# Patient Record
Sex: FEMALE | Race: OTHER | NOT HISPANIC OR LATINO
[De-identification: names, ages, dates, MRNs, and addresses within clinical notes are randomized per-mention and may not be internally consistent; named-entity substitution may affect disease eponyms.]

---

## 2018-06-13 ENCOUNTER — FORM ENCOUNTER (OUTPATIENT)
Age: 83
End: 2018-06-13

## 2018-06-14 ENCOUNTER — OUTPATIENT (OUTPATIENT)
Dept: OUTPATIENT SERVICES | Facility: HOSPITAL | Age: 83
LOS: 1 days | End: 2018-06-14
Payer: MEDICARE

## 2018-06-14 ENCOUNTER — APPOINTMENT (OUTPATIENT)
Dept: ORTHOPEDIC SURGERY | Facility: CLINIC | Age: 83
End: 2018-06-14
Payer: MEDICARE

## 2018-06-14 VITALS — BODY MASS INDEX: 25.52 KG/M2 | HEIGHT: 60 IN | WEIGHT: 130 LBS

## 2018-06-14 PROCEDURE — 73564 X-RAY EXAM KNEE 4 OR MORE: CPT

## 2018-06-14 PROCEDURE — 73564 X-RAY EXAM KNEE 4 OR MORE: CPT | Mod: 26,50

## 2018-06-14 PROCEDURE — 20610 DRAIN/INJ JOINT/BURSA W/O US: CPT | Mod: LT

## 2018-06-14 PROCEDURE — 72170 X-RAY EXAM OF PELVIS: CPT | Mod: 26

## 2018-06-14 PROCEDURE — 72170 X-RAY EXAM OF PELVIS: CPT

## 2018-10-04 ENCOUNTER — APPOINTMENT (OUTPATIENT)
Dept: ORTHOPEDIC SURGERY | Facility: CLINIC | Age: 83
End: 2018-10-04
Payer: MEDICARE

## 2018-10-04 DIAGNOSIS — Z86.2 PERSONAL HISTORY OF DISEASES OF THE BLOOD AND BLOOD-FORMING ORGANS AND CERTAIN DISORDERS INVOLVING THE IMMUNE MECHANISM: ICD-10-CM

## 2018-10-04 DIAGNOSIS — Z87.440 PERSONAL HISTORY OF URINARY (TRACT) INFECTIONS: ICD-10-CM

## 2018-10-04 DIAGNOSIS — Z87.01 PERSONAL HISTORY OF PNEUMONIA (RECURRENT): ICD-10-CM

## 2018-10-04 DIAGNOSIS — Z86.39 PERSONAL HISTORY OF OTHER ENDOCRINE, NUTRITIONAL AND METABOLIC DISEASE: ICD-10-CM

## 2018-10-04 DIAGNOSIS — Z60.2 PROBLEMS RELATED TO LIVING ALONE: ICD-10-CM

## 2018-10-04 DIAGNOSIS — Z87.39 PERSONAL HISTORY OF OTHER DISEASES OF THE MUSCULOSKELETAL SYSTEM AND CONNECTIVE TISSUE: ICD-10-CM

## 2018-10-04 DIAGNOSIS — Z86.59 PERSONAL HISTORY OF OTHER MENTAL AND BEHAVIORAL DISORDERS: ICD-10-CM

## 2018-10-04 PROCEDURE — 20610 DRAIN/INJ JOINT/BURSA W/O US: CPT | Mod: 50

## 2018-10-04 RX ADMIN — DEXAMETHASONE SODIUM PHOSPHATE 1 MG/30ML: 4 INJECTION, SOLUTION INTRA-ARTICULAR; INTRALESIONAL; INTRAMUSCULAR; INTRAVENOUS; SOFT TISSUE at 00:00

## 2018-10-04 SDOH — SOCIAL STABILITY - SOCIAL INSECURITY: PROBLEMS RELATED TO LIVING ALONE: Z60.2

## 2018-10-11 ENCOUNTER — APPOINTMENT (OUTPATIENT)
Dept: ORTHOPEDIC SURGERY | Facility: CLINIC | Age: 83
End: 2018-10-11

## 2019-04-24 ENCOUNTER — FORM ENCOUNTER (OUTPATIENT)
Age: 84
End: 2019-04-24

## 2019-04-25 ENCOUNTER — APPOINTMENT (OUTPATIENT)
Dept: ORTHOPEDIC SURGERY | Facility: CLINIC | Age: 84
End: 2019-04-25
Payer: MEDICARE

## 2019-04-25 ENCOUNTER — OUTPATIENT (OUTPATIENT)
Dept: OUTPATIENT SERVICES | Facility: HOSPITAL | Age: 84
LOS: 1 days | End: 2019-04-25
Payer: MEDICARE

## 2019-04-25 PROCEDURE — 20610 DRAIN/INJ JOINT/BURSA W/O US: CPT | Mod: 50

## 2019-04-25 PROCEDURE — 99213 OFFICE O/P EST LOW 20 MIN: CPT | Mod: 25

## 2019-04-25 PROCEDURE — 73564 X-RAY EXAM KNEE 4 OR MORE: CPT | Mod: 26,50

## 2019-04-25 PROCEDURE — 73564 X-RAY EXAM KNEE 4 OR MORE: CPT

## 2019-04-25 RX ORDER — DEXAMETHASONE SODIUM PHOSPHATE 4 MG/ML
120 INJECTION, SOLUTION INTRA-ARTICULAR; INTRALESIONAL; INTRAMUSCULAR; INTRAVENOUS; SOFT TISSUE
Refills: 0 | Status: COMPLETED | OUTPATIENT
Start: 2019-04-25

## 2019-04-25 RX ADMIN — DEXAMETHASONE SODIUM PHOSPHATE 1 MG/30ML: 4 INJECTION, SOLUTION INTRA-ARTICULAR; INTRALESIONAL; INTRAMUSCULAR; INTRAVENOUS; SOFT TISSUE at 00:00

## 2019-04-25 NOTE — PROCEDURE
[Injection] : Injection [Knee Joint] : knee joint [Bilateral] : of bilateral [Patient] : patient [Risk] : risk [Benefits] : benefits [Alternatives] : alternatives [Bleeding] : bleeding [Infection] : infection [Allergic Reaction] : allergic reaction [Alcohol] : Alcohol [Verbal Consent Obtained] : verbal consent was obtained prior to the procedure [Betadine] : Betadine [Ethyl Chloride Spray] : ethyl chloride spray was used as a topical anesthetic [20] : a 20-gauge [Lateral] : lateral [1% Lidocaine___(mL)] : [unfilled] mL of 1% Lidocaine [1.5  inch] : 1.5 inch needle [Dexameth. 4mg/mL___(mL)] : [unfilled] ~U mL of 4 mg/mL dexamethasone [Bandage Applied] : a bandage [Tolerated Well] : The patient tolerated the procedure well [No Strenuous Activity___day(s)] : avoid strenuous activity for [unfilled] day(s) [None] : none [PRN] : as needed

## 2019-04-25 NOTE — DISCUSSION/SUMMARY
[de-identified] : Diagnosis, prognosis and treatment options discussed. Conservative management including activity modification, therapeutic exercise with PT, topical and oral antiinflammatories, steroid and HA injections discussed. Patient was given a steroid injection to bilateral knees today. She will follow up pending HA authorization

## 2019-04-25 NOTE — HISTORY OF PRESENT ILLNESS
[de-identified] : Patient presents for follow up of bilateral knee pain. She states l;eft knee has become worse in last couple months. She reports pain worse after walking long distances and when waking up in the morning. She takes NSAIDs prn for pain. She had steroid injection in the past with minimal relief.

## 2019-04-25 NOTE — PHYSICAL EXAM
[de-identified] : Constitutional: Well appearing. No acute distress.\par Mental Status: Alert & oriented to person, place and time. Normal affect.\par Pulmonary: No respiratory distress. Normal chest excursion.\par Abdomen: Soft and not distended.\par  \par Gait: Normal.\par Ambulatory assist devices: None.\par  \par Cervical spine: Skin intact. No visible deformity. Painless active ROM without evident restriction.\par Bilateral upper extremities: Skin intact. No deformity. Painless active ROM without evident restriction.\par Thoracolumbar spine: No deformity. No tenderness. No radicular pain on passive straight leg raise bilaterally.\par  \par Pelvis: No pelvic obliquity. No tenderness.\par Leg lengths: Equal.\par  \par Bilateral Hips: No swelling or deformity. No focal tenderness. Painless and unrestricted range of motion. No crepitation. Hip flexor and abductor power 5/5. \par  \par Right Knee:\par Skin intact.\par No surgical scars.\par No erythema or ecchymosis.\par No swelling or effusion.\par No deformity.\par Mild medial joint line tenderness.\par Painless ROM from full extension to 120 degrees of flexion.\par Central patellar tracking.\par No crepitation.\par No instability.\par Quadriceps power 5/5.\par  \par Left Knee:\par Skin intact.\par No surgical scars.\par No erythema or ecchymosis.\par No swelling or effusion.\par No deformity.\par Mild medial joint line tenderness.\par Painless ROM from full extension to 120 degrees of flexion.\par Central patellar tracking.\par No crepitation.\par No instability.\par Quadriceps power 5/5.\par  \par Neurological: Intact distal crude touch sensation. Normal distal motor power. Symmetric knee jerk and ankle jerk reflexes bilaterally.\par Cardiovascular: Palpable dorsalis pedis and posterior tibialis pulses. Brisk capillary refill. No peripheral edema.\par Lymphatics: No peripheral adenopathy appreciated.\par \par  [de-identified] : X-rays taken today demonstrate bilateral knees with moderate medial compartment joint space narrowing

## 2019-04-25 NOTE — ADDENDUM
[FreeTextEntry1] : Dr. Sanches was physically present during the key portions the encounter, provided assistance as needed, and formulated the assessment and plan as documented above.\par \par

## 2019-10-02 PROBLEM — Z60.2 PERSON LIVING ALONE: Status: ACTIVE | Noted: 2018-06-14

## 2020-07-27 ENCOUNTER — RESULT REVIEW (OUTPATIENT)
Age: 85
End: 2020-07-27

## 2020-07-27 ENCOUNTER — OUTPATIENT (OUTPATIENT)
Dept: OUTPATIENT SERVICES | Facility: HOSPITAL | Age: 85
LOS: 1 days | End: 2020-07-27
Payer: MEDICARE

## 2020-07-27 ENCOUNTER — APPOINTMENT (OUTPATIENT)
Dept: ORTHOPEDIC SURGERY | Facility: CLINIC | Age: 85
End: 2020-07-27
Payer: MEDICARE

## 2020-07-27 PROCEDURE — 73562 X-RAY EXAM OF KNEE 3: CPT

## 2020-07-27 PROCEDURE — 99213 OFFICE O/P EST LOW 20 MIN: CPT | Mod: 25

## 2020-07-27 PROCEDURE — 73562 X-RAY EXAM OF KNEE 3: CPT | Mod: 26,50

## 2020-07-27 PROCEDURE — 20610 DRAIN/INJ JOINT/BURSA W/O US: CPT | Mod: RT

## 2020-07-31 NOTE — HISTORY OF PRESENT ILLNESS
[de-identified] : 89 yo female previous patient of Dr. Bryant seen for bilateral knee DJD L worse than right with cortisone injections 2 years ago with moderate relief. Has been ambulating more recently since COVID and reports worsening pain along medial knees. Occasional tylenol but no NSAIDs.

## 2020-07-31 NOTE — DISCUSSION/SUMMARY
[de-identified] : A\par Bilateral knee OA\par \par P\par Mobic\par Exercises\par CSI\par Activity modification\par RTO 1 month\par \par \par All medical record entries made by the PA/Scribe/Fellow are at my, Dr. Jani Mackenzie's direction and personally dictated by me on 07/27/2020]. I have reviewed the chart and agree that the record accurately reflects my personal performance of the history, physical exam, assessment, and plan. I have also personally directed reviewed, and agreed with the chart.\par

## 2020-07-31 NOTE — PHYSICAL EXAM
[de-identified] : BLE: pain at medial and lateral joint line of knees, crepitus, ROM 0-130, neg log roll. trace effusion, skin intact. lig stable. DNVI. [de-identified] : Bilateral xrays knee show Moderate OA-tricompartmental

## 2020-07-31 NOTE — PROCEDURE
[de-identified] : After verbal consent under sterile conditions both knees were injected using 40mg kenalog and 4cc lidocaine plain for which the atient tolerated wel expressing interval improvement in symptoms.

## 2020-08-24 ENCOUNTER — EMERGENCY (EMERGENCY)
Facility: HOSPITAL | Age: 85
LOS: 1 days | Discharge: HOME | End: 2020-08-24
Attending: EMERGENCY MEDICINE | Admitting: EMERGENCY MEDICINE
Payer: MEDICARE

## 2020-08-24 VITALS
OXYGEN SATURATION: 97 % | HEART RATE: 71 BPM | DIASTOLIC BLOOD PRESSURE: 72 MMHG | SYSTOLIC BLOOD PRESSURE: 182 MMHG | RESPIRATION RATE: 18 BRPM | TEMPERATURE: 98 F

## 2020-08-24 DIAGNOSIS — R11.0 NAUSEA: ICD-10-CM

## 2020-08-24 DIAGNOSIS — R51 HEADACHE: ICD-10-CM

## 2020-08-24 PROCEDURE — 99285 EMERGENCY DEPT VISIT HI MDM: CPT

## 2020-08-24 PROCEDURE — 93010 ELECTROCARDIOGRAM REPORT: CPT

## 2020-08-24 RX ORDER — METOCLOPRAMIDE HCL 10 MG
10 TABLET ORAL ONCE
Refills: 0 | Status: COMPLETED | OUTPATIENT
Start: 2020-08-24 | End: 2020-08-24

## 2020-08-24 RX ORDER — ACETAMINOPHEN 500 MG
650 TABLET ORAL ONCE
Refills: 0 | Status: DISCONTINUED | OUTPATIENT
Start: 2020-08-24 | End: 2020-08-25

## 2020-08-24 NOTE — ED ADULT TRIAGE NOTE - ARRIVAL INFO ADDITIONAL COMMENTS
pt c/o head pressure, abd pain for "a long time".  pt states her PMD just can't figure out what is wrong with her.

## 2020-08-24 NOTE — ED ADULT NURSE NOTE - CHPI ED NUR SYMPTOMS NEG
no weakness/no chills/no decreased eating/drinking/no vomiting/no fever/no dizziness/no tingling/no nausea

## 2020-08-24 NOTE — ED ADULT NURSE NOTE - OBJECTIVE STATEMENT
Received ambulatory with chief complaints of headache rated as 9/10 and nausea. AOX4, speaking full sentences. +PERRLA.  Patient denies chest pain, shortness of breath, difficulty breathing and any form of distress not noted.  Resps even and nonlabored. No obvious trauma/injury/deformity noted. Patient oriented to ED area. All needs attended. POC reviewed. Hourly rounding in progress.

## 2020-08-25 VITALS
SYSTOLIC BLOOD PRESSURE: 161 MMHG | RESPIRATION RATE: 18 BRPM | TEMPERATURE: 98 F | HEART RATE: 65 BPM | OXYGEN SATURATION: 97 % | DIASTOLIC BLOOD PRESSURE: 78 MMHG

## 2020-08-25 LAB
ANION GAP SERPL CALC-SCNC: 12 MMOL/L — SIGNIFICANT CHANGE UP (ref 5–17)
BASOPHILS # BLD AUTO: 0.02 K/UL — SIGNIFICANT CHANGE UP (ref 0–0.2)
BASOPHILS NFR BLD AUTO: 0.2 % — SIGNIFICANT CHANGE UP (ref 0–2)
BUN SERPL-MCNC: 20 MG/DL — SIGNIFICANT CHANGE UP (ref 7–23)
CALCIUM SERPL-MCNC: 9.4 MG/DL — SIGNIFICANT CHANGE UP (ref 8.4–10.5)
CHLORIDE SERPL-SCNC: 96 MMOL/L — SIGNIFICANT CHANGE UP (ref 96–108)
CO2 SERPL-SCNC: 26 MMOL/L — SIGNIFICANT CHANGE UP (ref 22–31)
CREAT SERPL-MCNC: 0.74 MG/DL — SIGNIFICANT CHANGE UP (ref 0.5–1.3)
EOSINOPHIL # BLD AUTO: 0.08 K/UL — SIGNIFICANT CHANGE UP (ref 0–0.5)
EOSINOPHIL NFR BLD AUTO: 1 % — SIGNIFICANT CHANGE UP (ref 0–6)
GLUCOSE SERPL-MCNC: 117 MG/DL — HIGH (ref 70–99)
HCT VFR BLD CALC: 34.5 % — SIGNIFICANT CHANGE UP (ref 34.5–45)
HGB BLD-MCNC: 11.6 G/DL — SIGNIFICANT CHANGE UP (ref 11.5–15.5)
IMM GRANULOCYTES NFR BLD AUTO: 0.4 % — SIGNIFICANT CHANGE UP (ref 0–1.5)
LYMPHOCYTES # BLD AUTO: 1.36 K/UL — SIGNIFICANT CHANGE UP (ref 1–3.3)
LYMPHOCYTES # BLD AUTO: 16.5 % — SIGNIFICANT CHANGE UP (ref 13–44)
MCHC RBC-ENTMCNC: 32.9 PG — SIGNIFICANT CHANGE UP (ref 27–34)
MCHC RBC-ENTMCNC: 33.6 GM/DL — SIGNIFICANT CHANGE UP (ref 32–36)
MCV RBC AUTO: 97.7 FL — SIGNIFICANT CHANGE UP (ref 80–100)
MONOCYTES # BLD AUTO: 0.72 K/UL — SIGNIFICANT CHANGE UP (ref 0–0.9)
MONOCYTES NFR BLD AUTO: 8.8 % — SIGNIFICANT CHANGE UP (ref 2–14)
NEUTROPHILS # BLD AUTO: 6.01 K/UL — SIGNIFICANT CHANGE UP (ref 1.8–7.4)
NEUTROPHILS NFR BLD AUTO: 73.1 % — SIGNIFICANT CHANGE UP (ref 43–77)
NRBC # BLD: 0 /100 WBCS — SIGNIFICANT CHANGE UP (ref 0–0)
PLATELET # BLD AUTO: 235 K/UL — SIGNIFICANT CHANGE UP (ref 150–400)
POTASSIUM SERPL-MCNC: 4.5 MMOL/L — SIGNIFICANT CHANGE UP (ref 3.5–5.3)
POTASSIUM SERPL-SCNC: 4.5 MMOL/L — SIGNIFICANT CHANGE UP (ref 3.5–5.3)
RBC # BLD: 3.53 M/UL — LOW (ref 3.8–5.2)
RBC # FLD: 12.2 % — SIGNIFICANT CHANGE UP (ref 10.3–14.5)
SODIUM SERPL-SCNC: 134 MMOL/L — LOW (ref 135–145)
TROPONIN T SERPL-MCNC: <0.01 NG/ML — SIGNIFICANT CHANGE UP (ref 0–0.01)
WBC # BLD: 8.22 K/UL — SIGNIFICANT CHANGE UP (ref 3.8–10.5)
WBC # FLD AUTO: 8.22 K/UL — SIGNIFICANT CHANGE UP (ref 3.8–10.5)

## 2020-08-25 PROCEDURE — 84484 ASSAY OF TROPONIN QUANT: CPT

## 2020-08-25 PROCEDURE — 99284 EMERGENCY DEPT VISIT MOD MDM: CPT | Mod: 25

## 2020-08-25 PROCEDURE — 70450 CT HEAD/BRAIN W/O DYE: CPT

## 2020-08-25 PROCEDURE — 93005 ELECTROCARDIOGRAM TRACING: CPT

## 2020-08-25 PROCEDURE — 80048 BASIC METABOLIC PNL TOTAL CA: CPT

## 2020-08-25 PROCEDURE — 36415 COLL VENOUS BLD VENIPUNCTURE: CPT

## 2020-08-25 PROCEDURE — 96374 THER/PROPH/DIAG INJ IV PUSH: CPT

## 2020-08-25 PROCEDURE — 70450 CT HEAD/BRAIN W/O DYE: CPT | Mod: 26

## 2020-08-25 PROCEDURE — 85025 COMPLETE CBC W/AUTO DIFF WBC: CPT

## 2020-08-25 RX ORDER — ACETAMINOPHEN 500 MG
650 TABLET ORAL ONCE
Refills: 0 | Status: COMPLETED | OUTPATIENT
Start: 2020-08-25 | End: 2020-08-25

## 2020-08-25 RX ADMIN — Medication 650 MILLIGRAM(S): at 01:00

## 2020-08-25 RX ADMIN — Medication 10 MILLIGRAM(S): at 00:26

## 2020-08-25 RX ADMIN — Medication 650 MILLIGRAM(S): at 00:38

## 2020-08-25 NOTE — ED PROVIDER NOTE - PHYSICAL EXAMINATION
CONST: nontoxic NAD speaking in full sentences  HEAD: atraumatic  EYES: conjunctivae clear  ENT: mmm  NECK: supple/FROM, no jvd, nttp  CARD: rrr no murmurs  CHEST: ctab no r/r/w, no stridor/retractions/tripoding  ABD: soft, nd, nttp, no rebound/guarding  EXT: FROM, symmetric distal pulses intact  SKIN: warm, dry, no rash, no pedal edema, cap refill <2sec  NEURO: a+ox3, no facial asymmetry, no aphasia, PERRL, EOMI, no neglect, CN II-XII intact, ftn wnl, neg pronator, 5/5 strength x4, gross sensation intact x4, normal gait

## 2020-08-25 NOTE — ED PROVIDER NOTE - OBJECTIVE STATEMENT
88F nonsmoker, hld, hypothyroid, c/o <48h constant nonpositional nonexertional nonradiating frontal/posterior head pressure unrelieved w/ assoc nausesa. pt reports similar chronic ha that usually resolves w/ nsaids so became worried when lasted >24h and was unrelieved s/p nsaids. no prior head imaging. no fever/chills, no dizziness, no neck pain/stiffness, no photophobia/vision changes, no uri/cough, no cp/sob, no abd pain/n/v, no diarrhea, no hematochezia/melena, no dysuria, no rash, no prior covid, no trauma, no etoh/ivdu.    at baseline, highly functional, a+ox3, +ADLs, ambulates unassisted, lives at home 88F nonsmoker, hld, hypothyroid, c/o <48h constant nonpositional nonexertional nonradiating frontal/posterior head pressure unrelieved w/ assoc nausea. pt is able to sleep. pt reports similar chronic ha that usually resolves w/ nsaids so became worried when lasted >24h and was unrelieved s/p nsaids. no prior head imaging. no fever/chills, no dizziness, no neck pain/stiffness, no photophobia/vision changes, no uri/cough, no cp/sob, no abd pain/n/v, no diarrhea, no hematochezia/melena, no dysuria, no rash, no prior covid, no trauma, no etoh/ivdu.    at baseline, highly functional, a+ox3, +ADLs, ambulates unassisted, lives at home.

## 2020-08-25 NOTE — ED PROVIDER NOTE - NSFOLLOWUPINSTRUCTIONS_ED_ALL_ED_FT
PLEASE REST AND REMAIN HYDRATED. CONTINUE IBUPROFEN/TYLENOL AS NEEDED FOR HEADACHE. PLEASE FOLLOW-UP WITH YOUR PCP IN 1-2 DAYS.    PLEASE RETURN TO THE EMERGENCY DEPARTMENT IF FEVER, WORSENING HEADACHE, NECK PAIN, CHEST PAIN, SHORTNESS OF BREATH, VOMITING, OTHER CONCERNING SYMPTOMS.

## 2020-08-25 NOTE — ED PROVIDER NOTE - PATIENT PORTAL LINK FT
You can access the FollowMyHealth Patient Portal offered by [facility] by registering at the following website: [email or web address]. By joining [facility]’s FollowMyHealth portal, you will also be able to view your health information using other applications (apps) compatible with our system.

## 2020-08-25 NOTE — ED PROVIDER NOTE - CLINICAL SUMMARY MEDICAL DECISION MAKING FREE TEXT BOX
avss. nontoxic. NAD. no systemic sx. no acute focal neuro deficits. no red flags. no risk factors. no leukocytosis vs significant anemia vs electrolyte abnl. trop neg. ekg w/o acute abnl. ct head w/o acute abnl. sx resolved. tolerating po. no indication for inpatient hospitalization at this time. will dc w/ outpatient pcp fu, strict return precautions. pt agrees w/ plan. questions answered.

## 2020-09-01 ENCOUNTER — APPOINTMENT (OUTPATIENT)
Dept: ORTHOPEDIC SURGERY | Facility: CLINIC | Age: 85
End: 2020-09-01
Payer: MEDICARE

## 2020-09-01 PROCEDURE — 99213 OFFICE O/P EST LOW 20 MIN: CPT

## 2020-09-03 NOTE — HISTORY OF PRESENT ILLNESS
[Home] : at home, [unfilled] , at the time of the visit. [Medical Office: ([facility])___] : at the medical office located in  [Verbal consent obtained from patient] : the patient, [unfilled] [de-identified] : 87 y/o F, here for follow up of bilateral knee pain. Has intermittent knee pain, 7/10, described as dull/achy for several years.

## 2020-09-03 NOTE — ASSESSMENT
[FreeTextEntry1] : Assessment\par Bilateral knee OA\par \par Plan\par Send script for physical therapy\par \par F/U in 1 month

## 2020-09-03 NOTE — PHYSICAL EXAM
[de-identified] : General: Not in acute distress, dressed appropriately, sitting on examination table\par Skin: Warm and dry, normal turgor, no rashes\par Neurological: AOx3, Cranial nerves grossly in tact\par Psych: Mood and affect appropriate\par \par Bilateral Knees: Alignment: Neutral Tender: Medial ROM: 0-120 Stable 5/5 Strength. DNVI. Ambulates with a cane. \par \par

## 2021-03-31 ENCOUNTER — APPOINTMENT (OUTPATIENT)
Dept: NEUROLOGY | Facility: CLINIC | Age: 86
End: 2021-03-31
Payer: MEDICARE

## 2021-03-31 VITALS
HEART RATE: 71 BPM | HEIGHT: 60 IN | SYSTOLIC BLOOD PRESSURE: 140 MMHG | BODY MASS INDEX: 25.52 KG/M2 | TEMPERATURE: 97.7 F | WEIGHT: 130 LBS | DIASTOLIC BLOOD PRESSURE: 74 MMHG | OXYGEN SATURATION: 95 %

## 2021-03-31 PROCEDURE — 99204 OFFICE O/P NEW MOD 45 MIN: CPT

## 2021-05-03 ENCOUNTER — APPOINTMENT (OUTPATIENT)
Dept: ORTHOPEDIC SURGERY | Facility: CLINIC | Age: 86
End: 2021-05-03
Payer: MEDICARE

## 2021-05-03 VITALS
SYSTOLIC BLOOD PRESSURE: 128 MMHG | BODY MASS INDEX: 25.52 KG/M2 | HEART RATE: 69 BPM | WEIGHT: 130 LBS | DIASTOLIC BLOOD PRESSURE: 69 MMHG | HEIGHT: 60 IN

## 2021-05-03 DIAGNOSIS — Z80.9 FAMILY HISTORY OF MALIGNANT NEOPLASM, UNSPECIFIED: ICD-10-CM

## 2021-05-03 DIAGNOSIS — Z78.9 OTHER SPECIFIED HEALTH STATUS: ICD-10-CM

## 2021-05-03 DIAGNOSIS — Z87.39 PERSONAL HISTORY OF OTHER DISEASES OF THE MUSCULOSKELETAL SYSTEM AND CONNECTIVE TISSUE: ICD-10-CM

## 2021-05-03 DIAGNOSIS — Z87.891 PERSONAL HISTORY OF NICOTINE DEPENDENCE: ICD-10-CM

## 2021-05-03 DIAGNOSIS — Z86.39 PERSONAL HISTORY OF OTHER ENDOCRINE, NUTRITIONAL AND METABOLIC DISEASE: ICD-10-CM

## 2021-05-03 DIAGNOSIS — Z82.49 FAMILY HISTORY OF ISCHEMIC HEART DISEASE AND OTHER DISEASES OF THE CIRCULATORY SYSTEM: ICD-10-CM

## 2021-05-03 PROCEDURE — 73564 X-RAY EXAM KNEE 4 OR MORE: CPT | Mod: 50

## 2021-05-03 PROCEDURE — 20610 DRAIN/INJ JOINT/BURSA W/O US: CPT | Mod: 50

## 2021-05-03 PROCEDURE — 99215 OFFICE O/P EST HI 40 MIN: CPT | Mod: 25

## 2021-05-03 NOTE — DISCUSSION/SUMMARY
[de-identified] : Discussed at length the nature of the patient's condition. Their bilateral knee symptoms appear secondary to degenerative arthritis. We reviewed operative and non operative treatment. I do not think that surgical intervention is warranted at this time. Therefore, we will continue nonoperatively. We will seek authorization for bilateral knee Euflexxa injections. Once we receive authorization, we will proceed accordingly. In the interim, patient was given a bilateral knee cortisone injection today as detailed above for symptomatic relief. I also suggest PT,  Advil or Aleve prn. They can continue activities as tolerated.

## 2021-05-03 NOTE — HISTORY OF PRESENT ILLNESS
[de-identified] : 89 year year old female presents for initial evaluation of bilateral knee pain L>R x20 years. She denies any recent trauma or injury. The pain is diffuse and is increased with stairs and walking. The pain is sharp/dull depending on the day, denying buckling, locking, or clicking.  She is able to walk 5-6 blocks and navigates stairs one step at a time using the railing. The patient takes Tylenol, Aleve, and Ibuprofen with no improvement. The patient did have treatment by Dr. Medrano 8 years ago with gel shots and Dr. Sanches who gave her left knee cortisone injection in 2018. In April 2019  Dr. Brownlee gave her bilateral knee cortisone and gel shots. Most recently she saw Dr. Mackenzie last year in July where she received  bilateral cortisone injections and PT. The patient would like to discuss a further treatment plan with Dr. Dowd today.

## 2021-05-03 NOTE — PROCEDURE
[de-identified] : BILATERAL KNEE CORTISONE INJECTION\par Discussed at length with the patient the planned steroid and lidocaine injection. The risks, benefits, convalescence and alternatives were reviewed. The possible side effects discussed included but were not limited to: pain, swelling, heat and redness. These symptoms are generally mild but if they are extensive then contact the office. Giving pain relievers by mouth such as NSAID’s or Tylenol can generally treat the reactions to steroid and lidocaine. Rare cases of infection have been noted. Rash, hives and itching may occur post injection. If you have muscle pain or cramps, flushing and or swelling of the face, rapid heart beat, nausea, dizziness, fever, chills, headache, difficulty breathing, swelling in the arms or legs, or have a prickly feeling of your skin, contact a health care provider immediately.\par \par Following this discussion, the knee was prepped with betadine and under sterile conditions 9 cc of 1% lidocaine and 1 cc depo-medrol (40mg) were injected with a 21 gauge needle. The needle was introduced into the joint, aspiration was performed to ensure intra-articular placement and the medication was injected. Upon withdrawal of the needle the site was cleaned with alcohol and a bandaid applied. The patient tolerated the injection well and there were no adverse effects. Post injection instructions included no strenuous activity for 24 hours, cryotherapy and if there are any adverse effects to contact the office.

## 2021-05-03 NOTE — ADDENDUM
[FreeTextEntry1] : This note was written by Marcel Yepez on 05/03/2021 acting scribe for Dr. Sebastian Dowd M.D.\par \par I Dr. Sebastian Dowd have read and attest that all the information, medical decision making, and discharge instructions within are true and accurate.

## 2021-05-09 VITALS — BODY MASS INDEX: 25.52 KG/M2 | WEIGHT: 130 LBS | HEIGHT: 60 IN

## 2021-05-10 ENCOUNTER — APPOINTMENT (OUTPATIENT)
Dept: ORTHOPEDIC SURGERY | Facility: CLINIC | Age: 86
End: 2021-05-10
Payer: MEDICARE

## 2021-05-10 PROCEDURE — 20610 DRAIN/INJ JOINT/BURSA W/O US: CPT | Mod: 50

## 2021-05-10 NOTE — PROCEDURE

## 2021-05-16 VITALS — WEIGHT: 130 LBS | BODY MASS INDEX: 25.52 KG/M2 | HEIGHT: 60 IN

## 2021-05-17 ENCOUNTER — APPOINTMENT (OUTPATIENT)
Dept: ORTHOPEDIC SURGERY | Facility: CLINIC | Age: 86
End: 2021-05-17
Payer: MEDICARE

## 2021-05-17 PROCEDURE — 20610 DRAIN/INJ JOINT/BURSA W/O US: CPT | Mod: 50

## 2021-05-17 NOTE — PROCEDURE

## 2021-05-23 VITALS — HEIGHT: 60 IN | WEIGHT: 130 LBS | BODY MASS INDEX: 25.52 KG/M2

## 2021-05-24 ENCOUNTER — APPOINTMENT (OUTPATIENT)
Dept: ORTHOPEDIC SURGERY | Facility: CLINIC | Age: 86
End: 2021-05-24
Payer: MEDICARE

## 2021-05-24 PROCEDURE — 20610 DRAIN/INJ JOINT/BURSA W/O US: CPT | Mod: 50

## 2021-05-24 NOTE — PROCEDURE
[de-identified] : Euflexxa # 3 Bilateral knee\par Discussed at length with the patient the planned Euflexxa injection. The risks, benefits, convalescence and alternatives were reviewed. The possible side effects discussed included but were not limited to: pain, swelling, heat and redness. There symptoms are generally mild but if they are extensive then contact the office. Giving pain relievers by mouth such as NSAID’s or Tylenol can generally treat the reactions to Euflexxa. Rare cases of infection have been noted. Rash, hives and itching may occur post injection. If you have muscle pain or cramps, flushing and or swelling of the face, rapid heart beat, nausea, dizziness, fever, chills, headache, difficulty breathing, swelling in the arms or legs, or have a prickly feeling of your skin, contact a health care provider immediately.\par \par Following this discussion, the knee was prepped with betadine and under sterile condition the Euflexxa injection was performed with a 22 gauge needle. The needle was introduced into the joint, aspiration was performed to ensure intra-articular placement and the medication was injected. Upon withdrawal of the needle the site was cleaned with alcohol and a bandaid applied. The patient tolerated the injection well and there were no adverse effects. Post injection instructions included no strenuous activity for 24 hours, cryotherapy and if there are any adverse effects to contact the office.\par

## 2021-07-12 ENCOUNTER — APPOINTMENT (OUTPATIENT)
Dept: NEUROLOGY | Facility: CLINIC | Age: 86
End: 2021-07-12
Payer: MEDICARE

## 2021-07-12 VITALS
HEIGHT: 60 IN | RESPIRATION RATE: 14 BRPM | TEMPERATURE: 98 F | HEART RATE: 64 BPM | OXYGEN SATURATION: 96 % | WEIGHT: 130 LBS | BODY MASS INDEX: 25.52 KG/M2 | SYSTOLIC BLOOD PRESSURE: 130 MMHG | DIASTOLIC BLOOD PRESSURE: 79 MMHG

## 2021-07-12 PROCEDURE — 99214 OFFICE O/P EST MOD 30 MIN: CPT

## 2021-07-12 NOTE — HISTORY OF PRESENT ILLNESS
[FreeTextEntry1] : Interim Hx:7/12/2021\par \par Patient reports headaches have returned again. \par Occurring daily, no specific time\par Diffuse, variable in location throbbing pain.\par No double vision, blurry vision reported.\par No response to NSAIDs\par Does report discomfort in stomach at times. No history of ulcers\par \par Went to urgent care last week and was given Fioricet which did not really  help. Made dizziness worse\par \par Now reports have been having these headaches on and off for 1.5 years.\par Has periods where she is feeling better and forgets about headaches.\par \par Doing PT for vertigo/neck pain \par _________________\par Initial Hx: 3/31/2021\par 88 yo F w/ vertigo presents today for headaches \par \par She reports she had persistent headaches for the past few months. Almost daily.\par Started with or after bout of vertigo in Nov/Dec.\par Past 1-2 weeks she has become headache free after starting acupuncture \par \par History of intermittent vertigo over the past 5 years.\par Has occurred about 5 x, does have severe symptoms \par \par [facility] CT head 1/27/2021\par Mild microvascular matter change in the centrum semiovale\par \par \par [facility] MRI brain 3/3/2021\par 1. Stable mild/moderate age-related cortical atrophy and slightly increased, mild microvascular ischemic white matter demyelination.\par 2. No acute intracranial pathology or intracranial hemorrhage\par 3. Interval development of mild mild left sphenoid disease and very small lateral right maxillary sinus retention cyst.

## 2021-07-12 NOTE — PHYSICAL EXAM
[General Appearance - Alert] : alert [General Appearance - In No Acute Distress] : in no acute distress [Oriented To Time, Place, And Person] : oriented to person, place, and time [Impaired Insight] : insight and judgment were intact [Person] : oriented to person [Place] : oriented to place [Time] : oriented to time [Fluency] : fluency intact [Cranial Nerves Optic (II)] : visual acuity intact bilaterally,  visual fields full to confrontation, pupils equal round and reactive to light [Cranial Nerves Oculomotor (III)] : extraocular motion intact [Cranial Nerves Trigeminal (V)] : facial sensation intact symmetrically [Cranial Nerves Facial (VII)] : face symmetrical [Cranial Nerves Vestibulocochlear (VIII)] : hearing was intact bilaterally [Cranial Nerves Glossopharyngeal (IX)] : tongue and palate midline [Cranial Nerves Accessory (XI - Cranial And Spinal)] : head turning and shoulder shrug symmetric [Cranial Nerves Hypoglossal (XII)] : there was no tongue deviation with protrusion [Motor Tone] : muscle tone was normal in all four extremities [Motor Strength] : muscle strength was normal in all four extremities [Sensation Tactile Decrease] : light touch was intact [Abnormal Walk] : normal gait

## 2021-07-12 NOTE — DISCUSSION/SUMMARY
[FreeTextEntry1] : 88 yo F presents for evaluation of daily  headaches\par \par plan:\par Discussed going to ER - patient does not want to at this time\par Provided Ubrelvy sample to patient  \par Start Nortriptyline 10 mg nightly as headache preventative. Informed of side effects\par Duexis PRN- (also has OA)\par Counseled on habitual risk with Fioricet- advised to d/c or using sparingly \par follow up in 1 month

## 2021-08-16 ENCOUNTER — APPOINTMENT (OUTPATIENT)
Dept: ORTHOPEDIC SURGERY | Facility: CLINIC | Age: 86
End: 2021-08-16
Payer: MEDICARE

## 2021-08-16 PROCEDURE — 73564 X-RAY EXAM KNEE 4 OR MORE: CPT | Mod: 50

## 2021-08-16 PROCEDURE — 20610 DRAIN/INJ JOINT/BURSA W/O US: CPT | Mod: 50

## 2021-08-16 PROCEDURE — 99213 OFFICE O/P EST LOW 20 MIN: CPT | Mod: 25

## 2021-08-16 NOTE — HISTORY OF PRESENT ILLNESS
[de-identified] : 80 year old female presents for follow-up evaluation of bilateral knee pain related to chronic pain and significant arthritis. She has been undergoing nonoperative treatments. Pt had a corticosteroid injections done in May 2021 and followed with Euflexxa injections. She would like to repeat the same treatment. She has been taking OTC Tylenol and does exercises on her own. Pt is willing to go for physical therapy and work with a therapist if recommended by Dr. Dowd.

## 2021-08-16 NOTE — END OF VISIT
[FreeTextEntry3] : This note was written by Mary Coyne on 08/16/2021 acting as scribe for Dr. Sebastian Dowd M.D.\par \par I, Dr. Sebastian Dowd, have read and attest that all the information, medical decision making and discharge instructions within are true and accurate.

## 2021-08-16 NOTE — DISCUSSION/SUMMARY
[de-identified] : Discussed at length the nature of the patient’s condition. Their BILATERAL knee symptoms appear secondary to degenerative arthritis. While I believe she would eventually benefit from a staged BILATERAL TKR, she is hesitant to have surgery at this time. Therefore, we will continue nonoperatively. In the interim, patient was given a BILATERAL knee cortisone injection as detailed above for symptomatic relief. I have recommended a course of physiotherapy, weight management and Tylenol prn. They can continue activities as tolerated. Patient may follow up in 3 months, at which we will consider Hyalgan injections

## 2021-08-16 NOTE — PHYSICAL EXAM
[de-identified] : Left knee\par Inspection: trace effusion and popiteal cyst noted \par Wounds: none.\par Alignment: normal.\par Palpation: no specific tenderness on palpation.\par ROM active (in degrees): 0-120 with crepitus no instability and good strength \par Ligamentous laxity: all ligaments appear stable,, negative ant. drawer test, negative post. drawer test, stable to varus stress test, stable to valgus stress test. negative Lachman's test, negative pivot shift test\par Meniscal Test: negative McMurrays, negative Rodolfo.\par Patellofemoral Alignment Test: Q angle-, normal.\par Muscle Test: good quad strength.\par Leg examination: calf is soft and non-tender. [de-identified] : RIGHT knee xrays, 4 views standing AP/Lateral and Merchant films, and 45 degree PA standing view, taken at the office today shows diffuse degenerative arthritis, medial joint space narrowing, sclerosis, bone on bone, narrowing of the patellofemoral joint, Kellgren and Deric grade 4\par \par LEFT knee xrays, 4 views standing AP/Lateral and Merchant films, and 45 degree PA standing view, taken at the office today shows diffuse degenerative arthritis, medial joint space narrowing, sclerosis, bone on bone, narrowing of the patellofemoral joint, Kellgren and Deric grade 4. \par

## 2021-08-16 NOTE — PROCEDURE
[de-identified] : BILATERAL KNEE CORTISONE INJECTION\par Discussed at length with the patient the planned steroid and lidocaine injection. The risks, benefits, convalescence and alternatives were reviewed. The possible side effects discussed included but were not limited to: pain, swelling, heat and redness. These symptoms are generally mild but if they are extensive then contact the office. Giving pain relievers by mouth such as NSAID’s or Tylenol can generally treat the reactions to steroid and lidocaine. Rare cases of infection have been noted. Rash, hives and itching may occur post injection. If you have muscle pain or cramps, flushing and or swelling of the face, rapid heart beat, nausea, dizziness, fever, chills, headache, difficulty breathing, swelling in the arms or legs, or have a prickly feeling of your skin, contact a health care provider immediately.\par \par Following this discussion, the knee was prepped with betadine and under sterile conditions 5 cc of 2% lidocaine and 1 cc depo-medrol (40mg) were injected with a 21 gauge needle. The needle was introduced into the joint, aspiration was performed to ensure intra-articular placement and the medication was injected. Upon withdrawal of the needle the site was cleaned with alcohol and a bandaid applied. The patient tolerated the injection well and there were no adverse effects. Post injection instructions included no strenuous activity for 24 hours, cryotherapy and if there are any adverse effects to contact the office.

## 2021-08-23 ENCOUNTER — APPOINTMENT (OUTPATIENT)
Dept: NEUROLOGY | Facility: CLINIC | Age: 86
End: 2021-08-23

## 2021-11-15 ENCOUNTER — APPOINTMENT (OUTPATIENT)
Dept: ORTHOPEDIC SURGERY | Facility: CLINIC | Age: 86
End: 2021-11-15
Payer: MEDICARE

## 2021-11-15 PROCEDURE — 99213 OFFICE O/P EST LOW 20 MIN: CPT | Mod: 25

## 2021-11-15 PROCEDURE — 73564 X-RAY EXAM KNEE 4 OR MORE: CPT | Mod: 50

## 2021-11-15 PROCEDURE — 20610 DRAIN/INJ JOINT/BURSA W/O US: CPT | Mod: 50

## 2021-11-15 NOTE — HISTORY OF PRESENT ILLNESS
[de-identified] : 89 year old female presents for follow-up evaluation of bilateral knee arthritis. She had cortisone injections done on 5/3/2021 and a series of Euflexxa injections in May of 2021. She had a second cortisone injection 8/15/2021. All of the aforementioned injections did not help according to the patient. She takes Tylenol and Ibuprofen prn. The patient is scheduled for Hyalgan injections.

## 2021-11-15 NOTE — END OF VISIT
[FreeTextEntry3] : This note was written by Nichelle Bartlett on 11/15/2021 acting as scribe for Dr. Sebastian Dowd M.D.\par \par I, Dr. Sebastian Dowd, have read and attest that all the information, medical decision making and discharge instructions within are true and accurate.

## 2021-11-15 NOTE — PHYSICAL EXAM
[de-identified] : Left knee\par Inspection: trace effusion and popiteal cyst noted \par Wounds: none.\par Alignment: normal.\par Palpation: no specific tenderness on palpation.\par ROM active (in degrees): 0-120 with crepitus no instability and good strength \par Ligamentous laxity: all ligaments appear stable,, negative ant. drawer test, negative post. drawer test, stable to varus stress test, stable to valgus stress test. negative Lachman's test, negative pivot shift test\par Meniscal Test: negative McMurrays, negative Rodolfo.\par Patellofemoral Alignment Test: Q angle-, normal.\par Muscle Test: good quad strength.\par Leg examination: calf is soft and non-tender.  [de-identified] : RIGHT knee xrays, 4 views standing AP/Lateral and Merchant films, and 45 degree PA standing view, taken at the office today shows diffuse degenerative arthritis, medial joint space narrowing, sclerosis, bone on bone, narrowing of the patellofemoral joint, Kellgren and Deric grade 4\par \par LEFT knee xrays, 4 views standing AP/Lateral and Merchant films, and 45 degree PA standing view, taken at the office today shows diffuse degenerative arthritis, medial joint space narrowing, sclerosis, bone on bone, narrowing of the patellofemoral joint, Kellgren and Deric grade 4. \par

## 2021-11-15 NOTE — DISCUSSION/SUMMARY
[de-identified] : Discussed at length the nature of the patient's condition. Their bilateral knee symptoms appear secondary to degenerative arthritis. The patient is authorized and scheduled for Hyalgan injections. In the interim, I suggested that she continue home exercise and Tylenol/Ibuprofen prn.

## 2021-11-22 ENCOUNTER — APPOINTMENT (OUTPATIENT)
Dept: ORTHOPEDIC SURGERY | Facility: CLINIC | Age: 86
End: 2021-11-22
Payer: MEDICARE

## 2021-11-22 VITALS — HEIGHT: 60 IN | WEIGHT: 130 LBS | BODY MASS INDEX: 25.52 KG/M2

## 2021-11-22 PROCEDURE — 20610 DRAIN/INJ JOINT/BURSA W/O US: CPT | Mod: 50

## 2021-11-22 NOTE — PROCEDURE
[de-identified] : Hyalgan #1 Bilateral knees\par Discussed at length with the patient the planned Hyalgan injection. The risks, benefits, convalescence and alternatives were reviewed. The possible side effects discussed included but were not limited to: pain, swelling, heat and redness. There symptoms are generally mild but if they are extensive then contact the office. Giving pain relievers by mouth such as NSAID’s or Tylenol can generally treat the reactions to Hyalgan. Rare cases of infection have been noted. Rash, hives and itching may occur post injection. If you have muscle pain or cramps, flushing and or swelling of the face, rapid heart beat, nausea, dizziness, fever, chills, headache, difficulty breathing, swelling in the arms or legs, or have a prickly feeling of your skin, contact a health care provider immediately.\par \par Following this discussion, the knee was prepped with betadine and under sterile conditions the Hyalgan injection was performed with a 22 gauge needle. The needle was introduced into the joint, aspiration was performed to ensure intra-articular placement and the medication was injected. Upon withdrawal of the needle the site was cleaned with alcohol and a bandaid applied. The patient tolerated the injection well and there were no adverse effects. Post injection instructions included no strenuous activity for 24 hours, cryotherapy and if there are any adverse effects to contact the office.\par

## 2021-11-24 ENCOUNTER — TRANSCRIPTION ENCOUNTER (OUTPATIENT)
Age: 86
End: 2021-11-24

## 2021-11-24 VITALS — BODY MASS INDEX: 25.52 KG/M2 | HEIGHT: 60 IN | WEIGHT: 130 LBS

## 2021-11-29 ENCOUNTER — APPOINTMENT (OUTPATIENT)
Dept: ORTHOPEDIC SURGERY | Facility: CLINIC | Age: 86
End: 2021-11-29
Payer: MEDICARE

## 2021-11-29 PROCEDURE — 20610 DRAIN/INJ JOINT/BURSA W/O US: CPT | Mod: 50

## 2021-11-29 NOTE — PROCEDURE
[de-identified] : Hyalgan # 2 Bilateral Knee\par Discussed at length with the patient the planned Hyalgan injection. The risks, benefits, convalescence and alternatives were reviewed. The possible side effects discussed included but were not limited to: pain, swelling, heat and redness. There symptoms are generally mild but if they are extensive then contact the office. Giving pain relievers by mouth such as NSAID’s or Tylenol can generally treat the reactions to Hyalgan. Rare cases of infection have been noted. Rash, hives and itching may occur post injection. If you have muscle pain or cramps, flushing and or swelling of the face, rapid heart beat, nausea, dizziness, fever, chills, headache, difficulty breathing, swelling in the arms or legs, or have a prickly feeling of your skin, contact a health care provider immediately.\par \par Following this discussion, the knee was prepped with betadine and under sterile conditions the Hyalgan injection was performed with a 22 gauge needle. The needle was introduced into the joint, aspiration was performed to ensure intra-articular placement and the medication was injected. Upon withdrawal of the needle the site was cleaned with alcohol and a bandaid applied. The patient tolerated the injection well and there were no adverse effects. Post injection instructions included no strenuous activity for 24 hours, cryotherapy and if there are any adverse effects to contact the office.\par

## 2021-12-03 VITALS — BODY MASS INDEX: 25.52 KG/M2 | WEIGHT: 130 LBS | HEIGHT: 60 IN

## 2021-12-06 ENCOUNTER — APPOINTMENT (OUTPATIENT)
Dept: ORTHOPEDIC SURGERY | Facility: CLINIC | Age: 86
End: 2021-12-06
Payer: MEDICARE

## 2021-12-06 PROCEDURE — 20610 DRAIN/INJ JOINT/BURSA W/O US: CPT | Mod: 50

## 2021-12-06 NOTE — PROCEDURE
[de-identified] : Hyalgan # 3 Bilateral Knee\par Discussed at length with the patient the planned Hyalgan injection. The risks, benefits, convalescence and alternatives were reviewed. The possible side effects discussed included but were not limited to: pain, swelling, heat and redness. There symptoms are generally mild but if they are extensive then contact the office. Giving pain relievers by mouth such as NSAID’s or Tylenol can generally treat the reactions to Hyalgan. Rare cases of infection have been noted. Rash, hives and itching may occur post injection. If you have muscle pain or cramps, flushing and or swelling of the face, rapid heart beat, nausea, dizziness, fever, chills, headache, difficulty breathing, swelling in the arms or legs, or have a prickly feeling of your skin, contact a health care provider immediately.\par \par Following this discussion, the knee was prepped with betadine and under sterile conditions the Hyalgan injection was performed with a 22 gauge needle. The needle was introduced into the joint, aspiration was performed to ensure intra-articular placement and the medication was injected. Upon withdrawal of the needle the site was cleaned with alcohol and a bandaid applied. The patient tolerated the injection well and there were no adverse effects. Post injection instructions included no strenuous activity for 24 hours, cryotherapy and if there are any adverse effects to contact the office.\par

## 2021-12-10 VITALS — WEIGHT: 130 LBS | HEIGHT: 60 IN | BODY MASS INDEX: 25.52 KG/M2

## 2021-12-12 ENCOUNTER — EMERGENCY (EMERGENCY)
Facility: HOSPITAL | Age: 86
LOS: 1 days | Discharge: HOME | End: 2021-12-12
Attending: EMERGENCY MEDICINE | Admitting: EMERGENCY MEDICINE
Payer: MEDICARE

## 2021-12-12 VITALS
OXYGEN SATURATION: 97 % | SYSTOLIC BLOOD PRESSURE: 167 MMHG | WEIGHT: 130.07 LBS | RESPIRATION RATE: 18 BRPM | DIASTOLIC BLOOD PRESSURE: 71 MMHG | HEART RATE: 71 BPM | TEMPERATURE: 98 F

## 2021-12-12 VITALS
SYSTOLIC BLOOD PRESSURE: 153 MMHG | DIASTOLIC BLOOD PRESSURE: 73 MMHG | OXYGEN SATURATION: 100 % | RESPIRATION RATE: 18 BRPM | TEMPERATURE: 98 F | HEART RATE: 60 BPM

## 2021-12-12 DIAGNOSIS — S09.90XA UNSPECIFIED INJURY OF HEAD, INITIAL ENCOUNTER: ICD-10-CM

## 2021-12-12 DIAGNOSIS — E03.9 HYPOTHYROIDISM, UNSPECIFIED: ICD-10-CM
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
## 2021-12-12 DIAGNOSIS — E78.5 HYPERLIPIDEMIA, UNSPECIFIED: ICD-10-CM
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
## 2021-12-12 DIAGNOSIS — R51.9 HEADACHE, UNSPECIFIED: ICD-10-CM

## 2021-12-12 PROCEDURE — 70450 CT HEAD/BRAIN W/O DYE: CPT | Mod: 26,MA

## 2021-12-12 PROCEDURE — 70450 CT HEAD/BRAIN W/O DYE: CPT | Mod: MA

## 2021-12-12 PROCEDURE — 99284 EMERGENCY DEPT VISIT MOD MDM: CPT | Mod: 25

## 2021-12-12 PROCEDURE — 99284 EMERGENCY DEPT VISIT MOD MDM: CPT

## 2021-12-12 RX ORDER — ACETAMINOPHEN 500 MG
650 TABLET ORAL ONCE
Refills: 0 | Status: COMPLETED | OUTPATIENT
Start: 2021-12-12 | End: 2021-12-12

## 2021-12-12 NOTE — ED PROVIDER NOTE - NSFOLLOWUPINSTRUCTIONS_ED_ALL_ED_FT
Closed Head Injury    A closed head injury is an injury to your head that may or may not involve a traumatic brain injury (TBI). Symptoms of TBI can be short or long lasting and include headache, dizziness, interference with memory or speech, fatigue, confusion, changes in sleep, mood changes, nausea, depression/anxiety, and dulling of senses. Make sure to obtain proper rest which includes getting plenty of sleep, avoiding excessive visual stimulation, and avoiding activities that may cause physical or mental stress. Avoid any situation where there is potential for another head injury, including sports.    SEEK IMMEDIATE MEDICAL CARE IF YOU HAVE ANY OF THE FOLLOWING SYMPTOMS: unusual drowsiness, vomiting, severe dizziness, seizures, lightheadedness, muscular weakness, different pupil sizes, visual changes, or clear or bloody discharge from your ears or nose. Take tylenol 650mg or motrin 400-800mg as needed every 4-6 hours for pain.     Closed Head Injury    A closed head injury is an injury to your head that may or may not involve a traumatic brain injury (TBI). Symptoms of TBI can be short or long lasting and include headache, dizziness, interference with memory or speech, fatigue, confusion, changes in sleep, mood changes, nausea, depression/anxiety, and dulling of senses. Make sure to obtain proper rest which includes getting plenty of sleep, avoiding excessive visual stimulation, and avoiding activities that may cause physical or mental stress. Avoid any situation where there is potential for another head injury, including sports.    SEEK IMMEDIATE MEDICAL CARE IF YOU HAVE ANY OF THE FOLLOWING SYMPTOMS: unusual drowsiness, vomiting, severe dizziness, seizures, lightheadedness, muscular weakness, different pupil sizes, visual changes, or clear or bloody discharge from your ears or nose.

## 2021-12-12 NOTE — ED PROVIDER NOTE - PATIENT PORTAL LINK FT
You can access the FollowMyHealth Patient Portal offered by [facility] by registering at the following website: [email or web address]. By joining [facility]’s FollowMyHealth portal, you will also be able to view your health information using other applications (apps) compatible with our system.
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
## 2021-12-12 NOTE — ED PROVIDER NOTE - NSICDXPASTMEDICALHX_GEN_ALL_CORE_FT
PAST MEDICAL HISTORY:  Dizziness Vertigo    Hyperlipidemia Hyperlipemia    Hypothyroidism Hypothyroidism
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
## 2021-12-12 NOTE — ED PROVIDER NOTE - CCCP TRG CHIEF CMPLNT
Progress Note


Progress Note


.pt seen and  examined full note will be dictated  [id number]











JOSE MOROCHO Mar 25, 2017 13:55 head injury

## 2021-12-12 NOTE — ED PROVIDER NOTE - PHYSICAL EXAMINATION
Vitals reviewed  Gen: well appearing elderly F, nad, speaking in full sentences  Skin: wwp, no rash/lesions  HEENT: ncat, eomi, perrla, no nystagmus, mmm  Neck/Back: no midline ttp/step off   CV: rrr, no audible m/r/g  Resp: symmetrical expansion, ctab, no w/r/r  Abd: nondistended, soft/nt  Ext: FROM throughout, no peripheral edema, distal pulses 2+, 5/5 strength all ext, no joint ttp   Neuro: alert/oriented, no focal deficits, steady gait no ataxia

## 2021-12-13 ENCOUNTER — APPOINTMENT (OUTPATIENT)
Dept: ORTHOPEDIC SURGERY | Facility: CLINIC | Age: 86
End: 2021-12-13
Payer: MEDICARE

## 2021-12-13 ENCOUNTER — APPOINTMENT (OUTPATIENT)
Dept: ORTHOPEDIC SURGERY | Facility: CLINIC | Age: 86
End: 2021-12-13

## 2021-12-13 PROCEDURE — 20610 DRAIN/INJ JOINT/BURSA W/O US: CPT | Mod: 50

## 2021-12-13 NOTE — PROCEDURE
[de-identified] : Hyalgan # 4 Bilateral Knee\par Discussed at length with the patient the planned Hyalgan injection. The risks, benefits, convalescence and alternatives were reviewed. The possible side effects discussed included but were not limited to: pain, swelling, heat and redness. There symptoms are generally mild but if they are extensive then contact the office. Giving pain relievers by mouth such as NSAID’s or Tylenol can generally treat the reactions to Hyalgan. Rare cases of infection have been noted. Rash, hives and itching may occur post injection. If you have muscle pain or cramps, flushing and or swelling of the face, rapid heart beat, nausea, dizziness, fever, chills, headache, difficulty breathing, swelling in the arms or legs, or have a prickly feeling of your skin, contact a health care provider immediately.\par \par Following this discussion, the knee was prepped with betadine and under sterile conditions the Hyalgan injection was performed with a 22 gauge needle. The needle was introduced into the joint, aspiration was performed to ensure intra-articular placement and the medication was injected. Upon withdrawal of the needle the site was cleaned with alcohol and a bandaid applied. The patient tolerated the injection well and there were no adverse effects. Post injection instructions included no strenuous activity for 24 hours, cryotherapy and if there are any adverse effects to contact the office.\par

## 2021-12-19 VITALS — BODY MASS INDEX: 25.52 KG/M2 | HEIGHT: 60 IN | WEIGHT: 130 LBS

## 2021-12-20 ENCOUNTER — APPOINTMENT (OUTPATIENT)
Dept: ORTHOPEDIC SURGERY | Facility: CLINIC | Age: 86
End: 2021-12-20

## 2021-12-20 ENCOUNTER — APPOINTMENT (OUTPATIENT)
Dept: ORTHOPEDIC SURGERY | Facility: CLINIC | Age: 86
End: 2021-12-20
Payer: MEDICARE

## 2021-12-20 PROCEDURE — 20610 DRAIN/INJ JOINT/BURSA W/O US: CPT | Mod: 50

## 2021-12-20 NOTE — PROCEDURE
[de-identified] : Hyalgan # 5 Bilateral Knee\par Discussed at length with the patient the planned Hyalgan injection. The risks, benefits, convalescence and alternatives were reviewed. The possible side effects discussed included but were not limited to: pain, swelling, heat and redness. There symptoms are generally mild but if they are extensive then contact the office. Giving pain relievers by mouth such as NSAID’s or Tylenol can generally treat the reactions to Hyalgan. Rare cases of infection have been noted. Rash, hives and itching may occur post injection. If you have muscle pain or cramps, flushing and or swelling of the face, rapid heart beat, nausea, dizziness, fever, chills, headache, difficulty breathing, swelling in the arms or legs, or have a prickly feeling of your skin, contact a health care provider immediately.\par \par Following this discussion, the knee was prepped with betadine and under sterile conditions the Hyalgan injection was performed with a 22 gauge needle. The needle was introduced into the joint, aspiration was performed to ensure intra-articular placement and the medication was injected. Upon withdrawal of the needle the site was cleaned with alcohol and a bandaid applied. The patient tolerated the injection well and there were no adverse effects. Post injection instructions included no strenuous activity for 24 hours, cryotherapy and if there are any adverse effects to contact the office.\par

## 2021-12-22 ENCOUNTER — TRANSCRIPTION ENCOUNTER (OUTPATIENT)
Age: 86
End: 2021-12-22

## 2021-12-23 ENCOUNTER — INPATIENT (INPATIENT)
Facility: HOSPITAL | Age: 86
LOS: 3 days | Discharge: HOME HEALTH | DRG: 641 | End: 2021-12-27
Attending: PSYCHIATRY & NEUROLOGY | Admitting: PSYCHIATRY & NEUROLOGY
Payer: MEDICARE

## 2021-12-23 VITALS
OXYGEN SATURATION: 97 % | TEMPERATURE: 98 F | WEIGHT: 130.07 LBS | DIASTOLIC BLOOD PRESSURE: 41 MMHG | HEART RATE: 59 BPM | RESPIRATION RATE: 18 BRPM | SYSTOLIC BLOOD PRESSURE: 194 MMHG

## 2021-12-23 LAB
ANION GAP SERPL CALC-SCNC: 9 MMOL/L — SIGNIFICANT CHANGE UP (ref 5–17)
APPEARANCE UR: CLEAR — SIGNIFICANT CHANGE UP
BACTERIA # UR AUTO: PRESENT /HPF
BASOPHILS # BLD AUTO: 0.02 K/UL — SIGNIFICANT CHANGE UP (ref 0–0.2)
BASOPHILS NFR BLD AUTO: 0.2 % — SIGNIFICANT CHANGE UP (ref 0–2)
BILIRUB UR-MCNC: NEGATIVE — SIGNIFICANT CHANGE UP
BUN SERPL-MCNC: 12 MG/DL — SIGNIFICANT CHANGE UP (ref 7–23)
CALCIUM SERPL-MCNC: 9 MG/DL — SIGNIFICANT CHANGE UP (ref 8.4–10.5)
CHLORIDE SERPL-SCNC: 86 MMOL/L — LOW (ref 96–108)
CHLORIDE UR-SCNC: 56 MMOL/L — SIGNIFICANT CHANGE UP
CO2 SERPL-SCNC: 25 MMOL/L — SIGNIFICANT CHANGE UP (ref 22–31)
COLOR SPEC: YELLOW — SIGNIFICANT CHANGE UP
CREAT ?TM UR-MCNC: 10 MG/DL — SIGNIFICANT CHANGE UP
CREAT SERPL-MCNC: 0.62 MG/DL — SIGNIFICANT CHANGE UP (ref 0.5–1.3)
DIFF PNL FLD: ABNORMAL
EOSINOPHIL # BLD AUTO: 0.04 K/UL — SIGNIFICANT CHANGE UP (ref 0–0.5)
EOSINOPHIL NFR BLD AUTO: 0.5 % — SIGNIFICANT CHANGE UP (ref 0–6)
EPI CELLS # UR: SIGNIFICANT CHANGE UP /HPF (ref 0–5)
GLUCOSE SERPL-MCNC: 106 MG/DL — HIGH (ref 70–99)
GLUCOSE UR QL: NEGATIVE — SIGNIFICANT CHANGE UP
HCT VFR BLD CALC: 32.5 % — LOW (ref 34.5–45)
HGB BLD-MCNC: 11.7 G/DL — SIGNIFICANT CHANGE UP (ref 11.5–15.5)
IMM GRANULOCYTES NFR BLD AUTO: 0.5 % — SIGNIFICANT CHANGE UP (ref 0–1.5)
KETONES UR-MCNC: NEGATIVE — SIGNIFICANT CHANGE UP
LEUKOCYTE ESTERASE UR-ACNC: NEGATIVE — SIGNIFICANT CHANGE UP
LYMPHOCYTES # BLD AUTO: 1.05 K/UL — SIGNIFICANT CHANGE UP (ref 1–3.3)
LYMPHOCYTES # BLD AUTO: 12.3 % — LOW (ref 13–44)
MCHC RBC-ENTMCNC: 32.7 PG — SIGNIFICANT CHANGE UP (ref 27–34)
MCHC RBC-ENTMCNC: 36 GM/DL — SIGNIFICANT CHANGE UP (ref 32–36)
MCV RBC AUTO: 90.8 FL — SIGNIFICANT CHANGE UP (ref 80–100)
MONOCYTES # BLD AUTO: 0.73 K/UL — SIGNIFICANT CHANGE UP (ref 0–0.9)
MONOCYTES NFR BLD AUTO: 8.6 % — SIGNIFICANT CHANGE UP (ref 2–14)
NEUTROPHILS # BLD AUTO: 6.64 K/UL — SIGNIFICANT CHANGE UP (ref 1.8–7.4)
NEUTROPHILS NFR BLD AUTO: 77.9 % — HIGH (ref 43–77)
NITRITE UR-MCNC: NEGATIVE — SIGNIFICANT CHANGE UP
NRBC # BLD: 0 /100 WBCS — SIGNIFICANT CHANGE UP (ref 0–0)
OSMOLALITY UR: 227 MOSM/KG — LOW (ref 300–900)
PH UR: 7 — SIGNIFICANT CHANGE UP (ref 5–8)
PLATELET # BLD AUTO: 303 K/UL — SIGNIFICANT CHANGE UP (ref 150–400)
POTASSIUM SERPL-MCNC: 4.5 MMOL/L — SIGNIFICANT CHANGE UP (ref 3.5–5.3)
POTASSIUM SERPL-SCNC: 4.5 MMOL/L — SIGNIFICANT CHANGE UP (ref 3.5–5.3)
POTASSIUM UR-SCNC: 14 MMOL/L — SIGNIFICANT CHANGE UP
PROT UR-MCNC: NEGATIVE MG/DL — SIGNIFICANT CHANGE UP
RBC # BLD: 3.58 M/UL — LOW (ref 3.8–5.2)
RBC # FLD: 12 % — SIGNIFICANT CHANGE UP (ref 10.3–14.5)
RBC CASTS # UR COMP ASSIST: < 5 /HPF — SIGNIFICANT CHANGE UP
SARS-COV-2 RNA SPEC QL NAA+PROBE: NEGATIVE — SIGNIFICANT CHANGE UP
SODIUM SERPL-SCNC: 120 MMOL/L — CRITICAL LOW (ref 135–145)
SODIUM UR-SCNC: 63 MMOL/L — SIGNIFICANT CHANGE UP
SP GR SPEC: 1.01 — SIGNIFICANT CHANGE UP (ref 1–1.03)
UROBILINOGEN FLD QL: 0.2 E.U./DL — SIGNIFICANT CHANGE UP
WBC # BLD: 8.52 K/UL — SIGNIFICANT CHANGE UP (ref 3.8–10.5)
WBC # FLD AUTO: 8.52 K/UL — SIGNIFICANT CHANGE UP (ref 3.8–10.5)
WBC UR QL: < 5 /HPF — SIGNIFICANT CHANGE UP

## 2021-12-23 PROCEDURE — 99285 EMERGENCY DEPT VISIT HI MDM: CPT

## 2021-12-23 PROCEDURE — 70498 CT ANGIOGRAPHY NECK: CPT | Mod: 26,MA

## 2021-12-23 PROCEDURE — 93010 ELECTROCARDIOGRAM REPORT: CPT

## 2021-12-23 PROCEDURE — 70496 CT ANGIOGRAPHY HEAD: CPT | Mod: 26,MA

## 2021-12-23 PROCEDURE — 0042T: CPT

## 2021-12-23 RX ORDER — CLOPIDOGREL BISULFATE 75 MG/1
75 TABLET, FILM COATED ORAL DAILY
Refills: 0 | Status: DISCONTINUED | OUTPATIENT
Start: 2021-12-23 | End: 2021-12-26

## 2021-12-23 RX ORDER — ENOXAPARIN SODIUM 100 MG/ML
40 INJECTION SUBCUTANEOUS AT BEDTIME
Refills: 0 | Status: DISCONTINUED | OUTPATIENT
Start: 2021-12-23 | End: 2021-12-27

## 2021-12-23 RX ORDER — METOCLOPRAMIDE HCL 10 MG
10 TABLET ORAL ONCE
Refills: 0 | Status: COMPLETED | OUTPATIENT
Start: 2021-12-23 | End: 2021-12-23

## 2021-12-23 RX ORDER — ACETAMINOPHEN 500 MG
1000 TABLET ORAL ONCE
Refills: 0 | Status: COMPLETED | OUTPATIENT
Start: 2021-12-23 | End: 2021-12-23

## 2021-12-23 RX ORDER — ATORVASTATIN CALCIUM 80 MG/1
80 TABLET, FILM COATED ORAL AT BEDTIME
Refills: 0 | Status: DISCONTINUED | OUTPATIENT
Start: 2021-12-23 | End: 2021-12-27

## 2021-12-23 RX ORDER — SODIUM CHLORIDE 9 MG/ML
1000 INJECTION INTRAMUSCULAR; INTRAVENOUS; SUBCUTANEOUS
Refills: 0 | Status: DISCONTINUED | OUTPATIENT
Start: 2021-12-23 | End: 2021-12-24

## 2021-12-23 RX ORDER — ASPIRIN/CALCIUM CARB/MAGNESIUM 324 MG
81 TABLET ORAL DAILY
Refills: 0 | Status: DISCONTINUED | OUTPATIENT
Start: 2021-12-23 | End: 2021-12-26

## 2021-12-23 RX ADMIN — Medication 1000 MILLIGRAM(S): at 21:26

## 2021-12-23 RX ADMIN — ENOXAPARIN SODIUM 40 MILLIGRAM(S): 100 INJECTION SUBCUTANEOUS at 23:34

## 2021-12-23 RX ADMIN — SODIUM CHLORIDE 100 MILLILITER(S): 9 INJECTION INTRAMUSCULAR; INTRAVENOUS; SUBCUTANEOUS at 21:32

## 2021-12-23 RX ADMIN — ATORVASTATIN CALCIUM 80 MILLIGRAM(S): 80 TABLET, FILM COATED ORAL at 23:34

## 2021-12-23 RX ADMIN — Medication 104 MILLIGRAM(S): at 20:53

## 2021-12-23 RX ADMIN — CLOPIDOGREL BISULFATE 75 MILLIGRAM(S): 75 TABLET, FILM COATED ORAL at 23:34

## 2021-12-23 RX ADMIN — Medication 400 MILLIGRAM(S): at 20:33

## 2021-12-23 RX ADMIN — Medication 10 MILLIGRAM(S): at 21:26

## 2021-12-23 NOTE — PATIENT PROFILE ADULT - FALL HARM RISK - HARM RISK INTERVENTIONS
Assistance with ambulation/Assistance OOB with selected safe patient handling equipment/Communicate Risk of Fall with Harm to all staff/Discuss with provider need for PT consult/Monitor gait and stability/Reinforce activity limits and safety measures with patient and family/Tailored Fall Risk Interventions/Visual Cue: Yellow wristband and red socks/Bed in lowest position, wheels locked, appropriate side rails in place/Call bell, personal items and telephone in reach/Instruct patient to call for assistance before getting out of bed or chair/Non-slip footwear when patient is out of bed/[place] to call system/Physically safe environment - no spills, clutter or unnecessary equipment/Purposeful Proactive Rounding/Room/bathroom lighting operational, light cord in reach

## 2021-12-23 NOTE — ED PROVIDER NOTE - CLINICAL SUMMARY MEDICAL DECISION MAKING FREE TEXT BOX
Dizziness, persistent, not positional, described as feeling off balance x 22 hours. HA, not worse than prior, not worst of life, c/w prior HA's. No hx HTN. + hx vertigo, headaches in the past, states nothing helps. Outside TPA window, but stroke code called in accordance w/ [facility] protocols Dizziness, persistent, not positional, described as feeling off balance x 22 hours. HA, not worse than prior, not worst of life, c/w prior HA's. No hx HTN. + hx vertigo, headaches in the past, states nothing helps. Outside TPA window, but stroke code called in accordance w/ [facility] protocols.  Supportive care for sx. F/u imaging, stroke recommendations. Other considerations include migraine, peripheral vertigo, electrolyte / metabolic disturbances, COVID19 infection, other pathology

## 2021-12-23 NOTE — H&P ADULT - NSHPREVIEWOFSYSTEMS_GEN_ALL_CORE
ROS:   Constitutional: No fever, weight loss or fatigue  Eyes: No eye pain, visual disturbances, or discharge  ENMT:  No difficulty hearing, tinnitus, vertigo; No sinus or throat pain  Neck: No pain or stiffness  Respiratory: No cough, wheezing, chills or hemoptysis  Cardiovascular: No chest pain, palpitations, shortness of breath, dizziness or leg swelling  Gastrointestinal: No abdominal pain. No nausea, vomiting or hematemesis; No diarrhea or constipation. Nohematochezia.  Genitourinary: No dysuria, frequency, hematuria or incontinence  Neurological: As per HPI  Skin: No itching, burning, rashes or lesions   Endocrine: No heat or cold intolerance; No hair loss  Musculoskeletal: No joint pain or swelling; No muscle, back or extremity pain  Psychiatric: No depression, anxiety, mood swings or difficulty sleeping  Heme/Lymph: No easy bruising or bleeding gums

## 2021-12-23 NOTE — ED PROVIDER NOTE - OBJECTIVE STATEMENT
Pt w/ PMHx HA's, HLD, hypothyroidism, vertigo, p/w HA and dizziness, sx onset 10pm last night while at rest. The HA is generalized, throbbing. She took Motrin 11 am w/o relief. The pain is 9.5/10. She states she has had similar HA's in the past, similar in quality and severity, and states it is no different. She states nothing ever works for her headaches. The dizziness is described as feeling off balance, having diff walking, and states it is not affected by positional change, and seems persistent. She states she has had vertigo in the past and does not have meds for it, because nothing has worked for her. She notes HTN, and states she does not normally have HTN. She denies unilateral neck pain, neck stiffness, vision changes, numbness/tingling, focal weakness, confusion and slurred speech. No head trauma.

## 2021-12-23 NOTE — ED PROVIDER NOTE - CONSULTANT FREE TEXT FOR MDM DISCUSSED CASE WITH QUESTION
Stroke PAST SURGICAL HISTORY:  H/O arthroscopy of knee left  knee    History of appendectomy     S/P partial hysterectomy

## 2021-12-23 NOTE — ED PROVIDER NOTE - PROGRESS NOTE DETAILS
Pt seen by stroke, admit to Dr Pradhan, tele, for further stroke w/u. Permissive HTN to . Hydration.

## 2021-12-23 NOTE — ED ADULT NURSE NOTE - OBJECTIVE STATEMENT
pt is 89y female, reports to ED for elevated BP, headache and dizziness since yes pt is 89y female, reports to ED for elevated BP, headache and dizziness since yesterday, reports prior episodes of similar sx, denies any hx of stroke, also denies any CP or SOB or vision changes, a&ox3, speaking in full sentences, no facial droop or arm drift, no neuro deficits, normal heart sounds

## 2021-12-23 NOTE — H&P ADULT - NSHPLABSRESULTS_GEN_ALL_CORE
Fingerstick Blood Glucose: CAPILLARY BLOOD GLUCOSE      POCT Blood Glucose.: 104 mg/dL (23 Dec 2021 19:49)    LABS:                        11.7   8.52  )-----------( 303      ( 23 Dec 2021 20:07 )             32.5     12-    120<LL>  |  86<L>  |  12  ----------------------------<  106<H>  4.5   |  25  |  0.62    Ca    9.0      23 Dec 2021 20:07        Urinalysis Basic - ( 23 Dec 2021 21:33 )    Color: Yellow / Appearance: Clear / S.010 / pH: x  Gluc: x / Ketone: NEGATIVE  / Bili: Negative / Urobili: 0.2 E.U./dL   Blood: x / Protein: NEGATIVE mg/dL / Nitrite: NEGATIVE   Leuk Esterase: NEGATIVE / RBC: < 5 /HPF / WBC < 5 /HPF   Sq Epi: x / Non Sq Epi: 0-5 /HPF / Bacteria: Present /HPF        RADIOLOGY & ADDITIONAL STUDIES:    HCT: No acute intracranial hemorrhage or acute demarcated territorial   infarction. 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     CTA: No high-grade stenosis or occlusion.    A 2 mm inferomedially oriented protuberance emanating off the proximal   right cervical internal carotid artery, (series 7 image [phone]). Findings   suspicious for a small aneurysm.

## 2021-12-23 NOTE — H&P ADULT - HISTORY OF PRESENT ILLNESS
**STROKE HPI***    HPI: 89y Female with PMHx of HLD, hypothyroidism, vertigo who presented to the ED for dizziness and headache x1 day. Patient reports she has chronic headaches and vertigo which she experiences around once per week but yesterday the dizziness and headache was worse. She reports the headache was slightly better in the morning but she still felt dizzy and unsteady on her feet. Dizziness had resolved upon arrival to ED. Stroke code called in ED. Patient denies hx of stroke, although CTH shows chronic left frontal infarct.     PAST MEDICAL & SURGICAL HISTORY:  Hyperlipidemia  Hyperlipemia    Hypothyroidism  Hypothyroidism    Dizziness  Vertigo    Cytomegalovirus infection not present  No significant past surgical history        FAMILY HISTORY:            T(C): 36.8 (12-23-21 @ 19:34), Max: 36.8 (12-23-21 @ 19:34)  HR: 61 (12-23-21 @ 21:00) (59 - 61)  BP: 192/81 (12-23-21 @ 21:00) (192/81 - 215/93)  RR: 18 (12-23-21 @ 21:00) (18 - 18)  SpO2: 99% (12-23-21 @ 21:00) (97% - 99%)    MEDICATION RECONCILIATION   MEDICATIONS  (STANDING):  aspirin enteric coated 81 milliGRAM(s) Oral daily  atorvastatin 80 milliGRAM(s) Oral at bedtime  clopidogrel Tablet 75 milliGRAM(s) Oral daily  enoxaparin Injectable 40 milliGRAM(s) SubCutaneous at bedtime  sodium chloride 0.9%. 1000 milliLiter(s) (100 mL/Hr) IV Continuous <Continuous>    MEDICATIONS  (PRN):    Allergies    No Known Allergies    Intolerances      Vital Signs Last 24 Hrs  T(C): 36.8 (23 Dec 2021 19:34), Max: 36.8 (23 Dec 2021 19:34)  T(F): 98.3 (23 Dec 2021 19:34), Max: 98.3 (23 Dec 2021 19:34)  HR: 61 (23 Dec 2021 21:00) (59 - 61)  BP: 192/81 (23 Dec 2021 21:00) (192/81 - 215/93)  BP(mean): --  RR: 18 (23 Dec 2021 21:00) (18 - 18)  SpO2: 99% (23 Dec 2021 21:00) (97% - 99%)

## 2021-12-23 NOTE — H&P ADULT - NSHPSOCIALHISTORY_GEN_ALL_CORE
SOCIAL HISTORY:   Patient lives at home alone   Smoking status: non smoker   Drinking: denies   Drug Use: denies

## 2021-12-23 NOTE — ED PROVIDER NOTE - PHYSICAL EXAMINATION
Constitutional: Well appearing, awake, alert, oriented to person, place, time/situation and in no apparent distress.  ENMT: Airway patent. Normal MM  Eyes: Clear bilaterally, PERRL. EOMI. No nystagmus  Cardiac: Normal rate, regular rhythm.    Respiratory: No increased WOB, tachypnea, hypoxia, or accessory mm use. Pt speaks in full sentences.   Gastrointestinal: Abd soft, NT, ND, NABS. No guarding, rebound, or rigidity. No pulsatile abdominal masses. No organomegaly appreciated.   Musculoskeletal: Range of motion is not limited  Neuro: Alert & Oriented x 3. CN II-XII intact. No facial droop. Clear speech. OCONNELL w/ 5/5 strength x 4 ext. Normal sensation. No pronator drift. No dysdidokinesia nor dysmetria. See NIHSS  Skin: Skin normal color for race, warm, dry and intact. No evidence of rash.  Psych: Alert and oriented to person, place, time/situation. normal mood and affect. no apparent risk to self or others.

## 2021-12-23 NOTE — H&P ADULT - ASSESSMENT
-----------------------------------------------------------------------------------------    ASSESSMENT/PLAN:    89y Female with PMHx of HLD, hypothyroidism, vertigo who presented to the ED for dizziness and headache x1 day. . HCT showed chronic left frontal infarct. CTA showed A 2 mm inferomedially oriented protuberance emanating off the proximal   right cervical internal carotid artery. Findings suspicious for a small aneurysm. Patient out of window for tPA, not a thrombectomy candidate as there is no large vessel occlusion. Patient was admitted to stroke telemetry for further stroke work up and blood pressure management.     Neuro  #CVA workup  - continue aspirin 81mg and plavix 75mg daily  - continue atorvastatin 80mg daily  - q4hr stroke neuro checks and vitals  - obtain MRI Brain without contrast  - Stroke Code HCT Results: left frontal chronic infarct   - Stroke Code CTA Results: 2 mm inferomedially oriented protuberance emanating off the proximal   right cervical internal carotid artery  - Stroke education    #Hyponatremia   -  on arrival   - NS @ 100 ml/hr   - serial NA blood draws     Cards  #HTN  - permissive hypertension, Goal -180  - hold home blood pressure medication for now  - obtain TTE with bubble    #HLD  - high dose statin as above in CVA  - LDL results: pending     Pulm  - call provider if SPO2 < 94%    GI  #Nutrition/Fluids/Electrolytes   - replete K<4 and Mg <2  - Diet: DASH/ TLC   - IVF: NA @ 100cc/hr     Endocrine  - A1C results: pending   - TSH results: pending    DVT Prophylaxis  - lovenox sq for DVT prophylaxis   - SCDs for DVT prophylaxis        Discussed daily hospital plans and goals with patient     Discussed with Neurology Attending

## 2021-12-23 NOTE — H&P ADULT - NSHPPHYSICALEXAM_GEN_ALL_CORE
Physical exam:  General: No acute distress, awake and alert  Cardiovascular: Regular rate and rhythm, no murmurs, rubs, or gallops. No bruits  Pulmonary: Anterior breath sounds clear bilaterally, no crackles or wheezing. No use of accessory muscles  GI: Abdomen soft, non-tender, distended    Neurologic:  -Mental status: Awake, alert, oriented to person, place, and time. Speech is fluent with intact naming, repetition, and comprehension, no dysarthria. Recent and remote memory intact. Follows commands. Attention/concentration intact. Fund of knowledge appropriate.  -Cranial nerves:   II: Visual fields are full to confrontation.  III, IV, VI: Extraocular movements are intact without nystagmus. Pupils equally round and reactive to light  V:  Facial sensation V1-V3 equal and intact   VII: Face is symmetric with normal eye closure and smile  VIII: Hearing is bilaterally intact to finger rub  IX, X: Uvula is midline and soft palate rises symmetrically  XI: Head turning and shoulder shrug are intact.  XII: Tongue protrudes midline  Motor: Normal bulk and tone. No pronator drift. Strength bilateral upper extremity 5/5, bilateral lower extremities 5/5.  Rapid alternating movements intact and symmetric  Sensation: Intact to light touch bilaterally. No neglect or extinction on double simultaneous testing.  Coordination: No dysmetria on finger-to-nose and heel-to-shin bilaterally  Reflexes: Downgoing toes bilaterally   Gait: Narrow gait and steady    NIHSS: 0

## 2021-12-24 ENCOUNTER — TRANSCRIPTION ENCOUNTER (OUTPATIENT)
Age: 86
End: 2021-12-24

## 2021-12-24 LAB
ANION GAP SERPL CALC-SCNC: 12 MMOL/L — SIGNIFICANT CHANGE UP (ref 5–17)
ANION GAP SERPL CALC-SCNC: 12 MMOL/L — SIGNIFICANT CHANGE UP (ref 5–17)
ANION GAP SERPL CALC-SCNC: 8 MMOL/L — SIGNIFICANT CHANGE UP (ref 5–17)
BUN SERPL-MCNC: 12 MG/DL — SIGNIFICANT CHANGE UP (ref 7–23)
BUN SERPL-MCNC: 14 MG/DL — SIGNIFICANT CHANGE UP (ref 7–23)
BUN SERPL-MCNC: 18 MG/DL — SIGNIFICANT CHANGE UP (ref 7–23)
CALCIUM SERPL-MCNC: 9 MG/DL — SIGNIFICANT CHANGE UP (ref 8.4–10.5)
CALCIUM SERPL-MCNC: 9.1 MG/DL — SIGNIFICANT CHANGE UP (ref 8.4–10.5)
CALCIUM SERPL-MCNC: 9.2 MG/DL — SIGNIFICANT CHANGE UP (ref 8.4–10.5)
CALCIUM UR-MCNC: 4.6 MG/DL — SIGNIFICANT CHANGE UP
CHLORIDE SERPL-SCNC: 103 MMOL/L — SIGNIFICANT CHANGE UP (ref 96–108)
CHLORIDE SERPL-SCNC: 103 MMOL/L — SIGNIFICANT CHANGE UP (ref 96–108)
CHLORIDE SERPL-SCNC: 98 MMOL/L — SIGNIFICANT CHANGE UP (ref 96–108)
CHOLEST SERPL-MCNC: 191 MG/DL — SIGNIFICANT CHANGE UP
CO2 SERPL-SCNC: 22 MMOL/L — SIGNIFICANT CHANGE UP (ref 22–31)
CO2 SERPL-SCNC: 23 MMOL/L — SIGNIFICANT CHANGE UP (ref 22–31)
CO2 SERPL-SCNC: 24 MMOL/L — SIGNIFICANT CHANGE UP (ref 22–31)
CREAT SERPL-MCNC: 0.61 MG/DL — SIGNIFICANT CHANGE UP (ref 0.5–1.3)
CREAT SERPL-MCNC: 0.67 MG/DL — SIGNIFICANT CHANGE UP (ref 0.5–1.3)
CREAT SERPL-MCNC: 0.76 MG/DL — SIGNIFICANT CHANGE UP (ref 0.5–1.3)
GLUCOSE SERPL-MCNC: 120 MG/DL — HIGH (ref 70–99)
GLUCOSE SERPL-MCNC: 147 MG/DL — HIGH (ref 70–99)
GLUCOSE SERPL-MCNC: 85 MG/DL — SIGNIFICANT CHANGE UP (ref 70–99)
HDLC SERPL-MCNC: 67 MG/DL — SIGNIFICANT CHANGE UP
LIPID PNL WITH DIRECT LDL SERPL: 110 MG/DL — HIGH
NON HDL CHOLESTEROL: 124 MG/DL — SIGNIFICANT CHANGE UP
POTASSIUM SERPL-MCNC: 3.5 MMOL/L — SIGNIFICANT CHANGE UP (ref 3.5–5.3)
POTASSIUM SERPL-MCNC: 3.9 MMOL/L — SIGNIFICANT CHANGE UP (ref 3.5–5.3)
POTASSIUM SERPL-MCNC: 4.1 MMOL/L — SIGNIFICANT CHANGE UP (ref 3.5–5.3)
POTASSIUM SERPL-SCNC: 3.5 MMOL/L — SIGNIFICANT CHANGE UP (ref 3.5–5.3)
POTASSIUM SERPL-SCNC: 3.9 MMOL/L — SIGNIFICANT CHANGE UP (ref 3.5–5.3)
POTASSIUM SERPL-SCNC: 4.1 MMOL/L — SIGNIFICANT CHANGE UP (ref 3.5–5.3)
SODIUM SERPL-SCNC: 125 MMOL/L — LOW (ref 135–145)
SODIUM SERPL-SCNC: 130 MMOL/L — LOW (ref 135–145)
SODIUM SERPL-SCNC: 132 MMOL/L — LOW (ref 135–145)
SODIUM SERPL-SCNC: 135 MMOL/L — SIGNIFICANT CHANGE UP (ref 135–145)
SODIUM SERPL-SCNC: 138 MMOL/L — SIGNIFICANT CHANGE UP (ref 135–145)
TRIGL SERPL-MCNC: 71 MG/DL — SIGNIFICANT CHANGE UP

## 2021-12-24 PROCEDURE — 99223 1ST HOSP IP/OBS HIGH 75: CPT

## 2021-12-24 RX ORDER — LANOLIN ALCOHOL/MO/W.PET/CERES
10 CREAM (GRAM) TOPICAL ONCE
Refills: 0 | Status: DISCONTINUED | OUTPATIENT
Start: 2021-12-24 | End: 2021-12-24

## 2021-12-24 RX ORDER — SODIUM CHLORIDE 9 MG/ML
500 INJECTION, SOLUTION INTRAVENOUS
Refills: 0 | Status: COMPLETED | OUTPATIENT
Start: 2021-12-24 | End: 2021-12-24

## 2021-12-24 RX ORDER — LANOLIN ALCOHOL/MO/W.PET/CERES
5 CREAM (GRAM) TOPICAL AT BEDTIME
Refills: 0 | Status: DISCONTINUED | OUTPATIENT
Start: 2021-12-24 | End: 2021-12-27

## 2021-12-24 RX ADMIN — ATORVASTATIN CALCIUM 80 MILLIGRAM(S): 80 TABLET, FILM COATED ORAL at 22:01

## 2021-12-24 RX ADMIN — ENOXAPARIN SODIUM 40 MILLIGRAM(S): 100 INJECTION SUBCUTANEOUS at 22:01

## 2021-12-24 RX ADMIN — SODIUM CHLORIDE 100 MILLILITER(S): 9 INJECTION, SOLUTION INTRAVENOUS at 19:23

## 2021-12-24 RX ADMIN — Medication 5 MILLIGRAM(S): at 22:01

## 2021-12-24 RX ADMIN — CLOPIDOGREL BISULFATE 75 MILLIGRAM(S): 75 TABLET, FILM COATED ORAL at 12:37

## 2021-12-24 RX ADMIN — Medication 81 MILLIGRAM(S): at 12:36

## 2021-12-24 NOTE — DISCHARGE NOTE PROVIDER - NSFTFHOMEHTHYNRD_GEN_ALL_CORE
Spoke to pharmacy. 0.15g of hydroxocobalamin powder reconstituted to 10mg/ml at 0.2ml(2mg) is 45 days of medication.    Yes

## 2021-12-24 NOTE — DISCHARGE NOTE PROVIDER - CARE PROVIDERS DIRECT ADDRESSES
[email or web address]

## 2021-12-24 NOTE — OCCUPATIONAL THERAPY INITIAL EVALUATION ADULT - SENSORY TESTS
Visual fields are full to confrontation, H and Quad test intact, Eye movements are intact without nystagmus, Pupils equally round and reactive to light, facial sensation V1-V3 equal and intact, face is symmetric with normal eye closure and smile, tongue protrudes midlines, shoulder shrugs intact, puffing cheeks intact, b/l eyebrow shrugs intact, hearing bilaterally with rubs intact

## 2021-12-24 NOTE — PHYSICAL THERAPY INITIAL EVALUATION ADULT - PERTINENT HX OF CURRENT PROBLEM, REHAB EVAL
89y Female with PMHx of HLD, hypothyroidism, vertigo who presented to the ED for dizziness and headache x1 day. Patient reports she has chronic headaches and vertigo which she experiences around once per week but yesterday the dizziness and headache was worse. CTH neg for acute infarct or  bleed

## 2021-12-24 NOTE — OCCUPATIONAL THERAPY INITIAL EVALUATION ADULT - DIAGNOSIS, OT EVAL
Patient to [facility] for dizziness and headache, presents with new b/l hand tremors, discomfort to b/l knees, decreased overall balance, activity tolerance impacting independence with functional activities and mobility.

## 2021-12-24 NOTE — DISCHARGE NOTE PROVIDER - HOSPITAL COURSE
88 yo F w/ PMH HLD, hypothyroidism, vertigo, presented to ED for dizziness and headache x1 day.   MRI ______  Patient found to be hyponatremic to 120, likely contributing to above symptoms.   Patient's home desmopressin was held, and sodium improved to _____ on day of discharge.    Patient had the following workup done in house:  < from: CT Brain Stroke Protocol (12.23.21 @ 20:15) >  Impression:  No acute intracranial hemorrhage or acute demarcated territorial   infarction..    < from: CT Angio Head w/ IV Cont (12.23.21 @ 20:16) >  IMPRESSION:  No high-grade stenosis or occlusion.    A 2 mm inferomedially oriented protuberance emanating off the proximal   right cervical internal carotid artery, (series 7 image 511). Findings   suspicious for a small aneurysm.    < from: CT Angio Head w/ IV Cont (12.23.21 @ 20:16) >  IMPRESSION:  No large vessel occlusion.    A 2 mm inferiorly oriented protuberance emanating off the left distal   cavernous/clinoid internal carotid artery, (series 7 image 340). Findings   may represent a small aneurysm versus infundibulum.    Asymmetric enhancement extending along the left nasopharynx into the left   oropharynx and along the left base of the tongue of unknown significance.   Correlate with direct visualization.    Physical exam at discharge:  General: No acute distress, awake and alert  Eyes: Anicteric sclerae, moist conjunctivae, see below for CNs  Neck: trachea midline  Cardiovascular: Regular rate and rhythm   Pulmonary: No use of accessory muscles  GI: Abdomen soft, non-distended, non-tender  Extremities:no edema    Neurologic:  -Mental status: Awake, alert, oriented to person, place, and time. Speech is fluent with intact naming, repetition, and comprehension, no dysarthria. Recent and remote memory intact. Follows commands. Attention/concentration intact. Fund of knowledge appropriate.  -Cranial nerves:   II: Visual fields are full to confrontation.  III, IV, VI: Extraocular movements are intact without nystagmus. Pupils equally round and reactive to light  V:  Facial sensation V1-V3 equal and intact   VII: Face is symmetric  Motor: Normal bulk and tone. No pronator drift. Strength bilateral upper extremity 5/5, bilateral lower extremities 5/5.  Rapid alternating movements intact and symmetric  Sensation: Intact to light touch bilaterally. No neglect or extinction on double simultaneous testing.  Coordination: No dysmetria on finger-to-nose and heel-to-shin bilaterally  Reflexes: Downgoing toes bilaterally   Gait: Narrow gait    New medications on discharge:  Labs to be followed up: repeat sodium level as outpatient  Imaging to be done as outpatient:  Further outpatient workup:         88 yo F w/ PMH HLD, hypothyroidism, vertigo, presented to ED for dizziness and headache x1 day.   CTH x2 showing no e/o strokes.   Patient found to be hyponatremic to 120, likely contributing to above symptoms.   Patient's home desmopressin was held, and sodium improved to 140 on day of discharge.    Patient had the following workup done in house:  < from: CT Brain Stroke Protocol (12.23.21 @ 20:15) >  Impression:  No acute intracranial hemorrhage or acute demarcated territorial   infarction..    < from: CT Angio Head w/ IV Cont (12.23.21 @ 20:16) >  IMPRESSION:  No high-grade stenosis or occlusion.    A 2 mm inferomedially oriented protuberance emanating off the proximal   right cervical internal carotid artery, (series 7 image 511). Findings   suspicious for a small aneurysm.    < from: CT Angio Head w/ IV Cont (12.23.21 @ 20:16) >  IMPRESSION:  No large vessel occlusion.    A 2 mm inferiorly oriented protuberance emanating off the left distal   cavernous/clinoid internal carotid artery, (series 7 image 340). Findings   may represent a small aneurysm versus infundibulum.    Asymmetric enhancement extending along the left nasopharynx into the left   oropharynx and along the left base of the tongue of unknown significance.   Correlate with direct visualization.    Physical exam at discharge:  General: No acute distress, awake and alert  Eyes: Anicteric sclerae, moist conjunctivae, see below for CNs  Neck: trachea midline  Cardiovascular: Regular rate and rhythm   Pulmonary: No use of accessory muscles  GI: Abdomen soft, non-distended, non-tender  Extremities:no edema    Neurologic:  -Mental status: Awake, alert, oriented to person, place, and time. Speech is fluent with intact naming, repetition, and comprehension, no dysarthria. Recent and remote memory intact. Follows commands. Attention/concentration intact. Fund of knowledge appropriate.  -Cranial nerves:   II: Visual fields are full to confrontation.  III, IV, VI: Extraocular movements are intact without nystagmus. Pupils equally round and reactive to light  V:  Facial sensation V1-V3 equal and intact   VII: Face is symmetric  Motor: Normal bulk and tone. No pronator drift. Strength bilateral upper extremity 5/5, bilateral lower extremities 5/5.  Rapid alternating movements intact and symmetric  Sensation: Intact to light touch bilaterally. No neglect or extinction on double simultaneous testing.  Coordination: No dysmetria on finger-to-nose and heel-to-shin bilaterally  Reflexes: Downgoing toes bilaterally   Gait: Narrow gait    New medications on discharge: Depakote 250mg QHS   Labs to be followed up: repeat sodium level as outpatient  Imaging to be done as outpatient:  Further outpatient workup:         89 yo F w/ PMH HLD, hypothyroidism, vertigo, presented to ED for dizziness and headache x1 day.   CTH x2 showing no e/o strokes.   Patient found to be hyponatremic to 120, likely contributing to above symptoms.   Patient's home desmopressin was held, and sodium improved to 140 on day of discharge.    Patient had the following workup done in house:  < from: CT Brain Stroke Protocol (12.23.21 @ 20:15) >  Impression:  No acute intracranial hemorrhage or acute demarcated territorial   infarction..    < from: CT Angio Head w/ IV Cont (12.23.21 @ 20:16) >  IMPRESSION:  No high-grade stenosis or occlusion.    A 2 mm inferomedially oriented protuberance emanating off the proximal   right cervical internal carotid artery, (series 7 image 511). Findings   suspicious for a small aneurysm.    < from: CT Angio Head w/ IV Cont (12.23.21 @ 20:16) >  IMPRESSION:  No large vessel occlusion.    A 2 mm inferiorly oriented protuberance emanating off the left distal   cavernous/clinoid internal carotid artery, (series 7 image 340). Findings   may represent a small aneurysm versus infundibulum.    Asymmetric enhancement extending along the left nasopharynx into the left   oropharynx and along the left base of the tongue of unknown significance.   Correlate with direct visualization.    Physical exam at discharge:  General: No acute distress, awake and alert  Eyes: Anicteric sclerae, moist conjunctivae, see below for CNs  Neck: trachea midline  Cardiovascular: Regular rate and rhythm   Pulmonary: No use of accessory muscles  GI: Abdomen soft, non-distended, non-tender  Extremities:no edema    Neurologic:  -Mental status: Awake, alert, oriented to person, place, and time. Speech is fluent with intact naming, repetition, and comprehension, no dysarthria. Recent and remote memory intact. Follows commands. Attention/concentration intact. Fund of knowledge appropriate.  -Cranial nerves:   II: Visual fields are full to confrontation.  III, IV, VI: Extraocular movements are intact without nystagmus. Pupils equally round and reactive to light  V:  Facial sensation V1-V3 equal and intact   VII: Face is symmetric  Motor: Normal bulk and tone. No pronator drift. Strength bilateral upper extremity 5/5, bilateral lower extremities 5/5.  Rapid alternating movements intact and symmetric  Sensation: Intact to light touch bilaterally. No neglect or extinction on double simultaneous testing.  Coordination: No dysmetria on finger-to-nose and heel-to-shin bilaterally  Reflexes: Downgoing toes bilaterally   Gait: Narrow gait    New medications on discharge: Depakote 250mg QHS, amlodipine 5mg  Labs to be followed up: repeat sodium level as outpatient  Imaging to be done as outpatient:  Further outpatient workup:

## 2021-12-24 NOTE — OCCUPATIONAL THERAPY INITIAL EVALUATION ADULT - MD ORDER
89y Female with PMHx of HLD, hypothyroidism, vertigo who presented to the ED for dizziness and headache x1 day. HCT showed chronic left frontal infarct. CTA showed A 2 mm inferomedially oriented protuberance emanating off the proximal right cervical internal carotid artery. Findings suspicious for a small aneurysm. Patient was admitted to stroke telemetry for further stroke work up and blood pressure management. Hyponatremic on admission to 120.

## 2021-12-24 NOTE — PHYSICAL THERAPY INITIAL EVALUATION ADULT - MODALITIES TREATMENT COMMENTS
H test: able to track smoothly to all fields. Quadrant test: WNL. All other cranial nerves intact. SILT face

## 2021-12-24 NOTE — DISCHARGE NOTE PROVIDER - NSDCMRMEDTOKEN_GEN_ALL_CORE_FT
desmopressin 0.1 mg oral tablet: 1 tab(s) orally 2 times a day  LORazepam 1 mg oral tablet: 1 tab(s) orally 2 times a day  lovastatin 20 mg oral tablet: 1 tab(s) orally once a day  Synthroid 75 mcg (0.075 mg) oral tablet: 1 tab(s) orally once a day   divalproex sodium 250 mg oral delayed release tablet: 1 tab(s) orally once a day (at bedtime)  LORazepam 1 mg oral tablet: 1 tab(s) orally 2 times a day  lovastatin 20 mg oral tablet: 1 tab(s) orally once a day  oxybutynin 5 mg oral tablet: 0.5 tab(s) orally 2 times a day  Synthroid 75 mcg (0.075 mg) oral tablet: 1 tab(s) orally once a day   amLODIPine 5 mg oral tablet: 1 tab(s) orally every 24 hours  divalproex sodium 250 mg oral delayed release tablet: 1 tab(s) orally once a day (at bedtime)  LORazepam 1 mg oral tablet: 1 tab(s) orally 2 times a day  lovastatin 20 mg oral tablet: 1 tab(s) orally once a day  oxybutynin 5 mg oral tablet: 0.5 tab(s) orally 2 times a day  Synthroid 75 mcg (0.075 mg) oral tablet: 1 tab(s) orally once a day

## 2021-12-24 NOTE — CONSULT NOTE ADULT - ASSESSMENT
per Neurology    89 yo Female with PMHx of HLD, hypothyroidism, vertigo who presented to the ED for dizziness and headache x1 day. . HCT showed chronic left frontal infarct. CTA showed A 2 mm inferomedially oriented protuberance emanating off the proximal   right cervical internal carotid artery. Findings suspicious for a small aneurysm. Patient out of window for tPA, not a thrombectomy candidate as there is no large vessel occlusion. Patient was admitted to stroke telemetry for further stroke work up and blood pressure management.     Neuro  #CVA workup  - continue aspirin 81mg and plavix 75mg daily  - continue atorvastatin 80mg daily  - q4hr stroke neuro checks and vitals  - obtain MRI Brain without contrast  - Stroke Code HCT Results: left frontal chronic infarct   - Stroke Code CTA Results: 2 mm inferomedially oriented protuberance emanating off the proximal   right cervical internal carotid artery  - Stroke education    #Hyponatremia   -  on arrival   - NS @ 100 ml/hr   - serial NA blood draws     Cards  #HTN  - permissive hypertension, Goal -180  - hold home blood pressure medication for now  - obtain TTE with bubble    #HLD  - high dose statin as above in CVA  - LDL results: pending     Pulm  - call provider if SPO2 < 94%    GI  #Nutrition/Fluids/Electrolytes   - replete K<4 and Mg <2  - Diet: DASH/ TLC   - IVF: NA @ 100cc/hr     Endocrine  - A1C results: pending   - TSH results: pending    DVT Prophylaxis  - lovenox sq for DVT prophylaxis   - SCDs for DVT prophylaxis

## 2021-12-24 NOTE — OCCUPATIONAL THERAPY INITIAL EVALUATION ADULT - ADDITIONAL COMMENTS
Patient reports living alone on the first floor of an apartment building with 4 EDWIN[place]. Patient states she was independent with all ADL's, functional mobility with no AD prior to admission however owns a RW. Patient is L hand dominant and owns a bathtub shower with shower chair however difficulty with getting into and out of the tub.

## 2021-12-24 NOTE — DISCHARGE NOTE PROVIDER - NSDCCPCAREPLAN_GEN_ALL_CORE_FT
PRINCIPAL DISCHARGE DIAGNOSIS  Diagnosis: Hyponatremia  Assessment and Plan of Treatment:        PRINCIPAL DISCHARGE DIAGNOSIS  Diagnosis: Hyponatremia  Assessment and Plan of Treatment: You were diagnosed with hyponatremia, which means your blood level of sodium (salt) is too low. Salt is needed for the body and brain to work. Very low blood levels of sodium can be fatal. Symptoms can include headache, confusion, fatigue, muscle cramps, hallucinations, seizures, and coma. You have been treated to raise your blood levels of sodium.   Keep all follow-up appointments. Your provider needs to watch your condition closely.       PRINCIPAL DISCHARGE DIAGNOSIS  Diagnosis: Hyponatremia  Assessment and Plan of Treatment: Hyponatremia occurs when the amount of sodium (salt) in your blood is lower than normal. Sodium is an electrolyte (mineral) that helps your muscles, heart, and digestive system work properly. It helps control blood pressure and fluid balance.   DISCHARGE INSTRUCTIONS:  Follow up with your healthcare provider as directed: You may need to return for more tests. Write down your questions so you remember to ask them during your visits.  Nutrition: You may need to increase your intake of sodium. Foods that are high in sodium include milk, packaged snacks such as pretzels, or processed meats (shabazz, sausage, and ham). Ask your dietitian to help you create a meal plan that is right for you.  Liquids: Follow your healthcare provider's advice if you need to limit the amount of liquid you drink. Ask how much liquid to drink each day and which liquids are best for you. You may be asked to drink liquids that have water, sugar, and salt, such as juices, milk, or sports drinks. These liquids help your body hold in fluid and prevent dehydration.   Contact your healthcare provider if:   •You have muscle cramps or twitching.  •You feel very weak or tired.  •You have nausea or are vomiting.  •You have questions or concerns about your condition or care.  Return to the emergency department if:   •You have a seizure.  •You have an irregular heartbeat.  •You have trouble breathing.  •You cannot move your arms and legs.  •You are confused or cannot think clearly.         PRINCIPAL DISCHARGE DIAGNOSIS  Diagnosis: Hyponatremia  Assessment and Plan of Treatment: Hyponatremia occurs when the amount of sodium (salt) in your blood is lower than normal. Sodium is an electrolyte (mineral) that helps your muscles, heart, and digestive system work properly. It helps control blood pressure and fluid balance. Please stop taking desmopressin.   DISCHARGE INSTRUCTIONS:  Follow up with your healthcare provider as directed: You may need to return for more tests. Write down your questions so you remember to ask them during your visits.  Nutrition: You may need to increase your intake of sodium. Foods that are high in sodium include milk, packaged snacks such as pretzels, or processed meats (shabazz, sausage, and ham). Ask your dietitian to help you create a meal plan that is right for you.  Liquids: Follow your healthcare provider's advice if you need to limit the amount of liquid you drink. Ask how much liquid to drink each day and which liquids are best for you. You may be asked to drink liquids that have water, sugar, and salt, such as juices, milk, or sports drinks. These liquids help your body hold in fluid and prevent dehydration.   Contact your healthcare provider if:   •You have muscle cramps or twitching.  •You feel very weak or tired.  •You have nausea or are vomiting.  •You have questions or concerns about your condition or care.  Return to the emergency department if:   •You have a seizure.  •You have an irregular heartbeat.  •You have trouble breathing.  •You cannot move your arms and legs.  •You are confused or cannot think clearly.        SECONDARY DISCHARGE DIAGNOSES  Diagnosis: HTN (hypertension)  Assessment and Plan of Treatment: Your blood pressure was very high when you came in, over 200. This is likely contributiting to your headaches and feeling dizzy. We started you on a new medication called amlodipine, you will take 5mg once a day. Please follow up with Dr. Hebert in 2-3 weeks and they can continue to manage your blood pressure and adjust your medications accordingly. We sent a prescription for the amlodipine to your pharmacy.

## 2021-12-24 NOTE — OCCUPATIONAL THERAPY INITIAL EVALUATION ADULT - MODIFIED CLINICAL TEST OF SENSORY INTEGRATION IN BALANCE TEST
Patient functionally ambulated 20 x 1 feet without AD and Min A x 1 and 60 x 1 feet with RW and CGA.

## 2021-12-24 NOTE — PHYSICAL THERAPY INITIAL EVALUATION ADULT - ADDITIONAL COMMENTS
Patient lives alone on the first floor of an apartment building with 4 [address] building. Independent PTA. Denies use of an AD but owns a RW

## 2021-12-24 NOTE — DISCHARGE NOTE PROVIDER - CARE PROVIDER_API CALL
Christine Pradhan)  Neurology; Vascular Neurology  [address], [address] [place] [address]  Phone: [phone]  Fax: [phone]  Follow Up Time: 2 weeks   Christine Pradhan)  Neurology; Vascular Neurology  [address], [address] [place] [address]  Phone: [phone]  Fax: [phone]  Follow Up Time: 2 weeks    Orlando Hebert)  Cardiovascular Disease; Internal Medicine  [facility], [address]  [place] [address]  Phone: [phone]  Fax: [phone]  Established Patient  Follow Up Time: 2 weeks   Christine Pradhan)  Neurology; Vascular Neurology  [address], [address] [place] [address]  Phone: [phone]  Fax: [phone]  Scheduled Appointment: 01/18/2022 10:20 AM    Orlando Hebert)  Cardiovascular Disease; Internal Medicine  [address] [facility], [address]  [place] [address]  Phone: [phone]  Fax: [phone]  Established Patient  Follow Up Time: 2 weeks

## 2021-12-24 NOTE — PROGRESS NOTE ADULT - SUBJECTIVE AND OBJECTIVE BOX
Neurology Stroke Progress Note    INTERVAL HPI/OVERNIGHT EVENTS:  No acute overnight events. Patient seen and examined with attending at bedside. Pt states she is doing well, in no acute distress, has no complaints at this time.     MEDICATIONS  (STANDING):  aspirin enteric coated 81 milliGRAM(s) Oral daily  atorvastatin 80 milliGRAM(s) Oral at bedtime  clopidogrel Tablet 75 milliGRAM(s) Oral daily  enoxaparin Injectable 40 milliGRAM(s) SubCutaneous at bedtime    MEDICATIONS  (PRN):      Allergies    No Known Allergies    Intolerances    Vital Signs Last 24 Hrs  T(C): 36.2 (24 Dec 2021 10:48), Max: 36.8 (23 Dec 2021 19:34)  T(F): 97.1 (24 Dec 2021 10:48), Max: 98.3 (23 Dec 2021 19:34)  HR: 64 (24 Dec 2021 12:09) (57 - 64)  BP: 143/63 (24 Dec 2021 12:09) (143/63 - 215/93)  BP(mean): 90 (24 Dec 2021 12:09) (90 - 103)  RR: 20 (24 Dec 2021 12:09) (14 - 20)  SpO2: 98% (24 Dec 2021 12:09) (94% - 100%)    Physical exam:  General: No acute distress, awake and alert  Eyes: Anicteric sclerae, moist conjunctivae, see below for CNs  Neck: trachea midline, FROM, supple, no thyromegaly or lymphadenopathy  Cardiovascular: Regular rate and rhythm, no murmurs, rubs, or gallops. No carotid bruits.   Pulmonary: Anterior breath sounds clear bilaterally, no crackles or wheezing. No use of accessory muscles  GI: Abdomen soft, non-distended, non-tender  Extremities: Radial and DP pulses +2, no edema    Neurologic:  -Mental status: Awake, alert, oriented to person, place, and time. Speech is fluent with intact naming, repetition, and comprehension, no dysarthria. Recent and remote memory intact. Follows commands. Attention/concentration intact. Fund of knowledge appropriate.  -Cranial nerves:   II: Visual fields are full to confrontation.  III, IV, VI: Extraocular movements are intact without nystagmus. Pupils equally round and reactive to light  V:  Facial sensation V1-V3 equal and intact   VII: Face is symmetric with normal eye closure and smile  VIII: Hearing is bilaterally intact to finger rub  IX, X: Uvula is midline and soft palate rises symmetrically  XI: Head turning and shoulder shrug are intact.  XII: Tongue protrudes midline  Motor: Normal bulk and tone. No pronator drift. Strength bilateral upper extremity 5/5, bilateral lower extremities 5/5.  Rapid alternating movements intact and symmetric  Sensation: Intact to light touch bilaterally. No neglect or extinction on double simultaneous testing.  Coordination: No dysmetria on finger-to-nose and heel-to-shin bilaterally  Reflexes: Downgoing toes bilaterally   Gait: Narrow gait, unsteady initially but improved with continuing gait.     LABS:                        11.7   8.52  )-----------( 303      ( 23 Dec 2021 20:07 )             32.5     12-24    130<L>  |  x   |  x   ----------------------------<  x   x    |  x   |  x     Ca    9.0      24 Dec 2021 05:56    Urinalysis Basic - ( 23 Dec 2021 21:33 )    Color: Yellow / Appearance: Clear / S.010 / pH: x  Gluc: x / Ketone: NEGATIVE  / Bili: Negative / Urobili: 0.2 E.U./dL   Blood: x / Protein: NEGATIVE mg/dL / Nitrite: NEGATIVE   Leuk Esterase: NEGATIVE / RBC: < 5 /HPF / WBC < 5 /HPF   Sq Epi: x / Non Sq Epi: 0-5 /HPF / Bacteria: Present /HPF        RADIOLOGY & ADDITIONAL TESTS:

## 2021-12-24 NOTE — OCCUPATIONAL THERAPY INITIAL EVALUATION ADULT - GENERAL OBSERVATIONS, REHAB EVAL
Patient received semisupine in bed +tele, +heplock IV, +primafit, room air, NAD. Patient A&Ox4, agreeable and tolerated session fairly.

## 2021-12-24 NOTE — DISCHARGE NOTE PROVIDER - PROVIDER TOKENS
PROVIDER:[TOKEN:[[id number]],FOLLOWUP:[2 weeks]] PROVIDER:[TOKEN:[[id number]],FOLLOWUP:[2 weeks]],PROVIDER:[TOKEN:[[id number]],FOLLOWUP:[2 weeks],ESTABLISHEDPATIENT:[T]] PROVIDER:[TOKEN:[[id number]],SCHEDULEDAPPT:[01/18/2022],SCHEDULEDAPPTTIME:[10:20 AM]],PROVIDER:[TOKEN:[[id number]],FOLLOWUP:[2 weeks],ESTABLISHEDPATIENT:[T]]

## 2021-12-24 NOTE — CONSULT NOTE ADULT - ASSESSMENT
89YOF with history of hyperlipidemia, hypothyroidism, vertigo admitted to stroke neurology service for dizziness found to have hyponatremia with sodium 120.    Dizziness  Hyponatremia  Head imaging negative, low concern for stroke. 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   -agree with stopping IVF given rapid correction of Na  -hold desmopressin - allow her to equilibrate on her own  -monitor BMP q6h  -I expect her sodium to normalize on its own after removal of desmopressin  -rest of workup per neurology    HLD - continue home lovastatin  Hypothyroidism - continue home levothyroxine    Dispo: possibly medically clear for d/c tomorrow if Na stable 89YOF with history of hyperlipidemia, hypothyroidism, vertigo admitted to stroke neurology service for dizziness found to have hyponatremia with sodium 120.    Dizziness  Hyponatremia  Head imaging negative, low concern for stroke. Most likely dizziness related to her hyponatremia; notably she takes desmopressin at home for overactive bladder which I think is likely the culprit; possibly component of hypovolemia as well given rapid correction with fluids. She overall appears euvolemic on my exam today though.  -agree with stopping IVF given rapid correction of Na  -hold desmopressin - allow her to equilibrate on her own  -monitor BMP q6h  -I expect her sodium to normalize on its own after removal of desmopressin  -rest of workup per neurology    HLD - continue home lovastatin  Hypothyroidism - continue home levothyroxine    Dispo: possibly medically clear for d/c tomorrow if Na stable

## 2021-12-24 NOTE — PROGRESS NOTE ADULT - ASSESSMENT
89y Female with PMHx of HLD, hypothyroidism, vertigo who presented to the ED for dizziness and headache x1 day. HCT showed chronic left frontal infarct. CTA showed A 2 mm inferomedially oriented protuberance emanating off the proximal right cervical internal carotid artery. Findings suspicious for a small aneurysm. Patient was admitted to stroke telemetry for further stroke work up and blood pressure management. Hyponatremic on admission to 120.     Neuro  #CVA workup  - continue aspirin 81mg and plavix 75mg daily  - continue atorvastatin 80mg daily  - q4hr stroke neuro checks and vitals  - obtain MRI Brain without contrast  - Stroke Code HCT Results: left frontal chronic infarct   - Stroke Code CTA Results: 2 mm inferomedially oriented protuberance emanating off the proximal   right cervical internal carotid artery  - Stroke education    #Hyponatremia   -  on arrival, increased to 132 in 10 hours, dced NS, rpt Na 130 @1000  - pending repeat sodium ~1600  - pending renal consult - paged but waiting for response.     Cards  #HTN  - permissive hypertension, Goal -180  - hold home blood pressure medication for now  - obtain TTE with bubble    #HLD  - high dose statin as above in CVA  - LDL results: pending     Pulm  - call provider if SPO2 < 94%    GI  #Nutrition/Fluids/Electrolytes   - replete K<4 and Mg <2  - Diet: DASH/ TLC   - IVF: none    Endocrine  - A1C results: pending   - TSH results: pending    DVT Prophylaxis  - lovenox sq for DVT prophylaxis   - SCDs for DVT prophylaxis      Discussed daily hospital plans and goals with patient and cousin Gordon    Discussed with Neurology Attending Dr. Pradhan

## 2021-12-24 NOTE — OCCUPATIONAL THERAPY INITIAL EVALUATION ADULT - LEVEL OF INDEPENDENCE: DRESS LOWER BODY, OT EVAL
Patient able to don/doff socks at EOB with additional time, difficulty with bending knees 2/2 discomfort/supervision

## 2021-12-25 LAB
ANION GAP SERPL CALC-SCNC: 11 MMOL/L — SIGNIFICANT CHANGE UP (ref 5–17)
BUN SERPL-MCNC: 11 MG/DL — SIGNIFICANT CHANGE UP (ref 7–23)
CALCIUM SERPL-MCNC: 9 MG/DL — SIGNIFICANT CHANGE UP (ref 8.4–10.5)
CHLORIDE SERPL-SCNC: 106 MMOL/L — SIGNIFICANT CHANGE UP (ref 96–108)
CO2 SERPL-SCNC: 24 MMOL/L — SIGNIFICANT CHANGE UP (ref 22–31)
CREAT SERPL-MCNC: 0.6 MG/DL — SIGNIFICANT CHANGE UP (ref 0.5–1.3)
GLUCOSE SERPL-MCNC: 101 MG/DL — HIGH (ref 70–99)
HCT VFR BLD CALC: 34.6 % — SIGNIFICANT CHANGE UP (ref 34.5–45)
HGB BLD-MCNC: 11.5 G/DL — SIGNIFICANT CHANGE UP (ref 11.5–15.5)
MAGNESIUM SERPL-MCNC: 2.4 MG/DL — SIGNIFICANT CHANGE UP (ref 1.6–2.6)
MCHC RBC-ENTMCNC: 31.5 PG — SIGNIFICANT CHANGE UP (ref 27–34)
MCHC RBC-ENTMCNC: 33.2 GM/DL — SIGNIFICANT CHANGE UP (ref 32–36)
MCV RBC AUTO: 94.8 FL — SIGNIFICANT CHANGE UP (ref 80–100)
NRBC # BLD: 0 /100 WBCS — SIGNIFICANT CHANGE UP (ref 0–0)
PHOSPHATE SERPL-MCNC: 3.2 MG/DL — SIGNIFICANT CHANGE UP (ref 2.5–4.5)
PLATELET # BLD AUTO: 251 K/UL — SIGNIFICANT CHANGE UP (ref 150–400)
POTASSIUM SERPL-MCNC: 3.9 MMOL/L — SIGNIFICANT CHANGE UP (ref 3.5–5.3)
POTASSIUM SERPL-SCNC: 3.9 MMOL/L — SIGNIFICANT CHANGE UP (ref 3.5–5.3)
RBC # BLD: 3.65 M/UL — LOW (ref 3.8–5.2)
RBC # FLD: 12.7 % — SIGNIFICANT CHANGE UP (ref 10.3–14.5)
SODIUM SERPL-SCNC: 141 MMOL/L — SIGNIFICANT CHANGE UP (ref 135–145)
WBC # BLD: 6.73 K/UL — SIGNIFICANT CHANGE UP (ref 3.8–10.5)
WBC # FLD AUTO: 6.73 K/UL — SIGNIFICANT CHANGE UP (ref 3.8–10.5)

## 2021-12-25 PROCEDURE — 99233 SBSQ HOSP IP/OBS HIGH 50: CPT

## 2021-12-25 PROCEDURE — 93306 TTE W/DOPPLER COMPLETE: CPT | Mod: 26

## 2021-12-25 RX ORDER — OXYBUTYNIN CHLORIDE 5 MG
2.5 TABLET ORAL
Refills: 0 | Status: DISCONTINUED | OUTPATIENT
Start: 2021-12-25 | End: 2021-12-27

## 2021-12-25 RX ORDER — SODIUM CHLORIDE 9 MG/ML
1000 INJECTION, SOLUTION INTRAVENOUS
Refills: 0 | Status: COMPLETED | OUTPATIENT
Start: 2021-12-25 | End: 2021-12-25

## 2021-12-25 RX ADMIN — ATORVASTATIN CALCIUM 80 MILLIGRAM(S): 80 TABLET, FILM COATED ORAL at 22:42

## 2021-12-25 RX ADMIN — CLOPIDOGREL BISULFATE 75 MILLIGRAM(S): 75 TABLET, FILM COATED ORAL at 12:44

## 2021-12-25 RX ADMIN — Medication 81 MILLIGRAM(S): at 12:44

## 2021-12-25 RX ADMIN — SODIUM CHLORIDE 80 MILLILITER(S): 9 INJECTION, SOLUTION INTRAVENOUS at 16:49

## 2021-12-25 RX ADMIN — Medication 2.5 MILLIGRAM(S): at 17:57

## 2021-12-25 RX ADMIN — ENOXAPARIN SODIUM 40 MILLIGRAM(S): 100 INJECTION SUBCUTANEOUS at 22:42

## 2021-12-25 NOTE — PROGRESS NOTE ADULT - SUBJECTIVE AND OBJECTIVE BOX
Neurology Stroke Progress Note    INTERVAL HPI/OVERNIGHT EVENTS:  No acute overnight events. Patient seen and examined with attending at bedside. Pt refuses MRI due to claustrophobia, states she doesn't want with Ativan, would like to do outpatient. Patient agreeable to repeat CT head to r/o stroke. Gait improved, wobbly with initial sit to stand per PT. Pt does feel better since admission.     MEDICATIONS  (STANDING):  aspirin enteric coated 81 milliGRAM(s) Oral daily  atorvastatin 80 milliGRAM(s) Oral at bedtime  clopidogrel Tablet 75 milliGRAM(s) Oral daily  enoxaparin Injectable 40 milliGRAM(s) SubCutaneous at bedtime  melatonin 5 milliGRAM(s) Oral at bedtime    MEDICATIONS  (PRN):    Allergies    No Known Allergies    Intolerances    Vital Signs Last 24 Hrs  T(C): 36.7 (25 Dec 2021 14:43), Max: 36.7 (24 Dec 2021 17:54)  T(F): 98 (25 Dec 2021 14:43), Max: 98.1 (24 Dec 2021 17:54)  HR: 80 (25 Dec 2021 15:40) (63 - 87)  BP: 121/58 (25 Dec 2021 15:40) (121/58 - 144/64)  BP(mean): 83 (25 Dec 2021 15:40) (82 - 92)  RR: 20 (25 Dec 2021 15:40) (19 - 31)  SpO2: 95% (25 Dec 2021 15:40) (94% - 98%)    Physical exam:  General: No acute distress, awake and alert  Eyes: Anicteric sclerae, moist conjunctivae, see below for CNs  Neck: trachea midline  Cardiovascular: Regular rate and rhythm, no murmurs, rubs, or gallops  Pulmonary: Anterior breath sounds clear bilaterally, no crackles or wheezing. No use of accessory muscles  GI: Abdomen soft, non-distended, non-tender  Extremities: Radial and DP pulses +2, no edema    Neurologic:  -Mental status: Awake, alert, oriented to person, place, and time. Speech is fluent with intact naming, repetition, and comprehension, no dysarthria. Recent and remote memory intact. Follows commands. Attention/concentration intact. Fund of knowledge appropriate.  -Cranial nerves:   II: Visual fields are full to confrontation.  III, IV, VI: Extraocular movements are intact without nystagmus. Pupils equally round and reactive to light  V:  Facial sensation V1-V3 equal and intact   VII: Face is symmetric with normal eye closure and smile  VIII: Hearing is grossly intact bilaterally  IX, X: Uvula is midline and soft palate rises symmetrically  XI: Head turning and shoulder shrug are intact.  XII: Tongue protrudes midline  Motor: Normal bulk and tone. No pronator drift. Strength bilateral upper extremity 5/5, bilateral lower extremities 5/5.  Sensation: Intact to light touch bilaterally. No neglect or extinction on double simultaneous testing.  Coordination: No dysmetria on finger-to-nose and heel-to-shin bilaterally  Reflexes: Downgoing toes bilaterally   Gait: Narrow gait, unsteady initially but improved with continuing gait. steady    LABS:                        11.5   6.73  )-----------( 251      ( 25 Dec 2021 06:04 )             34.6         141  |  106  |  11  ----------------------------<  101<H>  3.9   |  24  |  0.60    Ca    9.0      25 Dec 2021 06:04  Phos  3.2       Mg     2.4             Urinalysis Basic - ( 23 Dec 2021 21:33 )    Color: Yellow / Appearance: Clear / S.010 / pH: x  Gluc: x / Ketone: NEGATIVE  / Bili: Negative / Urobili: 0.2 E.U./dL   Blood: x / Protein: NEGATIVE mg/dL / Nitrite: NEGATIVE   Leuk Esterase: NEGATIVE / RBC: < 5 /HPF / WBC < 5 /HPF   Sq Epi: x / Non Sq Epi: 0-5 /HPF / Bacteria: Present /HPF    RADIOLOGY & ADDITIONAL TESTS:    < from: TTE Echo Complete w/o Contrast w/ Doppler (21 @ 09:45) >  FINDINGS:    Left Ventricle:  There is mild-to-moderate concentric left ventricular hypertrophy. There   are no regional wall motion abnormalities seen. Left ventricular systolic   function is hyperdynamic with a calculated ejection fraction of >75%.   Analysis of mitral annular tissue Doppler, left atrial volume index, and   tricuspid regurgitant velocity reveals: grade I diastolic dysfunction   without elevated filling pressure.    < end of copied text >  < from: TTE Echo Complete w/o Contrast w/ Doppler (. @ 09:45) >  CONCLUSIONS:     1. Mild to moderate symmetric left ventricular hypertrophy.   2. Hyperdynamic left ventricular systolic function.   3. Grade I left ventricular diastolic dysfunction.   4.Normal right ventricular size and systolic function.   5. Severely dilated left atrium.   6. No significant valvular disease.   7. No evidence of pulmonary hypertension.   8. No pericardial effusion.    < end of copied text >     Neurology Stroke Progress Note    89y Female with PMHx of HLD, hypothyroidism, vertigo who presented to the ED for dizziness and headache x1 day. . HCT showed chronic left frontal infarct. CTA showed A 2 mm inferomedially oriented protuberance emanating off the proximal right cervical internal carotid artery. Findings suspicious for a small aneurysm. Patient out of window for tPA, not a thrombectomy candidate as there is no large vessel occlusion. Patient was admitted to stroke telemetry for further stroke work up and blood pressure management. Course c/b hyponatremia, admission sodium 120, started on NS and AM sodium was 132, IVF stopped. Patient's sodium normalized to 141 this AM. Hyponatremia most likely 2/2 home medication desmopressin for urinary incontinence. Pt with improved ataxia and dizziness today. Most likely ataxia and dizziness 2/2 hyponatremia. Pt is stable for stepdown. Pending repeat HCT and re-eval by PT as dispo possible rehab due to unsteady initial sit to stand.     INTERVAL HPI/OVERNIGHT EVENTS:  No acute overnight events. Patient seen and examined with attending at bedside. Pt refuses MRI due to claustrophobia, states she doesn't want with Ativan, would like to do outpatient. Patient agreeable to repeat CT head to r/o stroke. Gait improved, wobbly with initial sit to stand per PT. Pt does feel better since admission.     MEDICATIONS  (STANDING):  aspirin enteric coated 81 milliGRAM(s) Oral daily  atorvastatin 80 milliGRAM(s) Oral at bedtime  clopidogrel Tablet 75 milliGRAM(s) Oral daily  enoxaparin Injectable 40 milliGRAM(s) SubCutaneous at bedtime  melatonin 5 milliGRAM(s) Oral at bedtime    MEDICATIONS  (PRN):    Allergies    No Known Allergies    Intolerances    Vital Signs Last 24 Hrs  T(C): 36.7 (25 Dec 2021 14:43), Max: 36.7 (24 Dec 2021 17:54)  T(F): 98 (25 Dec 2021 14:43), Max: 98.1 (24 Dec 2021 17:54)  HR: 80 (25 Dec 2021 15:40) (63 - 87)  BP: 121/58 (25 Dec 2021 15:40) (121/58 - 144/64)  BP(mean): 83 (25 Dec 2021 15:40) (82 - 92)  RR: 20 (25 Dec 2021 15:40) (19 - 31)  SpO2: 95% (25 Dec 2021 15:40) (94% - 98%)    Physical exam:  General: No acute distress, awake and alert  Eyes: Anicteric sclerae, moist conjunctivae, see below for CNs  Neck: trachea midline  Cardiovascular: Regular rate and rhythm, no murmurs, rubs, or gallops  Pulmonary: Anterior breath sounds clear bilaterally, no crackles or wheezing. No use of accessory muscles  GI: Abdomen soft, non-distended, non-tender  Extremities: Radial and DP pulses +2, no edema    Neurologic:  -Mental status: Awake, alert, oriented to person, place, and time. Speech is fluent with intact naming, repetition, and comprehension, no dysarthria. Recent and remote memory intact. Follows commands. Attention/concentration intact. Fund of knowledge appropriate.  -Cranial nerves:   II: Visual fields are full to confrontation.  III, IV, VI: Extraocular movements are intact without nystagmus. Pupils equally round and reactive to light  V:  Facial sensation V1-V3 equal and intact   VII: Face is symmetric with normal eye closure and smile  VIII: Hearing is grossly intact bilaterally  IX, X: Uvula is midline and soft palate rises symmetrically  XI: Head turning and shoulder shrug are intact.  XII: Tongue protrudes midline  Motor: Normal bulk and tone. No pronator drift. Strength bilateral upper extremity 5/5, bilateral lower extremities 5/5.  Sensation: Intact to light touch bilaterally. No neglect or extinction on double simultaneous testing.  Coordination: No dysmetria on finger-to-nose and heel-to-shin bilaterally  Reflexes: Downgoing toes bilaterally   Gait: Narrow gait, unsteady initially but improved with continuing gait. steady    LABS:                        11.5   6.73  )-----------( 251      ( 25 Dec 2021 06:04 )             34.6     12-25    141  |  106  |  11  ----------------------------<  101<H>  3.9   |  24  |  0.60    Ca    9.0      25 Dec 2021 06:04  Phos  3.2     12-25  Mg     2.4     12-25        Urinalysis Basic - ( 23 Dec 2021 21:33 )    Color: Yellow / Appearance: Clear / S.010 / pH: x  Gluc: x / Ketone: NEGATIVE  / Bili: Negative / Urobili: 0.2 E.U./dL   Blood: x / Protein: NEGATIVE mg/dL / Nitrite: NEGATIVE   Leuk Esterase: NEGATIVE / RBC: < 5 /HPF / WBC < 5 /HPF   Sq Epi: x / Non Sq Epi: 0-5 /HPF / Bacteria: Present /HPF    RADIOLOGY & ADDITIONAL TESTS:    < from: TTE Echo Complete w/o Contrast w/ Doppler (21 @ 09:45) >  FINDINGS:    Left Ventricle:  There is mild-to-moderate concentric left ventricular hypertrophy. There   are no regional wall motion abnormalities seen. Left ventricular systolic   function is hyperdynamic with a calculated ejection fraction of >75%.   Analysis of mitral annular tissue Doppler, left atrial volume index, and   tricuspid regurgitant velocity reveals: grade I diastolic dysfunction   without elevated filling pressure.    < end of copied text >  < from: TTE Echo Complete w/o Contrast w/ Doppler (21 @ 09:45) >  CONCLUSIONS:     1. Mild to moderate symmetric left ventricular hypertrophy.   2. Hyperdynamic left ventricular systolic function.   3. Grade I left ventricular diastolic dysfunction.   4.Normal right ventricular size and systolic function.   5. Severely dilated left atrium.   6. No significant valvular disease.   7. No evidence of pulmonary hypertension.   8. No pericardial effusion.    < end of copied text >     ** TRANSFER NOTE TO REGIONAL FOR STEPDOWN**    89y Female with PMHx of HLD, hypothyroidism, vertigo who presented to the ED for dizziness and headache x1 day. . HCT showed chronic left frontal infarct. CTA showed A 2 mm inferomedially oriented protuberance emanating off the proximal right cervical internal carotid artery. Findings suspicious for a small aneurysm. Patient out of window for tPA, not a thrombectomy candidate as there is no large vessel occlusion. Patient was admitted to stroke telemetry for further stroke work up and blood pressure management. Course c/b hyponatremia, admission sodium 120, started on NS and AM sodium was 132, IVF stopped. Patient's sodium normalized to 141 this AM. Hyponatremia most likely 2/2 home medication desmopressin for urinary incontinence. Pt with improved ataxia and dizziness today. Most likely ataxia and dizziness 2/2 hyponatremia. Pt is stable for stepdown. Pending repeat HCT and re-eval by PT as dispo possible rehab due to unsteady initial sit to stand.     Neurology Stroke Progress Note    INTERVAL HPI/OVERNIGHT EVENTS:  No acute overnight events. Patient seen and examined with attending at bedside. Pt refuses MRI due to claustrophobia, states she doesn't want with Ativan, would like to do outpatient. Patient agreeable to repeat CT head to r/o stroke. Gait improved, wobbly with initial sit to stand per PT. Pt does feel better since admission.     MEDICATIONS  (STANDING):  aspirin enteric coated 81 milliGRAM(s) Oral daily  atorvastatin 80 milliGRAM(s) Oral at bedtime  clopidogrel Tablet 75 milliGRAM(s) Oral daily  enoxaparin Injectable 40 milliGRAM(s) SubCutaneous at bedtime  melatonin 5 milliGRAM(s) Oral at bedtime    MEDICATIONS  (PRN):    Allergies    No Known Allergies    Intolerances    Vital Signs Last 24 Hrs  T(C): 36.7 (25 Dec 2021 14:43), Max: 36.7 (24 Dec 2021 17:54)  T(F): 98 (25 Dec 2021 14:43), Max: 98.1 (24 Dec 2021 17:54)  HR: 80 (25 Dec 2021 15:40) (63 - 87)  BP: 121/58 (25 Dec 2021 15:40) (121/58 - 144/64)  BP(mean): 83 (25 Dec 2021 15:40) (82 - 92)  RR: 20 (25 Dec 2021 15:40) (19 - 31)  SpO2: 95% (25 Dec 2021 15:40) (94% - 98%)    Physical exam:  General: No acute distress, awake and alert  Eyes: Anicteric sclerae, moist conjunctivae, see below for CNs  Neck: trachea midline  Cardiovascular: Regular rate and rhythm, no murmurs, rubs, or gallops  Pulmonary: Anterior breath sounds clear bilaterally, no crackles or wheezing. No use of accessory muscles  GI: Abdomen soft, non-distended, non-tender  Extremities: Radial and DP pulses +2, no edema    Neurologic:  -Mental status: Awake, alert, oriented to person, place, and time. Speech is fluent with intact naming, repetition, and comprehension, no dysarthria. Recent and remote memory intact. Follows commands. Attention/concentration intact. Fund of knowledge appropriate.  -Cranial nerves:   II: Visual fields are full to confrontation.  III, IV, VI: Extraocular movements are intact without nystagmus. Pupils equally round and reactive to light  V:  Facial sensation V1-V3 equal and intact   VII: Face is symmetric with normal eye closure and smile  VIII: Hearing is grossly intact bilaterally  IX, X: Uvula is midline and soft palate rises symmetrically  XI: Head turning and shoulder shrug are intact.  XII: Tongue protrudes midline  Motor: Normal bulk and tone. No pronator drift. Strength bilateral upper extremity 5/5, bilateral lower extremities 5/5.  Sensation: Intact to light touch bilaterally. No neglect or extinction on double simultaneous testing.  Coordination: No dysmetria on finger-to-nose and heel-to-shin bilaterally  Reflexes: Downgoing toes bilaterally   Gait: Narrow gait, unsteady initially but improved with continuing gait. steady    LABS:                        11.5   6.73  )-----------( 251      ( 25 Dec 2021 06:04 )             34.6     12-25    141  |  106  |  11  ----------------------------<  101<H>  3.9   |  24  |  0.60    Ca    9.0      25 Dec 2021 06:04  Phos  3.2     12-25  Mg     2.4     12-25        Urinalysis Basic - ( 23 Dec 2021 21:33 )    Color: Yellow / Appearance: Clear / S.010 / pH: x  Gluc: x / Ketone: NEGATIVE  / Bili: Negative / Urobili: 0.2 E.U./dL   Blood: x / Protein: NEGATIVE mg/dL / Nitrite: NEGATIVE   Leuk Esterase: NEGATIVE / RBC: < 5 /HPF / WBC < 5 /HPF   Sq Epi: x / Non Sq Epi: 0-5 /HPF / Bacteria: Present /HPF    RADIOLOGY & ADDITIONAL TESTS:    < from: TTE Echo Complete w/o Contrast w/ Doppler (21 @ 09:45) >  FINDINGS:    Left Ventricle:  There is mild-to-moderate concentric left ventricular hypertrophy. There   are no regional wall motion abnormalities seen. Left ventricular systolic   function is hyperdynamic with a calculated ejection fraction of >75%.   Analysis of mitral annular tissue Doppler, left atrial volume index, and   tricuspid regurgitant velocity reveals: grade I diastolic dysfunction   without elevated filling pressure.    < end of copied text >  < from: TTE Echo Complete w/o Contrast w/ Doppler (21 @ 09:45) >  CONCLUSIONS:     1. Mild to moderate symmetric left ventricular hypertrophy.   2. Hyperdynamic left ventricular systolic function.   3. Grade I left ventricular diastolic dysfunction.   4.Normal right ventricular size and systolic function.   5. Severely dilated left atrium.   6. No significant valvular disease.   7. No evidence of pulmonary hypertension.   8. No pericardial effusion.    < end of copied text >

## 2021-12-25 NOTE — PROGRESS NOTE ADULT - ASSESSMENT
89y Female with PMHx of HLD, hypothyroidism, vertigo who presented to the ED for dizziness and headache x1 day. HCT showed chronic left frontal infarct. CTA showed A 2 mm inferomedially oriented protuberance emanating off the proximal right cervical internal carotid artery. Findings suspicious for a small aneurysm. Patient was admitted to stroke telemetry for further stroke work up and blood pressure management. Hyponatremic on admission to 120, now normalized at 141. PT refuses MR, pending repeat HCT to r/o stroke.     Neuro  #CVA workup  - continue aspirin 81mg and plavix 75mg daily  - continue atorvastatin 80mg daily  - q4hr stroke neuro checks and vitals  - obtain MRI Brain without contrast: pt refuses  - pending repeat HCT to r/o stroke.   - Stroke Code HCT Results: left frontal chronic infarct   - Stroke Code CTA Results: 2 mm inferomedially oriented protuberance emanating off the proximal   right cervical internal carotid artery  - Stroke education    #Hyponatremia   -  on arrival, increased to 132 in 10 hours, dced NS, rpt Na 130 @1000  - pt s/p 500cc D5  - AM Na 141  - most likely 2/2 home medication desmopressin prescribed for urinary incontinence.     Cards  #HTN  - permissive hypertension, Goal -180  - hold home blood pressure medication for now  - TTE with severely dilated LA and hyperdynamic LV    #echo findings  - cards consult placed. appreciate recs    #HLD  - high dose statin as above in CVA  - LDL results: 110    Pulm  - call provider if SPO2 < 94%    GI  #Nutrition/Fluids/Electrolytes   - replete K<4 and Mg <2  - Diet: DASH/ TLC   - IVF: none    Endocrine  - A1C results: pending   - TSH results: pending    DVT Prophylaxis  - lovenox sq for DVT prophylaxis   - SCDs for DVT prophylaxis      Discussed daily hospital plans and goals with patient    Discussed with Neurology Attending Dr. Pradhan 89y Female with PMHx of HLD, hypothyroidism, vertigo who presented to the ED for dizziness and headache x1 day. HCT showed chronic left frontal infarct. CTA showed A 2 mm inferomedially oriented protuberance emanating off the proximal right cervical internal carotid artery. Findings suspicious for a small aneurysm. Patient was admitted to stroke telemetry for further stroke work up and blood pressure management. Hyponatremic on admission to 120, now normalized at 141. PT refuses MR, pending repeat HCT to r/o stroke. PT stable for stepdown. Possible rehab dependant on PT re-eval on 12/26.     Neuro  #CVA workup  - continue aspirin 81mg and plavix 75mg daily  - continue atorvastatin 80mg daily  - q4hr stroke neuro checks and vitals  - obtain MRI Brain without contrast: pt refuses  - pending repeat HCT to r/o stroke.   - Stroke Code HCT Results: left frontal chronic infarct   - Stroke Code CTA Results: 2 mm inferomedially oriented protuberance emanating off the proximal   right cervical internal carotid artery  - Stroke education    #Hyponatremia   -  on arrival, increased to 132 in 10 hours, dced NS, rpt Na 130 @1000  - pt s/p 500cc D5  - AM Na 141  - most likely 2/2 home medication desmopressin prescribed for urinary incontinence.     Cards  #HTN  - permissive hypertension, Goal -180  - hold home blood pressure medication for now  - TTE with severely dilated LA and hyperdynamic LV    #echo findings  - cards consult placed. appreciate recs    #HLD  - high dose statin as above in CVA  - LDL results: 110    Pulm  - call provider if SPO2 < 94%    GI  #Nutrition/Fluids/Electrolytes   - replete K<4 and Mg <2  - Diet: DASH/ TLC   - IVF: LR 1L @ 80cc x 1 dose    Endocrine  - A1C results: pending   - TSH results: pending    DVT Prophylaxis  - lovenox sq for DVT prophylaxis   - SCDs for DVT prophylaxis      Discussed daily hospital plans and goals with patient    Discussed with Neurology Attending Dr. Pradhan

## 2021-12-25 NOTE — CONSULT NOTE ADULT - TIME BILLING
-Pt. seen and examined  -VSS, NAD, euvolemic on exam  -Palpitations: currently in NSR, though pt. has noted occasional palpitations as an outpatient unrelated to current presentation w/ dizziness however; TTE: Normal LV/RV function, Dilated LA, no significant valvular disease, PASP wnl. Pt. currently w/ no evidence of AF but would consider outpatient monitoring w/ either ILR or Zio patch; Currently griffin not recommend A/C w/o documented AF  -HTN - BP now well controlled since admission, some component of anxiety; Currently not on any anti-hypertensives; could consider Norvasc 10mg PO daily; c/w Lipitor    Anibal Ricardo MD  Cardiology Attending

## 2021-12-25 NOTE — PROGRESS NOTE ADULT - SUBJECTIVE AND OBJECTIVE BOX
Subjective/Interval events:  Na corrected above goal yesterday - up to 138 yesterday 5pm (was 120 at 8pm the night before), was given D5, down to 135 11pm, back up to 141 this morning. This morning she is mentating normally, dizziness improved, feels less fatigued and felt stronger with PT. No numbness/tingling, motor deficit, confusion, other neurologic symptoms.    MEDICATIONS  (STANDING):  aspirin enteric coated 81 milliGRAM(s) Oral daily  atorvastatin 80 milliGRAM(s) Oral at bedtime  clopidogrel Tablet 75 milliGRAM(s) Oral daily  enoxaparin Injectable 40 milliGRAM(s) SubCutaneous at bedtime  melatonin 5 milliGRAM(s) Oral at bedtime    MEDICATIONS  (PRN):      Vital Signs Last 24 Hrs  T(C): 36.7 (25 Dec 2021 14:43), Max: 36.7 (24 Dec 2021 17:54)  T(F): 98 (25 Dec 2021 14:43), Max: 98.1 (24 Dec 2021 17:54)  HR: 71 (25 Dec 2021 12:49) (63 - 87)  BP: 126/57 (25 Dec 2021 12:49) (125/58 - 144/64)  BP(mean): 82 (25 Dec 2021 12:49) (82 - 92)  RR: 20 (25 Dec 2021 12:49) (19 - 31)  SpO2: 94% (25 Dec 2021 12:49) (94% - 98%)    PHYSICAL EXAM:  GENERAL: pleasant, appropriate, no acute distress. Participating appropriately in interview  HEENT - NC/AT, EOMI, PERLLA, oropharynx clear without exudate or erythema, moist mucus membranes  NECK: Soft, supple, No JVD, no lymphadenopathy  CHEST/LUNG: Clear to auscultation bilaterally; No rales, rhonchi, wheezing. Normal work of breathing, not tachypneic  HEART: Regular rate and rhythm; No murmurs, rubs, or gallops, normal s1/s2. Warm and well-perfused  ABDOMEN: Soft, Nontender, Nondistended. Normoactive bowel sounds.  EXTREMITIES:  2+ Peripheral Pulses, No clubbing, cyanosis, or edema  NEURO:  awake, alert, oriented to person, place, time, and situation. Cranial nerves intact, no motor or sensory deficits. Moves all extremities.  SKIN: No rashes or lesions    LABS:  12-25    141  |  106  |  11  ----------------------------<  101  3.9   |  24  |  0.60    Ca    9.0      25 Dec 2021 06:04  Phos  3.2     12-25  Mg     2.4     12-25                            11.5   6.73  )-----------( 251      ( 25 Dec 2021 06:04 )             34.6       COVID-19 PCR: Negative (12-23-21 @ 20:07)      RADIOLOGY & ADDITIONAL TESTS:  reviewed, none new

## 2021-12-25 NOTE — PROGRESS NOTE ADULT - ASSESSMENT
89YOF with history of hyperlipidemia, hypothyroidism, vertigo admitted to stroke neurology service for dizziness found to have hyponatremia with sodium 120.    Dizziness  Hypotonic euvolemic hyponatremia  Head imaging negative, low concern for stroke. Most likely dizziness related to her hyponatremia; notably she takes desmopressin at home for overactive bladder which I think is likely the culprit; possibly component of hypovolemia as well given rapid correction with fluids. Notably had very rapid correction of hyponatremia after stopping desmopressin - no signs of ODS at this time.  -continue to hold desmopressin   -Na has normalized  -monitor closely for any neurologic symptoms for ODS    HLD - continue home lovastatin  Hypothyroidism - continue home levothyroxine    Dispo: [facility] vs home with HHPT 89YOF with history of hyperlipidemia, hypothyroidism, vertigo admitted to stroke neurology service for dizziness found to have hyponatremia with sodium 120.    Dizziness  Hypotonic euvolemic hyponatremia  Head imaging negative, low concern for stroke. Most likely dizziness related to her hyponatremia; notably she takes desmopressin at home for overactive bladder which I think is likely the culprit; possibly component of hypovolemia as well given rapid correction with fluids. Notably had very rapid correction of hyponatremia after stopping desmopressin - no signs of ODS at this time.  -continue to hold desmopressin   -Na has normalized  -monitor closely for any neurologic symptoms for ODS  -can use alternative for overactive bladder such as oxybutynin, would avoid desmopressin    HLD - continue home lovastatin  Hypothyroidism - continue home levothyroxine    Dispo: [facility] vs home with HHPT

## 2021-12-25 NOTE — CONSULT NOTE ADULT - ASSESSMENT
89y Female with PMHx of HLD, hypothyroidism, vertigo who presented to the ED for dizziness and headache, admitted to stroke service for workup of stroke. CT imaging consistent with chronic infarct and dizziness attributed to hyponatremia. Cardiology consulted for incidental Echo findings.     # Severely dilated LA  Pt with reported palpitations in the past without any workup. Has been told about irregular heart beat in the past. On imaging, found to have chronic stroke (small chronic infarction in the left frontal   lobe). Pt declined MRI inpatient but per primary team suspicion for acute stroke is low and dizziness attributed to hyponatremia which resolved.   - Given severe LA dilation, old stroke and palpitations, patient should be evaluated for paroxysmal Afib  - Recommend Event monitor which can be done as outpatient if suspicion for acute stroke is low. May need ILR as outpatient given frequency of palpitations. Pt already sees Dr. Hebert as her OP primary care/ Cardiologist  - Pt currently not on tele. Per team, no reported events on tele prior to stepdown  - If pt to have pAF, would need AC given stroke risk. [id number] atleast 3    # HTN   - Denies diagnosis of HTN but given LVH and diastolic dysfunction, suspect she has had HTN for a while  - BP goals per Stroke team    Recs final upon attending attestation

## 2021-12-25 NOTE — CONSULT NOTE ADULT - SUBJECTIVE AND OBJECTIVE BOX
HPI: 89y Female with PMHx of HLD, hypothyroidism, vertigo who presented to the ED for dizziness and headache x1 day. Patient reports she has chronic headaches and vertigo which she experiences around once per week but the dizziness and headache was worse. Dizziness had resolved upon arrival to ED. Stroke code called in ED. HCT showed chronic left frontal infarct. CTA showed A 2 mm inferomedially oriented protuberance emanating off the proximal right cervical internal carotid artery. Findings suspicious for a small aneurysm. Patient was admitted to stroke telemetry for further stroke work up and blood pressure management. Hyponatremic on admission to 120, now normalized at 141. Cardiology consulted for Echo finding showing severe LA dilation      Pertinent HPI:   Pt sees Dr. Orlando Hebert. Reportedly, she has never had a prior Echo done. Has had EKG's in his office. Has a hx of palpitations, on average once/ every 2 weeks but has never discussed this with her Cardiologist and has never had an event monitor. Denies any chest pain/ SOB on exertion. Occasionally has SOB when bending down. Can walk a couple of blocks on flat surfaces without [place]. Does not use the stairs.     PAST MEDICAL & SURGICAL HISTORY:  Hyperlipidemia  Hyperlipemia    Hypothyroidism  Hypothyroidism    Dizziness  Vertigo    Cytomegalovirus infection not present  No significant past surgical history        FAMILY HISTORY:            T(C): 36.8 (12-23-21 @ 19:34), Max: 36.8 (12-23-21 @ 19:34)  HR: 61 (12-23-21 @ 21:00) (59 - 61)  BP: 192/81 (12-23-21 @ 21:00) (192/81 - 215/93)  RR: 18 (12-23-21 @ 21:00) (18 - 18)  SpO2: 99% (12-23-21 @ 21:00) (97% - 99%)    MEDICATION RECONCILIATION   MEDICATIONS  (STANDING):  aspirin enteric coated 81 milliGRAM(s) Oral daily  atorvastatin 80 milliGRAM(s) Oral at bedtime  clopidogrel Tablet 75 milliGRAM(s) Oral daily  enoxaparin Injectable 40 milliGRAM(s) SubCutaneous at bedtime  sodium chloride 0.9%. 1000 milliLiter(s) (100 mL/Hr) IV Continuous <Continuous>    MEDICATIONS  (PRN):    Allergies    No Known Allergies    Intolerances      Vital Signs Last 24 Hrs  T(C): 36.8 (23 Dec 2021 19:34), Max: 36.8 (23 Dec 2021 19:34)  T(F): 98.3 (23 Dec 2021 19:34), Max: 98.3 (23 Dec 2021 19:34)  HR: 61 (23 Dec 2021 21:00) (59 - 61)  BP: 192/81 (23 Dec 2021 21:00) (192/81 - 215/93)  BP(mean): --  RR: 18 (23 Dec 2021 21:00) (18 - 18)  SpO2: 99% (23 Dec 2021 21:00) (97% - 99%)     (23 Dec 2021 22:19)      ROS: A 10-point review of systems was otherwise negative.    PAST MEDICAL & SURGICAL HISTORY:  Hyperlipidemia  Hyperlipemia    Hypothyroidism  Hypothyroidism    Dizziness  Vertigo    Cytomegalovirus infection not present  No significant past surgical history        SOCIAL HISTORY:  FAMILY HISTORY:      ALLERGIES: 	  No Known Allergies            MEDICATIONS:  aspirin enteric coated 81 milliGRAM(s) Oral daily  atorvastatin 80 milliGRAM(s) Oral at bedtime  clopidogrel Tablet 75 milliGRAM(s) Oral daily  enoxaparin Injectable 40 milliGRAM(s) SubCutaneous at bedtime  melatonin 5 milliGRAM(s) Oral at bedtime  oxybutynin 2.5 milliGRAM(s) Oral two times a day      PHYSICAL EXAM:  T(C): 36.6 (12-25-21 @ 21:04), Max: 36.8 (12-25-21 @ 17:48)  HR: 81 (12-25-21 @ 21:04) (63 - 87)  BP: 147/76 (12-25-21 @ 21:04) (120/74 - 147/76)  RR: 18 (12-25-21 @ 21:04) (18 - 31)  SpO2: 97% (12-25-21 @ 21:04) (94% - 98%)  Wt(kg): --    GEN: Awake, comfortable. NAD.   HEENT: NCAT, PERRL, EOMI. Mucosa moist. No JVD.   RESP: CTA b/l  CV: RRR, normal s1/s2. No m/r/g.  ABD: Soft, NTND. BS+  EXT: Warm. No edema, clubbing, or cyanosis.   NEURO: AAOx3. No focal deficits.    I&O's Summary    24 Dec 2021 07:01  -  25 Dec 2021 07:00  --------------------------------------------------------  IN: 480 mL / OUT: 2950 mL / NET: -2470 mL    25 Dec 2021 07:01  -  25 Dec 2021 22:40  --------------------------------------------------------  IN: 540 mL / OUT: 0 mL / NET: 540 mL        	  LABS:	 	    CARDIAC MARKERS:                                  11.5   6.73  )-----------( 251      ( 25 Dec 2021 06:04 )             34.6     12-25    141  |  106  |  11  ----------------------------<  101<H>  3.9   |  24  |  0.60    Ca    9.0      25 Dec 2021 06:04  Phos  3.2     12-25  Mg     2.4     12-25      < from: TTE Echo Complete w/o Contrast w/ Doppler (12.25.21 @ 09:45) >  -----  CONCLUSIONS:     1. Mild to moderate symmetric left ventricular hypertrophy.   2. Hyperdynamic left ventricular systolic function.   3. Grade I left ventricular diastolic dysfunction.   4.Normal right ventricular size and systolic function.   5. Severely dilated left atrium.   6. No significant valvular disease.   7. No evidence of pulmonary hypertension.   8. No pericardial effusion.    < end of copied text >       
  Patient is a 89y old  Female who presents with a chief complaint of hypertension   dizziness (23 Dec 2021 22:19)       HPI:   **STROKE HPI***    HPI: 89y Female with PMHx of HLD, hypothyroidism, vertigo who presented to the ED for dizziness and headache x1 day. Patient reports she has chronic headaches and vertigo which she experiences around once per week but yesterday the dizziness and headache was worse. She reports the headache was slightly better in the morning but she still felt dizzy and unsteady on her feet. Dizziness had resolved upon arrival to ED. Stroke code called in ED. Patient denies hx of stroke, although CTH shows chronic left frontal infarct.     PAST MEDICAL & SURGICAL HISTORY:    Hyperlipemia    Hypothyroidism      Vertigo    Cytomegalovirus infection not present      No significant past surgical history        FAMILY HISTORY:            T(C): 36.8 (21 @ 19:34), Max: 36.8 (21 @ 19:34)  HR: 61 (21 @ 21:00) (59 - 61)  BP: 192/81 (21 @ 21:00) (192/81 - 215/93)  RR: 18 (21 @ 21:00) (18 - 18)  SpO2: 99% (21 @ 21:00) (97% - 99%)    MEDICATION RECONCILIATION   MEDICATIONS  (STANDING):  aspirin enteric coated 81 milliGRAM(s) Oral daily  atorvastatin 80 milliGRAM(s) Oral at bedtime  clopidogrel Tablet 75 milliGRAM(s) Oral daily  enoxaparin Injectable 40 milliGRAM(s) SubCutaneous at bedtime  sodium chloride 0.9%. 1000 milliLiter(s) (100 mL/Hr) IV Continuous <Continuous>    MEDICATIONS  (PRN):    Allergies    No Known Allergies    Intolerances      Vital Signs Last 24 Hrs  T(C): 36.8 (23 Dec 2021 19:34), Max: 36.8 (23 Dec 2021 19:34)  T(F): 98.3 (23 Dec 2021 19:34), Max: 98.3 (23 Dec 2021 19:34)  HR: 61 (23 Dec 2021 21:00) (59 - 61)  BP: 192/81 (23 Dec 2021 21:00) (192/81 - 215/93)  BP(mean): --  RR: 18 (23 Dec 2021 21:00) (18 - 18)  SpO2: 99% (23 Dec 2021 21:00) (97% - 99%)           MEDICATIONS  (STANDING):  aspirin enteric coated 81 milliGRAM(s) Oral daily  atorvastatin 80 milliGRAM(s) Oral at bedtime  clopidogrel Tablet 75 milliGRAM(s) Oral daily  enoxaparin Injectable 40 milliGRAM(s) SubCutaneous at bedtime    MEDICATIONS  (PRN):      FAMILY HISTORY:      CBC Full  -  ( 23 Dec 2021 20:07 )  WBC Count : 8.52 K/uL  RBC Count : 3.58 M/uL  Hemoglobin : 11.7 g/dL  Hematocrit : 32.5 %  Platelet Count - Automated : 303 K/uL  Mean Cell Volume : 90.8 fl  Mean Cell Hemoglobin : 32.7 pg  Mean Cell Hemoglobin Concentration : 36.0 gm/dL  Auto Neutrophil # : 6.64 K/uL  Auto Lymphocyte # : 1.05 K/uL  Auto Monocyte # : 0.73 K/uL  Auto Eosinophil # : 0.04 K/uL  Auto Basophil # : 0.02 K/uL  Auto Neutrophil % : 77.9 %  Auto Lymphocyte % : 12.3 %  Auto Monocyte % : 8.6 %  Auto Eosinophil % : 0.5 %  Auto Basophil % : 0.2 %          132<L>  |  98  |  12  ----------------------------<  85  3.5   |  22  |  0.67    Ca    9.0      24 Dec 2021 05:56        Urinalysis Basic - ( 23 Dec 2021 21:33 )    Color: Yellow / Appearance: Clear / S.010 / pH: x  Gluc: x / Ketone: NEGATIVE  / Bili: Negative / Urobili: 0.2 E.U./dL   Blood: x / Protein: NEGATIVE mg/dL / Nitrite: NEGATIVE   Leuk Esterase: NEGATIVE / RBC: < 5 /HPF / WBC < 5 /HPF   Sq Epi: x / Non Sq Epi: 0-5 /HPF / Bacteria: Present /HPF          Radiology:    < from: CT Brain Stroke Protocol (21 @ 20:15) >  ACC: [id number] EXAM:  CT BRAIN STROKE PROTOCOL                          PROCEDURE DATE:  2021          INTERPRETATION:  Clinical history/reason for exam: Stroke code, vertigo   and headaches    Technique: Multiple contiguous axial CT images ofthe head were obtained   from the base of the skull to the vertex without the administration of   intravenous contrast. Coronal and sagittal reformatted images were   obtained.  RAPID AI was used for preliminary interpretation.      Comparison:CT head 2021    Findings:    The ventricles and sulci are within normal limits for patient's stated   age.    No evidence for intracranial hemorrhage, significant space occupying   process or recent territorial infarction.  No acute extra-axial fluid   collections are identified.    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 There is no acute demarcated territorial infarction..    The bones of the calvarium are intact.    The paranasal sinuses and bilateral mastoid complexes are well aerated.    Impression:    No acute intracranial hemorrhage or acute demarcated territorial   infarction..        < from: CT Perfusion w/ Maps w/ IV Cont (21 @ 20:16) >    ACC: [id number] EXAM:  CT PERFUSION W MAPS IC                          PROCEDURE DATE:  2021          INTERPRETATION:  INDICATION: Vertigo. Stroke code.    TECHNIQUE: Following the intravenous administration of 40 ml of Isovue   370, serial axial images were obtained through the brain. The CT   perfusion data set was post processed per iSchemaViewRAPID protocol   generating color maps of CBF (cerebral blood flow), CBV (cerebral blood   volume), MTT (mean transit time), and Tmax.    COMPARISON: None.    FINDINGS: The CT perfusion study demonstrates no perfusion abnormality.   There is no mismatch volume.    CBF less than 30% volume: 0 mL.  Tmax greater than 6 seconds: 0 mL.  Mismatch volume: 0 mL.  Mismatch ratio: None    IMPRESSION: No evidence of CBF less than 30% or Tmax greater than 6   seconds.        < from: CT Angio Head w/ IV Cont (21 @ 20:16) >  ACC: [id number] EXAM:  CT ANGIO BRAIN (W)AW IC                        ACC: [id number] EXAM:  CT ANGIO NECK (W)AW IC                          PROCEDURE DATE:  2021          INTERPRETATION:  PROCEDURE: CTA brain with intravenous contrast.    INDICATION: Vertigo. Stroke code.    TECHNIQUE: Multiple axial thin section were obtained through the [place]   [place] following the intravenous bolus injection of 80 cc Isovue-370.   MIP series are provided.    COMPARISON: Same day CT head, 2021.    FINDINGS: Bilateral intracranial internal carotid arteries are patent   throughout their course. There is a 2 mm inferiorly oriented protuberance   emanating off the left distal cavernous/clinoid internal carotid artery,   (series 7 image 340). Findings may represent a small aneurysm versus   infundibulum. The bilateral MCA and CHRISTOPHER branches are patent. The MCA   bifurcations are unremarkable.    There is asymmetric enhancement extending along the left nasopharynx into   the left oropharynx and along the left base of the tongue of unknown   significance, (series 7 images [id number]). Correlate with direct   visualization to exclude any underlying abnormalities.    Limitations of CTA. Status post genioplasty.      IMPRESSION:  No large vessel occlusion.    A 2 mm inferiorly oriented protuberance emanating off the left distal   cavernous/clinoid internal carotid artery, (series 7 image 340). Findings   may represent a small aneurysm versus infundibulum.    Asymmetric enhancement extending along the left nasopharynx into the left   oropharynx and along the left base of the tongue of unknown significance.   Correlate with direct visualization.    PROCEDURE: CTA neck with intravenous contrast.    INDICATION: Vertigo. Stroke code.    TECHNIQUE: Multiple axial thin section were obtained through the neck   following the intravenous bolus injection of Optiray-350 as above. MIP   series are provided.    COMPARISON: None.    FINDINGS:    The aortic arch is normal size and shows patency, with normal 3 vessel   configuration.    Both common carotid arteries are patent up to the bifurcations. There is   mild calcified plaque at the bilateral carotid bifurcations extending   into the proximal bilateral internal carotid arteries without   hemodynamically significant stenosis or occlusion. There is a 2 mm   inferomedially oriented protuberance emanating off the proximal right   internal carotid artery, (series 7 image 511). Findings suspicious for a   small aneurysm. Further correlation with nonemergent MRA of the neck can   be obtained for further evaluation.    The cervical vertebral arteries are patent throughout, without   hemodynamically significant stenosis or occlusion.    Also noted is atrophy of the thyroid gland.    A 14 x 5 mm linear opacity within the right upper lobe, (series 7 image   840). Further evaluation with nonemergent dedicated CT the chest can be   obtained for further evaluation.    IMPRESSION:  No high-grade stenosis or occlusion.    A 2 mm inferomedially oriented protuberance emanating off the proximal   right cervical internal carotid artery, (series 7 image 511). Findings   suspicious for a small aneurysm.              Vital Signs Last 24 Hrs  T(C): 36.1 (24 Dec 2021 05:23), Max: 36.8 (23 Dec 2021 19:34)  T(F): 96.9 (24 Dec 2021 05:23), Max: 98.3 (23 Dec 2021 19:34)  HR: 62 (24 Dec 2021 08:30) (57 - 62)  BP: 149/65 (24 Dec 2021 08:30) (144/63 - 215/93)  BP(mean): 94 (24 Dec 2021 08:30) (90 - 103)  RR: 20 (24 Dec 2021 08:30) (14 - 20)  SpO2: 98% (24 Dec 2021 08:30) (94% - 100%)        REVIEW OF SYSTEMS:    CONSTITUTIONAL: No fever, weight loss, or fatigue  EYES: No eye pain, visual disturbances, or discharge  ENMT:  No difficulty hearing, tinnitus, vertigo; No sinus or throat pain  NECK: No pain or stiffness  BREASTS: No pain, masses, or nipple discharge  RESPIRATORY: No cough, wheezing, chills or hemoptysis; No shortness of breath  CARDIOVASCULAR: No chest pain, palpitations, dizziness, or leg swelling  GASTROINTESTINAL: No abdominal or epigastric pain. No nausea, vomiting, or hematemesis; No diarrhea or constipation. No melena or hematochezia.  GENITOURINARY: No dysuria, frequency, hematuria, or incontinence  NEUROLOGICAL:   per HPI  SKIN: No itching, burning, rashes, or lesions   LYMPH NODES: No enlarged glands  ENDOCRINE: No heat or cold intolerance; No hair loss  MUSCULOSKELETAL: No joint pain or swelling; No muscle, back, or extremity pain  PSYCHIATRIC: No depression, anxiety, mood swings, or difficulty sleeping  HEME/LYMPH: No easy bruising, or bleeding gums  ALLERGY AND IMMUNOLOGIC: No hives or eczema  VASCULAR: no swelling, erythema,           Physical Exam:  88 yo woman lying in semi Aquino's position, awake, alert, no acute complaints of dizziness or otherwise    Head: normocephalic, atraumatic    Eyes: PERRLA, EOMI, no nystagmus, sclera anicteric    ENT: nasal discharge, uvula midline, no oropharyngeal erythema/exudate    Neck: supple, negative JVD, negative carotid bruits, no thyromegaly    Chest: CTA bilaterally, neg wheeze/ rhonchi/ rales/ crackles/ egophany    Cardiovascular: regular rate and rhythm, neg murmurs/rubs/gallops    Abdomen: soft, non distended, non tender to palpation in all 4 quadrants, negative rebound/guarding, normal bowel sounds    Extremities: WWP, neg cyanosis/clubbing/edema, negative calf tenderness to palpation, negative Artemio's sign      Neurologic Exam:    Alert and oriented to person, place, date/year, speech fluent w/o dysarthria, follows commands, recent and remote memory intact, repetition intact, comprehension intact,  attention/concentration intact, fund of knowledge appropriate    Cranial Nerves:     II:                         pupils equal, round and reactive to light, visual fields intact   III/ IV/VI:              extraocular movements intact, neg nystagmus, neg ptosis  V:                        facial sensation intact, V1-3 normal  VII:                      face symmetric, no droop, normal eye closure and smile  VIII:                     hearing intact to finger rub bilaterally  IX and X:             no hoarseness, gag intact, palate/ uvula rise symmetrically  XI:                       SCM/ trapezius strength intact bilateral  XII:                      no tongue deviation    Motor Exam:    Right UE:             : 5/5                             wrist extensors/ flexors: 5/5                             biceps:   5/5                             triceps:  5/5                             deltoid:  5/5                             pronator drift: neg    Left UE:               :  5/5                             wrist extensors/ flexors:  5/5                             biceps:    5/5                             triceps:   5/5                             deltoid:  5/5                             pronator drift: neg      Right LE:             dorsiflexors:  5/5                             plantar flexors:  5/5                             quadriceps:  5/5                             hamstrings:  5/5                             hip flexors:  5/5    Left LE:               dorsiflexors:  5/5                            plantar flexors:  5/5                            quadriceps:  5/5                            hamstrings:  5/5                            hip flexors:  5/5               Sensation:           intact to light touch x 4 extremities                            No neglect or extinction on double simultaneous testing                       DTR:                  biceps/brachioradialis: equal                                                     patella/ankle: equal                                                       neg clonus                           neg Babinski                        Coordination:                              Finger to Nose:  neg dysmetria bilaterally                            Gait:  not tested        PM&R Impression:    1) dizziness  2) no focal neuro deficits    Recommendations/ Plan :    1) Physical therapy focusing on therapeutic exercises, bed mobility/transfer out of bed evaluation, progressive ambulation with assistive devices prn.    2) Anticipated Disposition Plan/Recs:    pending functional progress                  
CC: dizziness    HPI:  Per admission H&P:  "89y Female with PMHx of HLD, hypothyroidism, vertigo who presented to the ED for dizziness and headache x1 day. Patient reports she has chronic headaches and vertigo which she experiences around once per week but yesterday the dizziness and headache was worse. She reports the headache was slightly better in the morning but she still felt dizzy and unsteady on her feet. Dizziness had resolved upon arrival to ED. Stroke code called in ED. Patient denies hx of stroke, although CTH shows chronic left frontal infarct. "    Patient admitted to stroke neurology service, received 1L NS and sodium went from 120 to 132. NS stopped and down to 130 this morning. Seen by me this afternoon. She confirms above history. She says her dizziness has improved significantly but not back to normal. Denies headache, nausea, vomiting. Tolerating PO.    12 point ROS reviewed and negative except as otherwise stated in HPI and below    PAST MEDICAL & SURGICAL HISTORY:  Hyperlipidemia  Hyperlipemia    Hypothyroidism  Hypothyroidism    Dizziness  Vertigo    Cytomegalovirus infection not present  No significant past surgical history      SOCIAL HISTORY:  Tobacco: quit long ago  Alcohol: denies  Illicit Drugs: denies  Living situation: alone  ADLs: independent    FAMILY HISTORY:  Mother lymphoma  Father MI at young age    ALLERGIES:  No Known Allergies      HOME MEDICATIONS:  desmopressin 0.1 mg oral tablet: 1 tab(s) orally 2 times a day  LORazepam 1 mg oral tablet: 1 tab(s) orally 2 times a day  lovastatin 20 mg oral tablet: 1 tab(s) orally once a day  Synthroid 75 mcg (0.075 mg) oral tablet: 1 tab(s) orally once a day      Vital Signs Last 24 Hrs  T(F): 97.1 (24 Dec 2021 14:45), Max: 98.3 (23 Dec 2021 19:34)  HR: 88 (24 Dec 2021 15:30) (57 - 88)  BP: 129/60 (24 Dec 2021 15:30) (120/57 - 215/93)  RR: 31 (24 Dec 2021 15:30) (14 - 31)  SpO2: 98% (24 Dec 2021 15:05) (94% - 100%)    PHYSICAL EXAM:  GENERAL: pleasant, appropriate, no acute distress  HEAD:  Atraumatic, Normocephalic  EYES: EOMI, PERRLA, conjunctiva and sclera clear  ENMT: No tonsillar erythema, exudates, or enlargement; Moist mucous membranes  NECK: Supple, No JVD  CHEST/LUNG: Clear to auscultation bilaterally; No rales, rhonchi, wheezing, or rubs  HEART: Regular rate and rhythm; S1/S2, No murmurs, rubs, or gallops  ABDOMEN: Soft, Nontender, Nondistended; Bowel sounds present  VASCULAR: Normal pulses, Normal capillary refill  EXTREMITIES:  2+ Peripheral Pulses, No cyanosis, No edema  LYMPH: No lymphadenopathy noted  SKIN: Warm, Intact  PSYCH: Normal mood and affect  NERVOUS SYSTEM:  A/O x3, CN 2-12 intact, No focal deficits    LABS:                        11.7   8.52  )-----------( 303      ( 23 Dec 2021 20:07 )             32.5     12    130  |  x   |  x   ----------------------------<  x   x    |  x   |  x     Ca    9.0      24 Dec 2021 05:56        Urinalysis Basic - ( 23 Dec 2021 21:33 )    Color: Yellow / Appearance: Clear / S.010 / pH: x  Gluc: x / Ketone: NEGATIVE  / Bili: Negative / Urobili: 0.2 E.U./dL   Blood: x / Protein: NEGATIVE mg/dL / Nitrite: NEGATIVE   Leuk Esterase: NEGATIVE / RBC: < 5 /HPF / WBC < 5 /HPF   Sq Epi: x / Non Sq Epi: 0-5 /HPF / Bacteria: Present /HPF        COVID-19 PCR: Negative (21 @ 20:07)    RADIOLOGY & ADDITIONAL TESTS:  `< from: CT Angio Neck w/ IV Cont (21 @ 20:16) >  IMPRESSION:  No large vessel occlusion.    A 2 mm inferiorly oriented protuberance emanating off the left distal   cavernous/clinoid internal carotid artery, (series 7 image 340). Findings   may represent a small aneurysm versus infundibulum.    Asymmetric enhancement extending along the left nasopharynx into the left   oropharynx and along the left base of the tongue of unknown significance.   Correlate with direct visualization.    < end of copied text >  < from: CT Angio Neck w/ IV Cont (21 @ 20:16) >  IMPRESSION:  No high-grade stenosis or occlusion.    < end of copied text >  < from: CT Head No Cont (21 @ 19:36) >  IMPRESSION:  No acute intracranial hemorrhage or calvarial fracture.    < end of copied text >

## 2021-12-26 LAB
ANION GAP SERPL CALC-SCNC: 9 MMOL/L — SIGNIFICANT CHANGE UP (ref 5–17)
BUN SERPL-MCNC: 17 MG/DL — SIGNIFICANT CHANGE UP (ref 7–23)
CALCIUM SERPL-MCNC: 9.4 MG/DL — SIGNIFICANT CHANGE UP (ref 8.4–10.5)
CHLORIDE SERPL-SCNC: 104 MMOL/L — SIGNIFICANT CHANGE UP (ref 96–108)
CO2 SERPL-SCNC: 27 MMOL/L — SIGNIFICANT CHANGE UP (ref 22–31)
CREAT SERPL-MCNC: 0.66 MG/DL — SIGNIFICANT CHANGE UP (ref 0.5–1.3)
GLUCOSE SERPL-MCNC: 97 MG/DL — SIGNIFICANT CHANGE UP (ref 70–99)
POTASSIUM SERPL-MCNC: 4.5 MMOL/L — SIGNIFICANT CHANGE UP (ref 3.5–5.3)
POTASSIUM SERPL-SCNC: 4.5 MMOL/L — SIGNIFICANT CHANGE UP (ref 3.5–5.3)
SODIUM SERPL-SCNC: 140 MMOL/L — SIGNIFICANT CHANGE UP (ref 135–145)

## 2021-12-26 PROCEDURE — 99233 SBSQ HOSP IP/OBS HIGH 50: CPT

## 2021-12-26 PROCEDURE — 70450 CT HEAD/BRAIN W/O DYE: CPT | Mod: 26

## 2021-12-26 RX ORDER — POLYETHYLENE GLYCOL 3350 17 G/17G
17 POWDER, FOR SOLUTION ORAL DAILY
Refills: 0 | Status: DISCONTINUED | OUTPATIENT
Start: 2021-12-26 | End: 2021-12-27

## 2021-12-26 RX ORDER — ACETAMINOPHEN 500 MG
650 TABLET ORAL EVERY 6 HOURS
Refills: 0 | Status: DISCONTINUED | OUTPATIENT
Start: 2021-12-26 | End: 2021-12-27

## 2021-12-26 RX ORDER — DIVALPROEX SODIUM 500 MG/1
250 TABLET, DELAYED RELEASE ORAL AT BEDTIME
Refills: 0 | Status: DISCONTINUED | OUTPATIENT
Start: 2021-12-26 | End: 2021-12-27

## 2021-12-26 RX ORDER — DESMOPRESSIN ACETATE 0.1 MG/1
1 TABLET ORAL
Qty: 0 | Refills: 0 | DISCHARGE

## 2021-12-26 RX ORDER — DIVALPROEX SODIUM 500 MG/1
1 TABLET, DELAYED RELEASE ORAL
Qty: 30 | Refills: 0
Start: 2021-12-26 | End: 2022-01-24

## 2021-12-26 RX ORDER — OXYBUTYNIN CHLORIDE 5 MG
0.5 TABLET ORAL
Qty: 30 | Refills: 0
Start: 2021-12-26 | End: 2022-01-24

## 2021-12-26 RX ORDER — SENNA PLUS 8.6 MG/1
2 TABLET ORAL AT BEDTIME
Refills: 0 | Status: DISCONTINUED | OUTPATIENT
Start: 2021-12-26 | End: 2021-12-27

## 2021-12-26 RX ADMIN — Medication 650 MILLIGRAM(S): at 15:15

## 2021-12-26 RX ADMIN — Medication 650 MILLIGRAM(S): at 14:15

## 2021-12-26 RX ADMIN — SENNA PLUS 2 TABLET(S): 8.6 TABLET ORAL at 21:52

## 2021-12-26 RX ADMIN — ENOXAPARIN SODIUM 40 MILLIGRAM(S): 100 INJECTION SUBCUTANEOUS at 21:51

## 2021-12-26 RX ADMIN — DIVALPROEX SODIUM 250 MILLIGRAM(S): 500 TABLET, DELAYED RELEASE ORAL at 21:52

## 2021-12-26 RX ADMIN — Medication 2.5 MILLIGRAM(S): at 17:58

## 2021-12-26 RX ADMIN — ATORVASTATIN CALCIUM 80 MILLIGRAM(S): 80 TABLET, FILM COATED ORAL at 21:51

## 2021-12-26 RX ADMIN — CLOPIDOGREL BISULFATE 75 MILLIGRAM(S): 75 TABLET, FILM COATED ORAL at 11:57

## 2021-12-26 RX ADMIN — Medication 2.5 MILLIGRAM(S): at 06:42

## 2021-12-26 RX ADMIN — Medication 81 MILLIGRAM(S): at 11:57

## 2021-12-26 NOTE — PROGRESS NOTE ADULT - ASSESSMENT
89YOF with history of hyperlipidemia, hypothyroidism, vertigo admitted to stroke neurology service for dizziness found to have hyponatremia with sodium 120.    Dizziness  Hypotonic euvolemic hyponatremia  Head imaging negative, low concern for stroke. Most likely dizziness related to her hyponatremia; notably she takes desmopressin at home for overactive bladder which I think is likely the culprit; possibly component of hypovolemia as well given rapid correction with fluids. Notably had very rapid correction of hyponatremia after stopping desmopressin - no signs of ODS.  -continue to hold desmopressin   -Na has normalized  -monitor closely for any neurologic symptoms for ODS  -can use alternative for overactive bladder such as oxybutynin, would avoid desmopressin    HLD - continue home lovastatin  Hypothyroidism - continue home levothyroxine    Dispo: [facility] vs home with HHPT

## 2021-12-26 NOTE — PROGRESS NOTE ADULT - SUBJECTIVE AND OBJECTIVE BOX
Neurology Stroke Progress Note    INTERVAL HPI/OVERNIGHT EVENTS:  Patient seen and examined. Pt asks when PT will come to see her today, and says she will want to talk to the  tomorrow to discuss if she needs home PT and how to set that up. She states she feels well and would like to walk around, and that she is weak from laying in bed. Pt denies new weakness, numbness, or speech problems.     MEDICATIONS  (STANDING):  aspirin enteric coated 81 milliGRAM(s) Oral daily  atorvastatin 80 milliGRAM(s) Oral at bedtime  clopidogrel Tablet 75 milliGRAM(s) Oral daily  enoxaparin Injectable 40 milliGRAM(s) SubCutaneous at bedtime  melatonin 5 milliGRAM(s) Oral at bedtime  oxybutynin 2.5 milliGRAM(s) Oral two times a day  polyethylene glycol 3350 17 Gram(s) Oral daily  senna 2 Tablet(s) Oral at bedtime    MEDICATIONS  (PRN):      Allergies    No Known Allergies    Intolerances        Vital Signs Last 24 Hrs  T(C): 36.6 (26 Dec 2021 06:08), Max: 36.8 (25 Dec 2021 17:48)  T(F): 97.9 (26 Dec 2021 06:08), Max: 98.2 (25 Dec 2021 17:48)  HR: 69 (26 Dec 2021 06:08) (69 - 87)  BP: 149/68 (26 Dec 2021 06:08) (120/74 - 149/68)  BP(mean): 83 (25 Dec 2021 15:40) (82 - 92)  RR: 18 (26 Dec 2021 06:08) (18 - 31)  SpO2: 99% (26 Dec 2021 06:08) (94% - 99%)    Physical exam:  General: No acute distress, awake and alert  Eyes: Anicteric sclerae, moist conjunctivae, see below for CNs  Neck: trachea midline  Cardiovascular: Regular rate and rhythm,    Neurologic:  -Mental status: Awake, alert, oriented to person, place, and time. Speech is fluent, no dysarthria. Recent and remote memory intact. Follows commands. Attention/concentration intact. Fund of knowledge appropriate.  -Cranial nerves:   II: Visual fields are full to confrontation.  III, IV, VI: Extraocular movements are intact without nystagmus. Pupils equally round and reactive to light  V:  Facial sensation V1-V3 equal and intact   VII: Face is symmetric  Motor: Normal bulk and tone. No pronator drift. Strength bilateral upper extremity 5/5, bilateral lower extremities 5/5.  Sensation: Intact to light touch bilaterally. No neglect or extinction on double simultaneous testing.  Coordination: No dysmetria on finger-to-nose bilaterally    LABS:                        11.5   6.73  )-----------( 251      ( 25 Dec 2021 06:04 )             34.6     12-25    141  |  106  |  11  ----------------------------<  101<H>  3.9   |  24  |  0.60    Ca    9.0      25 Dec 2021 06:04  Phos  3.2     12-25  Mg     2.4     12-25            RADIOLOGY & ADDITIONAL TESTS:

## 2021-12-26 NOTE — PROGRESS NOTE ADULT - ASSESSMENT
89y Female with PMHx of HLD, hypothyroidism, vertigo who presented to the ED for dizziness and headache x1 day. HCT showed chronic left frontal infarct. CTA showed A 2 mm inferomedially oriented protuberance emanating off the proximal right cervical internal carotid artery. Findings suspicious for a small aneurysm. Patient was admitted to stroke telemetry for further stroke work up and blood pressure management. Hyponatremic on admission to 120, now normalized at 141. PT refuses MR, pending repeat HCT to r/o stroke. Pt stable for stepdown. Possible rehab dependant on PT re-eval on 12/26.     Neuro  #CVA workup  - continue aspirin 81mg and plavix 75mg daily  - continue atorvastatin 80mg daily  - q8hr general neuro checks and vitals  - obtain MRI Brain without contrast: pt refuses  - pending repeat HCT to r/o stroke.   - Stroke Code HCT Results: left frontal chronic infarct   - Stroke Code CTA Results: 2 mm inferomedially oriented protuberance emanating off the proximal   right cervical internal carotid artery  - Stroke education    #Hyponatremia   -  on arrival  - AM Na 141 on 12/25, pending am labs today  - most likely 2/2 home medication desmopressin prescribed for urinary incontinence - stopped home desmopressin    Cards  #HTN  - normotension  - TTE with severely dilated LA and hyperdynamic LV    #echo findings  - cards consult placed - recommend loop recorder given frequency of palpitations, can be done outpatient    #HLD  - high dose statin as above in CVA  - LDL results: 110    Pulm  - call provider if SPO2 < 94%    GI  #Nutrition/Fluids/Electrolytes   - replete K<4 and Mg <2  - Diet: DASH/ TLC   - IVF: LR 1L @ 80cc x 1 dose    Endocrine  - A1C results: pending   - TSH results: pending    DVT Prophylaxis  - lovenox sq for DVT prophylaxis   - SCDs for DVT prophylaxis      Discussed daily hospital plans and goals with patient    Discussed with Neurology Attending Dr. Pradhan

## 2021-12-26 NOTE — PROGRESS NOTE ADULT - SUBJECTIVE AND OBJECTIVE BOX
Subjective/Interval events:  No acute overnight events. Feels about the same yesterday, hopeful to work with PT. No fever/chills. Dizziness improved. No visual changes.     MEDICATIONS  (STANDING):  aspirin enteric coated 81 milliGRAM(s) Oral daily  atorvastatin 80 milliGRAM(s) Oral at bedtime  clopidogrel Tablet 75 milliGRAM(s) Oral daily  enoxaparin Injectable 40 milliGRAM(s) SubCutaneous at bedtime  melatonin 5 milliGRAM(s) Oral at bedtime  oxybutynin 2.5 milliGRAM(s) Oral two times a day  polyethylene glycol 3350 17 Gram(s) Oral daily  senna 2 Tablet(s) Oral at bedtime    MEDICATIONS  (PRN):  acetaminophen     Tablet .. 650 milliGRAM(s) Oral every 6 hours PRN Moderate Pain (4 - 6)    Vital Signs Last 24 Hrs  T(C): 36.4 (26 Dec 2021 12:41), Max: 36.8 (25 Dec 2021 17:48)  T(F): 97.5 (26 Dec 2021 12:41), Max: 98.2 (25 Dec 2021 17:48)  HR: 67 (26 Dec 2021 12:41) (67 - 81)  BP: 124/67 (26 Dec 2021 12:41) (120/74 - 149/68)  BP(mean): 83 (25 Dec 2021 15:40) (83 - 83)  RR: 18 (26 Dec 2021 12:41) (18 - 20)  SpO2: 97% (26 Dec 2021 12:41) (95% - 99%)    PHYSICAL EXAM:  GENERAL: pleasant, appropriate, no acute distress. Participating appropriately in interview  HEENT - NC/AT, EOMI, PERLLA, oropharynx clear without exudate or erythema, moist mucus membranes  NECK: Soft, supple, No JVD, no lymphadenopathy  CHEST/LUNG: Clear to auscultation bilaterally; No rales, rhonchi, wheezing. Normal work of breathing, not tachypneic  HEART: Regular rate and rhythm; No murmurs, rubs, or gallops, normal s1/s2. Warm and well-perfused  ABDOMEN: Soft, Nontender, Nondistended. Normoactive bowel sounds.  EXTREMITIES:  2+ Peripheral Pulses, No clubbing, cyanosis, or edema  NEURO:  awake, alert, oriented to person, place, time, and situation. Cranial nerves intact, no motor or sensory deficits. Moves all extremities.  SKIN: No rashes or lesions    LABS:  12-26    140  |  104  |  17  ----------------------------<  97  4.5   |  27  |  0.66    Ca    9.4      26 Dec 2021 12:21  Phos  3.2     12-25  Mg     2.4     12-25                          11.5   6.73  )-----------( 251      ( 25 Dec 2021 06:04 )             34.6     COVID-19 PCR: Negative (12-23-21 @ 20:07)      RADIOLOGY & ADDITIONAL TESTS:  reviewed, none new

## 2021-12-27 ENCOUNTER — TRANSCRIPTION ENCOUNTER (OUTPATIENT)
Age: 86
End: 2021-12-27

## 2021-12-27 VITALS — OXYGEN SATURATION: 98 % | HEART RATE: 69 BPM | DIASTOLIC BLOOD PRESSURE: 77 MMHG | SYSTOLIC BLOOD PRESSURE: 151 MMHG

## 2021-12-27 DIAGNOSIS — I10 ESSENTIAL (PRIMARY) HYPERTENSION: ICD-10-CM

## 2021-12-27 DIAGNOSIS — E03.9 HYPOTHYROIDISM, UNSPECIFIED: ICD-10-CM

## 2021-12-27 DIAGNOSIS — E87.1 HYPO-OSMOLALITY AND HYPONATREMIA: ICD-10-CM

## 2021-12-27 DIAGNOSIS — E78.5 HYPERLIPIDEMIA, UNSPECIFIED: ICD-10-CM

## 2021-12-27 LAB
ALBUMIN SERPL ELPH-MCNC: 4.2 G/DL — SIGNIFICANT CHANGE UP (ref 3.3–5)
ALP SERPL-CCNC: 61 U/L — SIGNIFICANT CHANGE UP (ref 40–120)
ALT FLD-CCNC: 14 U/L — SIGNIFICANT CHANGE UP (ref 10–45)
ANION GAP SERPL CALC-SCNC: 10 MMOL/L — SIGNIFICANT CHANGE UP (ref 5–17)
AST SERPL-CCNC: 17 U/L — SIGNIFICANT CHANGE UP (ref 10–40)
BASOPHILS # BLD AUTO: 0.04 K/UL — SIGNIFICANT CHANGE UP (ref 0–0.2)
BASOPHILS NFR BLD AUTO: 0.5 % — SIGNIFICANT CHANGE UP (ref 0–2)
BILIRUB SERPL-MCNC: 0.2 MG/DL — SIGNIFICANT CHANGE UP (ref 0.2–1.2)
BUN SERPL-MCNC: 16 MG/DL — SIGNIFICANT CHANGE UP (ref 7–23)
CALCIUM SERPL-MCNC: 9.4 MG/DL — SIGNIFICANT CHANGE UP (ref 8.4–10.5)
CHLORIDE SERPL-SCNC: 105 MMOL/L — SIGNIFICANT CHANGE UP (ref 96–108)
CO2 SERPL-SCNC: 25 MMOL/L — SIGNIFICANT CHANGE UP (ref 22–31)
CREAT SERPL-MCNC: 0.64 MG/DL — SIGNIFICANT CHANGE UP (ref 0.5–1.3)
EOSINOPHIL # BLD AUTO: 0.1 K/UL — SIGNIFICANT CHANGE UP (ref 0–0.5)
EOSINOPHIL NFR BLD AUTO: 1.2 % — SIGNIFICANT CHANGE UP (ref 0–6)
GLUCOSE SERPL-MCNC: 106 MG/DL — HIGH (ref 70–99)
HCT VFR BLD CALC: 36.9 % — SIGNIFICANT CHANGE UP (ref 34.5–45)
HGB BLD-MCNC: 11.8 G/DL — SIGNIFICANT CHANGE UP (ref 11.5–15.5)
IMM GRANULOCYTES NFR BLD AUTO: 0.7 % — SIGNIFICANT CHANGE UP (ref 0–1.5)
LYMPHOCYTES # BLD AUTO: 1.31 K/UL — SIGNIFICANT CHANGE UP (ref 1–3.3)
LYMPHOCYTES # BLD AUTO: 15.3 % — SIGNIFICANT CHANGE UP (ref 13–44)
MAGNESIUM SERPL-MCNC: 2 MG/DL — SIGNIFICANT CHANGE UP (ref 1.6–2.6)
MCHC RBC-ENTMCNC: 30.7 PG — SIGNIFICANT CHANGE UP (ref 27–34)
MCHC RBC-ENTMCNC: 32 GM/DL — SIGNIFICANT CHANGE UP (ref 32–36)
MCV RBC AUTO: 96.1 FL — SIGNIFICANT CHANGE UP (ref 80–100)
MONOCYTES # BLD AUTO: 0.71 K/UL — SIGNIFICANT CHANGE UP (ref 0–0.9)
MONOCYTES NFR BLD AUTO: 8.3 % — SIGNIFICANT CHANGE UP (ref 2–14)
NEUTROPHILS # BLD AUTO: 6.32 K/UL — SIGNIFICANT CHANGE UP (ref 1.8–7.4)
NEUTROPHILS NFR BLD AUTO: 74 % — SIGNIFICANT CHANGE UP (ref 43–77)
NRBC # BLD: 0 /100 WBCS — SIGNIFICANT CHANGE UP (ref 0–0)
PLATELET # BLD AUTO: 254 K/UL — SIGNIFICANT CHANGE UP (ref 150–400)
POTASSIUM SERPL-MCNC: 4.2 MMOL/L — SIGNIFICANT CHANGE UP (ref 3.5–5.3)
POTASSIUM SERPL-SCNC: 4.2 MMOL/L — SIGNIFICANT CHANGE UP (ref 3.5–5.3)
PROT SERPL-MCNC: 7 G/DL — SIGNIFICANT CHANGE UP (ref 6–8.3)
RBC # BLD: 3.84 M/UL — SIGNIFICANT CHANGE UP (ref 3.8–5.2)
RBC # FLD: 12.8 % — SIGNIFICANT CHANGE UP (ref 10.3–14.5)
SODIUM SERPL-SCNC: 140 MMOL/L — SIGNIFICANT CHANGE UP (ref 135–145)
WBC # BLD: 8.54 K/UL — SIGNIFICANT CHANGE UP (ref 3.8–10.5)
WBC # FLD AUTO: 8.54 K/UL — SIGNIFICANT CHANGE UP (ref 3.8–10.5)

## 2021-12-27 PROCEDURE — 99233 SBSQ HOSP IP/OBS HIGH 50: CPT

## 2021-12-27 PROCEDURE — 70450 CT HEAD/BRAIN W/O DYE: CPT | Mod: MA

## 2021-12-27 PROCEDURE — 93306 TTE W/DOPPLER COMPLETE: CPT

## 2021-12-27 PROCEDURE — 0042T: CPT

## 2021-12-27 PROCEDURE — 80048 BASIC METABOLIC PNL TOTAL CA: CPT

## 2021-12-27 PROCEDURE — 97535 SELF CARE MNGMENT TRAINING: CPT

## 2021-12-27 PROCEDURE — 84300 ASSAY OF URINE SODIUM: CPT

## 2021-12-27 PROCEDURE — 84295 ASSAY OF SERUM SODIUM: CPT

## 2021-12-27 PROCEDURE — 93005 ELECTROCARDIOGRAM TRACING: CPT

## 2021-12-27 PROCEDURE — 83735 ASSAY OF MAGNESIUM: CPT

## 2021-12-27 PROCEDURE — 99285 EMERGENCY DEPT VISIT HI MDM: CPT

## 2021-12-27 PROCEDURE — 87635 SARS-COV-2 COVID-19 AMP PRB: CPT

## 2021-12-27 PROCEDURE — 96367 TX/PROPH/DG ADDL SEQ IV INF: CPT

## 2021-12-27 PROCEDURE — 81001 URINALYSIS AUTO W/SCOPE: CPT

## 2021-12-27 PROCEDURE — 97116 GAIT TRAINING THERAPY: CPT

## 2021-12-27 PROCEDURE — 82962 GLUCOSE BLOOD TEST: CPT

## 2021-12-27 PROCEDURE — 96365 THER/PROPH/DIAG IV INF INIT: CPT

## 2021-12-27 PROCEDURE — 82570 ASSAY OF URINE CREATININE: CPT

## 2021-12-27 PROCEDURE — 84133 ASSAY OF URINE POTASSIUM: CPT

## 2021-12-27 PROCEDURE — 36415 COLL VENOUS BLD VENIPUNCTURE: CPT

## 2021-12-27 PROCEDURE — 70496 CT ANGIOGRAPHY HEAD: CPT | Mod: MA

## 2021-12-27 PROCEDURE — 85025 COMPLETE CBC W/AUTO DIFF WBC: CPT

## 2021-12-27 PROCEDURE — 70498 CT ANGIOGRAPHY NECK: CPT | Mod: MA

## 2021-12-27 PROCEDURE — 80053 COMPREHEN METABOLIC PANEL: CPT

## 2021-12-27 PROCEDURE — 80061 LIPID PANEL: CPT

## 2021-12-27 PROCEDURE — 82340 ASSAY OF CALCIUM IN URINE: CPT

## 2021-12-27 PROCEDURE — 82436 ASSAY OF URINE CHLORIDE: CPT

## 2021-12-27 PROCEDURE — 84100 ASSAY OF PHOSPHORUS: CPT

## 2021-12-27 PROCEDURE — 97161 PT EVAL LOW COMPLEX 20 MIN: CPT

## 2021-12-27 PROCEDURE — 85027 COMPLETE CBC AUTOMATED: CPT

## 2021-12-27 PROCEDURE — 83935 ASSAY OF URINE OSMOLALITY: CPT

## 2021-12-27 RX ORDER — AMLODIPINE BESYLATE 2.5 MG/1
1 TABLET ORAL
Qty: 30 | Refills: 3
Start: 2021-12-27 | End: 2022-04-25

## 2021-12-27 RX ORDER — AMLODIPINE BESYLATE 2.5 MG/1
5 TABLET ORAL EVERY 24 HOURS
Refills: 0 | Status: DISCONTINUED | OUTPATIENT
Start: 2021-12-27 | End: 2021-12-27

## 2021-12-27 RX ADMIN — Medication 2.5 MILLIGRAM(S): at 06:00

## 2021-12-27 RX ADMIN — AMLODIPINE BESYLATE 5 MILLIGRAM(S): 2.5 TABLET ORAL at 10:53

## 2021-12-27 RX ADMIN — POLYETHYLENE GLYCOL 3350 17 GRAM(S): 17 POWDER, FOR SOLUTION ORAL at 10:53

## 2021-12-27 NOTE — PROGRESS NOTE ADULT - SUBJECTIVE AND OBJECTIVE BOX
Patient is a 89y old  Female who presents with a chief complaint of hypertension   dizziness (27 Dec 2021 10:30)      INTERVAL HPI/OVERNIGHT EVENTS:    Pt. seen and examined earlier today  Pt. eating lunch, feels well, has no complaints  Pt. denies feeling dizzy    Review of Systems: 12 point review of systems otherwise negative    MEDICATIONS  (STANDING):  amLODIPine   Tablet 5 milliGRAM(s) Oral every 24 hours  atorvastatin 80 milliGRAM(s) Oral at bedtime  diVALproex  milliGRAM(s) Oral at bedtime  enoxaparin Injectable 40 milliGRAM(s) SubCutaneous at bedtime  melatonin 5 milliGRAM(s) Oral at bedtime  oxybutynin 2.5 milliGRAM(s) Oral two times a day  polyethylene glycol 3350 17 Gram(s) Oral daily  senna 2 Tablet(s) Oral at bedtime    MEDICATIONS  (PRN):  acetaminophen     Tablet .. 650 milliGRAM(s) Oral every 6 hours PRN Moderate Pain (4 - 6)      Allergies    No Known Allergies    Intolerances          Vital Signs Last 24 Hrs  T(C): 36.2 (27 Dec 2021 09:14), Max: 36.8 (27 Dec 2021 06:03)  T(F): 97.1 (27 Dec 2021 09:14), Max: 98.2 (27 Dec 2021 06:03)  HR: 69 (27 Dec 2021 09:21) (63 - 69)  BP: 151/77 (27 Dec 2021 09:21) (151/63 - 161/77)  BP(mean): --  RR: 18 (27 Dec 2021 09:14) (16 - 18)  SpO2: 98% (27 Dec 2021 09:21) (98% - 99%)  CAPILLARY BLOOD GLUCOSE            Physical Exam:  (earlier today)  Daily     Daily   General:  well-appearing in NAD, sitting at edge of bed  HEENT:  MMM  Skin:  WWP  Neuro:  AAOx3, non-focal    LABS:                        11.8   8.54  )-----------( 254      ( 27 Dec 2021 08:32 )             36.9     12-27    140  |  105  |  16  ----------------------------<  106<H>  4.2   |  25  |  0.64    Ca    9.4      27 Dec 2021 08:32  Mg     2.0     12-27    TPro  7.0  /  Alb  4.2  /  TBili  0.2  /  DBili  x   /  AST  17  /  ALT  14  /  AlkPhos  61  12-27

## 2021-12-27 NOTE — PROGRESS NOTE ADULT - PROBLEM SELECTOR PLAN 1
cont. Norvasc, DASH diet; Pt. verbalized plan to f/u with her cardiologist within the next 2 weeks for BP check

## 2021-12-27 NOTE — DISCHARGE NOTE NURSING/CASE MANAGEMENT/SOCIAL WORK - PATIENT PORTAL LINK FT
You can access the FollowMyHealth Patient Portal offered by [facility] by registering at the following website: [email or web address]. By joining [email or web address] FollowMyHealth portal, you will also be able to view your health information using other applications (apps) compatible with our system.

## 2021-12-27 NOTE — PROGRESS NOTE ADULT - ASSESSMENT
per Neurology    89 y o Female with PMHx of HLD, hypothyroidism, vertigo who presented to the ED for dizziness and headache x1 day. HCT showed chronic left frontal infarct. CTA showed A 2 mm inferomedially oriented protuberance emanating off the proximal right cervical internal carotid artery. Findings suspicious for a small aneurysm. Patient was admitted to stroke telemetry for further stroke work up and blood pressure management. Hyponatremic on admission to 120, now normalized at 141. PT refuses MR, pending repeat HCT to r/o stroke. Pt stable for stepdown. Possible rehab dependant on PT re-eval on 12/26.     Neuro  #CVA workup  - continue aspirin 81mg and plavix 75mg daily  - continue atorvastatin 80mg daily  - q8hr general neuro checks and vitals  - obtain MRI Brain without contrast: pt refuses  - pending repeat HCT to r/o stroke.   - Stroke Code HCT Results: left frontal chronic infarct   - Stroke Code CTA Results: 2 mm inferomedially oriented protuberance emanating off the proximal   right cervical internal carotid artery  - Stroke education    #Hyponatremia   -  on arrival  - AM Na 141 on 12/25, pending am labs today  - most likely 2/2 home medication desmopressin prescribed for urinary incontinence - stopped home desmopressin    Cards  #HTN  - normotension  - TTE with severely dilated LA and hyperdynamic LV    #echo findings  - cards consult placed - recommend loop recorder given frequency of palpitations, can be done outpatient    #HLD  - high dose statin as above in CVA  - LDL results: 110    Pulm  - call provider if SPO2 < 94%    GI  #Nutrition/Fluids/Electrolytes   - replete K<4 and Mg <2  - Diet: DASH/ TLC   - IVF: LR 1L @ 80cc x 1 dose    Endocrine  - A1C results: pending   - TSH results: pending    DVT Prophylaxis  - lovenox sq for DVT prophylaxis   - SCDs for DVT prophylaxis

## 2021-12-27 NOTE — PROGRESS NOTE ADULT - SUBJECTIVE AND OBJECTIVE BOX
Physical Medicine and Rehabilitation Progress Note:    Patient is a 89y old  Female who presents with a chief complaint of hypertension   dizziness (26 Dec 2021 15:08)      HPI:   **STROKE HPI***    HPI: 89y Female with PMHx of HLD, hypothyroidism, vertigo who presented to the ED for dizziness and headache x1 day. Patient reports she has chronic headaches and vertigo which she experiences around once per week but yesterday the dizziness and headache was worse. She reports the headache was slightly better in the morning but she still felt dizzy and unsteady on her feet. Dizziness had resolved upon arrival to ED. Stroke code called in ED. Patient denies hx of stroke, although CTH shows chronic left frontal infarct.     PAST MEDICAL & SURGICAL HISTORY:  Hyperlipidemia  Hyperlipemia    Hypothyroidism  Hypothyroidism    Dizziness  Vertigo    Cytomegalovirus infection not present  No significant past surgical history        FAMILY HISTORY:            T(C): 36.8 (12-23-21 @ 19:34), Max: 36.8 (12-23-21 @ 19:34)  HR: 61 (12-23-21 @ 21:00) (59 - 61)  BP: 192/81 (12-23-21 @ 21:00) (192/81 - 215/93)  RR: 18 (12-23-21 @ 21:00) (18 - 18)  SpO2: 99% (12-23-21 @ 21:00) (97% - 99%)    MEDICATION RECONCILIATION   MEDICATIONS  (STANDING):  aspirin enteric coated 81 milliGRAM(s) Oral daily  atorvastatin 80 milliGRAM(s) Oral at bedtime  clopidogrel Tablet 75 milliGRAM(s) Oral daily  enoxaparin Injectable 40 milliGRAM(s) SubCutaneous at bedtime  sodium chloride 0.9%. 1000 milliLiter(s) (100 mL/Hr) IV Continuous <Continuous>    MEDICATIONS  (PRN):    Allergies    No Known Allergies    Intolerances      Vital Signs Last 24 Hrs  T(C): 36.8 (23 Dec 2021 19:34), Max: 36.8 (23 Dec 2021 19:34)  T(F): 98.3 (23 Dec 2021 19:34), Max: 98.3 (23 Dec 2021 19:34)  HR: 61 (23 Dec 2021 21:00) (59 - 61)  BP: 192/81 (23 Dec 2021 21:00) (192/81 - 215/93)  BP(mean): --  RR: 18 (23 Dec 2021 21:00) (18 - 18)  SpO2: 99% (23 Dec 2021 21:00) (97% - 99%)     (23 Dec 2021 22:19)                            11.8   8.54  )-----------( 254      ( 27 Dec 2021 08:32 )             36.9       12-27    140  |  105  |  16  ----------------------------<  106<H>  4.2   |  25  |  0.64    Ca    9.4      27 Dec 2021 08:32  Mg     2.0     12-27    TPro  7.0  /  Alb  4.2  /  TBili  0.2  /  DBili  x   /  AST  17  /  ALT  14  /  AlkPhos  61  12-27    Vital Signs Last 24 Hrs  T(C): 36.2 (27 Dec 2021 09:14), Max: 36.8 (27 Dec 2021 06:03)  T(F): 97.1 (27 Dec 2021 09:14), Max: 98.2 (27 Dec 2021 06:03)  HR: 69 (27 Dec 2021 09:21) (63 - 69)  BP: 151/77 (27 Dec 2021 09:21) (124/67 - 161/77)  BP(mean): --  RR: 18 (27 Dec 2021 09:14) (16 - 18)  SpO2: 98% (27 Dec 2021 09:21) (97% - 99%)    MEDICATIONS  (STANDING):  amLODIPine   Tablet 5 milliGRAM(s) Oral every 24 hours  atorvastatin 80 milliGRAM(s) Oral at bedtime  diVALproex  milliGRAM(s) Oral at bedtime  enoxaparin Injectable 40 milliGRAM(s) SubCutaneous at bedtime  melatonin 5 milliGRAM(s) Oral at bedtime  oxybutynin 2.5 milliGRAM(s) Oral two times a day  polyethylene glycol 3350 17 Gram(s) Oral daily  senna 2 Tablet(s) Oral at bedtime    MEDICATIONS  (PRN):  acetaminophen     Tablet .. 650 milliGRAM(s) Oral every 6 hours PRN Moderate Pain (4 - 6)    Currently Undergoing Physical/ Occupational Therapy at bedside.    Functional Status Assessment:   12/26/2021      Cognitive/Neuro/Behavioral  Cognitive/Neuro/Behavioral [WDL Definition: Alert; opens eyes spontaneously; arouses to voice or touch; oriented x 4; follows commands; speech spontaneous, logical; purposeful motor response; behavior appropriate to situation]: WDL  Level of Consciousness: alert  Orientation: oriented x 4  Speech: clear;  spontaneous;  logical    Language Assistance  Preferred Language to Address Healthcare Preferred Language to Address Healthcare: English    Therapeutic Interventions      Sit-Stand Transfer Training  Sit-to-Stand Transfer Training Rehab Potential: good, to achieve stated therapy goals  Transfer Training Sit-to-Stand Transfer: supervision;  verbal cues;  weight-bearing as tolerated  Transfer Training Stand-to-Sit Transfer: supervision;  weight-bearing as tolerated  Sit-to-Stand Transfer Training Transfer Safety Analysis: decreased weight-shifting ability    Gait Training  Gait Training Rehab Potential: good, to achieve stated therapy goals  Gait Training: contact guard;  supervision;  weight-bearing as tolerated   straight cane;  250 feet  Gait Analysis: mildly ataxic R LE placement, decreased heel strike bilat R>L, narrow ANA    Therapeutic Exercise  Therapeutic Exercise Rehab Effort: good  Therapeutic Exercise Detail: LAQ, HR, step to target x10           PM&R Impression: as above    Current Disposition Plan Recommendations:    d/c home with home physical therapy

## 2021-12-27 NOTE — PROGRESS NOTE ADULT - REASON FOR ADMISSION
hypertension   dizziness

## 2021-12-27 NOTE — DISCHARGE NOTE NURSING/CASE MANAGEMENT/SOCIAL WORK - NSDCPEFALRISK_GEN_ALL_CORE
For information on Fall & Injury Prevention, visit: [email or web address] OR  [email or web address] OR  https://www.cdc.gov/steadi/patient.html

## 2022-01-04 DIAGNOSIS — E87.1 HYPO-OSMOLALITY AND HYPONATREMIA: ICD-10-CM

## 2022-01-04 DIAGNOSIS — E86.1 HYPOVOLEMIA: ICD-10-CM

## 2022-01-04 DIAGNOSIS — R42 DIZZINESS AND GIDDINESS: ICD-10-CM

## 2022-01-04 DIAGNOSIS — Z79.02 LONG TERM (CURRENT) USE OF ANTITHROMBOTICS/ANTIPLATELETS: ICD-10-CM

## 2022-01-04 DIAGNOSIS — N32.81 OVERACTIVE BLADDER: ICD-10-CM

## 2022-01-04 DIAGNOSIS — E78.5 HYPERLIPIDEMIA, UNSPECIFIED: ICD-10-CM

## 2022-01-04 DIAGNOSIS — I67.1 CEREBRAL ANEURYSM, NONRUPTURED: ICD-10-CM

## 2022-01-04 DIAGNOSIS — R27.0 ATAXIA, UNSPECIFIED: ICD-10-CM

## 2022-01-04 DIAGNOSIS — Z79.82 LONG TERM (CURRENT) USE OF ASPIRIN: ICD-10-CM

## 2022-01-04 DIAGNOSIS — E03.9 HYPOTHYROIDISM, UNSPECIFIED: ICD-10-CM

## 2022-01-04 DIAGNOSIS — T38.895A ADVERSE EFFECT OF OTHER HORMONES AND SYNTHETIC SUBSTITUTES, INITIAL ENCOUNTER: ICD-10-CM

## 2022-01-04 DIAGNOSIS — I10 ESSENTIAL (PRIMARY) HYPERTENSION: ICD-10-CM

## 2022-01-11 ENCOUNTER — FORM ENCOUNTER (OUTPATIENT)
Age: 87
End: 2022-01-11

## 2022-01-18 ENCOUNTER — APPOINTMENT (OUTPATIENT)
Dept: NEUROLOGY | Facility: CLINIC | Age: 87
End: 2022-01-18
Payer: MEDICARE

## 2022-01-18 DIAGNOSIS — E78.5 HYPERLIPIDEMIA, UNSPECIFIED: ICD-10-CM

## 2022-01-18 PROCEDURE — 99215 OFFICE O/P EST HI 40 MIN: CPT | Mod: 95

## 2022-01-18 RX ORDER — NORTRIPTYLINE HYDROCHLORIDE 10 MG/1
10 CAPSULE ORAL
Qty: 30 | Refills: 3 | Status: DISCONTINUED | COMMUNITY
Start: 2021-07-12 | End: 2022-01-18

## 2022-01-19 PROBLEM — E78.5 HYPERLIPIDEMIA: Status: ACTIVE | Noted: 2022-01-19

## 2022-01-19 RX ORDER — LOVASTATIN 40 MG/1
TABLET ORAL
Refills: 0 | Status: DISCONTINUED | COMMUNITY
End: 2022-01-19

## 2022-01-20 ENCOUNTER — NON-APPOINTMENT (OUTPATIENT)
Age: 87
End: 2022-01-20

## 2022-01-20 NOTE — HISTORY OF PRESENT ILLNESS
[FreeTextEntry1] : \par Pt is an 88yo F with PMHx of HLD, hypothyroidism, vertigo who presents today for hospital f/u visit. She initially presented to the ED on 12/23 for dizziness and worse headache x1 day. (She reports headaches for a few months prior which were happening very frequently and associated with nausea when they became severe).  Her BP was elevated to the 200s at the time of ED arrival. HCT showed chronic left frontal infarct. 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 Patient out of window for tPA and was not a thrombectomy candidate as there was no large vessel occlusion. Patient was admitted to stroke telemetry for further stroke work up and blood pressure management. Course c/b hyponatremia (admission sodium 120) started on NS with improvement by next morning to 132. Ultimately her sodium normalized to 141 prior to discharge. Hyponatremia most likely 2/2 home medication desmopressin which she had been taking for urinary incontinence. TTE showed mild to moderate symmetric left ventricular hypertrophy. Hyperdynamic left ventricular systolic function. Grade I left ventricular diastolic dysfunction. Severely dilated left atrium. Otherwise normal. Pt with improved ataxia and dizziness throughout hospital course, d/c home with plans for outpatient MRI. MRI done last week on 1/12 at [facility] showed progression of R occipital and R parietal chronic infarct since March (done in [facility]- unable to view these images currently). Since d/c she is taking depakote 250mg ER for migraines with great improvement in her headaches. Her balance has remained stable. She has no new neurological complaints. She is not currently on ASA, but she has been taking the rest o her discharge medications and is tolerating them well. She does not routinely check her blood pressures at home. Her PCP/ cardiologist is Dr Saxena and she saw him last week for review of her echo and blood-work- she does not know these results (including repeat Na level). \par

## 2022-01-20 NOTE — ASSESSMENT
[FreeTextEntry1] : Pt is an 89yo F with PMHx of HLD, hypothyroidism, vertigo who is here for hospital f/u. She initially presented to the ED for dizziness and headache x1 day with elevated BPs (systolic 200s). HCT showed chronic left frontal infarct. CTA showed A 2 mm inferomedially oriented protuberance emanating off the prox MARIZA, suspicious for a small aneurysm. Hospital course c/b hyponatremia (Na 120) that was corrected through IV fluids prior to discharge. Originally her ataxia and dizziness were believed to be 2/2 hyponatremia, since her symptoms resolved with fluid correction. However given progression of new strokes since March which are cortical and embolic-appearing, will need to pursue more of a cardiac workup. Thankfully her symptoms including ataxia, headache all improved since d/c. \par \par Plan: \par Recommend she start taking daily Aspirin and plavix for a maximum of 3 months for secondary stroke prevention, until source of new strokes can be determined. (If source not found, can switch to monotherapy). \par Will switch her to high-intensity statin for secondary stroke prevention, tighter LDL goal <70 given hx strokes. D/c lovastatin, rx lipitor 40. 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 Orlando Hebert [phone] her PCP/ cards who is agreeable with plan.

## 2022-05-11 ENCOUNTER — EMERGENCY (EMERGENCY)
Facility: HOSPITAL | Age: 87
LOS: 1 days | Discharge: HOME | End: 2022-05-11
Attending: EMERGENCY MEDICINE | Admitting: EMERGENCY MEDICINE
Payer: MEDICARE

## 2022-05-11 VITALS
HEART RATE: 72 BPM | SYSTOLIC BLOOD PRESSURE: 135 MMHG | OXYGEN SATURATION: 97 % | HEIGHT: 60 IN | WEIGHT: 128.09 LBS | RESPIRATION RATE: 18 BRPM | TEMPERATURE: 98 F | DIASTOLIC BLOOD PRESSURE: 72 MMHG

## 2022-05-11 VITALS
SYSTOLIC BLOOD PRESSURE: 148 MMHG | WEIGHT: 128.09 LBS | OXYGEN SATURATION: 96 % | TEMPERATURE: 98 F | DIASTOLIC BLOOD PRESSURE: 85 MMHG | HEART RATE: 64 BPM | RESPIRATION RATE: 16 BRPM | HEIGHT: 60 IN

## 2022-05-11 DIAGNOSIS — E03.9 HYPOTHYROIDISM, UNSPECIFIED: ICD-10-CM

## 2022-05-11 DIAGNOSIS — R51.9 HEADACHE, UNSPECIFIED: ICD-10-CM

## 2022-05-11 DIAGNOSIS — G43.909 MIGRAINE, UNSPECIFIED, NOT INTRACTABLE, WITHOUT STATUS MIGRAINOSUS: ICD-10-CM

## 2022-05-11 DIAGNOSIS — E78.5 HYPERLIPIDEMIA, UNSPECIFIED: ICD-10-CM

## 2022-05-11 LAB
ALBUMIN SERPL ELPH-MCNC: 4.4 G/DL — SIGNIFICANT CHANGE UP (ref 3.3–5)
ALP SERPL-CCNC: 61 U/L — SIGNIFICANT CHANGE UP (ref 40–120)
ALT FLD-CCNC: 13 U/L — SIGNIFICANT CHANGE UP (ref 10–45)
ANION GAP SERPL CALC-SCNC: 10 MMOL/L — SIGNIFICANT CHANGE UP (ref 5–17)
AST SERPL-CCNC: 19 U/L — SIGNIFICANT CHANGE UP (ref 10–40)
BASOPHILS # BLD AUTO: 0.03 K/UL — SIGNIFICANT CHANGE UP (ref 0–0.2)
BASOPHILS NFR BLD AUTO: 0.5 % — SIGNIFICANT CHANGE UP (ref 0–2)
BILIRUB SERPL-MCNC: 0.2 MG/DL — SIGNIFICANT CHANGE UP (ref 0.2–1.2)
BUN SERPL-MCNC: 14 MG/DL — SIGNIFICANT CHANGE UP (ref 7–23)
CALCIUM SERPL-MCNC: 9.3 MG/DL — SIGNIFICANT CHANGE UP (ref 8.4–10.5)
CHLORIDE SERPL-SCNC: 100 MMOL/L — SIGNIFICANT CHANGE UP (ref 96–108)
CO2 SERPL-SCNC: 32 MMOL/L — HIGH (ref 22–31)
CREAT SERPL-MCNC: 0.79 MG/DL — SIGNIFICANT CHANGE UP (ref 0.5–1.3)
EGFR: 71 ML/MIN/1.73M2 — SIGNIFICANT CHANGE UP
EOSINOPHIL # BLD AUTO: 0.08 K/UL — SIGNIFICANT CHANGE UP (ref 0–0.5)
EOSINOPHIL NFR BLD AUTO: 1.4 % — SIGNIFICANT CHANGE UP (ref 0–6)
GLUCOSE SERPL-MCNC: 100 MG/DL — HIGH (ref 70–99)
HCT VFR BLD CALC: 35.3 % — SIGNIFICANT CHANGE UP (ref 34.5–45)
HGB BLD-MCNC: 11.3 G/DL — LOW (ref 11.5–15.5)
IMM GRANULOCYTES NFR BLD AUTO: 0.3 % — SIGNIFICANT CHANGE UP (ref 0–1.5)
LYMPHOCYTES # BLD AUTO: 1.08 K/UL — SIGNIFICANT CHANGE UP (ref 1–3.3)
LYMPHOCYTES # BLD AUTO: 18.7 % — SIGNIFICANT CHANGE UP (ref 13–44)
MCHC RBC-ENTMCNC: 31.4 PG — SIGNIFICANT CHANGE UP (ref 27–34)
MCHC RBC-ENTMCNC: 32 GM/DL — SIGNIFICANT CHANGE UP (ref 32–36)
MCV RBC AUTO: 98.1 FL — SIGNIFICANT CHANGE UP (ref 80–100)
MONOCYTES # BLD AUTO: 0.47 K/UL — SIGNIFICANT CHANGE UP (ref 0–0.9)
MONOCYTES NFR BLD AUTO: 8.1 % — SIGNIFICANT CHANGE UP (ref 2–14)
NEUTROPHILS # BLD AUTO: 4.11 K/UL — SIGNIFICANT CHANGE UP (ref 1.8–7.4)
NEUTROPHILS NFR BLD AUTO: 71 % — SIGNIFICANT CHANGE UP (ref 43–77)
NRBC # BLD: 0 /100 WBCS — SIGNIFICANT CHANGE UP (ref 0–0)
PLATELET # BLD AUTO: 231 K/UL — SIGNIFICANT CHANGE UP (ref 150–400)
POTASSIUM SERPL-MCNC: 3.7 MMOL/L — SIGNIFICANT CHANGE UP (ref 3.5–5.3)
POTASSIUM SERPL-SCNC: 3.7 MMOL/L — SIGNIFICANT CHANGE UP (ref 3.5–5.3)
PROT SERPL-MCNC: 7 G/DL — SIGNIFICANT CHANGE UP (ref 6–8.3)
RBC # BLD: 3.6 M/UL — LOW (ref 3.8–5.2)
RBC # FLD: 12.1 % — SIGNIFICANT CHANGE UP (ref 10.3–14.5)
SARS-COV-2 RNA SPEC QL NAA+PROBE: NEGATIVE — SIGNIFICANT CHANGE UP
SODIUM SERPL-SCNC: 142 MMOL/L — SIGNIFICANT CHANGE UP (ref 135–145)
WBC # BLD: 5.79 K/UL — SIGNIFICANT CHANGE UP (ref 3.8–10.5)
WBC # FLD AUTO: 5.79 K/UL — SIGNIFICANT CHANGE UP (ref 3.8–10.5)

## 2022-05-11 PROCEDURE — 99285 EMERGENCY DEPT VISIT HI MDM: CPT | Mod: FS

## 2022-05-11 PROCEDURE — 93010 ELECTROCARDIOGRAM REPORT: CPT

## 2022-05-11 PROCEDURE — 70450 CT HEAD/BRAIN W/O DYE: CPT | Mod: 26,ME

## 2022-05-11 PROCEDURE — G1004: CPT

## 2022-05-11 PROCEDURE — 99283 EMERGENCY DEPT VISIT LOW MDM: CPT

## 2022-05-11 NOTE — ED ADULT NURSE NOTE - OBJECTIVE STATEMENT
89F presents to ED from home for headache since this morning at 1100. Pt states that she usually has headaches but this morning it was worse pressure than normal and she also experienced dizziness. Pt currently denies dizziness and states headache has improved with Tylenol. Pt denies double vision, photophobia, unsteady gait. Pt does endorse mild nausea.

## 2022-05-11 NOTE — ED PROVIDER NOTE - NEUROLOGICAL STRAIGHT LEG RAISE
"INT:    Chief Complaint   Patient presents with   â¢ Physical   â¢ Imm/Inj     Tdap       SUBJECTIVE:  Melody Merritt Sever is a 52year old female who presents today for complete physical exam. Patient complains of pain ""everywhere, legs, arms, shoulder, everywhere\"". Patient reports she controls pain by smoking marijuana Dellar Shells does not take tylenol for pain, and is unable to take ASA or ibuprofen due to her brain aneurysm. Patient also complains of incontinence when she coughs related to her hysterectomy. Patient reports the doctor who performed her hysterectomy told her that if she develops incontinence related to coughing she should return for possible surgical intervention. Hypertension - Tolerating Lisinopril without complaint of dry cough    Depression - Tolerating Bupropion and Zoloft well    SANDY - Refuses CPAP, \""cant have something on my face while sleeping\""    Hyperlipidemia - Tolerating statin medication well, no c/o myalgia    Brain Aneurysm - Unsecured, followed by Dr. Morgan Fields, Interventional Neurologist, no complains of head ache    Neuropathy - Tolerating Gabapentin, symptoms controlled    Incontinence - happens daily when coughing, wears pads for protection       Review of systems:     Constitutional:  No fevers or chills. No fatigue. Respiratory:  No shortness of breath. No cough. No wheezing  Cardiovascular:  No chest pain. No palpitations. No edema. No syncope. Gastrointestinal:  No abdominal pain. No nausea. No vomiting. No diarrhea. No constipation. No blood in stool. PAST HISTORIES:  I have reviewed the past medical history, family history, social history, medications and allergies listed in the medical record as obtained by my nursing staff and support staff and agree with their documentation.     OBJECTIVE:    PHYSICAL EXAM:    Vital Signs:   Vitals:    09/06/19 1112   BP: 122/82   Pulse: 82   SpO2: 98%   Weight: 102 kg   Height: 5' 3\"" (1.6 m)     General: Alert, " oriented, pleasant female, no acute distress, nontoxic. HEENT: Normocephalic, atraumatic. Conjunctivae pink. Mucous membranes moist and pink. Tympanic membranes clear bilaterally. Oropharynx clear. Respiratory: Clear to auscultation bilaterally. Normal inspiratory effort. Cardiovascular: Regular rate and rhythm. Normal S1, S2. No S3, S4. No murmur. Dorsalis pedis and posterior tibial pulses +2 and equal bilaterally. Abdomen: Soft, nontender. No rebound. Non-guarding. Extremities: No pallor. No cyanosis. No edema. Neurologic: Sensation grossly intact to light touch. LAB RESULTS:    Pertinent labs reviewed. ASSESSMENT:    1. SANDY on CPAP    2. Spasmodic torticollis    3. Brain aneurysm    4. Hyperlipidemia, unspecified hyperlipidemia type    5. Benign essential hypertension    6. Myalgia    7. Body mass index (BMI) of 32.0-32.9 in adult     8. Need for vaccination      Hypertension -well controlled with ACE, ARB    Depression - controlled with Zoloft and Buproprion    SANDY - Refuses CPAP    Hyperlipidemia - Controlled with statin medication    Brain Aneurysm - Unsecured, followed by Dr. Shala Santana, Interventional Neurologist    Neuropathy - Controlled with gabapentin    Incontinence - Wears pads, UA to r/o UTI. PLAN:      Maintain current medications. Add tylenol 325 BID, may increase to TID if symptoms persist, daily max is 3g. Ordered CBC, CMP, THS, Lipid Panel. Patient agreeable to Tetanus shot and consult placed to rheumatology for tremors of nervous system. UA ordered to rule out UTI as source of incontinence, may consult URO-GYN for further management.      Orders Placed This Encounter   â¢ Tetanus/Diphtheria/Acellular Pertussis 11+ ([facility])   â¢ CBC with Automated Differential   â¢ COMPREHENSIVE METABOLIC PANEL   â¢ THYROID STIMULATING HORMONE   â¢ Lipid Panel With Reflex   â¢ Vitamin D -25 Hydroxy   â¢ Urinalysis with Micro & Culture if Indicated   â¢ SERVICE TO RHEUMATOLOGY IMMUNOLOGY       Return in about 6 months (around 3/6/2020). Instructions provided as documented in the after visit summary. The patient indicated understanding of the diagnosis and agreed with the plan of care. SN Kwabena  9/6/2019        The patient was seen and examined personally. The plan of care was reviewed with the student in detail and agreed upon. The patient was given an opportunity to ask questions. I agree with the note as written. normal bilaterally

## 2022-05-11 NOTE — ED ADULT NURSE NOTE - OBJECTIVE STATEMENT
pt a&ox4 resting comfortably in stretcher, c/o flashing light to left eye beginning around 4 pm today, now resolved. pt reports [facility] ed visit today, dc home. offers no other complaints, no deficits noted, nih=0. pt able to ambulate independently.

## 2022-05-11 NOTE — ED ADULT TRIAGE NOTE - ARRIVAL INFO ADDITIONAL COMMENTS
Moves all extremities. Ambulatory with steady gait. No facial droop noted. Good equal strength in BL upper and lower extremity against resistance, normal  equal sensation in BL upper and lower extremity, face and cheek bilaterally when touched with finger, facial expressions are equal and symmetrical bilaterally when asked to smile, frown and puff out cheeks. No slurring of speech noted.

## 2022-05-11 NOTE — ED PROVIDER NOTE - NSFOLLOWUPINSTRUCTIONS_ED_ALL_ED_FT
Take your regularly prescribed medications.    Take tylenol and/or meclizine as needed for symptoms of headache and dizziness.    Return to ED with worsening symptoms or other concerns.          Acute Headache    WHAT YOU NEED TO KNOW:    An acute headache is pain or discomfort that may start suddenly and get worse quickly. You may have an acute headache only when you feel stress or eat certain foods. Other acute headache pain can happen every day, and sometimes several times a day.     DISCHARGE INSTRUCTIONS:    Seek care immediately if:   •You have severe pain.      •You have numbness or weakness on one side of your face or body.      •You have a headache that occurs after a blow to the head, a fall, or other trauma.       •You have a headache, are forgetful or confused, or have trouble speaking.      •You have a headache, stiff neck, and a fever.      Call your doctor if:   •You have a constant headache and are vomiting.      •You have a headache each day that does not get better, even after treatment.      •You have changes in your headaches, or new symptoms that occur when you have a headache.      •You have questions or concerns about your condition or care.      Medicines: You may need any of the following:   •Prescription pain medicine may be given. The medicine your healthcare provider recommends will depend on the kind of headaches you have. You will need to take prescription headache medicines as directed to prevent a problem called rebound headache. These headaches happen with regular use of pain relievers for headache disorders.      •NSAIDs, such as ibuprofen, help decrease swelling, pain, and fever. This medicine is available with or without a doctor's order. NSAIDs can cause stomach bleeding or kidney problems in certain people. If you take blood thinner medicine, always ask your healthcare provider if NSAIDs are safe for you. Always read the medicine label and follow directions.      •Acetaminophen decreases pain and fever. It is available without a doctor's order. Ask how much to take and how often to take it. Follow directions. Read the labels of all other medicines you are using to see if they also contain acetaminophen, or ask your doctor or pharmacist. Acetaminophen can cause liver damage if not taken correctly.      •Antidepressants may be given for some kinds of headaches.      •Take your medicine as directed. Contact your healthcare provider if you think your medicine is not helping or if you have side effects. Tell him or her if you are allergic to any medicine. Keep a list of the medicines, vitamins, and herbs you take. Include the amounts, and when and why you take them. Bring the list or the pill bottles to follow-up visits. Carry your medicine list with you in case of an emergency.      Manage your symptoms:   •Apply heat or ice on the headache area. Use a heat or ice pack. For an ice pack, you can also put crushed ice in a plastic bag. Cover the pack or bag with a towel before you apply it to your skin. Ice and heat both help decrease pain, and heat also helps decrease muscle spasms. Apply heat for 20 to 30 minutes every 2 hours. Apply ice for 15 to 20 minutes every hour. Apply heat or ice for as long and for as many days as directed. You may alternate heat and ice.      •Relax your muscles. Lie down in a comfortable position and close your eyes. Relax your muscles slowly. Start at your toes and work your way up your body.      •Keep a record of your headaches. Write down when your headaches start and stop. Include your symptoms and what you were doing when the headache began. Record what you ate or drank for 24 hours before the headache started. Describe the pain and where it hurts. Keep track of what you did to treat your headache and if it worked.       Prevent an acute headache:   •Avoid anything that triggers an acute headache. Examples include exposure to chemicals, going to high altitude, or not getting enough sleep. Create a regular sleep routine. Go to sleep at the same time and wake up at the same time each day. Do not use electronic devices before bedtime. These may trigger a headache or prevent you from sleeping well.      •Do not smoke. Nicotine and other chemicals in cigarettes and cigars can trigger an acute headache or make it worse. Ask your healthcare provider for information if you currently smoke and need help to quit. E-cigarettes or smokeless tobacco still contain nicotine. Talk to your healthcare provider before you use these products.       •Limit alcohol as directed. Alcohol can trigger an acute headache or make it worse. If you have cluster headaches, do not drink alcohol during an episode. For other types of headaches, ask your healthcare provider if it is safe for you to drink alcohol. Ask how much is safe for you to drink, and how often.      •Exercise as directed. Exercise can reduce tension and help with headache pain. Aim for 30 minutes of physical activity on most days of the week. Your healthcare provider can help you create an exercise plan.      •Eat a variety of healthy foods. Healthy foods include fruits, vegetables, low-fat dairy products, lean meats, fish, whole grains, and cooked beans. Your healthcare provider or dietitian can help you create meals plans if you need to avoid foods that trigger headaches.      Follow up with your healthcare provider as directed: Bring your headache record with you when you see your healthcare provider. Write down your questions so you remember to ask them during your visits.       © Copyright [employer] 2022           back to top                          © Copyright [employer] 2022

## 2022-05-11 NOTE — ED ADULT NURSE NOTE - NSIMPLEMENTINTERV_GEN_ALL_ED
Implemented All Fall with Harm Risk Interventions:  [place] to call system. Call bell, personal items and telephone within reach. Instruct patient to call for assistance. Room bathroom lighting operational. Non-slip footwear when patient is off stretcher. Physically safe environment: no spills, clutter or unnecessary equipment. Stretcher in lowest position, wheels locked, appropriate side rails in place. Provide visual cue, wrist band, yellow gown, etc. Monitor gait and stability. Monitor for mental status changes and reorient to person, place, and time. Review medications for side effects contributing to fall risk. Reinforce activity limits and safety measures with patient and family. Provide visual clues: red socks.

## 2022-05-11 NOTE — ED ADULT TRIAGE NOTE - CHIEF COMPLAINT QUOTE
Patient presents to ER with chief complaints of intermittent dizziness since this morning and intermittent flashing light to L side of eye started after discharge at 4PM today. Hx of Vertigo.

## 2022-05-11 NOTE — ED ADULT NURSE NOTE - NSFALLRSKPASTHIST_ED_ALL_ED
Consent: The patient's consent was obtained including but not limited to risks of crusting, scabbing, blistering, scarring, darker or lighter pigmentary change, recurrence, incomplete removal and infection. Render Post-Care Instructions In Note?: yes Medical Necessity Information: It is in your best interest to select a reason for this procedure from the list below. All of these items fulfill various CMS LCD requirements except the new and changing color options. Medical Necessity Clause: This procedure was medically necessary because the lesions that were treated were: Duration Of Freeze Thaw-Cycle (Seconds): 5-10 Number Of Freeze-Thaw Cycles: 2 freeze-thaw cycles Add 52 Modifier (Optional): no Detail Level: Detailed Post-Care Instructions: I reviewed with the patient in detail post-care instructions. Patient is to wear sunprotection, and avoid picking at any of the treated lesions. Pt may apply Vaseline to crusted or scabbing areas. no

## 2022-05-11 NOTE — ED ADULT NURSE NOTE - NSFALLRSKASSESSTYPE_ED_ALL_ED
[facility]    Hospitalist Progress Note    Date of Service (when I saw the patient): 06/05/2017  Provider:  Jerome Rubin MD     Initial presenting complaint/issue to hospital (Diagnosis): shortness of breath and cough, abdominal distension   Assessment & Plan   Mel Lazaro is a 87 year old female with a PMH significant for HTN, CKD, HLP, hypothyroidism, mild Alzheimer's disease and current tobacco use, who presented to emergency room with cough and SOB. She had 3-4 days of increased cough and SOB. She had no fever. She also had no chest pain. She was hypoxemic on room air at urgent care, % on 2L O2 here. She received 125 mg IV solu-medrol in the ER. She was put on oral prednisone, nebs, antibiotics and admitted for further management. She is still requiring oxygen supplement.      1. Community acquired pneumonia -   -- Afebrile, good general status, not toxemic.Procalcitonin 7.24 on 5/31. WBC elevated partly due to steroids.  -- Will continue Rocephin and Azithromycin(day#6 today)  -- Scheduled Duonebs and PO Prednisone, 40 mg daily.   -- Wean O2 as able.    --Will repeat CXR today   --Patient refusing TCU. Likely home tomorrow with home health services if she remains stable.     2. Current tobacco use, likely underlying undiagnosed COPD.   --No formal diagnosis of COPD but likely has this underlying given hyperinflation on CXR.   --Patient is still actively smoking and and doesn't want to quit. This may be challenging if she requires oxygen on discharge. Continue nebs, steroids.     3. CKD stage III - stable.  Encourage po intake. Avoid nephrotoxins    4. HTN -  home meds with parameters.    5. Hypothyroidism - Continue  home meds    6. HLP - home meds    7. Mild Alzheimer's disease - on aricept. Pt still lives independently. Cognitive assessment requested.     8. Hyponatremia - resolved.      DVT Prophylaxis: Pneumatic Compression Devices    Code Status: Full Code    Disposition:  Expected discharge tomorrow if she continues to improve. She will need HHC if discharging home. Discussed with .     Interval History   Patient appears to have some dyspnea today. She also complains of intermittent cough.     -Data reviewed today: I reviewed all new labs and imaging results over the last 24 hours. I personally reviewed the EKG tracing showing NSR.    Physical Exam   Temp: 97.6  F (36.4  C) Temp src: Oral BP: 167/75   Heart Rate: 81 Resp: 18 SpO2: 94 % O2 Device: Nasal cannula Oxygen Delivery: 1 LPM  Vitals:    06/03/17 0555 06/04/17 0534 06/05/17 0551   Weight: 60 kg (132 lb 3.2 oz) 59.7 kg (131 lb 9.6 oz) 59.1 kg (130 lb 3.2 oz)     Vital Signs with Ranges  Temp:  [96.1  F (35.6  C)-98.7  F (37.1  C)] 97.6  F (36.4  C)  Heart Rate:  [81-82] 81  Resp:  [18-20] 18  BP: (134-167)/(59-75) 167/75  SpO2:  [93 %-96 %] 94 %  I/O last 3 completed shifts:  In: 390 [P.O.:390]  Out: -     GEN:  Alert, cooperative, forgetfull, appears mildly dyspneic today.  HEENT:  Normocephalic/atraumatic, no scleral icterus, no nasal discharge, mouth moist.  CV:  Regular rate and rhythm, no murmur or JVD.  S1 + S2 noted, no S3 or S4.  LUNGS: Soundscoarse to auscultation bilaterally with crackles LLB.  Symmetric chest rise on inhalation noted.  ABD:  Active bowel sounds, soft, non-tender/non-distended.  No rebound/guarding/rigidity.  EXT:  No edema or cyanosis.  No joint synovitis noted.  SKIN:  Dry to touch, no exanthems noted in the visualized areas.       Medications        ipratropium - albuterol 0.5 mg/2.5 mg/3 mL  3 mL Nebulization 4x Daily     aspirin  325 mg Oral Daily     donepezil  5 mg Oral QPM     hydrochlorothiazide  12.5 mg Oral Daily     levothyroxine (SYNTHROID/LEVOTHROID) tablet 50 mcg  50 mcg Oral QAM AC     quinapril  20 mg Oral Daily     sertraline  75 mg Oral Daily     simvastatin  40 mg Oral QPM     cefTRIAXone  2 g Intravenous Q24H     sodium chloride (PF)  3 mL Intracatheter Q8H      predniSONE  40 mg Oral Daily     nicotine   Transdermal Q8H     nicotine   Transdermal Daily       Data     Recent Labs  Lab 06/05/17  0910 06/04/17  0750 06/02/17  0618 06/01/17  0628   WBC 20.8* 21.8* 18.1* 21.4*   HGB 11.5* 11.6* 10.5* 11.4*   MCV 93 93 96 93    362 305 343   NA  --  137 139 137   POTASSIUM  --  3.7 4.0 4.2   CHLORIDE  --  99 105 102   CO2  --  32 29 27   BUN  --  35* 58* 57*   CR  --  1.00 1.33* 1.40*   ANIONGAP  --  6 5 8   JOE  --  8.6 8.2* 8.3*   GLC  --  62* 103* 129*       No results found for this or any previous visit (from the past 24 hour(s)).       Initial (On Arrival)

## 2022-05-11 NOTE — ED PROVIDER NOTE - PATIENT PORTAL LINK FT
You can access the FollowMyHealth Patient Portal offered by [facility] by registering at the following website: [email or web address]. By joining [employer]’s FollowMyHealth portal, you will also be able to view your health information using other applications (apps) compatible with our system.

## 2022-05-11 NOTE — ED PROVIDER NOTE - OBJECTIVE STATEMENT
89 y/o F pt with PMHx of HLD, hypothyroid, headache, and vertigo presents to ED c/o diffuse frontal headache since waking up this morning. Pt states her headache is similar to her prior headaches in the past, but she got a little more worried because it wasn't going away. Pt then took Sudafed and Tylenol, so she now feels 100% better. Pt relates sometimes getting associated vertigo with her symptoms, which she had this morning, but it is now resolved. Pt had multiple imaging and work ups done in the past for her headaches which were negative. Currently, pt is asymptomatic in ED and feels well.

## 2022-05-11 NOTE — ED PROVIDER NOTE - CLINICAL SUMMARY MEDICAL DECISION MAKING FREE TEXT BOX
89 y/o F pt with PMHx of headaches and vertigo presents to ED with similar symptoms. Pt is neuro intact and symptoms completely resolved. She was offered CT, but is requesting discharge without any imaging.   Pt is ambulating with steady gait around ED.

## 2022-05-11 NOTE — ED PROVIDER NOTE - MUSCULOSKELETAL, MLM
Spine appears normal, range of motion is not limited, no muscle or joint tenderness. Ambulating with steady gait.

## 2022-05-11 NOTE — ED ADULT TRIAGE NOTE - CHIEF COMPLAINT QUOTE
Pt BIBA c/o headache x hours. Pt denies dizziness, vision changes, numbness/tingling, unilateral weakness. No drift noted, strength 5/5 b/l. Pt endorses hx of migraines.

## 2022-05-12 ENCOUNTER — INPATIENT (INPATIENT)
Facility: HOSPITAL | Age: 87
LOS: 2 days | Discharge: HOME | DRG: 42 | End: 2022-05-15
Attending: PSYCHIATRY & NEUROLOGY | Admitting: PSYCHIATRY & NEUROLOGY
Payer: MEDICARE

## 2022-05-12 ENCOUNTER — TRANSCRIPTION ENCOUNTER (OUTPATIENT)
Age: 87
End: 2022-05-12

## 2022-05-12 DIAGNOSIS — E03.9 HYPOTHYROIDISM, UNSPECIFIED: ICD-10-CM

## 2022-05-12 DIAGNOSIS — E78.5 HYPERLIPIDEMIA, UNSPECIFIED: ICD-10-CM

## 2022-05-12 DIAGNOSIS — I63.9 CEREBRAL INFARCTION, UNSPECIFIED: ICD-10-CM

## 2022-05-12 DIAGNOSIS — I10 ESSENTIAL (PRIMARY) HYPERTENSION: ICD-10-CM

## 2022-05-12 LAB
A1C WITH ESTIMATED AVERAGE GLUCOSE RESULT: 4.6 % — SIGNIFICANT CHANGE UP (ref 4–5.6)
ANION GAP SERPL CALC-SCNC: 12 MMOL/L — SIGNIFICANT CHANGE UP (ref 5–17)
APTT BLD: 31.5 SEC — SIGNIFICANT CHANGE UP (ref 27.5–35.5)
BUN SERPL-MCNC: 13 MG/DL — SIGNIFICANT CHANGE UP (ref 7–23)
CALCIUM SERPL-MCNC: 9.3 MG/DL — SIGNIFICANT CHANGE UP (ref 8.4–10.5)
CHLORIDE SERPL-SCNC: 101 MMOL/L — SIGNIFICANT CHANGE UP (ref 96–108)
CHOLEST SERPL-MCNC: 173 MG/DL — SIGNIFICANT CHANGE UP
CO2 SERPL-SCNC: 27 MMOL/L — SIGNIFICANT CHANGE UP (ref 22–31)
CREAT SERPL-MCNC: 0.76 MG/DL — SIGNIFICANT CHANGE UP (ref 0.5–1.3)
EGFR: 74 ML/MIN/1.73M2 — SIGNIFICANT CHANGE UP
ESTIMATED AVERAGE GLUCOSE: 85 MG/DL — SIGNIFICANT CHANGE UP (ref 68–114)
GLUCOSE SERPL-MCNC: 250 MG/DL — HIGH (ref 70–99)
HCT VFR BLD CALC: 33.1 % — LOW (ref 34.5–45)
HDLC SERPL-MCNC: 66 MG/DL — SIGNIFICANT CHANGE UP
HGB BLD-MCNC: 10.6 G/DL — LOW (ref 11.5–15.5)
INR BLD: 1 — SIGNIFICANT CHANGE UP (ref 0.88–1.16)
LIPID PNL WITH DIRECT LDL SERPL: 90 MG/DL — SIGNIFICANT CHANGE UP
MAGNESIUM SERPL-MCNC: 2 MG/DL — SIGNIFICANT CHANGE UP (ref 1.6–2.6)
MCHC RBC-ENTMCNC: 32 GM/DL — SIGNIFICANT CHANGE UP (ref 32–36)
MCHC RBC-ENTMCNC: 32.3 PG — SIGNIFICANT CHANGE UP (ref 27–34)
MCV RBC AUTO: 100.9 FL — HIGH (ref 80–100)
NON HDL CHOLESTEROL: 107 MG/DL — SIGNIFICANT CHANGE UP
NRBC # BLD: 0 /100 WBCS — SIGNIFICANT CHANGE UP (ref 0–0)
PHOSPHATE SERPL-MCNC: 3.1 MG/DL — SIGNIFICANT CHANGE UP (ref 2.5–4.5)
PLATELET # BLD AUTO: 209 K/UL — SIGNIFICANT CHANGE UP (ref 150–400)
POTASSIUM SERPL-MCNC: 3.6 MMOL/L — SIGNIFICANT CHANGE UP (ref 3.5–5.3)
POTASSIUM SERPL-SCNC: 3.6 MMOL/L — SIGNIFICANT CHANGE UP (ref 3.5–5.3)
PROTHROM AB SERPL-ACNC: 11.9 SEC — SIGNIFICANT CHANGE UP (ref 10.5–13.4)
RBC # BLD: 3.28 M/UL — LOW (ref 3.8–5.2)
RBC # FLD: 12.4 % — SIGNIFICANT CHANGE UP (ref 10.3–14.5)
SODIUM SERPL-SCNC: 140 MMOL/L — SIGNIFICANT CHANGE UP (ref 135–145)
TRIGL SERPL-MCNC: 84 MG/DL — SIGNIFICANT CHANGE UP
TSH SERPL-MCNC: 1.84 UIU/ML — SIGNIFICANT CHANGE UP (ref 0.27–4.2)
WBC # BLD: 5.68 K/UL — SIGNIFICANT CHANGE UP (ref 3.8–10.5)
WBC # FLD AUTO: 5.68 K/UL — SIGNIFICANT CHANGE UP (ref 3.8–10.5)

## 2022-05-12 PROCEDURE — 70496 CT ANGIOGRAPHY HEAD: CPT | Mod: 26

## 2022-05-12 PROCEDURE — 99221 1ST HOSP IP/OBS SF/LOW 40: CPT

## 2022-05-12 PROCEDURE — 99223 1ST HOSP IP/OBS HIGH 75: CPT

## 2022-05-12 PROCEDURE — 70498 CT ANGIOGRAPHY NECK: CPT | Mod: 26

## 2022-05-12 RX ORDER — ASPIRIN/CALCIUM CARB/MAGNESIUM 324 MG
81 TABLET ORAL DAILY
Refills: 0 | Status: DISCONTINUED | OUTPATIENT
Start: 2022-05-13 | End: 2022-05-15

## 2022-05-12 RX ORDER — LEVOTHYROXINE SODIUM 125 MCG
75 TABLET ORAL DAILY
Refills: 0 | Status: DISCONTINUED | OUTPATIENT
Start: 2022-05-12 | End: 2022-05-15

## 2022-05-12 RX ORDER — ASPIRIN/CALCIUM CARB/MAGNESIUM 324 MG
324 TABLET ORAL ONCE
Refills: 0 | Status: COMPLETED | OUTPATIENT
Start: 2022-05-12 | End: 2022-05-12

## 2022-05-12 RX ORDER — POTASSIUM CHLORIDE 20 MEQ
40 PACKET (EA) ORAL ONCE
Refills: 0 | Status: COMPLETED | OUTPATIENT
Start: 2022-05-12 | End: 2022-05-12

## 2022-05-12 RX ORDER — LANOLIN ALCOHOL/MO/W.PET/CERES
3 CREAM (GRAM) TOPICAL ONCE
Refills: 0 | Status: COMPLETED | OUTPATIENT
Start: 2022-05-12 | End: 2022-05-12

## 2022-05-12 RX ORDER — ATORVASTATIN CALCIUM 80 MG/1
40 TABLET, FILM COATED ORAL AT BEDTIME
Refills: 0 | Status: DISCONTINUED | OUTPATIENT
Start: 2022-05-12 | End: 2022-05-15

## 2022-05-12 RX ORDER — ATORVASTATIN CALCIUM 80 MG/1
80 TABLET, FILM COATED ORAL AT BEDTIME
Refills: 0 | Status: DISCONTINUED | OUTPATIENT
Start: 2022-05-12 | End: 2022-05-12

## 2022-05-12 RX ORDER — SENNA PLUS 8.6 MG/1
2 TABLET ORAL AT BEDTIME
Refills: 0 | Status: DISCONTINUED | OUTPATIENT
Start: 2022-05-12 | End: 2022-05-15

## 2022-05-12 RX ORDER — ASPIRIN/CALCIUM CARB/MAGNESIUM 324 MG
324 TABLET ORAL ONCE
Refills: 0 | Status: DISCONTINUED | OUTPATIENT
Start: 2022-05-12 | End: 2022-05-12

## 2022-05-12 RX ORDER — ATORVASTATIN CALCIUM 80 MG/1
80 TABLET, FILM COATED ORAL AT BEDTIME
Refills: 0 | Status: DISCONTINUED | OUTPATIENT
Start: 2022-05-12 | End: 2022-05-15

## 2022-05-12 RX ORDER — ASPIRIN/CALCIUM CARB/MAGNESIUM 324 MG
81 TABLET ORAL DAILY
Refills: 0 | Status: DISCONTINUED | OUTPATIENT
Start: 2022-05-12 | End: 2022-05-12

## 2022-05-12 RX ORDER — ACETAMINOPHEN 500 MG
650 TABLET ORAL EVERY 6 HOURS
Refills: 0 | Status: DISCONTINUED | OUTPATIENT
Start: 2022-05-12 | End: 2022-05-15

## 2022-05-12 RX ORDER — CLOPIDOGREL BISULFATE 75 MG/1
75 TABLET, FILM COATED ORAL DAILY
Refills: 0 | Status: DISCONTINUED | OUTPATIENT
Start: 2022-05-12 | End: 2022-05-15

## 2022-05-12 RX ORDER — ASPIRIN/CALCIUM CARB/MAGNESIUM 324 MG
300 TABLET ORAL DAILY
Refills: 0 | Status: DISCONTINUED | OUTPATIENT
Start: 2022-05-12 | End: 2022-05-12

## 2022-05-12 RX ORDER — ASPIRIN/CALCIUM CARB/MAGNESIUM 324 MG
324 TABLET ORAL ONCE
Refills: 0 | Status: DISCONTINUED | OUTPATIENT
Start: 2022-05-12 | End: 2022-05-15

## 2022-05-12 RX ORDER — ENOXAPARIN SODIUM 100 MG/ML
40 INJECTION SUBCUTANEOUS EVERY 24 HOURS
Refills: 0 | Status: DISCONTINUED | OUTPATIENT
Start: 2022-05-12 | End: 2022-05-15

## 2022-05-12 RX ADMIN — CLOPIDOGREL BISULFATE 75 MILLIGRAM(S): 75 TABLET, FILM COATED ORAL at 11:29

## 2022-05-12 RX ADMIN — SENNA PLUS 2 TABLET(S): 8.6 TABLET ORAL at 22:01

## 2022-05-12 RX ADMIN — ATORVASTATIN CALCIUM 40 MILLIGRAM(S): 80 TABLET, FILM COATED ORAL at 22:12

## 2022-05-12 RX ADMIN — ENOXAPARIN SODIUM 40 MILLIGRAM(S): 100 INJECTION SUBCUTANEOUS at 22:01

## 2022-05-12 RX ADMIN — Medication 40 MILLIEQUIVALENT(S): at 22:02

## 2022-05-12 RX ADMIN — Medication 650 MILLIGRAM(S): at 11:18

## 2022-05-12 RX ADMIN — Medication 650 MILLIGRAM(S): at 12:20

## 2022-05-12 RX ADMIN — Medication 75 MICROGRAM(S): at 07:08

## 2022-05-12 RX ADMIN — Medication 3 MILLIGRAM(S): at 22:02

## 2022-05-12 RX ADMIN — Medication 324 MILLIGRAM(S): at 11:29

## 2022-05-12 NOTE — ED PROVIDER NOTE - PHYSICAL EXAMINATION
CONSTITUTIONAL: NAD   SKIN: Normal color and turgor.    HEAD: NC/AT.  EYES: Conjunctiva clear. EOMI. PERRL.    ENT: Airway clear. Normal voice.   RESPIRATORY:  Normal work of breathing. Lungs CTAB.  CARDIOVASCULAR:  RRR, S1S2. No M/R/G.      GI:  Abdomen soft, nontender.    MSK: Neck supple.  No edema.  No muscular tenderness. No joint swelling or ROM limitation.  NEURO: Alert; CN: II-XII intact.  5/5 strength in all extremities.  SILT all extremities.  F-N normal.  Gait steady.

## 2022-05-12 NOTE — H&P ADULT - NSHPLABSRESULTS_GEN_ALL_CORE
Fingerstick Blood Glucose: CAPILLARY BLOOD GLUCOSE  POCT Blood Glucose.: 107 mg/dL (12 May 2022 00:07)  LABS:                        11.3   5.79  )-----------( 231      ( 11 May 2022 22:44 )             35.3     05-11    142  |  100  |  14  ----------------------------<  100<H>  3.7   |  32<H>  |  0.79    Ca    9.3      11 May 2022 22:44    TPro  7.0  /  Alb  4.4  /  TBili  0.2  /  DBili  x   /  AST  19  /  ALT  13  /  AlkPhos  61  05-11    CARDIAC MARKERS ( 11 May 2022 22:44 )  x     / 0.01 ng/mL / x     / x     / x        RADIOLOGY & ADDITIONAL STUDIES:  HCT: < from: CT Head No Cont (05.11.22 @ 22:38) >  IMPRESSION:  Right occipital lobe infarct, 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  Findings were discussed with Dr. Larson 11:23 PM on 5/11/22.  < end of copied text >    CTA: pending     -----------------------------------------------------------------------------------------

## 2022-05-12 NOTE — H&P ADULT - ASSESSMENT
89y Female with PMHx of HLD, hypothyroid, headache, and vertigo presents to ED c/o diffuse frontal headache since waking up this morning. Patient originally was discharged home after taking tylenol from the ED. Represented tonight because she felt dizziness and headache return. LKN was this morning but patient could not remember exact time. NIH 0, FS and SBP within normal limits. CT Head obtained by the ED concerning for acute to subacute R occipital infarct. CTA still pending. Patient admitted to neurology for stroke work up. Patient is aware she needs to stay in the hospital for stroke workup, states that she gets claustrophobic so would need to be medicated for MRI    Neuro  #CVA workup  - admit to neurology  - Load ASA 325mg once and continue aspirin 81mg   - continue plavix 75mg daily  - atorvastatin 80mg daily  - q4hr stroke neuro checks and vitals  - obtain MRI Brain without contrast  - HCT 5/11 Results: subacute to acute R occipital lobe infarct  - CTA Results: pending   - Stroke education    Cards  #HTN  - Continuous telemetry monitoring   - permissive hypertension, Goal -180  - hold home blood pressure medication for now    - obtain TTE     #HLD  - start lipitor 80mg daily   - obtain Lipid panel     Pulm  - call provider if SPO2 < 94%    GI  #Nutrition/Fluids/Electrolytes   - replete K<4 and Mg <2  - Regular Diet  - passed dysphagia screen in the ED    Renal  - voiding  - oxybutynin ER 10mg daily    Infectious Disease  - Afebrile     Endocrine  - continue Synthroid 75mcg daily  - obtain HA1C, TSH    DVT Prophylaxis  - lovenox sq for DVT prophylaxis   - SCDs for DVT prophylaxis     Dispo: pending PTOT     Discussed daily hospital plans and goals with patient and family at bedside  To be discussed with Dr. Pradhan in the AM.

## 2022-05-12 NOTE — PATIENT PROFILE ADULT - FALL HARM RISK - HARM RISK INTERVENTIONS

## 2022-05-12 NOTE — ED PROVIDER NOTE - CLINICAL SUMMARY MEDICAL DECISION MAKING FREE TEXT BOX
Pt with chronic HA and vertigo, seen in ED earlier today but symptoms resolved and nonfocal neuro exam.  Bounced back with recurrent headache and then flashes of light left temporal visual field, also resolved.  Plan:  Basic labs, CT head.

## 2022-05-12 NOTE — PATIENT PROFILE ADULT - FALL HARM RISK - DEVICES
Admitting Diagnosis:   Patient is a 60y old  Female who presents with a chief complaint of Expanding hematoma (2018 03:05)      PAST MEDICAL & SURGICAL HISTORY:  Anxiety  Hypertension  Breast CA  Previous  section  History of mastectomy, left: with TRAM flap recon      Current Nutrition Order:  NPO       PO Intake: Good (%) [   ]  Fair (50-75%) [   ] Poor (<25%) [   ]- N/A NPO    GI Issues: Unable to assess at this time 2/2 lethargy    Pain: Unable to assess at this time 2/2 lethargy    Skin Integrity: 15, surgical wounds     Labs:       147<H>  |  112<H>  |  11  ----------------------------<  145<H>  3.8   |  25  |  0.43<L>    Ca    9.0      2018 05:52  Phos  2.7     06-06  Mg     2.2     06-06      CAPILLARY BLOOD GLUCOSE          Medications:  MEDICATIONS  (STANDING):  aspirin  chewable 81 milliGRAM(s) Oral daily  atorvastatin 80 milliGRAM(s) Oral at bedtime  bisoprolol   Tablet 2.5 milliGRAM(s) Oral daily  dextrose 5%. 1000 milliLiter(s) (50 mL/Hr) IV Continuous <Continuous>  dextrose 50% Injectable 12.5 Gram(s) IV Push once  dextrose 50% Injectable 25 Gram(s) IV Push once  dextrose 50% Injectable 25 Gram(s) IV Push once  docusate sodium Liquid 100 milliGRAM(s) Oral two times a day  glycopyrrolate Injectable 0.2 milliGRAM(s) IV Push once  heparin  Injectable 5000 Unit(s) SubCutaneous every 8 hours  levETIRAcetam  Solution 1000 milliGRAM(s) Oral two times a day  multivitamin 1 Tablet(s) Oral daily  polyethylene glycol 3350 17 Gram(s) Oral at bedtime  senna Syrup 10 milliLiter(s) Oral at bedtime    MEDICATIONS  (PRN):  acetaminophen   Tablet 650 milliGRAM(s) Oral every 6 hours PRN For Temp greater than 38 C (100.4 F)  acetaminophen   Tablet. 650 milliGRAM(s) Oral every 6 hours PRN Mild Pain (1 - 3)  dextrose 40% Gel 15 Gram(s) Oral once PRN Blood Glucose LESS THAN 70 milliGRAM(s)/deciliter  fentaNYL    Injectable 25 MICROGram(s) IV Push every 2 hours PRN Severe Pain (7 - 10)  glucagon  Injectable 1 milliGRAM(s) IntraMuscular once PRN Glucose LESS THAN 70 milligrams/deciliter  hydrALAZINE Injectable 10 milliGRAM(s) IV Push every 4 hours PRN SBP >160  ondansetron Injectable 4 milliGRAM(s) IV Push every 6 hours PRN Nausea      Weight: 78kg   Daily     Daily     Weight Change: No new weights recorded since admit     Estimated energy needs: Ideal body weight (61.2kg) used for calculations as pt >120% of IBW. Needs adjusted for s/p CVA, wound healing  Calories: 25-30kcal/kg = 2207-2464 kcal/day  Protein: 1.2-1.4 g/kg = 86-98g protein/day  Fluids; 30-35mL/kg = 4189-8395 mL/day    Subjective: 61 yo/female with PMHx L. breast cancer s/p mastectomy w/flap reconstruction, HTN, admitted with abdominal pain/bulge. Found to have R. rectus expanding hematoma. Course c/b large R. MCA stroke. S/p decompressive craniotomy on . Pt successfully extubated this AM. . NGT remains in place but TF off. Pt very lethargic at this time and unable to speak a full sentence without falling back to sleep; likely some residual precedex. Pt appears comfortable at this time. Na 147 (H), K/Mg/Phos WNL. BG 145mg/dL.     Previous Nutrition Diagnosis:  Inadequate oral intake RT inability to meet needs AEB NPO    Active [   ]  Resolved [  X ]    If resolved, new PES: Increased nutrient needs RT increased demand for protein-calorie intake AEB s/p CVA, wound healing     Goal: Pt will meet % of EER per day via tolerated route     Recommendations:  1. Once patient is less lethargic, recommend SLP evaluation prior to PO intake to assess for safest, least restrictive texture modification   2. If PO intake not feasible w/in 48hrs, recommend resuming TF  3. Monitor lytes and replete prn.   4. Trend weights   5. Ensure pain control    Education:   Not appropriate for education at this time     Risk Level: High [ X  ] Moderate [   ] Low [   ] None

## 2022-05-12 NOTE — ED PROVIDER NOTE - ATTENDING APP SHARED VISIT CONTRIBUTION OF CARE
here with intermittent dizziness/headache. unclear onset but at least a few days. seen earlier today, no workup as symptoms resolved. also denied active symptoms in ed tonight but given repeat visit, labs/ct ordered. shows here with intermittent dizziness/headache. unclear onset but at least a few days. notes some intermittent left eye flashes. seen earlier today, no workup as symptoms resolved. also denied active symptoms in ed tonight but given repeat visit, labs/ct ordered. shows acute/subacute cva on scan. neuro consulted. cta ordered. exam non focal/ well appearing but will admit for further stroke workup

## 2022-05-12 NOTE — SPEECH LANGUAGE PATHOLOGY EVALUATION - SLP PERTINENT HISTORY OF CURRENT PROBLEM
Pt is 89 year old F who presented to the ED c/o headaches. Pt initially given tylenol and sent home, but returned to ED later that evening 2/2 persisting headache. CTH revealed acute-subacute R occipital infarct. NIH 0.

## 2022-05-12 NOTE — ED PROVIDER NOTE - OBJECTIVE STATEMENT
89 f pmhx HLD, chronic headaches and dizziness; seen here earlier for headache consistent with her "usual headaches" and her longstanding dizziness.  Her symptoms had resolved by the time she was evaluated and she wanted to be discharged home. After going home in the afternoon her headache & dizziness recurred; this did not worry her because of her chronic symptoms, but she developed flashes of light in temporal visual field of left eye.  These have resolved, but she says she still has a mild headache and dysequilibrium. She says these are common for her.  No loss of vision or diplopia, no N/V, no focal weakness or paresthesia/numbness, no trouble with speech.

## 2022-05-12 NOTE — PHYSICAL THERAPY INITIAL EVALUATION ADULT - PERTINENT HX OF CURRENT PROBLEM, REHAB EVAL
89y Female with PMHx of HLD, hypothyroid, headache, and vertigo presents to ED c/o diffuse frontal headache, CT scan incidentally showing subacute right occipital stroke. Admitted for further workup

## 2022-05-12 NOTE — H&P ADULT - HISTORY OF PRESENT ILLNESS
**STROKE HPI***    HPI: 89y Female with PMHx of HLD, hypothyroid, headache, and vertigo presents to ED c/o diffuse frontal headache since waking up this morning. Patient originally was discharged home after taking tylenol from the ED. Represented tonight because she felt dizziness and headache return. LKN was this morning but patient could not remember exact time. NIH 0, FS and SBP within normal limits. CT Head obtained by the ED concerning for acute to subacute R occipital infarct.                        **STROKE HPI***    HPI: 89y Female with PMHx of HLD, hypothyroid, headache, and vertigo presents to ED c/o diffuse frontal headache since waking up this morning. Patient originally was discharged home after taking tylenol from the ED. Represented tonight because she felt dizziness and headache return. LKN was this morning but patient could not remember exact time. NIH 0, FS and SBP within normal limits. CT Head obtained by the ED concerning for acute to subacute R occipital infarct.

## 2022-05-12 NOTE — H&P ADULT - NSHPPHYSICALEXAM_GEN_ALL_CORE
Physical exam:  General: No acute distress, awake and alert  Cardiovascular: Regular rate and rhythm, no murmurs, rubs, or gallops. No bruits  Pulmonary: Anterior breath sounds clear bilaterally, no crackles or wheezing. No use of accessory muscles  Extremities: Radial and DP pulses +2, no edema    Neurologic:  -Mental status: Awake, alert, oriented to person, place, and time. Speech is fluent with intact naming, repetition, and comprehension, no dysarthria. Recent and remote memory intact. Follows commands. Attention/concentration intact. Fund of knowledge appropriate.  -Cranial nerves:   II: Visual fields are full to confrontation.  III, IV, VI: Extraocular movements are intact without nystagmus. Pupils equally round and reactive to light  V:  Facial sensation V1-V3 equal and intact   VII: Face is symmetric   Motor: Normal bulk and tone. No pronator drift. Strength bilateral upper extremity 5/5, bilateral lower extremities 5/5.  Sensation: Intact to light touch bilaterally. No neglect or extinction on double simultaneous testing.  Coordination: No dysmetria on finger-to-nose and heel-to-shin bilaterally  Reflexes: Downgoing toes bilaterally   Gait: Narrow gait and steady Physical exam:  General: No acute distress, awake and alert  Cardiovascular: Regular rate and rhythm, no murmurs, rubs, or gallops. No bruits  Pulmonary: Anterior breath sounds clear bilaterally, no crackles or wheezing. No use of accessory muscles  Extremities: Radial and DP pulses +2, no edema    Neurologic:  -Mental status: Awake, alert, oriented to person, place, and time. Speech is fluent with intact naming, repetition, and comprehension, no dysarthria. Recent and remote memory intact. Follows commands. Attention/concentration intact. Fund of knowledge appropriate.  -Cranial nerves:   II: some vision loss of left upper quadrant of left eye. Otherwise no vision loss.   III, IV, VI: Extraocular movements are intact without nystagmus. Pupils equally round and reactive to light  V:  Facial sensation V1-V3 equal and intact   VII: Face is symmetric   Motor: Normal bulk and tone. No pronator drift. Strength bilateral upper extremity 5/5, bilateral lower extremities 5/5.  Sensation: Intact to light touch bilaterally. No neglect or extinction on double simultaneous testing.  Coordination: No dysmetria on finger-to-nose and heel-to-shin bilaterally  Reflexes: Downgoing toes bilaterally   Gait: Narrow gait and steady    NIHSS 1 Physical exam:  General: No acute distress, awake and alert  Cardiovascular: Regular rate and rhythm, no murmurs, rubs, or gallops. No bruits  Pulmonary: Anterior breath sounds clear bilaterally, no crackles or wheezing. No use of accessory muscles  Extremities: Radial and DP pulses +2, no edema    Neurologic:  -Mental status: Awake, alert, oriented to person, place, and time. Speech is fluent with intact naming, repetition, and comprehension, no dysarthria. Recent and remote memory intact. Follows commands. Attention/concentration intact. Fund of knowledge appropriate.  -Cranial nerves:   II: left upper quadrantanopia   III, IV, VI: Extraocular movements are intact without nystagmus. Pupils equally round and reactive to light  V:  Facial sensation V1-V3 equal and intact   VII: Face is symmetric   Motor: Normal bulk and tone. No pronator drift. Strength bilateral upper extremity 5/5, bilateral lower extremities 5/5.  Sensation: Intact to light touch bilaterally. No neglect or extinction on double simultaneous testing.  Coordination: No dysmetria on finger-to-nose and heel-to-shin bilaterally  Reflexes: Downgoing toes bilaterally   Gait: Narrow gait and steady    NIHSS 1

## 2022-05-12 NOTE — ED PROVIDER NOTE - NS ED ATTENDING STATEMENT MOD
This was a shared visit with the MARANDA. I reviewed and verified the documentation and independently performed the documented:

## 2022-05-12 NOTE — CONSULT NOTE ADULT - ASSESSMENT
89 y/o F w/
per Neurology    89 y o Female with PMHx of HLD, hypothyroid, headache, and vertigo presents to ED c/o diffuse frontal headache since waking up this morning. Patient originally was discharged home after taking tylenol from the ED. Represented tonight because she felt dizziness and headache return. LKN was this morning but patient could not remember exact time. NIH 0, FS and SBP within normal limits. CT Head obtained by the ED concerning for acute to subacute R occipital infarct. CTA still pending. Patient admitted to neurology for stroke work up. Patient is aware she needs to stay in the hospital for stroke workup, states that she gets claustrophobic so would need to be medicated for MRI    Neuro  #CVA workup  - admit to neurology  - Load ASA 325mg once and continue aspirin 81mg   -hold plavix for now given size of stroke  - atorvastatin 40g daily  - q4hr stroke neuro checks and vitals  - obtain MRI Brain without contrast  - HCT 5/11 Results: subacute to acute R occipital lobe infarct  - CTA Results: pending   - Stroke education    Cards  #HTN  - Continuous telemetry monitoring   - Goal -180  - hold home blood pressure medication for now    - obtain TTE     #HLD  - start lipitor 4mg daily   - obtain Lipid panel     Pulm  - call provider if SPO2 < 94%    GI  #Nutrition/Fluids/Electrolytes   - replete K<4 and Mg <2  - Regular Diet  - passed dysphagia screen in the ED    Renal  - voiding  - oxybutynin ER 10mg daily    Infectious Disease  - Afebrile     Endocrine  - continue Synthroid 75mcg daily  - obtain HA1C, TSH    DVT Prophylaxis  - lovenox sq for DVT prophylaxis   - SCDs for DVT prophylaxis

## 2022-05-12 NOTE — H&P ADULT - ASSESSMENT
89y Female with PMHx of HLD, hypothyroid, headache, and vertigo presents to ED c/o diffuse frontal headache since waking up this morning. Patient originally was discharged home after taking tylenol from the ED. Represented tonight because she felt dizziness and headache return. LKN was this morning but patient could not remember exact time. NIH 0, FS and SBP within normal limits. CT Head obtained by the ED concerning for acute to subacute R occipital infarct. CTA still pending. Patient admitted to neurology for stroke work up. Patient is aware she needs to stay in the hospital for stroke workup, states that she gets claustrophobic so would need to be medicated for MRI    Neuro  #CVA workup  - admit to neurology  - Load ASA 325mg once and continue aspirin 81mg   -hold plavix for now given size of stroke  - atorvastatin 40g daily  - q4hr stroke neuro checks and vitals  - obtain MRI Brain without contrast  - HCT 5/11 Results: subacute to acute R occipital lobe infarct  - CTA Results: pending   - Stroke education    Cards  #HTN  - Continuous telemetry monitoring   - Goal -180  - hold home blood pressure medication for now    - obtain TTE     #HLD  - start lipitor 4mg daily   - obtain Lipid panel     Pulm  - call provider if SPO2 < 94%    GI  #Nutrition/Fluids/Electrolytes   - replete K<4 and Mg <2  - Regular Diet  - passed dysphagia screen in the ED    Renal  - voiding  - oxybutynin ER 10mg daily    Infectious Disease  - Afebrile     Endocrine  - continue Synthroid 75mcg daily  - obtain HA1C, TSH    DVT Prophylaxis  - lovenox sq for DVT prophylaxis   - SCDs for DVT prophylaxis     Pt discussed during rounds with Dr. Carolynn MCDANIELS Goals: Goals reviewed at interdisciplinary rounds with case management, social work, physical therapy, occupational therapy, and speech language pathology.   Please see specific therapy  notes for in depth goals.  Dispo: pending PT/OT  Discussed daily hospital plans and goals with patient and family at bedside. (Called and updated family.)   89y Female with PMHx of HLD, hypothyroid, headache, and vertigo presents to ED c/o diffuse frontal headache, CT scan incidentally showing subacute right occipital stroke. Admitted for further workup    Neuro  #CVA workup  - admit to neurology  - Load ASA 325mg once and continue aspirin 81mg   -hold plavix for now given size of stroke  - atorvastatin 40g daily  - q4hr stroke neuro checks and vitals  - HCT 5/11 Results: subacute to acute R occipital lobe infarct  - CTA Results: right PCA occlusion, otherwise unremarkable  - Stroke education    Cards  #HTN  - Continuous telemetry monitoring   - Goal -180  - hold home blood pressure medication for now    - obtain TTE     #HLD  - start lipitor 40mg daily   - obtain Lipid panel     Pulm  - call provider if SPO2 < 94%    GI  #Nutrition/Fluids/Electrolytes   - replete K<4 and Mg <2  - Regular Diet  - passed dysphagia screen in the ED    Renal  - voiding  - oxybutynin ER 10mg daily    Infectious Disease  - Afebrile     Endocrine  - continue Synthroid 75mcg daily  - obtain HA1C, TSH    DVT Prophylaxis  - lovenox sq for DVT prophylaxis   - SCDs for DVT prophylaxis     Pt discussed during rounds with Dr. Carolynn MCDANIELS Goals: Goals reviewed at interdisciplinary rounds with case management, social work, physical therapy, occupational therapy, and speech language pathology.   Please see specific therapy  notes for in depth goals.  Dispo: pending PT/OT  Discussed daily hospital plans and goals with patient and family at bedside. (Called and updated family.)   89y Female with PMHx of HLD, hypothyroid, headache, and vertigo presents to ED c/o diffuse frontal headache, CT scan incidentally showing subacute right occipital stroke. Admitted for further workup    Neuro  #CVA workup  - admit to neurology  - Load ASA 325mg once and continue aspirin 81mg   - continue home plavix  - atorvastatin 40g daily  - q4hr stroke neuro checks and vitals  - HCT 5/11 Results: subacute to acute R occipital lobe infarct  - CTA Results: right PCA occlusion, otherwise unremarkable  - Stroke education    Cards  #HTN  - Continuous telemetry monitoring   - Goal -180  - hold home blood pressure medication for now    - obtain TTE     #HLD  - start lipitor 40mg daily   - obtain Lipid panel     Pulm  - call provider if SPO2 < 94%    GI  #Nutrition/Fluids/Electrolytes   - replete K<4 and Mg <2  - Regular Diet  - passed dysphagia screen in the ED    Renal  - voiding  - oxybutynin ER 10mg daily    Infectious Disease  - Afebrile     Endocrine  - continue Synthroid 75mcg daily  - obtain HA1C, TSH    DVT Prophylaxis  - lovenox sq for DVT prophylaxis   - SCDs for DVT prophylaxis     Pt discussed during rounds with Dr. Carolynn MCDANIELS Goals: Goals reviewed at interdisciplinary rounds with case management, social work, physical therapy, occupational therapy, and speech language pathology.   Please see specific therapy  notes for in depth goals.  Dispo: pending PT/OT  Discussed daily hospital plans and goals with patient and family at bedside. (Called and updated family.)

## 2022-05-12 NOTE — CONSULT NOTE ADULT - SUBJECTIVE AND OBJECTIVE BOX
HPI:    89F with HLD, hypothyroid, vertigo, HA, p/w subacute right occipital stroke. EP consulted for ILR.    TTE and EVELYN pending.     pt denies history of palpitations or diagnosed arrhythmias.        PAST MEDICAL & SURGICAL HISTORY:    No significant past surgical history          No pertinent family history        Social History: no smoking, no drugs, no algohol    pertinent home medications:    Inpatient Medications:   acetaminophen     Tablet .. 650 milliGRAM(s) Oral every 6 hours PRN  atorvastatin 40 milliGRAM(s) Oral at bedtime  clopidogrel Tablet 75 milliGRAM(s) Oral daily  enoxaparin Injectable 40 milliGRAM(s) SubCutaneous every 24 hours  levothyroxine 75 MICROGram(s) Oral daily  potassium chloride   Solution 40 milliEquivalent(s) Oral once      Allergies: No Known Allergies      ROS:   CONSTITUTIONAL: No fever, weight loss + fatigue  EYES: Pt denies  RESPIRATORY: No cough, wheezing, chills or hemoptysis; No Shortness of Breath  CARDIOVASCULAR: see HPI  GASTROINTESTINAL: Pt denies  NEUROLOGICAL: Pt denies  SKIN: Pt denies   PSYCHIATRIC: Pt denies  HEME/LYMPH: Pt denies    PHYSICAL:  T(C): 36.8 (05-12-22 @ 14:04), Max: 37.6 (05-12-22 @ 10:18)  HR: 78 (05-12-22 @ 12:30) (58 - 78)  BP: 138/62 (05-12-22 @ 12:30) (121/56 - 195/80)  RR: 18 (05-12-22 @ 08:14) (16 - 18)  SpO2: 94% (05-12-22 @ 12:30) (93% - 99%)  Appearance: No acute distress, well developed  Eyes: normal appearing conjunctiva, pupils and eyelids  Cardiovascular: Normal S1 S2, No JVD, No murmurs, No edema  Respiratory: Lungs clear to auscultation	bilaterally.  No wheeze, rhonchi, rales noted  Gastrointestinal:  Soft, NT/ND 	  Neurologic:  No deficit noted  Psych: A&Ox3, normal mood/affect  Musculoskeletal: normal gait  Skin: no rash noted, normal color and pigmentation.        LABS:                        10.6   5.68  )-----------( 209      ( 12 May 2022 11:02 )             33.1     05-12    140  |  101  |  13  ----------------------------<  250<H>  3.6   |  27  |  0.76    Ca    9.3      12 May 2022 11:02  Phos  3.1     05-12  Mg     2.0     05-12    TPro  7.0  /  Alb  4.4  /  TBili  0.2  /  DBili  x   /  AST  19  /  ALT  13  /  AlkPhos  61  05-11    PT/INR - ( 12 May 2022 00:37 )   PT: 11.9 sec;   INR: 1.00          PTT - ( 12 May 2022 00:37 )  PTT:31.5 sec      Assessment Plan:  89F with HLD, hypothyroid, vertigo, HA, p/w subacute right occipital stroke. EP consulted for ILR.    -plan for ILR implantation for cryptogenic stroke tomorrow, 5/13. Procedure explained to patient. r/b/c/a/ explained. all questions answered. informed consent was obtained.       
  Patient is a 89y old  Female who presents with a chief complaint of stroke (12 May 2022 07:26)        HPI:   89y Female with PMHx of HLD, hypothyroid, headache, and vertigo presents to ED c/o diffuse frontal headache since waking up this morning. Patient originally was discharged home after taking tylenol from the ED. Represented tonight because she felt dizziness and headache return. LKN was this morning but patient could not remember exact time. NIH 0, FS and SBP within normal limits. CT Head obtained by the ED concerning for acute to subacute R occipital infarct.   (12 May 2022 07:26)      PAST MEDICAL & SURGICAL HISTORY:  Hyperlipidemia  Hyperlipemia      Hypothyroidism  Hypothyroidism      Dizziness  Vertigo      Cytomegalovirus infection not present  No significant past surgical history          MEDICATIONS  (STANDING):  aspirin  chewable 324 milliGRAM(s) Oral once  atorvastatin 80 milliGRAM(s) Oral at bedtime  enoxaparin Injectable 40 milliGRAM(s) SubCutaneous every 24 hours  levothyroxine 75 MICROGram(s) Oral daily    MEDICATIONS  (PRN):        Social History:                 -  Home Living Status:  lives          -  Prior Home Care Services:            -  Family support:          -  Occupation:     Baseline Functional Level Prior to Admission:             - ADL's/ IADL's:  patient states he/she is          - ambulatory status PTA:  walked with    FAMILY HISTORY:    CBC Full  -  ( 11 May 2022 22:44 )  WBC Count : 5.79 K/uL  RBC Count : 3.60 M/uL  Hemoglobin : 11.3 g/dL  Hematocrit : 35.3 %  Platelet Count - Automated : 231 K/uL  Mean Cell Volume : 98.1 fl  Mean Cell Hemoglobin : 31.4 pg  Mean Cell Hemoglobin Concentration : 32.0 gm/dL  Auto Neutrophil # : 4.11 K/uL  Auto Lymphocyte # : 1.08 K/uL  Auto Monocyte # : 0.47 K/uL  Auto Eosinophil # : 0.08 K/uL  Auto Basophil # : 0.03 K/uL  Auto Neutrophil % : 71.0 %  Auto Lymphocyte % : 18.7 %  Auto Monocyte % : 8.1 %  Auto Eosinophil % : 1.4 %  Auto Basophil % : 0.5 %      05-11    142  |  100  |  14  ----------------------------<  100<H>  3.7   |  32<H>  |  0.79    Ca    9.3      11 May 2022 22:44    TPro  7.0  /  Alb  4.4  /  TBili  0.2  /  DBili  x   /  AST  19  /  ALT  13  /  AlkPhos  61  05-11            Radiology:    < from: CT Head No Cont (05.11.22 @ 22:38) >  ACC: [id number] EXAM:  CT BRAIN                          PROCEDURE DATE:  05/11/2022          INTERPRETATION:  Antonio BULLOCK MD, have reviewed the images and   the report and agree with the findings.    ******PRELIMINARY REPORT******    INDICATIONS: Chronic headache with new features/increased frequency.    TECHNIQUE: Serial axial images were obtained from the skull base to the   vertex without the use of intravenous contrast. Sagittal and coronal   images were reformatted.    COMPARISONEXAMINATION: CT head from 12/23/2021.    FINDINGS:  VENTRICLES AND SULCI: Mild ventricular and sulcal prominence due to   parenchymal volume loss.  INTRA-AXIAL: There is a well-defined wedge-shaped area of hypodensity   within the right occipital region which is new from the prior CT dated   12/26/2021. These findings are consistent with infarct, likely acute to   subacute. No areas of hemorrhage. There are scattered areas of   hypodensity and periventricular white matter consistent with   microangiopathic disease. No midline shift or significant sulcal   effacement.  EXTRA-AXIAL: No extra-axial fluid collection is present.  VISUALIZED SINUSES: No air-fluid levels are identified.  VISUALIZED MASTOIDS: Clear.  CALVARIUM: Normal.  MISCELLANEOUS: None.    IMPRESSION:  Right occipital lobe infarct, 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      < from: CT Angio Head w/ IV Cont (05.12.22 @ 00:34) >      ACC: [id number] EXAM:  CT ANGIO BRAIN (W)AW IC                          PROCEDURE DATE:  05/12/2022    ******PRELIMINARY REPORT******      ******PRELIMINARY REPORT******           INTERPRETATION:  PROCEDURE INFORMATION:  Exam: CT Angiography Head With Contrast, Arteriography  Exam date and time: 5/12/2022 12:15 AM  Age: 89 years old  Clinical indication: Other: Worsening headaches, left vision issues  TECHNIQUE:  Imaging protocol: Computed tomography angiography of the head with   contrast. Exam focused on  the arteries.  3D rendering (Not supervised by radiologist): MIP and/or 3D reconstructed   images were created  by the technologist.  COMPARISON:  1. CT ANGIO NECK WITHOUT AND OR WITH IV CONTRAST 12/23/2021 8:01 PM      2. CT HEAD 5/11/2022 10:27 PM  FINDINGS:  ANTERIOR CIRCULATION:  Right internal carotid artery: Unremarkable. Intracranial segment is   patent with no significant  stenosis. No aneurysm.  Right middle cerebral artery: Unremarkable. No occlusion or significant   stenosis. No aneurysm.  Right anterior cerebral artery: Unremarkable. No occlusion or significant   stenosis. No aneurysm.  Left internal carotid artery: Unremarkable. Intracranial segment is   patent with no significant  stenosis. No aneurysm.  Left middle cerebral artery: Unremarkable. No occlusion or significant   stenosis. No aneurysm.  Left anterior cerebral artery: Unremarkable. No occlusion or significant   stenosis. No aneurysm.      POSTERIOR CIRCULATION:  Right vertebral artery: Unremarkable. No occlusion or significant   stenosis. No aneurysm.  Left vertebral artery: Unremarkable. No occlusion or significant   stenosis. No aneurysm.  Basilar artery: Unremarkable. No occlusion or significant stenosis. No   aneurysm.  Right posterior cerebral artery: Occlusion of a distal branch of the   right sided posterior cerebral  artery.  Left posterior cerebral artery: Unremarkable. No occlusion or significant   stenosis. No aneurysm.  Veins: No venous sinus thrombosis.  Brain: No definite mass, mass effect, or midline shift.  Cerebral ventricles: No ventriculomegaly.  Bones/joints: Unremarkable. No acute fracture.  Soft tissues: Unremarkable.      IMPRESSION:  1. Occlusion of a distal branch of the right sided posterior cerebral   artery. This corresponds to the  acute infarction seen on the noncontrast examination.  2. The remainder of the vascular structures are unremarkable. There is no   other occlusion. There is  no other significant stenosis or aneurysm.    =========================  PROCEDURE INFORMATION:  Exam: CT Angiography Neck With Contrast  Exam date and time: 5/12/2022 12:15 AM  Age: 89 years old  Clinical indication: Other: Worsening headaches, leftvision issues  TECHNIQUE:  Imaging protocol: Computed tomography angiography of the neck with   contrast.  3D rendering (Not supervised by radiologist): MIP and/or 3D reconstructed   images were created  by the technologist.  COMPARISON:  1. CT ANGIONECK WITHOUT AND OR WITH IV CONTRAST 12/23/2021 8:01 PM  2. CT HEAD 5/11/2022 10:27 PM  FINDINGS:  Right common carotid artery: No stenosis. No dissection or occlusion.  Right internal carotid artery: No significant stenosis. No dissection or   occlusion.  Right external carotid artery: No occlusion or stenosis of the origin.  Left common carotid artery: No stenosis. No dissection or occlusion.  Left internal carotid artery: No significant stenosis. No dissection or   occlusion.  Left external carotid artery: No occlusion or stenosis of the origin.  Right vertebral artery: No stenosis. No dissection or occlusion.  Left vertebral artery: No stenosis. No dissection or occlusion.  Soft tissues: Normal. No significant soft tissue swelling.  Bones/joints: No acute fracture.      IMPRESSION:  Unremarkable examination with no significant stenosis, dissection, or   occlusion.        Vital Signs Last 24 Hrs  T(C): 36.6 (12 May 2022 05:18), Max: 36.8 (11 May 2022 20:38)  T(F): 97.8 (12 May 2022 05:18), Max: 98.2 (11 May 2022 20:38)  HR: 58 (12 May 2022 04:37) (58 - 72)  BP: 148/65 (12 May 2022 04:37) (135/72 - 195/80)  BP(mean): 94 (12 May 2022 04:37) (94 - 115)  RR: 18 (12 May 2022 04:37) (16 - 18)  SpO2: 99% (12 May 2022 04:37) (96% - 99%)        REVIEW OF SYSTEMS:      CONSTITUTIONAL: No fever, weight loss, or fatigue  EYES: No eye pain, visual disturbances, or discharge  ENMT:  No difficulty hearing, tinnitus, vertigo; No sinus or throat pain  NECK: No pain or stiffness  BREASTS: No pain, masses, or nipple discharge  RESPIRATORY: No cough, wheezing, chills or hemoptysis; No shortness of breath  CARDIOVASCULAR: No chest pain, palpitations, dizziness, or leg swelling  GASTROINTESTINAL: No abdominal or epigastric pain. No nausea, vomiting, or hematemesis; No diarrhea or constipation. No melena or hematochezia.  GENITOURINARY: No dysuria, frequency, hematuria, or incontinence  NEUROLOGICAL:  per HPI  SKIN: No itching, burning, rashes, or lesions   LYMPH NODES: No enlarged glands  ENDOCRINE: No heat or cold intolerance; No hair loss  MUSCULOSKELETAL: No joint pain or swelling; No muscle, back, or extremity pain  PSYCHIATRIC: No depression, anxiety, mood swings, or difficulty sleeping  HEME/LYMPH: No easy bruising, or bleeding gums  ALLERGY AND IMMUNOLOGIC: No hives or eczema  VASCULAR: no swelling, erythema          Physical Exam:  89 yo woman lying in semi Aquino's position, awake, alert,  no acute complaints    Head: normocephalic, atraumatic    Eyes: PERRLA, EOMI, no nystagmus, sclera anicteric    ENT: nasal discharge, uvula midline, no oropharyngeal erythema/exudate    Neck: supple, negative JVD, negative carotid bruits, no thyromegaly    Chest: CTA bilaterally, neg wheeze/ rhonchi/ rales/ crackles/ egophany    Cardiovascular: regular rate and rhythm, neg murmurs/rubs/gallops    Abdomen: soft, non distended, non tender to palpation in all 4 quadrants, negative rebound/guarding, normal bowel sounds    Extremities: WWP, neg cyanosis/clubbing/edema    Neurologic Exam:    Alert and oriented to person, place, date/year, speech fluent w/o dysarthria, follows commands, recent and remote memory intact, repetition intact, comprehension intact,  attention/concentration intact, fund of knowledge appropriate    Cranial Nerves:     II:                         pupils equal, round and reactive to light, visual fields intact   III/ IV/VI:              extraocular movements intact, neg nystagmus, neg ptosis  V:                        facial sensation intact, V1-3 normal  VII:                      face symmetric, no droop, normal eye closure and smile  VIII:                     hearing intact to finger rub bilaterally  IX and X:             no hoarseness, gag intact, palate/ uvula rise symmetrically  XI:                       SCM/ trapezius strength intact bilateral  XII:                      no tongue deviation    Motor Exam:    Right UE:           no focal weakness,  > 3+/5 throughout                           pronator drift: neg                                 Left UE:              no focal weakness,  > 3+/5 throughout                           pronator drift: neg        Right LE:            no focal weakness,  > 3+/5 throughout    Left LE:              no focal weakness,  > 3+/5 throughout    Sensation:         intact to light touch x 4 extremities                         no neglect or extinction on double simultaneous testing                       DTR:                     biceps/brachioradialis: equal                                              patella/ankle: equal                              neg Babinski                        Coordination:      Finger to Nose:  neg dysmetria bilaterally    Gait:  not tested              PM&R Impression:    1) acute to subacute Right PCA distribution stroke  2) no focal weakness    Recommendations/ Plan :    1) Physical / Occupational therapy focusing on therapeutic exercises, bed mobility/transfer out of bed evaluation, progressive ambulation with assistive devices prn.    2) Anticipated Disposition Plan/Recs  :    pending functional progress                
CC: HA    HPI: 89 y/o F c/o frontal HA a/w dizziness; Pt. presented to [facility] ED, was offered a CTH, but Pt. declined as her sx resolved, and are somewhat chronic; Pt. was d/c’ed home; however, Pt. returned to [facility] when her sx recurred; CTH at that time revealed a subacute vs. acute CVA; NIHSS 0; today, Pt. reports her dizziness has resolved, but still has an occasional HA relieved w/ Tylenol  12-pt ROS otherwise negative    PMH: HLD, hypothyroidism, HA, vertigo  PSH: s/p lumpectomy  SH: non-smoker (quit 10+ years ago); doesn’t drink EtOH or use recreational drugs   FH: sister had a stroke, father had a heart attack  Allergies: NKDA    VS: 97.8 150/63 66 18 98% on RA    Exam:   Gen: well appearing in NAD   HEENT: MMM   CV: RRR, no JVD   Lungs: CTA B/L, normal WOB on 2L NC   Abdomen: soft NT ND   Extremities: no edema B/L LE   Skin: WWP   Neuro: A&Ox3, non-focal, no dysarthria    Labs reviewed; TSH 1.840, LDL 90    Images reviewed; CTH shows "right occipital lobe infarct, likely acute to subacute;" CTA head shows "occlusion of a distal branch of the right sided posterior cerebral artery; this corresponds to the acute infarction seen on the non-contrast examination;" CTA neck unremarkable    EKG reviewed; sinus leatha

## 2022-05-12 NOTE — OCCUPATIONAL THERAPY INITIAL EVALUATION ADULT - AMBULATORY DEVICES NEEDED
Baby's vital signs are stable.  Stools and voids are appropriate for age.  Breastfeeding going well.  Baby bonding well with parents.  All questions answered.  Will continue to monitor.    no

## 2022-05-12 NOTE — H&P ADULT - NSHPLABSRESULTS_GEN_ALL_CORE
Fingerstick Blood Glucose: CAPILLARY BLOOD GLUCOSE  POCT Blood Glucose.: 107 mg/dL (12 May 2022 00:07)  LABS:                        11.3   5.79  )-----------( 231      ( 11 May 2022 22:44 )             35.3     05-11    142  |  100  |  14  ----------------------------<  100<H>  3.7   |  32<H>  |  0.79    Ca    9.3      11 May 2022 22:44    TPro  7.0  /  Alb  4.4  /  TBili  0.2  /  DBili  x   /  AST  19  /  ALT  13  /  AlkPhos  61  05-11    CARDIAC MARKERS ( 11 May 2022 22:44 )  x     / 0.01 ng/mL / x     / x     / x        RADIOLOGY & ADDITIONAL STUDIES:  HCT: < from: CT Head No Cont (05.11.22 @ 22:38) >  IMPRESSION:  Right occipital lobe infarct, 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  Findings were discussed with Dr. Larson 11:23 PM on 5/11/22.  < end of copied text >    < from: CT Angio Head w/ IV Cont (05.12.22 @ 00:34) >    IMPRESSION:  1. Occlusion of a distal branch of the right sided posterior cerebral   artery. This corresponds to the  acute infarction seen on the noncontrast examination.  2. The remainder of the vascular structures are unremarkable. There is no   other occlusion. There is  no other significant stenosis or aneurysm.    < end of copied text >

## 2022-05-12 NOTE — PHYSICAL THERAPY INITIAL EVALUATION ADULT - GAIT DEVIATIONS NOTED, PT EVAL
decreased dariel/increased time in double stance/decreased step length/decreased weight-shifting ability

## 2022-05-12 NOTE — OCCUPATIONAL THERAPY INITIAL EVALUATION ADULT - MD ORDER
89 y/o F c/o frontal HA a/w dizziness; Pt. presented to [facility] ED, was offered a CTH, but Pt. declined as her sx resolved, and are somewhat chronic; Pt. was d/c’ed home; however, Pt. returned to [facility] when her sx recurred; CTH at that time revealed a subacute vs. acute CVA; NIHSS 0; today, Pt. reports her dizziness has resolved, but still has an occasional HA relieved w/ Tylenol  12-pt ROS otherwise negative

## 2022-05-12 NOTE — H&P ADULT - NSHPPHYSICALEXAM_GEN_ALL_CORE
AxOx3, speech fluent no dysarthria, follows simple and complex commands  PERRL, EMOI, VFF, FS, no dysmetria   5/5 throughout, sensations intact B/L  Able to walk unassisted, states that shes walking at her baseline  NIHSS: 0

## 2022-05-12 NOTE — SPEECH LANGUAGE PATHOLOGY EVALUATION - SLP DIAGNOSIS
Pt presents with functional speech and language skills. Pt reports no changes in speech or language skills since hospitalization.

## 2022-05-12 NOTE — H&P ADULT - HISTORY OF PRESENT ILLNESS
89y Female with PMHx of HLD, hypothyroid, headache, and vertigo presents to ED c/o diffuse frontal headache since waking up this morning. Patient originally was discharged home after taking tylenol from the ED. Represented tonight because she felt dizziness and headache return. LKN was this morning but patient could not remember exact time. NIH 0, FS and SBP within normal limits. CT Head obtained by the ED concerning for acute to subacute R occipital infarct.    89y Female with PMHx of HLD, hypothyroid, headache, and vertigo presents to ED c/o diffuse frontal headache since Monday. Patient originally was discharged home yesterday after taking tylenol from the ED and did not want a CT scan at the time.  She came back later that night (5/11) because the dizziness returned. CT Head obtained by the ED concerning for acute to subacute R occipital infarct.    Pt says she actually started noticing episodes where she felt like some of the vision of the left part of her left eye were going black, but then it would return. Otherwise has not noticed anything new.

## 2022-05-12 NOTE — PHYSICAL THERAPY INITIAL EVALUATION ADULT - ADDITIONAL COMMENTS
pt states that she lives alone in an elevator access apt building w/ 4 steps to enter. Denies hx of recent falls. States that she was independent in all ADLs prior to this admission.

## 2022-05-12 NOTE — PHYSICAL THERAPY INITIAL EVALUATION ADULT - MODALITIES TREATMENT COMMENTS
smile, cheek puff, eyebrow raise: symmetrical. tongue protrusion: midline. visual tracking (H test): smooth pursuit. visual field test (quadrant test): impaired L upper quadrant of L eye.

## 2022-05-12 NOTE — H&P ADULT - NSHPREVIEWOFSYSTEMS_GEN_ALL_CORE
Constitutional: No fever, weight loss or fatigue  Eyes: No eye pain, visual disturbances, or discharge  ENMT:  No difficulty hearing, tinnitus; No sinus or throat pain  Neck: No pain or stiffness  Respiratory: No cough, wheezing, chills or hemoptysis  Cardiovascular: No chest pain, palpitations, shortness of breath, or leg swelling  Gastrointestinal: No abdominal pain. No nausea, vomiting or hematemesis; No diarrhea or constipation. Nohematochezia.  Genitourinary: No dysuria, frequency, hematuria or incontinence  Neurological: As per HPI  Skin: No itching, burning, rashes or lesions   Endocrine: No heat or cold intolerance; No hair loss  Musculoskeletal: No joint pain or swelling; No muscle, back or extremity pain  Heme/Lymph: No easy bruising or bleeding gums

## 2022-05-12 NOTE — H&P ADULT - NS ATTEND AMEND GEN_ALL_CORE FT
Patient seen with ACP.    Right PCA stroke in this elderly patient who actually appears to be doing quite well.  She is tolerating dual antiplatelet therapy and high-dose statin.  We will plan on EVELYN and loop recorder.    Impression right PCA stroke embolic with no evidence on CTA of vertebrobasilar stenosis.  Work-up for cardiac etiology.  Continue current medications.

## 2022-05-13 LAB
ANION GAP SERPL CALC-SCNC: 10 MMOL/L — SIGNIFICANT CHANGE UP (ref 5–17)
BUN SERPL-MCNC: 14 MG/DL — SIGNIFICANT CHANGE UP (ref 7–23)
CALCIUM SERPL-MCNC: 9 MG/DL — SIGNIFICANT CHANGE UP (ref 8.4–10.5)
CHLORIDE SERPL-SCNC: 106 MMOL/L — SIGNIFICANT CHANGE UP (ref 96–108)
CO2 SERPL-SCNC: 24 MMOL/L — SIGNIFICANT CHANGE UP (ref 22–31)
CREAT SERPL-MCNC: 0.66 MG/DL — SIGNIFICANT CHANGE UP (ref 0.5–1.3)
EGFR: 83 ML/MIN/1.73M2 — SIGNIFICANT CHANGE UP
GLUCOSE SERPL-MCNC: 107 MG/DL — HIGH (ref 70–99)
HCT VFR BLD CALC: 33.4 % — LOW (ref 34.5–45)
HGB BLD-MCNC: 10.7 G/DL — LOW (ref 11.5–15.5)
MAGNESIUM SERPL-MCNC: 2 MG/DL — SIGNIFICANT CHANGE UP (ref 1.6–2.6)
MCHC RBC-ENTMCNC: 31.8 PG — SIGNIFICANT CHANGE UP (ref 27–34)
MCHC RBC-ENTMCNC: 32 GM/DL — SIGNIFICANT CHANGE UP (ref 32–36)
MCV RBC AUTO: 99.4 FL — SIGNIFICANT CHANGE UP (ref 80–100)
NRBC # BLD: 0 /100 WBCS — SIGNIFICANT CHANGE UP (ref 0–0)
PHOSPHATE SERPL-MCNC: 3 MG/DL — SIGNIFICANT CHANGE UP (ref 2.5–4.5)
PLATELET # BLD AUTO: 193 K/UL — SIGNIFICANT CHANGE UP (ref 150–400)
POTASSIUM SERPL-MCNC: 4.4 MMOL/L — SIGNIFICANT CHANGE UP (ref 3.5–5.3)
POTASSIUM SERPL-SCNC: 4.4 MMOL/L — SIGNIFICANT CHANGE UP (ref 3.5–5.3)
RBC # BLD: 3.36 M/UL — LOW (ref 3.8–5.2)
RBC # FLD: 12.5 % — SIGNIFICANT CHANGE UP (ref 10.3–14.5)
SODIUM SERPL-SCNC: 140 MMOL/L — SIGNIFICANT CHANGE UP (ref 135–145)
WBC # BLD: 5.67 K/UL — SIGNIFICANT CHANGE UP (ref 3.8–10.5)
WBC # FLD AUTO: 5.67 K/UL — SIGNIFICANT CHANGE UP (ref 3.8–10.5)

## 2022-05-13 PROCEDURE — 33285 INSJ SUBQ CAR RHYTHM MNTR: CPT

## 2022-05-13 PROCEDURE — 99233 SBSQ HOSP IP/OBS HIGH 50: CPT

## 2022-05-13 RX ORDER — LANOLIN ALCOHOL/MO/W.PET/CERES
5 CREAM (GRAM) TOPICAL AT BEDTIME
Refills: 0 | Status: DISCONTINUED | OUTPATIENT
Start: 2022-05-13 | End: 2022-05-15

## 2022-05-13 RX ADMIN — Medication 650 MILLIGRAM(S): at 11:00

## 2022-05-13 RX ADMIN — Medication 75 MICROGRAM(S): at 06:22

## 2022-05-13 RX ADMIN — CLOPIDOGREL BISULFATE 75 MILLIGRAM(S): 75 TABLET, FILM COATED ORAL at 11:25

## 2022-05-13 RX ADMIN — ENOXAPARIN SODIUM 40 MILLIGRAM(S): 100 INJECTION SUBCUTANEOUS at 22:07

## 2022-05-13 RX ADMIN — ATORVASTATIN CALCIUM 40 MILLIGRAM(S): 80 TABLET, FILM COATED ORAL at 22:05

## 2022-05-13 RX ADMIN — Medication 81 MILLIGRAM(S): at 11:13

## 2022-05-13 RX ADMIN — Medication 10 MILLIGRAM(S): at 22:07

## 2022-05-13 RX ADMIN — Medication 650 MILLIGRAM(S): at 09:22

## 2022-05-13 RX ADMIN — SENNA PLUS 2 TABLET(S): 8.6 TABLET ORAL at 22:05

## 2022-05-13 NOTE — PROGRESS NOTE ADULT - SUBJECTIVE AND OBJECTIVE BOX
Neurology Stroke Progress Note    INTERVAL HPI/OVERNIGHT EVENTS:  Patient seen and examined. Pt tearful after loop recorder, does not want EVELYN and is agreeable to echo. Wants to go home tomorrow.     MEDICATIONS  (STANDING):  aspirin enteric coated 81 milliGRAM(s) Oral daily  atorvastatin 40 milliGRAM(s) Oral at bedtime  clopidogrel Tablet 75 milliGRAM(s) Oral daily  enoxaparin Injectable 40 milliGRAM(s) SubCutaneous every 24 hours  levothyroxine 75 MICROGram(s) Oral daily  senna 2 Tablet(s) Oral at bedtime    MEDICATIONS  (PRN):  acetaminophen     Tablet .. 650 milliGRAM(s) Oral every 6 hours PRN Mild Pain (1 - 3), Moderate Pain (4 - 6)      Allergies    No Known Allergies    Intolerances        Vital Signs Last 24 Hrs  T(C): 36.7 (13 May 2022 15:18), Max: 37.2 (12 May 2022 22:18)  T(F): 98 (13 May 2022 15:18), Max: 99 (12 May 2022 22:18)  HR: 58 (13 May 2022 16:07) (58 - 102)  BP: 123/56 (13 May 2022 16:07) (123/56 - 158/69)  BP(mean): 81 (13 May 2022 16:07) (81 - 100)  RR: 18 (13 May 2022 16:07) (18 - 18)  SpO2: 94% (13 May 2022 16:07) (92% - 98%)    Physical exam:  General: No acute distress, awake and alert  Eyes: Anicteric sclerae, moist conjunctivae, see below for CNs  Neck: trachea midline,  Cardiovascular: Regular rate and rhythm, no murmurs  Pulmonary: Anterior breath sounds clear bilaterally No use of accessory muscles  GI: Abdomen soft, non-distended, non-tender  Extremities: no edema    Neurologic:  -Mental status: Awake, alert, oriented to person, place, and time. Speech is fluent with intact naming, repetition, and comprehension, no dysarthria. Recent and remote memory intact. Follows commands. Attention/concentration intact. Fund of knowledge appropriate.  -Cranial nerves:   II: Left upper quadrantanopia  III, IV, VI: Extraocular movements are intact without nystagmus. Pupils equally round and reactive to light  V:  Facial sensation V1-V3 equal and intact   VII: Face is symmetric  Motor: Normal bulk and tone. No pronator drift. Strength bilateral upper extremity 5/5, bilateral lower extremities 5/5.  Sensation: Intact to light touch bilaterally. No neglect or extinction on double simultaneous testing.  Coordination: No dysmetria on finger-to-nose bilaterally  Reflexes: Downgoing toes bilaterally     LABS:                        10.7   5.67  )-----------( 193      ( 13 May 2022 07:55 )             33.4     05-13    140  |  106  |  14  ----------------------------<  107<H>  4.4   |  24  |  0.66    Ca    9.0      13 May 2022 07:55  Phos  3.0     05-13  Mg     2.0     05-13    TPro  7.0  /  Alb  4.4  /  TBili  0.2  /  DBili  x   /  AST  19  /  ALT  13  /  AlkPhos  61  05-11    PT/INR - ( 12 May 2022 00:37 )   PT: 11.9 sec;   INR: 1.00          PTT - ( 12 May 2022 00:37 )  PTT:31.5 sec      RADIOLOGY & ADDITIONAL TESTS:

## 2022-05-13 NOTE — DISCHARGE NOTE PROVIDER - HOSPITAL COURSE
Hospital course:  89y Female with PMH HLD, Hypothyroid, HA and vertigo presented to [facility] ED for C/O diffuse frontal headache since 05/09. Pt was seen @ [facility] ER for HA and dizzy on 05/11 with some  reported relief of HA with tylenol, pt refused CTH and was D/C'd home. Pt returned later that night 2/2 dizziness and CTH obtained showed acute to subacute R occipital infarct and CTA H/N with R PCA occlusion. Pt was admitted to stroke neurology for further work up. EVELYN done showing ______________________. ILR placed on 05/13.        During this hospital course, patient had a (ischemic/hemorrhagic) stroke located in (left/right.....) as seen on (MRI/CT).   The stroke etiology is likely secondary to:  []atrial fibrillation  []small vessel disease from atherosclerotic risk factors  []other:  [X]etiology workup still in progress    Patient had the following workup done in house:  CT Head: acute to subacute R occipital lobe infarct.  CT Angio Head/Neck : R PCA distal branch occlusion.    []echo : EVELYN pending  []labs  A1C: 4.6  TSH : 1.840  LDL  : 90    Physical exam at discharge:  Discharge NIHSS    New medications on discharge: Aspirin  Labs to be followed up:  Imaging to be done as outpatient:  Further outpatient workup:   Hospital course:  89y Female with PMH HLD, Hypothyroid, HA and vertigo presented to [facility] ED for C/O diffuse frontal headache since 05/09. Pt was seen @ [facility] ER for HA and dizzy on 05/11 with some  reported relief of HA with tylenol, pt refused CTH and was D/C'd home. Pt returned later that night 2/2 dizziness and CTH obtained showed acute to subacute R occipital infarct and CTA H/N with R PCA occlusion. Pt was admitted to stroke neurology for further work up. ILR placed on 05/13. Pt did not want a EVELYN, and regular echo done showing______        During this hospital course, patient had an ischemic stroke in right PCA as seen on CT   The stroke etiology is likely secondary to:  []atrial fibrillation  []small vessel disease from atherosclerotic risk factors  []other:  [X]etiology workup still in progress    Patient had the following workup done in house:  CT Head: acute to subacute R occipital lobe infarct.  CT Angio Head/Neck : R PCA distal branch occlusion.    []echo : echo  []labs  A1C: 4.6  TSH : 1.840  LDL  : 90    Physical exam at discharge:  Neurologic:  -Mental status: Awake, alert, oriented to person, place, and time. Speech is fluent with intact naming, repetition, and comprehension, no dysarthria. Recent and remote memory intact. Follows commands. Attention/concentration intact. Fund of knowledge appropriate.  -Cranial nerves:   II: left upper quadrantanopia   III, IV, VI: Extraocular movements are intact without nystagmus. Pupils equally round and reactive to light  V:  Facial sensation V1-V3 equal and intact   VII: Face is symmetric   Motor: Normal bulk and tone. No pronator drift. Strength bilateral upper extremity 5/5, bilateral lower extremities 5/5.  Sensation: Intact to light touch bilaterally. No neglect or extinction on double simultaneous testing.  Coordination: No dysmetria on finger-to-nose bilaterally  Reflexes: Downgoing toes bilaterally   Gait: Narrow gait and steady    Discharge NIHSS 1    New medications on discharge: Aspirin  Further outpatient workup: f/u loop recorder placed 5/13   Hospital course:  89y Female with PMH HLD, Hypothyroid, HA and vertigo presented to [facility] ED for C/O diffuse frontal headache since 05/09. Pt was seen @ [facility] ER for HA and dizzy on 05/11 with some  reported relief of HA with tylenol, pt refused CTH and was D/C'd home. Pt returned later that night 2/2 dizziness and CTH obtained showed acute to subacute R occipital infarct and CTA H/N with R PCA occlusion. Pt was admitted to stroke neurology for further work up. ILR placed on 05/13. Pt did not want a EVELYN, and regular echo done showing______    During this hospital course, patient had an ischemic stroke in right PCA as seen on CT   The stroke etiology is likely secondary to:    [X]etiology workup still in progress    Patient had the following workup done in house:  CT Head: acute to subacute R occipital lobe infarct.  CT Angio Head/Neck : R PCA distal branch occlusion.    []echo : echo  []labs  A1C: 4.6  TSH : 1.840  LDL  : 90    Physical exam at discharge:  Neurologic:  -Mental status: Awake, alert, oriented to person, place, and time. Speech is fluent with intact naming, repetition, and comprehension, no dysarthria. Recent and remote memory intact. Follows commands. Attention/concentration intact. Fund of knowledge appropriate.  -Cranial nerves:   II: left upper quadrantanopia   III, IV, VI: Extraocular movements are intact without nystagmus. Pupils equally round and reactive to light  V:  Facial sensation V1-V3 equal and intact   VII: Face is symmetric   Motor: Normal bulk and tone. No pronator drift. Strength bilateral upper extremity 5/5, bilateral lower extremities 5/5.  Sensation: Intact to light touch bilaterally. No neglect or extinction on double simultaneous testing.  Coordination: No dysmetria on finger-to-nose bilaterally  Reflexes: Downgoing toes bilaterally   Gait: Narrow gait and steady    Discharge NIHSS 1    New medications on discharge: Aspirin  Further outpatient workup: f/u loop recorder placed 5/13   Hospital course:  89y Female with PMH HLD, Hypothyroid, HA and vertigo presented to [facility] ED for C/O diffuse frontal headache since 05/09. Pt was seen @ [facility] ER for HA and dizzy on 05/11 with some  reported relief of HA with tylenol, pt refused CTH and was D/C'd home. Pt returned later that night 2/2 dizziness and CTH obtained showed acute to subacute R occipital infarct and CTA H/N with R PCA occlusion. Pt was admitted to stroke neurology for further work up. ILR placed on 05/13. Pt did not want a EVELYN, and regular echo done showing______    During this hospital course, patient had an ischemic stroke in right PCA as seen on CT   The stroke etiology is likely secondary to:    [X]etiology workup still in progress    Patient had the following workup done in house:  CT Head: acute to subacute R occipital lobe infarct.  CT Angio Head/Neck : R PCA distal branch occlusion.    []echo : echo < from: TTE Echo Complete w/o Contrast w/ Doppler (05.14.22 @ 09:48) >  CONCLUSIONS:   1. Normal left ventricular size.   2. Mild symmetric left ventricular hypertrophy.   3. Normal left ventricular systolic function.   4. Grade I left ventricular diastolic dysfunction.   5. Normal right ventricular size and systolic function.   6. Dilated left atrium.   7. No evidence of pulmonary hypertension.   8. No pericardial effusion.   9. Compared to the previous TTE performed on 12/25/2021, there have been    no significant interval changes.  < end of copied text >      [x]labs  A1C: 4.6  TSH : 1.840  LDL  : 90    Physical exam at discharge:  Neurologic:  -Mental status: Awake, alert, oriented to person, place, and time. Speech is fluent with intact naming, repetition, and comprehension, no dysarthria. Recent and remote memory intact. Follows commands. Attention/concentration intact. Fund of knowledge appropriate.  -Cranial nerves:   II: left upper quadrantanopia   III, IV, VI: Extraocular movements are intact without nystagmus. Pupils equally round and reactive to light  V:  Facial sensation V1-V3 equal and intact   VII: Face is symmetric   Motor: Normal bulk and tone. No pronator drift. Strength bilateral upper extremity 5/5, bilateral lower extremities 5/5.  Sensation: Intact to light touch bilaterally. No neglect or extinction on double simultaneous testing.  Coordination: No dysmetria on finger-to-nose bilaterally  Reflexes: Downgoing toes bilaterally   Gait: Narrow gait and steady    Discharge NIHSS 1    New medications on discharge: Aspirin (and continue home plavix, home statin)  Further outpatient workup: f/u loop recorder placed 5/13

## 2022-05-13 NOTE — PROGRESS NOTE ADULT - NS ATTEND AMEND GEN_ALL_CORE FT
Patient seen today on rounds.  She had refused a EVELYN but agreed to proceed with a loop recorder.  No new symptoms and no worsening of her left visual field quadrantanopsia.    Impression right PCA stroke.  Continue DAPT and statin.  Plan will be to get an echo on the patient and then discharge in a.m.

## 2022-05-13 NOTE — PROGRESS NOTE ADULT - SUBJECTIVE AND OBJECTIVE BOX
Please see EP consult note for clinical assessment    The rationale for insertion of an implantable loop recorder was discussed with the patient in detail.  The risks of infection, bleeding, pocket hematoma and pain were discussed.  Vendor presence for technical support was also discussed.   Alternatives were reviewed and all questions were answered.  The patient agrees to proceed, and informed consent was signed and placed in the chart.  The patient was in a non-fasting state.    Cloroprep was used to clean procedure site (L chest) and patient was draped in a sterile manner.  The procedure was performed under local anesthetic only.  The site was infiltrated with 1% Lidocaine with 0.001% Epi.  A small incision was made with a specialized scalpel in the region of the fourth intercostal space along the left sternal border.  Using the ILR insertion tool the ILR was “injected” in the subcutaneous plane and deployed (please see [facility]W report for model number) in the region of optimal R-wave sensing.  Hemostasis was achieved and the wound was closed in a running fashion using 4-0 Monocryl suture.  Skin was closed using Dermabond.  Tegaderm was placed over incision.    Complications- None.    The patient can be discharged post implant.  Patient should follow up with EP in 4-6 weeks (call [phone] for an appointment)

## 2022-05-13 NOTE — DISCHARGE NOTE PROVIDER - NSDCFUADDAPPT_GEN_ALL_CORE_FT
Please call [phone] for an appointment to follow up in 4-6 weeks with Electrophysiology for your loop recorder

## 2022-05-13 NOTE — DISCHARGE NOTE PROVIDER - NSDCFUSCHEDAPPT_GEN_ALL_CORE_FT
Carmen Mckeon  [facility]  Neuro [address]  Scheduled Appointment: 05/31/2022    Bo Pereira  [facility]  Gastro [address]  Scheduled Appointment: 07/08/2022

## 2022-05-13 NOTE — PROGRESS NOTE ADULT - PROBLEM SELECTOR PLAN 1
cont. work-up and mgmt per Neuro; PT/OT; on DAPT + statin; s/p ILR placement today; plan for EVELYN, if Pt. agrees

## 2022-05-13 NOTE — PROGRESS NOTE ADULT - SUBJECTIVE AND OBJECTIVE BOX
Patient is a 89y old  Female who presents with a chief complaint of stroke (13 May 2022 11:47)      INTERVAL HPI/OVERNIGHT EVENTS:    Pt. seen and examined earlier today  Pt. feeling anxious after ILR placement, says she feels hesitant re: EVELYN, wants to discuss w/ Neuro team; emotional support provided  No complaints otherwise    Review of Systems: 12 point review of systems otherwise negative    MEDICATIONS  (STANDING):  aspirin enteric coated 81 milliGRAM(s) Oral daily  atorvastatin 40 milliGRAM(s) Oral at bedtime  clopidogrel Tablet 75 milliGRAM(s) Oral daily  enoxaparin Injectable 40 milliGRAM(s) SubCutaneous every 24 hours  levothyroxine 75 MICROGram(s) Oral daily  senna 2 Tablet(s) Oral at bedtime    MEDICATIONS  (PRN):  acetaminophen     Tablet .. 650 milliGRAM(s) Oral every 6 hours PRN Mild Pain (1 - 3), Moderate Pain (4 - 6)      Allergies    No Known Allergies    Intolerances          Vital Signs Last 24 Hrs  T(C): 36.7 (13 May 2022 10:54), Max: 37.2 (12 May 2022 22:18)  T(F): 98.1 (13 May 2022 10:54), Max: 99 (12 May 2022 22:18)  HR: 102 (13 May 2022 12:07) (60 - 102)  BP: 139/65 (13 May 2022 12:07) (127/59 - 158/69)  BP(mean): 93 (13 May 2022 12:07) (85 - 100)  RR: 18 (13 May 2022 12:07) (18 - 18)  SpO2: 94% (13 May 2022 12:07) (92% - 98%)  CAPILLARY BLOOD GLUCOSE          05-12 @ 07:01  -  05-13 @ 07:00  --------------------------------------------------------  IN: 0 mL / OUT: 1050 mL / NET: -1050 mL        Physical Exam:  (earlier today)  Daily     Daily   General:  somewhat anxious-appearing but otherwise in NAD  HEENT:  MMM  CV:  RRR, no JVD  Lungs:  CTA B/L  Abdomen:  soft NT ND  Extremities:  no edema B/L LE  Skin:  WWP  Neuro:  AAOx3, no dysarthria    LABS:                        10.7   5.67  )-----------( 193      ( 13 May 2022 07:55 )             33.4     05-13    140  |  106  |  14  ----------------------------<  107<H>  4.4   |  24  |  0.66    Ca    9.0      13 May 2022 07:55  Phos  3.0     05-13  Mg     2.0     05-13    TPro  7.0  /  Alb  4.4  /  TBili  0.2  /  DBili  x   /  AST  19  /  ALT  13  /  AlkPhos  61  05-11    PT/INR - ( 12 May 2022 00:37 )   PT: 11.9 sec;   INR: 1.00          PTT - ( 12 May 2022 00:37 )  PTT:31.5 sec

## 2022-05-13 NOTE — DISCHARGE NOTE PROVIDER - CARE PROVIDER_API CALL
Carmen Mckeon)  Neurology  [address] [address]  Phone: [phone]  Fax: [phone]  Scheduled Appointment: 05/31/2022 01:00 PM

## 2022-05-13 NOTE — DISCHARGE NOTE PROVIDER - NSDCMRMEDTOKEN_GEN_ALL_CORE_FT
amLODIPine 5 mg oral tablet: 1 tab(s) orally every 24 hours  clopidogrel 75 mg oral tablet: 1 tab(s) orally once a day  LORazepam 1 mg oral tablet: 1 tab(s) orally 2 times a day  lovastatin 20 mg oral tablet: 1 tab(s) orally once a day  Synthroid 75 mcg (0.075 mg) oral tablet: 1 tab(s) orally once a day   amLODIPine 5 mg oral tablet: 1 tab(s) orally every 24 hours  aspirin 81 mg oral delayed release tablet: 1 tab(s) orally once a day  clopidogrel 75 mg oral tablet: 1 tab(s) orally once a day  LORazepam 1 mg oral tablet: 1 tab(s) orally 2 times a day  lovastatin 20 mg oral tablet: 1 tab(s) orally once a day  Synthroid 75 mcg (0.075 mg) oral tablet: 1 tab(s) orally once a day

## 2022-05-13 NOTE — DISCHARGE NOTE PROVIDER - NSDCCPCAREPLAN_GEN_ALL_CORE_FT
PRINCIPAL DISCHARGE DIAGNOSIS  Diagnosis: Acute CVA (cerebrovascular accident)  Assessment and Plan of Treatment: During this hospital admission, you had an ischemic stroke. During an ischemic stroke, blood stops flowing to part of your brain because of a blockage in the blood vessel. This can damage areas in the brain that control other parts of the body.  Please take your aspirin and plavix for blood thinning and Atorvastatin for cholesterol medication/blood vessel protection as prescribed to prevent further strokes. Do not skip doses and do not run low on your medication. If you run low on your medication, please contact your doctor.  You will follow up outpatient with the stroke Nurse Practitioner as scheduled below.  Doing your regular tasks may be difficult after you've had a stroke, but you can learn new ways to manage your daily activities. In fact, doing daily activities may help you to regain muscle strength. Be patient, give yourself time to adjust, and appreciate the progress you make. For example, when showering or bathing, test the water temperature with a hand or foot that was not affected by the stroke, use grab bars, a shower seat, a hand-held showerhead, etc. It is normal to feel fatigue after a stroke, while some days may be worse than others, you will continue to improve.  Call 911 right away if you have any of the following symptoms of another stroke:  B: Balance: Sudden: Dizziness, loss of balance, or a sense of falling, difficulty with coordinating movement  E: Eyes: Sudden double vision or trouble seeing in one or both eyes  F: Face: Sudden uneven face  A: Arms (Legs): Sudden weakness, tingling, or loss of feeling on one side of your face or body  S: Speech: Sudden trouble talking or slurred speech, sudden difficulty understanding others  T: Time: Please call 911 right away and go to the emergency room  •Sudden, severe headache  •Blackouts or seizures         PRINCIPAL DISCHARGE DIAGNOSIS  Diagnosis: Acute CVA (cerebrovascular accident)  Assessment and Plan of Treatment: During this hospital admission, you had an ischemic stroke. During an ischemic stroke, blood stops flowing to part of your brain because of a blockage in the blood vessel. This can damage areas in the brain that control other parts of the body.  Please take your aspirin and plavix for blood thinning and Atorvastatin for cholesterol medication/blood vessel protection as prescribed to prevent further strokes. Do not skip doses and do not run low on your medication. If you run low on your medication, please contact your doctor.  You will follow up outpatient with the stroke Nurse Practitioner as scheduled below.  Doing your regular tasks may be difficult after you've had a stroke, but you can learn new ways to manage your daily activities. In fact, doing daily activities may help you to regain muscle strength. Be patient, give yourself time to adjust, and appreciate the progress you make. For example, when showering or bathing, test the water temperature with a hand or foot that was not affected by the stroke, use grab bars, a shower seat, a hand-held showerhead, etc. It is normal to feel fatigue after a stroke, while some days may be worse than others, you will continue to improve. Discharge NIHSS 1  Call 911 right away if you have any of the following symptoms of another stroke:  B: Balance: Sudden: Dizziness, loss of balance, or a sense of falling, difficulty with coordinating movement  E: Eyes: Sudden double vision or trouble seeing in one or both eyes  F: Face: Sudden uneven face  A: Arms (Legs): Sudden weakness, tingling, or loss of feeling on one side of your face or body  S: Speech: Sudden trouble talking or slurred speech, sudden difficulty understanding others  T: Time: Please call 911 right away and go to the emergency room  •Sudden, severe headache  •Blackouts or seizures         PRINCIPAL DISCHARGE DIAGNOSIS  Diagnosis: Acute CVA (cerebrovascular accident)  Assessment and Plan of Treatment: During this hospital admission, you had an ischemic stroke. During an ischemic stroke, blood stops flowing to part of your brain because of a blockage in the blood vessel. This can damage areas in the brain that control other parts of the body.  Please take your aspirin and plavix for blood thinning and statin for cholesterol medication/blood vessel protection as prescribed to prevent further strokes. Do not skip doses and do not run low on your medication. If you run low on your medication, please contact your doctor.  You will follow up outpatient with the stroke Nurse Practitioner as scheduled below.  Doing your regular tasks may be difficult after you've had a stroke, but you can learn new ways to manage your daily activities. In fact, doing daily activities may help you to regain muscle strength. Be patient, give yourself time to adjust, and appreciate the progress you make. For example, when showering or bathing, test the water temperature with a hand or foot that was not affected by the stroke, use grab bars, a shower seat, a hand-held showerhead, etc. It is normal to feel fatigue after a stroke, while some days may be worse than others, you will continue to improve. Discharge NIHSS 1  Call 911 right away if you have any of the following symptoms of another stroke:  B: Balance: Sudden: Dizziness, loss of balance, or a sense of falling, difficulty with coordinating movement  E: Eyes: Sudden double vision or trouble seeing in one or both eyes  F: Face: Sudden uneven face  A: Arms (Legs): Sudden weakness, tingling, or loss of feeling on one side of your face or body  S: Speech: Sudden trouble talking or slurred speech, sudden difficulty understanding others  T: Time: Please call 911 right away and go to the emergency room  •Sudden, severe headache  •Blackouts or seizures

## 2022-05-14 PROCEDURE — 99233 SBSQ HOSP IP/OBS HIGH 50: CPT

## 2022-05-14 PROCEDURE — 99233 SBSQ HOSP IP/OBS HIGH 50: CPT | Mod: GC

## 2022-05-14 PROCEDURE — 93306 TTE W/DOPPLER COMPLETE: CPT | Mod: 26

## 2022-05-14 RX ORDER — KETOROLAC TROMETHAMINE 30 MG/ML
10 SYRINGE (ML) INJECTION ONCE
Refills: 0 | Status: DISCONTINUED | OUTPATIENT
Start: 2022-05-14 | End: 2022-05-14

## 2022-05-14 RX ADMIN — Medication 5 MILLIGRAM(S): at 23:11

## 2022-05-14 RX ADMIN — Medication 650 MILLIGRAM(S): at 15:00

## 2022-05-14 RX ADMIN — Medication 650 MILLIGRAM(S): at 02:27

## 2022-05-14 RX ADMIN — Medication 650 MILLIGRAM(S): at 23:10

## 2022-05-14 RX ADMIN — SENNA PLUS 2 TABLET(S): 8.6 TABLET ORAL at 23:10

## 2022-05-14 RX ADMIN — Medication 650 MILLIGRAM(S): at 14:27

## 2022-05-14 RX ADMIN — Medication 10 MILLIGRAM(S): at 18:27

## 2022-05-14 RX ADMIN — ATORVASTATIN CALCIUM 40 MILLIGRAM(S): 80 TABLET, FILM COATED ORAL at 23:10

## 2022-05-14 RX ADMIN — CLOPIDOGREL BISULFATE 75 MILLIGRAM(S): 75 TABLET, FILM COATED ORAL at 11:34

## 2022-05-14 RX ADMIN — Medication 81 MILLIGRAM(S): at 11:34

## 2022-05-14 RX ADMIN — ENOXAPARIN SODIUM 40 MILLIGRAM(S): 100 INJECTION SUBCUTANEOUS at 23:09

## 2022-05-14 RX ADMIN — Medication 650 MILLIGRAM(S): at 03:10

## 2022-05-14 RX ADMIN — Medication 75 MICROGRAM(S): at 07:16

## 2022-05-14 RX ADMIN — Medication 650 MILLIGRAM(S): at 09:55

## 2022-05-14 RX ADMIN — Medication 650 MILLIGRAM(S): at 09:21

## 2022-05-14 RX ADMIN — Medication 10 MILLIGRAM(S): at 17:29

## 2022-05-14 NOTE — PROGRESS NOTE ADULT - SUBJECTIVE AND OBJECTIVE BOX
Neurology Stroke Progress Note    INTERVAL HPI/OVERNIGHT EVENTS:  Patient seen and examined. No overnight events. C/o HA, tenderness to occipital area.     MEDICATIONS  (STANDING):  aspirin enteric coated 81 milliGRAM(s) Oral daily  atorvastatin 40 milliGRAM(s) Oral at bedtime  clopidogrel Tablet 75 milliGRAM(s) Oral daily  enoxaparin Injectable 40 milliGRAM(s) SubCutaneous every 24 hours  levothyroxine 75 MICROGram(s) Oral daily  melatonin 5 milliGRAM(s) Oral at bedtime  senna 2 Tablet(s) Oral at bedtime    MEDICATIONS  (PRN):  acetaminophen     Tablet .. 650 milliGRAM(s) Oral every 6 hours PRN Mild Pain (1 - 3), Moderate Pain (4 - 6)      Allergies    No Known Allergies    Intolerances        Vital Signs Last 24 Hrs  T(C): 35.9 (14 May 2022 13:47), Max: 37 (14 May 2022 05:07)  T(F): 96.6 (14 May 2022 13:47), Max: 98.6 (14 May 2022 05:07)  HR: 70 (14 May 2022 16:02) (54 - 70)  BP: 126/60 (14 May 2022 16:02) (126/60 - 194/78)  BP(mean): 86 (14 May 2022 16:02) (86 - 112)  RR: 18 (14 May 2022 16:02) (16 - 18)  SpO2: 94% (14 May 2022 16:02) (93% - 96%)    Physical exam:  General: No acute distress, awake and alert  Eyes: Anicteric sclerae, moist conjunctivae, see below for CNs  Neck: trachea midline, FROM, supple, no thyromegaly or lymphadenopathy  Cardiovascular: Regular rate and rhythm on tele  Pulmonary: No increased work of breathing.   GI: Abdomen soft, non-distended, non-tender  Extremities: no edema    Neurologic:  -Mental status: Awake, alert, oriented to person, place, and time. Speech is fluent with intact naming, repetition, and comprehension, no dysarthria. Follows commands. Attention/concentration intact.  -Cranial nerves:   II: Left upper quadrantanopia  III, IV, VI: Extraocular movements are intact without nystagmus. Pupils equally round and reactive to light  V:  Facial sensation V1-V3 equal and intact   VII: Face is symmetric  Motor: Normal bulk and tone. No pronator drift. Strength bilateral upper extremity 5/5, bilateral lower extremities 5/5.  Sensation: Intact to light touch bilaterally. No neglect or extinction on double simultaneous testing.  Coordination: No dysmetria on finger-to-nose bilaterally     LABS:                        10.7   5.67  )-----------( 193      ( 13 May 2022 07:55 )             33.4     05-13    140  |  106  |  14  ----------------------------<  107<H>  4.4   |  24  |  0.66    Ca    9.0      13 May 2022 07:55  Phos  3.0     05-13  Mg     2.0     05-13            RADIOLOGY & ADDITIONAL TESTS:  < from: TTE Echo Complete w/o Contrast w/ Doppler (05.14.22 @ 09:48) >   1. Normal left ventricular size.   2. Mild symmetric left ventricular hypertrophy.   3. Normal left ventricular systolic function.   4. Grade I left ventriculardiastolic dysfunction.   5. Normal right ventricular size and systolic function.   6. Dilated left atrium.   7. No evidence of pulmonary hypertension.   8. No pericardial effusion.   9. Compared to the previous TTE performed on 12/25/2021, there havebeen   no significant interval changes.    < end of copied text >

## 2022-05-14 NOTE — PROGRESS NOTE ADULT - SUBJECTIVE AND OBJECTIVE BOX
Patient is a 89y old  Female who presents with a chief complaint of stroke (13 May 2022 11:47)      INTERVAL HPI/OVERNIGHT EVENTS:    Pt. seen and examined earlier today  Pt. feeling anxious after ILR placement, says she feels hesitant re: EVELYN, wants to discuss w/ Neuro team; emotional support provided  No complaints otherwise    Review of Systems: 12 point review of systems otherwise negative    MEDICATIONS  (STANDING):  aspirin enteric coated 81 milliGRAM(s) Oral daily  atorvastatin 40 milliGRAM(s) Oral at bedtime  clopidogrel Tablet 75 milliGRAM(s) Oral daily  enoxaparin Injectable 40 milliGRAM(s) SubCutaneous every 24 hours  levothyroxine 75 MICROGram(s) Oral daily  senna 2 Tablet(s) Oral at bedtime    MEDICATIONS  (PRN):  acetaminophen     Tablet .. 650 milliGRAM(s) Oral every 6 hours PRN Mild Pain (1 - 3), Moderate Pain (4 - 6)      Allergies    No Known Allergies    Intolerances          Vital Signs Last 24 Hrs  T(C): 36.7 (13 May 2022 10:54), Max: 37.2 (12 May 2022 22:18)  T(F): 98.1 (13 May 2022 10:54), Max: 99 (12 May 2022 22:18)  HR: 102 (13 May 2022 12:07) (60 - 102)  BP: 139/65 (13 May 2022 12:07) (127/59 - 158/69)  BP(mean): 93 (13 May 2022 12:07) (85 - 100)  RR: 18 (13 May 2022 12:07) (18 - 18)  SpO2: 94% (13 May 2022 12:07) (92% - 98%)  CAPILLARY BLOOD GLUCOSE          05-12 @ 07:01  -  05-13 @ 07:00  --------------------------------------------------------  IN: 0 mL / OUT: 1050 mL / NET: -1050 mL        Physical Exam:  (earlier today)  Daily     Daily   General:  somewhat anxious-appearing but otherwise in NAD  HEENT:  MMM  CV:  RRR, no JVD  Lungs:  CTA B/L  Abdomen:  soft NT ND  Extremities:  no edema B/L LE  Skin:  WWP  Neuro:  AAOx3, no dysarthria    LABS:                        10.7   5.67  )-----------( 193      ( 13 May 2022 07:55 )             33.4     05-13    140  |  106  |  14  ----------------------------<  107<H>  4.4   |  24  |  0.66    Ca    9.0      13 May 2022 07:55  Phos  3.0     05-13  Mg     2.0     05-13    TPro  7.0  /  Alb  4.4  /  TBili  0.2  /  DBili  x   /  AST  19  /  ALT  13  /  AlkPhos  61  05-11    PT/INR - ( 12 May 2022 00:37 )   PT: 11.9 sec;   INR: 1.00          PTT - ( 12 May 2022 00:37 )  PTT:31.5 sec       Patient is a 89y old  Female who presents with a chief complaint of stroke (13 May 2022 11:47)      no acute complaints      Review of Systems: 12 point review of systems otherwise negative    MEDICATIONS  (STANDING):  aspirin enteric coated 81 milliGRAM(s) Oral daily  atorvastatin 40 milliGRAM(s) Oral at bedtime  clopidogrel Tablet 75 milliGRAM(s) Oral daily  enoxaparin Injectable 40 milliGRAM(s) SubCutaneous every 24 hours  levothyroxine 75 MICROGram(s) Oral daily  senna 2 Tablet(s) Oral at bedtime    MEDICATIONS  (PRN):  acetaminophen     Tablet .. 650 milliGRAM(s) Oral every 6 hours PRN Mild Pain (1 - 3), Moderate Pain (4 - 6)      Allergies    No Known Allergies    Intolerances          Vital Signs Last 24 Hrs  T(C): 36.7 (13 May 2022 10:54), Max: 37.2 (12 May 2022 22:18)  T(F): 98.1 (13 May 2022 10:54), Max: 99 (12 May 2022 22:18)  HR: 102 (13 May 2022 12:07) (60 - 102)  BP: 139/65 (13 May 2022 12:07) (127/59 - 158/69)  BP(mean): 93 (13 May 2022 12:07) (85 - 100)  RR: 18 (13 May 2022 12:07) (18 - 18)  SpO2: 94% (13 May 2022 12:07) (92% - 98%)  CAPILLARY BLOOD GLUCOSE          05-12 @ 07:01  -  05-13 @ 07:00  --------------------------------------------------------  IN: 0 mL / OUT: 1050 mL / NET: -1050 mL        Physical Exam:  (earlier today)  Daily     Daily   General:  somewhat anxious-appearing but otherwise in NAD  HEENT:  MMM  CV:  RRR, no JVD  Lungs:  CTA B/L  Abdomen:  soft NT ND  Extremities:  no edema B/L LE  Skin:  WWP  Neuro:  AAOx3, no dysarthria    LABS:                        10.7   5.67  )-----------( 193      ( 13 May 2022 07:55 )             33.4     05-13    140  |  106  |  14  ----------------------------<  107<H>  4.4   |  24  |  0.66    Ca    9.0      13 May 2022 07:55  Phos  3.0     05-13  Mg     2.0     05-13    TPro  7.0  /  Alb  4.4  /  TBili  0.2  /  DBili  x   /  AST  19  /  ALT  13  /  AlkPhos  61  05-11    PT/INR - ( 12 May 2022 00:37 )   PT: 11.9 sec;   INR: 1.00          PTT - ( 12 May 2022 00:37 )  PTT:31.5 sec

## 2022-05-14 NOTE — PROGRESS NOTE ADULT - PROBLEM SELECTOR PLAN 1
cont. work-up and mgmt per Neuro; PT/OT; on DAPT + statin; s/p ILR placement today; plan for EVELYN, if Pt. agrees cont. work-up and mgmt per Neuro; PT/OT; on DAPT + statin; s/p ILR placement  EVELYN

## 2022-05-15 ENCOUNTER — TRANSCRIPTION ENCOUNTER (OUTPATIENT)
Age: 87
End: 2022-05-15

## 2022-05-15 VITALS — TEMPERATURE: 97 F

## 2022-05-15 PROCEDURE — 85027 COMPLETE CBC AUTOMATED: CPT

## 2022-05-15 PROCEDURE — 84100 ASSAY OF PHOSPHORUS: CPT

## 2022-05-15 PROCEDURE — 97161 PT EVAL LOW COMPLEX 20 MIN: CPT

## 2022-05-15 PROCEDURE — 99285 EMERGENCY DEPT VISIT HI MDM: CPT | Mod: 25

## 2022-05-15 PROCEDURE — G1004: CPT

## 2022-05-15 PROCEDURE — 80061 LIPID PANEL: CPT

## 2022-05-15 PROCEDURE — 87635 SARS-COV-2 COVID-19 AMP PRB: CPT

## 2022-05-15 PROCEDURE — 82962 GLUCOSE BLOOD TEST: CPT

## 2022-05-15 PROCEDURE — 85025 COMPLETE CBC W/AUTO DIFF WBC: CPT

## 2022-05-15 PROCEDURE — 99283 EMERGENCY DEPT VISIT LOW MDM: CPT

## 2022-05-15 PROCEDURE — 70498 CT ANGIOGRAPHY NECK: CPT | Mod: MG

## 2022-05-15 PROCEDURE — 80048 BASIC METABOLIC PNL TOTAL CA: CPT

## 2022-05-15 PROCEDURE — 70450 CT HEAD/BRAIN W/O DYE: CPT | Mod: ME

## 2022-05-15 PROCEDURE — 70496 CT ANGIOGRAPHY HEAD: CPT | Mod: MF

## 2022-05-15 PROCEDURE — 36415 COLL VENOUS BLD VENIPUNCTURE: CPT

## 2022-05-15 PROCEDURE — 85610 PROTHROMBIN TIME: CPT

## 2022-05-15 PROCEDURE — 84443 ASSAY THYROID STIM HORMONE: CPT

## 2022-05-15 PROCEDURE — C1764: CPT

## 2022-05-15 PROCEDURE — 83036 HEMOGLOBIN GLYCOSYLATED A1C: CPT

## 2022-05-15 PROCEDURE — 92523 SPEECH SOUND LANG COMPREHEN: CPT

## 2022-05-15 PROCEDURE — 99232 SBSQ HOSP IP/OBS MODERATE 35: CPT | Mod: GC

## 2022-05-15 PROCEDURE — 80053 COMPREHEN METABOLIC PANEL: CPT

## 2022-05-15 PROCEDURE — 93306 TTE W/DOPPLER COMPLETE: CPT

## 2022-05-15 PROCEDURE — 85730 THROMBOPLASTIN TIME PARTIAL: CPT

## 2022-05-15 PROCEDURE — 83735 ASSAY OF MAGNESIUM: CPT

## 2022-05-15 PROCEDURE — 97162 PT EVAL MOD COMPLEX 30 MIN: CPT

## 2022-05-15 PROCEDURE — 84484 ASSAY OF TROPONIN QUANT: CPT

## 2022-05-15 PROCEDURE — 99238 HOSP IP/OBS DSCHRG MGMT 30/<: CPT

## 2022-05-15 RX ORDER — MECLIZINE HCL 12.5 MG
12.5 TABLET ORAL ONCE
Refills: 0 | Status: COMPLETED | OUTPATIENT
Start: 2022-05-15 | End: 2022-05-15

## 2022-05-15 RX ORDER — ASPIRIN/CALCIUM CARB/MAGNESIUM 324 MG
1 TABLET ORAL
Qty: 30 | Refills: 3
Start: 2022-05-15 | End: 2022-09-11

## 2022-05-15 RX ADMIN — CLOPIDOGREL BISULFATE 75 MILLIGRAM(S): 75 TABLET, FILM COATED ORAL at 11:31

## 2022-05-15 RX ADMIN — Medication 12.5 MILLIGRAM(S): at 14:04

## 2022-05-15 RX ADMIN — Medication 650 MILLIGRAM(S): at 00:10

## 2022-05-15 RX ADMIN — Medication 650 MILLIGRAM(S): at 11:32

## 2022-05-15 RX ADMIN — Medication 650 MILLIGRAM(S): at 12:37

## 2022-05-15 RX ADMIN — Medication 81 MILLIGRAM(S): at 11:31

## 2022-05-15 RX ADMIN — Medication 75 MICROGRAM(S): at 06:15

## 2022-05-15 NOTE — DISCHARGE NOTE NURSING/CASE MANAGEMENT/SOCIAL WORK - NSDCFUADDAPPT_GEN_ALL_CORE_FT
Please call [phone] for an appointment to follow up in 4-6 weeks with Electrophysiology for your loop recorder

## 2022-05-15 NOTE — DISCHARGE NOTE NURSING/CASE MANAGEMENT/SOCIAL WORK - NSDCPEFALRISK_GEN_ALL_CORE
For information on Fall & Injury Prevention, visit: [email or web address] OR  [email or web address] OR  https://www.cdc.gov/steadi/patient.html

## 2022-05-15 NOTE — PROGRESS NOTE ADULT - SUBJECTIVE AND OBJECTIVE BOX
Patient is a 89y old  Female who presents with a chief complaint of stroke (13 May 2022 11:47)      no acute complaints      Review of Systems: 12 point review of systems otherwise negative    MEDICATIONS  (STANDING):  aspirin enteric coated 81 milliGRAM(s) Oral daily  atorvastatin 40 milliGRAM(s) Oral at bedtime  clopidogrel Tablet 75 milliGRAM(s) Oral daily  enoxaparin Injectable 40 milliGRAM(s) SubCutaneous every 24 hours  levothyroxine 75 MICROGram(s) Oral daily  senna 2 Tablet(s) Oral at bedtime    MEDICATIONS  (PRN):  acetaminophen     Tablet .. 650 milliGRAM(s) Oral every 6 hours PRN Mild Pain (1 - 3), Moderate Pain (4 - 6)      Allergies    No Known Allergies    Intolerances          Vital Signs Last 24 Hrs  T(C): 36.7 (13 May 2022 10:54), Max: 37.2 (12 May 2022 22:18)  T(F): 98.1 (13 May 2022 10:54), Max: 99 (12 May 2022 22:18)  HR: 102 (13 May 2022 12:07) (60 - 102)  BP: 139/65 (13 May 2022 12:07) (127/59 - 158/69)  BP(mean): 93 (13 May 2022 12:07) (85 - 100)  RR: 18 (13 May 2022 12:07) (18 - 18)  SpO2: 94% (13 May 2022 12:07) (92% - 98%)  CAPILLARY BLOOD GLUCOSE          05-12 @ 07:01  -  05-13 @ 07:00  --------------------------------------------------------  IN: 0 mL / OUT: 1050 mL / NET: -1050 mL        Physical Exam:  (earlier today)  Daily     Daily   General:  somewhat anxious-appearing but otherwise in NAD  HEENT:  MMM  CV:  RRR, no JVD  Lungs:  CTA B/L  Abdomen:  soft NT ND  Extremities:  no edema B/L LE  Skin:  WWP  Neuro:  AAOx3, no dysarthria    LABS:                        10.7   5.67  )-----------( 193      ( 13 May 2022 07:55 )             33.4     05-13    140  |  106  |  14  ----------------------------<  107<H>  4.4   |  24  |  0.66    Ca    9.0      13 May 2022 07:55  Phos  3.0     05-13  Mg     2.0     05-13    TPro  7.0  /  Alb  4.4  /  TBili  0.2  /  DBili  x   /  AST  19  /  ALT  13  /  AlkPhos  61  05-11    PT/INR - ( 12 May 2022 00:37 )   PT: 11.9 sec;   INR: 1.00          PTT - ( 12 May 2022 00:37 )  PTT:31.5 sec

## 2022-05-15 NOTE — PROGRESS NOTE ADULT - PROBLEM SELECTOR PLAN 3
BP goal per Neuro; cont. DASH diet, holding Norvasc for now

## 2022-05-15 NOTE — DISCHARGE NOTE NURSING/CASE MANAGEMENT/SOCIAL WORK - PATIENT PORTAL LINK FT
You can access the FollowMyHealth Patient Portal offered by [facility] by registering at the following website: [email or web address]. By joining [employer]’s FollowMyHealth portal, you will also be able to view your health information using other applications (apps) compatible with our system.

## 2022-05-15 NOTE — PROGRESS NOTE ADULT - REASON FOR ADMISSION
stroke
Batsheva Davis  SURGERY  [address] [place] [id number]  Phone: [phone]  Fax: [phone]  Scheduled Appointment: 08/13/2020
stroke
stroke

## 2022-05-15 NOTE — PROGRESS NOTE ADULT - ASSESSMENT
89y Female with PMHx of HLD, hypothyroid, headache, and vertigo presents to ED c/o diffuse frontal headache, CT scan incidentally showing subacute right occipital stroke. Loop recorder placed.     Neuro  #CVA workup  - Loaded with ASA 325mg once and continue aspirin 81mg   - continue home plavix  - atorvastatin 40g daily  - q4hr stroke neuro checks and vitals  - HCT 5/11 Results: subacute to acute R occipital lobe infarct  - CTA Results: right PCA occlusion, otherwise unremarkable  - Stroke education  -loop recorder placed    Cards  #HTN  - Continuous telemetry monitoring   - Goal -180  - TTE: dilated LA     #HLD  - lipitor 40mg daily   - LDL 90    Pulm  - call provider if SPO2 < 94%    GI  #Nutrition/Fluids/Electrolytes   - replete K<4 and Mg <2  - Regular Diet  - passed dysphagia screen in the ED    Renal  - voiding  - oxybutynin ER 10mg daily    Infectious Disease  - Afebrile     Endocrine  - continue Synthroid 75mcg daily  - HA1C 4.6 , TSH 1.84      DVT Prophylaxis  - lovenox sq for DVT prophylaxis   - SCDs for DVT prophylaxis     Pt discussed during rounds with Dr. Carolynn MCDANIELS Goals: Goals reviewed at interdisciplinary rounds with case management, social work, physical therapy, occupational therapy, and speech language pathology.   Please see specific therapy  notes for in depth goals.  Dispo:home no needs  Discussed daily hospital plans and goals with patient at bedside.  
89y Female with PMHx of HLD, hypothyroid, headache, and vertigo presents to ED c/o diffuse frontal headache, CT scan incidentally showing subacute right occipital stroke. Loop recorder placed. Echo pending    Neuro  #CVA workup  - admit to neurology  - Loaded with ASA 325mg once and continue aspirin 81mg   - continue home plavix  - atorvastatin 40g daily  - q4hr stroke neuro checks and vitals  - HCT 5/11 Results: subacute to acute R occipital lobe infarct  - CTA Results: right PCA occlusion, otherwise unremarkable  - Stroke education  -loop recorder placed    Cards  #HTN  - Continuous telemetry monitoring   - Goal -180    - obtain TTE     #HLD  - lipitor 40mg daily   - LDL 90    Pulm  - call provider if SPO2 < 94%    GI  #Nutrition/Fluids/Electrolytes   - replete K<4 and Mg <2  - Regular Diet  - passed dysphagia screen in the ED    Renal  - voiding  - oxybutynin ER 10mg daily    Infectious Disease  - Afebrile     Endocrine  - continue Synthroid 75mcg daily  - HA1C 4.6 , TSH 1.84      DVT Prophylaxis  - lovenox sq for DVT prophylaxis   - SCDs for DVT prophylaxis     Pt discussed during rounds with Dr. Carolynn MCDANIELS Goals: Goals reviewed at interdisciplinary rounds with case management, social work, physical therapy, occupational therapy, and speech language pathology.   Please see specific therapy  notes for in depth goals.  Dispo:home no needs  Discussed daily hospital plans and goals with patient and family at bedside.  
89 y/o F w/
89 y/o F w/
89 y/o F w/

## 2022-05-23 DIAGNOSIS — I63.531 CEREBRAL INFARCTION DUE TO UNSPECIFIED OCCLUSION OR STENOSIS OF RIGHT POSTERIOR CEREBRAL ARTERY: ICD-10-CM

## 2022-05-23 DIAGNOSIS — E78.5 HYPERLIPIDEMIA, UNSPECIFIED: ICD-10-CM

## 2022-05-23 DIAGNOSIS — R29.701 NIHSS SCORE 1: ICD-10-CM

## 2022-05-23 DIAGNOSIS — H53.462 HOMONYMOUS BILATERAL FIELD DEFECTS, LEFT SIDE: ICD-10-CM

## 2022-05-23 DIAGNOSIS — I10 ESSENTIAL (PRIMARY) HYPERTENSION: ICD-10-CM

## 2022-05-23 DIAGNOSIS — E03.9 HYPOTHYROIDISM, UNSPECIFIED: ICD-10-CM

## 2022-05-27 ENCOUNTER — EMERGENCY (EMERGENCY)
Facility: HOSPITAL | Age: 87
LOS: 1 days | Discharge: HOME | End: 2022-05-27
Attending: STUDENT IN AN ORGANIZED HEALTH CARE EDUCATION/TRAINING PROGRAM | Admitting: STUDENT IN AN ORGANIZED HEALTH CARE EDUCATION/TRAINING PROGRAM
Payer: MEDICARE

## 2022-05-27 VITALS
DIASTOLIC BLOOD PRESSURE: 79 MMHG | HEART RATE: 65 BPM | RESPIRATION RATE: 18 BRPM | TEMPERATURE: 98 F | WEIGHT: 128.09 LBS | HEIGHT: 60 IN | SYSTOLIC BLOOD PRESSURE: 138 MMHG | OXYGEN SATURATION: 97 %

## 2022-05-27 DIAGNOSIS — Z87.891 PERSONAL HISTORY OF NICOTINE DEPENDENCE: ICD-10-CM

## 2022-05-27 DIAGNOSIS — R42 DIZZINESS AND GIDDINESS: ICD-10-CM

## 2022-05-27 DIAGNOSIS — Z79.02 LONG TERM (CURRENT) USE OF ANTITHROMBOTICS/ANTIPLATELETS: ICD-10-CM

## 2022-05-27 DIAGNOSIS — E03.9 HYPOTHYROIDISM, UNSPECIFIED: ICD-10-CM

## 2022-05-27 DIAGNOSIS — R51.9 HEADACHE, UNSPECIFIED: ICD-10-CM

## 2022-05-27 DIAGNOSIS — Z20.822 CONTACT WITH AND (SUSPECTED) EXPOSURE TO COVID-19: ICD-10-CM

## 2022-05-27 DIAGNOSIS — Z86.19 PERSONAL HISTORY OF OTHER INFECTIOUS AND PARASITIC DISEASES: ICD-10-CM

## 2022-05-27 DIAGNOSIS — E78.5 HYPERLIPIDEMIA, UNSPECIFIED: ICD-10-CM

## 2022-05-27 DIAGNOSIS — Z79.82 LONG TERM (CURRENT) USE OF ASPIRIN: ICD-10-CM

## 2022-05-27 LAB
ANION GAP SERPL CALC-SCNC: 10 MMOL/L — SIGNIFICANT CHANGE UP (ref 5–17)
APPEARANCE UR: CLEAR — SIGNIFICANT CHANGE UP
BACTERIA # UR AUTO: PRESENT /HPF
BASOPHILS # BLD AUTO: 0.04 K/UL — SIGNIFICANT CHANGE UP (ref 0–0.2)
BASOPHILS NFR BLD AUTO: 0.6 % — SIGNIFICANT CHANGE UP (ref 0–2)
BILIRUB UR-MCNC: NEGATIVE — SIGNIFICANT CHANGE UP
BUN SERPL-MCNC: 18 MG/DL — SIGNIFICANT CHANGE UP (ref 7–23)
CALCIUM SERPL-MCNC: 9.2 MG/DL — SIGNIFICANT CHANGE UP (ref 8.4–10.5)
CHLORIDE SERPL-SCNC: 100 MMOL/L — SIGNIFICANT CHANGE UP (ref 96–108)
CO2 SERPL-SCNC: 25 MMOL/L — SIGNIFICANT CHANGE UP (ref 22–31)
COLOR SPEC: YELLOW — SIGNIFICANT CHANGE UP
CREAT SERPL-MCNC: 0.72 MG/DL — SIGNIFICANT CHANGE UP (ref 0.5–1.3)
DIFF PNL FLD: ABNORMAL
EGFR: 79 ML/MIN/1.73M2 — SIGNIFICANT CHANGE UP
EOSINOPHIL # BLD AUTO: 0.1 K/UL — SIGNIFICANT CHANGE UP (ref 0–0.5)
EOSINOPHIL NFR BLD AUTO: 1.6 % — SIGNIFICANT CHANGE UP (ref 0–6)
EPI CELLS # UR: SIGNIFICANT CHANGE UP /HPF (ref 0–5)
GLUCOSE SERPL-MCNC: 130 MG/DL — HIGH (ref 70–99)
GLUCOSE UR QL: NEGATIVE — SIGNIFICANT CHANGE UP
HCT VFR BLD CALC: 31.6 % — LOW (ref 34.5–45)
HGB BLD-MCNC: 10.5 G/DL — LOW (ref 11.5–15.5)
IMM GRANULOCYTES NFR BLD AUTO: 0.3 % — SIGNIFICANT CHANGE UP (ref 0–1.5)
KETONES UR-MCNC: NEGATIVE — SIGNIFICANT CHANGE UP
LEUKOCYTE ESTERASE UR-ACNC: NEGATIVE — SIGNIFICANT CHANGE UP
LYMPHOCYTES # BLD AUTO: 1.15 K/UL — SIGNIFICANT CHANGE UP (ref 1–3.3)
LYMPHOCYTES # BLD AUTO: 18.5 % — SIGNIFICANT CHANGE UP (ref 13–44)
MCHC RBC-ENTMCNC: 32.3 PG — SIGNIFICANT CHANGE UP (ref 27–34)
MCHC RBC-ENTMCNC: 33.2 GM/DL — SIGNIFICANT CHANGE UP (ref 32–36)
MCV RBC AUTO: 97.2 FL — SIGNIFICANT CHANGE UP (ref 80–100)
MONOCYTES # BLD AUTO: 0.58 K/UL — SIGNIFICANT CHANGE UP (ref 0–0.9)
MONOCYTES NFR BLD AUTO: 9.3 % — SIGNIFICANT CHANGE UP (ref 2–14)
NEUTROPHILS # BLD AUTO: 4.32 K/UL — SIGNIFICANT CHANGE UP (ref 1.8–7.4)
NEUTROPHILS NFR BLD AUTO: 69.7 % — SIGNIFICANT CHANGE UP (ref 43–77)
NITRITE UR-MCNC: NEGATIVE — SIGNIFICANT CHANGE UP
NRBC # BLD: 0 /100 WBCS — SIGNIFICANT CHANGE UP (ref 0–0)
PH UR: 6 — SIGNIFICANT CHANGE UP (ref 5–8)
PLATELET # BLD AUTO: 218 K/UL — SIGNIFICANT CHANGE UP (ref 150–400)
POTASSIUM SERPL-MCNC: 4.1 MMOL/L — SIGNIFICANT CHANGE UP (ref 3.5–5.3)
POTASSIUM SERPL-SCNC: 4.1 MMOL/L — SIGNIFICANT CHANGE UP (ref 3.5–5.3)
PROT UR-MCNC: NEGATIVE MG/DL — SIGNIFICANT CHANGE UP
RBC # BLD: 3.25 M/UL — LOW (ref 3.8–5.2)
RBC # FLD: 12.1 % — SIGNIFICANT CHANGE UP (ref 10.3–14.5)
RBC CASTS # UR COMP ASSIST: < 5 /HPF — SIGNIFICANT CHANGE UP
SARS-COV-2 RNA SPEC QL NAA+PROBE: NEGATIVE — SIGNIFICANT CHANGE UP
SODIUM SERPL-SCNC: 135 MMOL/L — SIGNIFICANT CHANGE UP (ref 135–145)
SP GR SPEC: 1.01 — SIGNIFICANT CHANGE UP (ref 1–1.03)
TROPONIN T SERPL-MCNC: <0.01 NG/ML — SIGNIFICANT CHANGE UP (ref 0–0.01)
UROBILINOGEN FLD QL: 0.2 E.U./DL — SIGNIFICANT CHANGE UP
WBC # BLD: 6.21 K/UL — SIGNIFICANT CHANGE UP (ref 3.8–10.5)
WBC # FLD AUTO: 6.21 K/UL — SIGNIFICANT CHANGE UP (ref 3.8–10.5)
WBC UR QL: < 5 /HPF — SIGNIFICANT CHANGE UP

## 2022-05-27 PROCEDURE — 99285 EMERGENCY DEPT VISIT HI MDM: CPT

## 2022-05-27 PROCEDURE — 71046 X-RAY EXAM CHEST 2 VIEWS: CPT | Mod: 26

## 2022-05-27 NOTE — ED PROVIDER NOTE - PHYSICAL EXAMINATION
General: comfortable, resting in ED  HEENT: atraumatic, no eye erythema or discharge, neck supple  Pulm: no cyanosis, no added work of breathing  Cardiac: extremities warm, intact peripheral pulse  GI: no abdominal distension, abdomen nontender  Neuro: alert, conversant, CNII grossly intact bilaterally, CNIII-XII intact bilaterally, 5/5 strength upper and lower extremities bilaterally, intact sensation upper and lower extremities bilaterally, intact finger to nose bilaterally, intact rapid alternating hand movements  Psych: neutral affect, cooperative  Msk: no gross deformity or instability  Skin: no erythema or rash

## 2022-05-27 NOTE — ED ADULT NURSE NOTE - NSIMPLEMENTINTERV_GEN_ALL_ED
Implemented All Fall with Harm Risk Interventions:  [place] to call system. Call bell, personal items and telephone within reach. Instruct patient to call for assistance. Room bathroom lighting operational. Non-slip footwear when patient is off stretcher. Physically safe environment: no spills, clutter or unnecessary equipment. Stretcher in lowest position, wheels locked, appropriate side rails in place. Provide visual cue, wrist band, yellow gown, etc. Monitor gait and stability. Monitor for mental status changes and reorient to person, place, and time. Review medications for side effects contributing to fall risk. Reinforce activity limits and safety measures with patient and family. Provide visual clues: red socks.

## 2022-05-27 NOTE — ED ADULT NURSE NOTE - OBJECTIVE STATEMENT
Pt is 89 y.o female client complaining of lightheadedness started this morning. Pt A&Ox4. Pt is able to communicate and has a steady gait. Per pt "I just got admitted last Tuesday here with same thing and I have this for awhile it just got worst today and I took meclizine this morning but it didn't work". Pt denies chest pain, sob, fever, chills, loc, fall, blurred vision, HA, dizziness, n&v, tingling and weakness. Pt has medical hx of htn, hyperlipidemia, hypothyroid and vertigo.

## 2022-05-27 NOTE — ED ADULT TRIAGE NOTE - CHIEF COMPLAINT QUOTE
Pt presents to the ED via EMS with complaints of lightheadedness. As per pt, she has a history of vertigo and takes meclizine, last dose this am. In triage, pt states that she is feeling better.

## 2022-05-27 NOTE — ED PROVIDER NOTE - CLINICAL SUMMARY MEDICAL DECISION MAKING FREE TEXT BOX
Concern for recurrent episode of longstanding intermittent dizziness of unclear etiology.  Will r/o emergent causes of dizziness, including gross metabolic abnormality ?hypernatremia ?hyponatremia ?hypoglycemia ?anemia.  R/o occult infection as cause for dizziness ?UTI ?PNA.  Given patient is alert, afebrile, with supple neck, concern for acute bacterial meningitis or encephalitis below threshold for testing.

## 2022-05-27 NOTE — ED PROVIDER NOTE - SHIFT CHANGE DETAILS
89F with HLD and hypothyroid, longstanding intermittent dizziness followed by outpatient cards, now with 1 day of recurrent dizziness, pending labs urine CXR r/o infectious or metabolic cause of dizziness.  If all tests unremarkable discharge home with return precautions and outpatient followup.

## 2022-05-27 NOTE — ED PROVIDER NOTE - PATIENT PORTAL LINK FT
You can access the FollowMyHealth Patient Portal offered by [facility] by registering at the following website: [email or web address]. By joining [employer]’s FollowMyHealth portal, you will also be able to view your health information using other applications (apps) compatible with our system.

## 2022-05-27 NOTE — ED PROVIDER NOTE - NSFOLLOWUPINSTRUCTIONS_ED_ALL_ED_FT
Dizziness      Dizziness is a common problem. It is a feeling of unsteadiness or light-headedness. You may feel like you are about to faint. Dizziness can lead to injury if you stumble or fall. Anyone can become dizzy, but dizziness is more common in older adults. This condition can be caused by a number of things, including medicines, dehydration, or illness.      Follow these instructions at home:      Eating and drinking   A comparison of three sample cups showing dark yellow, yellow, and pale yellow urine.   •Drink enough fluid to keep your urine pale yellow. This helps to keep you from becoming dehydrated. Try to drink more clear fluids, such as water.      • Do not drink alcohol.      •Limit your caffeine intake if told to do so by your health care provider. Check ingredients and nutrition facts to see if a food or beverage contains caffeine.      •Limit your salt (sodium) intake if told to do so by your health care provider. Check ingredients and nutrition facts to see if a food or beverage contains sodium.        Activity   A sign showing that a person should not drive. •Avoid making quick movements.  •Rise slowly from chairs and steady yourself until you feel okay.      •In the morning, first sit up on the side of the bed. When you feel okay, stand slowly while you hold onto something until you know that your balance is good.        •If you need to  one place for a long time, move your legs often. Tighten and relax the muscles in your legs while you are standing.      • Do not drive or use machinery if you feel dizzy.      •Avoid bending down if you feel dizzy. Place items in your home so that they are easy for you to reach without leaning over.      Lifestyle     • Do not use any products that contain nicotine or tobacco. These products include cigarettes, chewing tobacco, and vaping devices, such as e-cigarettes. If you need help quitting, ask your health care provider.      •Try to reduce your stress level by using methods such as yoga or meditation. Talk with your health care provider if you need help to manage your stress.      General instructions     •Watch your dizziness for any changes.      •Take over-the-counter and prescription medicines only as told by your health care provider. Talk with your health care provider if you think that your dizziness is caused by a medicine that you are taking.      •Tell a friend or a family member that you are feeling dizzy. If he or she notices any changes in your behavior, have this person call your health care provider.      •Keep all follow-up visits. This is important.        Contact a health care provider if:    •Your dizziness does not go away or you have new symptoms.      •Your dizziness or light-headedness gets worse.      •You feel nauseous.      •You have reduced hearing.      •You have a fever.      •You have neck pain or a stiff neck.      •Your dizziness leads to an injury or a fall.        Get help right away if:    •You vomit or have diarrhea and are unable to eat or drink anything.      •You have problems talking, walking, swallowing, or using your arms, hands, or legs.      •You feel generally weak.      •You have any bleeding.      •You are not thinking clearly or you have trouble forming sentences. It may take a friend or family member to notice this.      •You have chest pain, abdominal pain, shortness of breath, or sweating.      •Your vision changes or you develop a severe headache.      These symptoms may represent a serious problem that is an emergency. Do not wait to see if the symptoms will go away. Get medical help right away. Call your local emergency services (911 in the [place] Do not drive yourself to the hospital.       Summary    •Dizziness is a feeling of unsteadiness or light-headedness. This condition can be caused by a number of things, including medicines, dehydration, or illness.      •Anyone can become dizzy, but dizziness is more common in older adults.      •Drink enough fluid to keep your urine pale yellow. Do not drink alcohol.      •Avoid making quick movements if you feel dizzy. Monitor your dizziness for any changes.      This information is not intended to replace advice given to you by your health care provider. Make sure you discuss any questions you have with your health care provider.

## 2022-05-27 NOTE — ED PROVIDER NOTE - OBJECTIVE STATEMENT
89F with HLD hypothyroid, hx peripheral vertigo on meclizine, longstanding intermittent episodes of pressure headache and dizziness (unsteadiness) lasting hours to days, actively being followed by outpatient cards with cardiac monitoring implant, now with recurrent episode of moderate constant dizziness (unsteadiness) starting this morning, no vertigo / spinning, no fall or LoC.  Similar to previous episodes.

## 2022-05-28 VITALS
HEART RATE: 65 BPM | SYSTOLIC BLOOD PRESSURE: 162 MMHG | DIASTOLIC BLOOD PRESSURE: 74 MMHG | TEMPERATURE: 98 F | RESPIRATION RATE: 18 BRPM | OXYGEN SATURATION: 95 %

## 2022-05-28 PROCEDURE — 81001 URINALYSIS AUTO W/SCOPE: CPT

## 2022-05-28 PROCEDURE — 99285 EMERGENCY DEPT VISIT HI MDM: CPT | Mod: 25

## 2022-05-28 PROCEDURE — 85025 COMPLETE CBC W/AUTO DIFF WBC: CPT

## 2022-05-28 PROCEDURE — 80048 BASIC METABOLIC PNL TOTAL CA: CPT

## 2022-05-28 PROCEDURE — 84484 ASSAY OF TROPONIN QUANT: CPT

## 2022-05-28 PROCEDURE — 93005 ELECTROCARDIOGRAM TRACING: CPT

## 2022-05-28 PROCEDURE — 71046 X-RAY EXAM CHEST 2 VIEWS: CPT

## 2022-05-28 PROCEDURE — 87635 SARS-COV-2 COVID-19 AMP PRB: CPT

## 2022-05-28 PROCEDURE — 36415 COLL VENOUS BLD VENIPUNCTURE: CPT

## 2022-05-28 RX ORDER — ACETAMINOPHEN 500 MG
650 TABLET ORAL ONCE
Refills: 0 | Status: COMPLETED | OUTPATIENT
Start: 2022-05-28 | End: 2022-05-28

## 2022-05-28 RX ORDER — MECLIZINE HCL 12.5 MG
25 TABLET ORAL ONCE
Refills: 0 | Status: COMPLETED | OUTPATIENT
Start: 2022-05-28 | End: 2022-05-28

## 2022-05-28 RX ADMIN — Medication 25 MILLIGRAM(S): at 00:54

## 2022-05-28 RX ADMIN — Medication 650 MILLIGRAM(S): at 00:52

## 2022-05-31 ENCOUNTER — APPOINTMENT (OUTPATIENT)
Dept: NEUROLOGY | Facility: CLINIC | Age: 87
End: 2022-05-31
Payer: MEDICARE

## 2022-05-31 VITALS
HEIGHT: 60 IN | SYSTOLIC BLOOD PRESSURE: 114 MMHG | WEIGHT: 128 LBS | OXYGEN SATURATION: 95 % | HEART RATE: 73 BPM | DIASTOLIC BLOOD PRESSURE: 69 MMHG | TEMPERATURE: 98.5 F | BODY MASS INDEX: 25.13 KG/M2

## 2022-05-31 PROCEDURE — 99215 OFFICE O/P EST HI 40 MIN: CPT

## 2022-05-31 NOTE — ASSESSMENT
[FreeTextEntry1] : Patient is a 89-year-old female with a history of hypertension, hyperlipidemia, hypothyroidism, vertigo, chronic headaches, and recent ischemic stroke.  She is here today for posthospitalization follow-up, headaches. \par \par From a stroke perspective, she has had adequate testing, short of a EVELYN. She has f/u with EP later this week.  For now, we will keep her on DAPT and continue up to 3 months post-stroke. She denies taking aspirin regularly prior to the stroke so she could be transitioned to aspirin monotherapy thereafter. She will continue statin. Encouraged healthy lifestyle. EEG to evaluate for abnormal discharges as patient has cortical-based stroke and is having flashing lights in her peripheral (left) vision.\par \par Regarding her headaches- there is almost certainly a analgesic-overuse component.  We discussed the importance of weaning Tylenol and ibuprofen over the next few weeks.  She is interested in trying a preventative headache and given her headaches do have migrainous qualities, I have given her a trial of Nurtec to take every other day for headache prevention.  I will also order sleep apnea testing as she snores at night and awakens with headaches almost daily.  ESR/CRP will GCA seems less likely given duration of symptoms and lack of other systemic symptoms.\par \par Also recommended discussing transitioning to alternative sleep aid other than Lorazepam w her PCP.\par

## 2022-05-31 NOTE — HISTORY OF PRESENT ILLNESS
[FreeTextEntry1] : Patient is a 89-year-old female with a history of hypertension, hyperlipidemia, hypothyroidism, vertigo, chronic headaches, and recent ischemic stroke.  She is here today for posthospitalization follow-up, headaches.\par \par With regard to the patient's history of stroke, she was first evaluated in the stroke clinic in January 2022 at which time an outpatient MRI showed evidence of chronic cortical ischemic strokes.  She was recommended to complete her stroke work-up and start dual antiplatelet therapy, statin.  The patient did not follow through with the testing/medication changes.\par \par Unfortunately, the patient was admitted mid May after presenting with headache and being found to have a right PCA territory infarct with CTA showing an occlusion of the proximal right PCA.  She underwent a TTE which showed a dilated left atrium and mild LVH but otherwise was unremarkable.  She refused EVELYN.  She underwent loop recorder placement and has a follow-up appoint with EP later this week.  She has been taking aspirin, Plavix, and statin without side effects.  She denies any strokelike symptoms. She has had intermittent flashes in her peripheral (left) vision since the stroke as well. \par \par The patient's bigger concern is actually her chronic headaches. She denies any history of headaches/migraines in the distant past.  She describes a pressure of variable location and severity for the last 2 to 3 years.  She often wakes up with headaches.  These headaches can be associated with nausea when severe and mild phonophobia/photophobia.  In the past, some of these headaches have been associated with high blood pressure but that has not been the case recently.  She feels triggers include constipation or even drinking water or having the evening meal.  The patient takes 6 tablets of regular strength Tylenol and 2 to 3 tablets of ibuprofen every day.  She denies using Fioricet.  She did try low-dose nortriptyline with no relief though the details of her trial are unclear to me.\par

## 2022-05-31 NOTE — PHYSICAL EXAM
[FreeTextEntry1] : Alert.  Fully oriented.  Speech and language are intact.  L homonymous hemianospia, otherwise cranial nerves II-XII are intact.  Motor exam reveals intact strength with individual muscle testing in bilateral upper and lower extremities.  Tone is normal.  Reflexes are reduced througout. Sensation is intact to light touch in distal extremities.  Finger-to-nose and heel-to-shin are intact.  Rapid alternating movements are normal in the upper and lower extremities.  Gait is normal. She is able to walk on heels, toes.\par

## 2022-06-01 LAB
CRP SERPL-MCNC: <3 MG/L
ERYTHROCYTE [SEDIMENTATION RATE] IN BLOOD BY WESTERGREN METHOD: 10 MM/HR

## 2022-06-02 ENCOUNTER — APPOINTMENT (OUTPATIENT)
Dept: HEART AND VASCULAR | Facility: CLINIC | Age: 87
End: 2022-06-02
Payer: MEDICARE

## 2022-06-02 VITALS
TEMPERATURE: 97.1 F | HEIGHT: 60 IN | DIASTOLIC BLOOD PRESSURE: 61 MMHG | BODY MASS INDEX: 25.13 KG/M2 | HEART RATE: 100 BPM | WEIGHT: 128 LBS | SYSTOLIC BLOOD PRESSURE: 126 MMHG

## 2022-06-02 PROCEDURE — 93291 INTERROG DEV EVAL SCRMS IP: CPT

## 2022-06-07 NOTE — ED ADULT NURSE NOTE - NSSUHOSCREENINGYN_ED_ALL_ED
Goal Outcome Evaluation: Small amount of bloody drainage from right foot, dressing change completed by podiatry. Edema to right foot and ankle, CMS intact other than history of numbness to feet bilaterally. Afebrile, vss. Possible discharge later today or tomorrow.    Plan of Care Reviewed With: patient     Overall Patient Progress: improving            Yes - the patient is able to be screened

## 2022-06-08 NOTE — PROCEDURE
[de-identified] : [employer] reveal linq\par battery good\par sensing good\par no events - one symptom triggered NSR

## 2022-06-08 NOTE — HISTORY OF PRESENT ILLNESS
[de-identified] : 89 year old female with HTN, HLD, hypothyroidism, and CVA s/p ILR, who presents for follow up.\par \par No device related complaints.  No known history of arrhythmia.  No palpitations, chest pain or syncope.

## 2022-06-08 NOTE — DISCUSSION/SUMMARY
[FreeTextEntry1] : 89 year old female with HTN, HLD, hypothyroidism, and CVA s/p ILR, who presents for follow up.  ILR incision well healed.  Interrogation with no arrhythmias.  Home monitoring working.  She will follow up in 6 months or sooner if needed.  She knows to call with any questions or concerns.

## 2022-06-08 NOTE — REVIEW OF SYSTEMS
[Negative] : Heme/Lymph [Fever] : no fever [Chills] : no chills [SOB] : no shortness of breath [Palpitations] : no palpitations [Syncope] : no syncope

## 2022-06-08 NOTE — ADDENDUM
[FreeTextEntry1] : I, Sam Molina, hereby attest that the medical record entry for this patient accurately reflects signatures/notations that I made on the Date of Service in my capacity as an Attending Physician when I treated/diagnosed the above patient. I do hereby attest that this information is true, accurate and complete to the best of my knowledge and I understand that any falsification, omission, or concealment of material fact may subject me to administrative, civil, or, criminal liability. I agree with the note as written by my PA in its entirety.\par I was present for the entire visit and supervised the entire visit and agree with the plan as outlined.\par \par

## 2022-06-09 ENCOUNTER — APPOINTMENT (OUTPATIENT)
Dept: NEUROLOGY | Facility: CLINIC | Age: 87
End: 2022-06-09
Payer: MEDICARE

## 2022-06-09 PROCEDURE — 95819 EEG AWAKE AND ASLEEP: CPT

## 2022-06-09 PROCEDURE — 95806 SLEEP STUDY UNATT&RESP EFFT: CPT

## 2022-06-10 ENCOUNTER — APPOINTMENT (OUTPATIENT)
Dept: NEUROLOGY | Facility: CLINIC | Age: 87
End: 2022-06-10

## 2022-06-12 ENCOUNTER — EMERGENCY (EMERGENCY)
Facility: HOSPITAL | Age: 87
LOS: 1 days | Discharge: HOME | End: 2022-06-12
Attending: EMERGENCY MEDICINE | Admitting: EMERGENCY MEDICINE
Payer: MEDICARE

## 2022-06-12 VITALS
RESPIRATION RATE: 17 BRPM | TEMPERATURE: 98 F | HEART RATE: 59 BPM | SYSTOLIC BLOOD PRESSURE: 164 MMHG | HEIGHT: 60 IN | OXYGEN SATURATION: 97 % | DIASTOLIC BLOOD PRESSURE: 74 MMHG

## 2022-06-12 VITALS
DIASTOLIC BLOOD PRESSURE: 67 MMHG | SYSTOLIC BLOOD PRESSURE: 124 MMHG | HEART RATE: 70 BPM | OXYGEN SATURATION: 97 % | TEMPERATURE: 97 F | RESPIRATION RATE: 16 BRPM

## 2022-06-12 DIAGNOSIS — Z79.82 LONG TERM (CURRENT) USE OF ASPIRIN: ICD-10-CM

## 2022-06-12 DIAGNOSIS — R42 DIZZINESS AND GIDDINESS: ICD-10-CM

## 2022-06-12 DIAGNOSIS — R51.9 HEADACHE, UNSPECIFIED: ICD-10-CM

## 2022-06-12 DIAGNOSIS — E03.9 HYPOTHYROIDISM, UNSPECIFIED: ICD-10-CM

## 2022-06-12 DIAGNOSIS — E78.5 HYPERLIPIDEMIA, UNSPECIFIED: ICD-10-CM

## 2022-06-12 PROCEDURE — 70450 CT HEAD/BRAIN W/O DYE: CPT | Mod: MG

## 2022-06-12 PROCEDURE — 99284 EMERGENCY DEPT VISIT MOD MDM: CPT | Mod: FS

## 2022-06-12 PROCEDURE — 99284 EMERGENCY DEPT VISIT MOD MDM: CPT | Mod: 25

## 2022-06-12 PROCEDURE — G1004: CPT

## 2022-06-12 PROCEDURE — 70450 CT HEAD/BRAIN W/O DYE: CPT | Mod: 26,MG

## 2022-06-12 NOTE — ED ADULT NURSE NOTE - OBJECTIVE STATEMENT
pt with hx of vertigo, c/o running out of medications and dizziness that has been ongoing for a while. AOx4, able to speak in full sentences and can follow commands. denies any changes in vision. pupils equal in size.

## 2022-06-12 NOTE — ED PROVIDER NOTE - ATTENDING APP SHARED VISIT CONTRIBUTION OF CARE
The pt is a 89 y/o F, who presents to ED c/o "an episode of a headache" earlier, the h/a has now fully resolved. Pt states that she had similar "episodes" in the past, did not take any pain meds, currently pain free. Denies visual changes, h/a at this time, dizziness, syncope, neck pain, gait disturbances, n/v, fevers, chills, falls.    pt came in after having a h/a earlier -- fully resolved upon arrival, non focal neuro exam - had a recent cva - ct neg for acute stroke - evolving stroke noted, pt follows up with dr watts and will see her in the office, requesting to go home, strict return precautions given    Agree with above note as documented by PA.  I was available as the supervising attending during patient's ER evaluation.    Pt known to me for prior evaluation for stroke - chronic headaches - similar to prior - ct no new findings - headache resolved in ED.  Feels well and requests discharge.  No neuro deficits and ambulating with steady gait.

## 2022-06-12 NOTE — ED PROVIDER NOTE - CLINICAL SUMMARY MEDICAL DECISION MAKING FREE TEXT BOX
pt came in after having a h/a earlier -- fully resolved upon arrival, non focal neuro exam - had a recent cva - ct neg for acute stroke - evolving stroke noted, pt follows up with dr watts and will see her in the office, requesting to go home, strict return precautions given

## 2022-06-12 NOTE — ED ADULT TRIAGE NOTE - CHIEF COMPLAINT QUOTE
"I have a headache for a while now and ia have not been feeling well. I need a prescription for my headache medications to."

## 2022-06-12 NOTE — ED ADULT NURSE NOTE - CAS EDP DISCH TYPE

## 2022-06-12 NOTE — ED PROVIDER NOTE - NSFOLLOWUPINSTRUCTIONS_ED_ALL_ED_FT
Headache  please follow up with your neurologist   A headache is pain or discomfort felt around the head or neck area. The specific cause of a headache may not be found as there are many types including tension headaches, migraine headaches, and cluster headaches. Watch your condition for any changes. Things you can do to manage your pain include taking over the counter and prescription medications as instructed by your health care provider, lying down in a dark quiet room, limiting stress, getting regular sleep, and refraining from alcohol and tobacco products.    SEEK IMMEDIATE MEDICAL CARE IF YOU HAVE ANY OF THE FOLLOWING SYMPTOMS: fever, vomiting, stiff neck, loss of vision, problems with speech, muscle weakness, loss of balance, trouble walking, passing out, or confusion.

## 2022-06-12 NOTE — ED PROVIDER NOTE - PATIENT PORTAL LINK FT
You can access the FollowMyHealth Patient Portal offered by [facility] by registering at the following website: [email or web address]. By joining [employer]’s FollowMyHealth portal, you will also be able to view your health information using other applications (apps) compatible with our system.

## 2022-06-12 NOTE — ED ADULT TRIAGE NOTE - ARRIVAL INFO ADDITIONAL COMMENTS
Patient reports multiple medical complaints with EMS with arrival then reports to triage that she just needs a prescription for her "headache medication" for a reported headache lasting for more than a week. Patient noted to be adding complaints with more inquiry.

## 2022-06-12 NOTE — ED PROVIDER NOTE - OBJECTIVE STATEMENT
The pt is a 89 y/o F, who presents to ED c/o "an episode of a headache" earlier, the h/a has now fully resolved. Pt states that she had similar "episodes" in the past, did not take any pain meds, currently pain free. Denies visual changes, h/a at this time, dizziness, syncope, neck pain, gait disturbances, n/v, fevers, chills, falls.

## 2022-06-15 ENCOUNTER — APPOINTMENT (OUTPATIENT)
Dept: NEUROLOGY | Facility: CLINIC | Age: 87
End: 2022-06-15
Payer: MEDICARE

## 2022-06-15 ENCOUNTER — NON-APPOINTMENT (OUTPATIENT)
Age: 87
End: 2022-06-15

## 2022-06-15 VITALS
SYSTOLIC BLOOD PRESSURE: 155 MMHG | HEART RATE: 70 BPM | TEMPERATURE: 98.2 F | HEIGHT: 60 IN | BODY MASS INDEX: 25.13 KG/M2 | WEIGHT: 128 LBS | DIASTOLIC BLOOD PRESSURE: 71 MMHG | OXYGEN SATURATION: 98 %

## 2022-06-15 DIAGNOSIS — G47.33 OBSTRUCTIVE SLEEP APNEA (ADULT) (PEDIATRIC): ICD-10-CM

## 2022-06-15 DIAGNOSIS — G43.919 MIGRAINE, UNSPECIFIED, INTRACTABLE, W/OUT STATUS MIGRAINOSUS: ICD-10-CM

## 2022-06-15 PROCEDURE — 99215 OFFICE O/P EST HI 40 MIN: CPT

## 2022-06-15 RX ORDER — RIMEGEPANT SULFATE 75 MG/75MG
75 TABLET, ORALLY DISINTEGRATING ORAL
Qty: 16 | Refills: 4 | Status: DISCONTINUED | COMMUNITY
Start: 2022-05-31 | End: 2022-06-15

## 2022-06-15 NOTE — PHYSICAL EXAM
[FreeTextEntry1] : Alert. Fully oriented. Speech/language intact. Moving all limbs symmetrically. Gait is normal.\par

## 2022-06-15 NOTE — ASSESSMENT
[FreeTextEntry1] : Patient is a 89-year-old female with a history of hypertension, hyperlipidemia, hypothyroidism, vertigo, chronic refractory headaches, and recent ischemic stroke.  She is here today to review her headaches which remain persistent and refractory.  Plan will be as follows:\par \par --Repeat MRI with and without contrast to evaluate for potential structural causes though this seems less likely\par --Referral to sleep medicine for further evaluation/treatment of sleep apnea\par --Again counseled her on the importance of limiting over-the-counter analgesics given the risk of rebound headaches.  Specifically caution against use of the ibuprofen/Aleve due to risk of gastric irritation/ulcers that may be related to her nausea (also has upcoming GI appt to review symptoms as well)\par --Headache journal\par --Start duloxetine for mood/headache/neuro pain\par --Ubrelvy PRN for severe migraines, no more than 2 times per week. \par --Encouraged patient to get medication coverage as part of insurance plan\par --F/U in 2 weeks with NP Rosenberg as previously scheduled to check in and also review if nerve blocks may be an option if symptoms persist

## 2022-06-15 NOTE — HISTORY OF PRESENT ILLNESS
[FreeTextEntry1] : Patient is a 89-year-old female with a history of hypertension, hyperlipidemia, hypothyroidism, vertigo, chronic refractory headaches, and recent ischemic stroke. \par \par THe patient returns today to review her headaches. She continues to have unrelenting headaches. She has been trying to limit Tyelnol and ibuprofen but due to lack of insurance medication coverage, she was unable to try Nurtec.  She reports worsening nausea with diminished appetite and generalized fatigue as well.  She has no new focal neurologic deficits she was found to have mild JIMI on sleep apnea testing.  EEG was performed but the results are pending

## 2022-06-16 ENCOUNTER — INPATIENT (INPATIENT)
Facility: HOSPITAL | Age: 87
LOS: 4 days | Discharge: HOME HEALTH | DRG: 103 | End: 2022-06-21
Attending: INTERNAL MEDICINE | Admitting: EMERGENCY MEDICINE
Payer: MEDICARE

## 2022-06-16 VITALS
RESPIRATION RATE: 18 BRPM | HEIGHT: 60 IN | DIASTOLIC BLOOD PRESSURE: 89 MMHG | HEART RATE: 60 BPM | OXYGEN SATURATION: 98 % | SYSTOLIC BLOOD PRESSURE: 194 MMHG | TEMPERATURE: 98 F

## 2022-06-16 LAB
ALBUMIN SERPL ELPH-MCNC: 4.5 G/DL — SIGNIFICANT CHANGE UP (ref 3.3–5)
ALP SERPL-CCNC: 58 U/L — SIGNIFICANT CHANGE UP (ref 40–120)
ALT FLD-CCNC: 14 U/L — SIGNIFICANT CHANGE UP (ref 10–45)
ANION GAP SERPL CALC-SCNC: 12 MMOL/L — SIGNIFICANT CHANGE UP (ref 5–17)
APPEARANCE UR: CLEAR — SIGNIFICANT CHANGE UP
APTT BLD: 29.8 SEC — SIGNIFICANT CHANGE UP (ref 27.5–35.5)
AST SERPL-CCNC: 20 U/L — SIGNIFICANT CHANGE UP (ref 10–40)
BACTERIA # UR AUTO: SIGNIFICANT CHANGE UP /HPF
BASOPHILS # BLD AUTO: 0.01 K/UL — SIGNIFICANT CHANGE UP (ref 0–0.2)
BASOPHILS NFR BLD AUTO: 0.2 % — SIGNIFICANT CHANGE UP (ref 0–2)
BILIRUB SERPL-MCNC: 0.5 MG/DL — SIGNIFICANT CHANGE UP (ref 0.2–1.2)
BILIRUB UR-MCNC: NEGATIVE — SIGNIFICANT CHANGE UP
BUN SERPL-MCNC: 8 MG/DL — SIGNIFICANT CHANGE UP (ref 7–23)
CALCIUM SERPL-MCNC: 9.6 MG/DL — SIGNIFICANT CHANGE UP (ref 8.4–10.5)
CHLORIDE SERPL-SCNC: 89 MMOL/L — LOW (ref 96–108)
CO2 SERPL-SCNC: 25 MMOL/L — SIGNIFICANT CHANGE UP (ref 22–31)
COLOR SPEC: YELLOW — SIGNIFICANT CHANGE UP
CREAT SERPL-MCNC: 0.57 MG/DL — SIGNIFICANT CHANGE UP (ref 0.5–1.3)
DIFF PNL FLD: ABNORMAL
EGFR: 86 ML/MIN/1.73M2 — SIGNIFICANT CHANGE UP
EOSINOPHIL # BLD AUTO: 0.06 K/UL — SIGNIFICANT CHANGE UP (ref 0–0.5)
EOSINOPHIL NFR BLD AUTO: 0.9 % — SIGNIFICANT CHANGE UP (ref 0–6)
GLUCOSE SERPL-MCNC: 132 MG/DL — HIGH (ref 70–99)
GLUCOSE UR QL: NEGATIVE — SIGNIFICANT CHANGE UP
HCT VFR BLD CALC: 35.5 % — SIGNIFICANT CHANGE UP (ref 34.5–45)
HGB BLD-MCNC: 12.3 G/DL — SIGNIFICANT CHANGE UP (ref 11.5–15.5)
IMM GRANULOCYTES NFR BLD AUTO: 0.6 % — SIGNIFICANT CHANGE UP (ref 0–1.5)
INR BLD: 0.98 — SIGNIFICANT CHANGE UP (ref 0.88–1.16)
KETONES UR-MCNC: NEGATIVE — SIGNIFICANT CHANGE UP
LEUKOCYTE ESTERASE UR-ACNC: NEGATIVE — SIGNIFICANT CHANGE UP
LYMPHOCYTES # BLD AUTO: 0.79 K/UL — LOW (ref 1–3.3)
LYMPHOCYTES # BLD AUTO: 12.1 % — LOW (ref 13–44)
MCHC RBC-ENTMCNC: 32.3 PG — SIGNIFICANT CHANGE UP (ref 27–34)
MCHC RBC-ENTMCNC: 34.6 GM/DL — SIGNIFICANT CHANGE UP (ref 32–36)
MCV RBC AUTO: 93.2 FL — SIGNIFICANT CHANGE UP (ref 80–100)
MONOCYTES # BLD AUTO: 0.54 K/UL — SIGNIFICANT CHANGE UP (ref 0–0.9)
MONOCYTES NFR BLD AUTO: 8.3 % — SIGNIFICANT CHANGE UP (ref 2–14)
NEUTROPHILS # BLD AUTO: 5.07 K/UL — SIGNIFICANT CHANGE UP (ref 1.8–7.4)
NEUTROPHILS NFR BLD AUTO: 77.9 % — HIGH (ref 43–77)
NITRITE UR-MCNC: NEGATIVE — SIGNIFICANT CHANGE UP
NRBC # BLD: 0 /100 WBCS — SIGNIFICANT CHANGE UP (ref 0–0)
PH UR: 7.5 — SIGNIFICANT CHANGE UP (ref 5–8)
PLATELET # BLD AUTO: 255 K/UL — SIGNIFICANT CHANGE UP (ref 150–400)
POTASSIUM SERPL-MCNC: 4 MMOL/L — SIGNIFICANT CHANGE UP (ref 3.5–5.3)
POTASSIUM SERPL-SCNC: 4 MMOL/L — SIGNIFICANT CHANGE UP (ref 3.5–5.3)
PROT SERPL-MCNC: 7.2 G/DL — SIGNIFICANT CHANGE UP (ref 6–8.3)
PROT UR-MCNC: NEGATIVE MG/DL — SIGNIFICANT CHANGE UP
PROTHROM AB SERPL-ACNC: 11.7 SEC — SIGNIFICANT CHANGE UP (ref 10.5–13.4)
RBC # BLD: 3.81 M/UL — SIGNIFICANT CHANGE UP (ref 3.8–5.2)
RBC # FLD: 11.9 % — SIGNIFICANT CHANGE UP (ref 10.3–14.5)
RBC CASTS # UR COMP ASSIST: < 5 /HPF — SIGNIFICANT CHANGE UP
SARS-COV-2 RNA SPEC QL NAA+PROBE: NEGATIVE — SIGNIFICANT CHANGE UP
SODIUM SERPL-SCNC: 126 MMOL/L — LOW (ref 135–145)
SP GR SPEC: 1.01 — SIGNIFICANT CHANGE UP (ref 1–1.03)
TROPONIN T SERPL-MCNC: 0.01 NG/ML — SIGNIFICANT CHANGE UP (ref 0–0.01)
UROBILINOGEN FLD QL: 0.2 E.U./DL — SIGNIFICANT CHANGE UP
WBC # BLD: 6.51 K/UL — SIGNIFICANT CHANGE UP (ref 3.8–10.5)
WBC # FLD AUTO: 6.51 K/UL — SIGNIFICANT CHANGE UP (ref 3.8–10.5)
WBC UR QL: < 5 /HPF — SIGNIFICANT CHANGE UP

## 2022-06-16 PROCEDURE — 70450 CT HEAD/BRAIN W/O DYE: CPT | Mod: 26,MA

## 2022-06-16 PROCEDURE — 99285 EMERGENCY DEPT VISIT HI MDM: CPT

## 2022-06-16 RX ORDER — ACETAMINOPHEN 500 MG
1000 TABLET ORAL ONCE
Refills: 0 | Status: COMPLETED | OUTPATIENT
Start: 2022-06-16 | End: 2022-06-16

## 2022-06-16 RX ORDER — SODIUM CHLORIDE 9 MG/ML
1000 INJECTION INTRAMUSCULAR; INTRAVENOUS; SUBCUTANEOUS ONCE
Refills: 0 | Status: COMPLETED | OUTPATIENT
Start: 2022-06-16 | End: 2022-06-16

## 2022-06-16 RX ADMIN — Medication 400 MILLIGRAM(S): at 23:07

## 2022-06-16 RX ADMIN — SODIUM CHLORIDE 1000 MILLILITER(S): 9 INJECTION INTRAMUSCULAR; INTRAVENOUS; SUBCUTANEOUS at 23:07

## 2022-06-16 NOTE — ED ADULT NURSE NOTE - NSICDXPASTMEDICALHX_GEN_ALL_CORE_FT
PAST MEDICAL HISTORY:  Dizziness Vertigo    Hyperlipidemia Hyperlipemia    Hypothyroidism Hypothyroidism      Vertigo

## 2022-06-16 NOTE — ED ADULT NURSE NOTE - OBJECTIVE STATEMENT
89 bibems c/o ams, HA with last normal per patient around 1800.  Upon arrival patient fully awake and oriented x3 still complaining of PATEL . Dr Cisneros seen patient and activated for level 1 stroke . Patient denies  sob, cp, weakness, numbness , fever, vision changes .

## 2022-06-16 NOTE — CONSULT NOTE ADULT - ASSESSMENT
89y Female with PMHx of HLD, hypothyroid, headache, and vertigo presents to ED with recurrent frontal headache and confusion, LKN was 6pm. Patient went to her neighbor around 9pm to complain she wasn't at her baseline, noted to be confused that the time. BIBEMS at 9:41pm when stroke code was activated. NIH at the time was 1 for baseline L upper field cut, no new deficits rest of neurologic exam was intact. CTH was negative for acute findings.     Of note patient was in the ED 4 days ago complaining of the same symptoms, work up was noncontributory. All symptoms have since resolved from 3 hours ago.     Plan:  - follow up official read for CTH, per verbal radiology read- no acute findings  - ok to discharge from neurologic perspective with outpatient follow up- patient follows with Dr. Mckeon   - care per primary team    Case discussed with Dr. Pradhan

## 2022-06-16 NOTE — ED PROVIDER NOTE - PATIENT PORTAL LINK FT
You can access the FollowMyHealth Patient Portal offered by [facility] by registering at the following website: [email or web address]. By joining [employer]’s FollowMyHealth portal, you will also be able to view your health information using other applications (apps) compatible with our system.

## 2022-06-16 NOTE — ED PROVIDER NOTE - PROGRESS NOTE DETAILS
Reassessed, patient still alert and conversant, complaining again with headache.  Will treat symptoms, pending social work discussion in ED. Sid: pt now having dizziness and headache. Tried to walk and is unsteady, unsafe for discharge. Will admit to medicine

## 2022-06-16 NOTE — ED PROVIDER NOTE - PHYSICAL EXAMINATION
CONSTITUTIONAL: Well-appearing; in no apparent distress.   HEAD: Normocephalic; atraumatic.   EYES:  conjunctiva and sclera clear  ENT: normal nose; no rhinorrhea; normal pharynx with no erythema or lesions.   NECK: Supple; non-tender;   CARDIOVASCULAR: Normal S1, S2; no murmurs, rubs, or gallops. Regular rate and rhythm.   RESPIRATORY: Breathing easily; breath sounds clear and equal bilaterally; no wheezes, rhonchi, or rales.  GI: Soft; non-distended; non-tender; no palpable organomegaly.   EXT: No cyanosis or edema; N/V intact  SKIN: Normal for age and race; warm; dry; good turgor; no apparent lesions or rash.   NEURO: A & O x 3; face symmetric; grossly unremarkable.   PSYCHOLOGICAL: The patient’s mood and manner are appropriate. CONSTITUTIONAL: Well-appearing; in no apparent distress.   HEAD: Normocephalic; atraumatic.   EYES:  conjunctiva and sclera clear  ENT: normal nose; no rhinorrhea; normal pharynx with no erythema or lesions.   NECK: Supple; non-tender;   CARDIOVASCULAR: Normal S1, S2; no murmurs, rubs, or gallops. Regular rate and rhythm.   RESPIRATORY: Breathing easily; breath sounds clear and equal bilaterally; no wheezes, rhonchi, or rales.  GI: Soft; non-distended; non-tender; no palpable organomegaly.   EXT: No cyanosis or edema; N/V intact  SKIN: Normal for age and race; warm; dry; good turgor; no apparent lesions or rash.   NEURO: A & O x 1; L pronator drift, no facial droop, slurred speech, content of speech sometimes inappropriate, unable to assess gait, diffusely weak in all extremities.   PSYCHOLOGICAL: The patient’s mood and manner are appropriate.

## 2022-06-16 NOTE — ED PROVIDER NOTE - CLINICAL SUMMARY MEDICAL DECISION MAKING FREE TEXT BOX
89 y/o F pt presents to ED with acute onset headache, dizziness, and possible AMS earlier today. Code stroke called for concern for stroke/TIA. Pt's symptoms are improving though persistent, pt is not a candidate for TPA. 89 y/o F pt presents to ED with acute onset headache, dizziness, and possible AMS earlier today. Code stroke called for concern for stroke/TIA. Pt's symptoms are improving though persistent, pt is not a candidate for TPA. Symptoms initially seemed more L sided, however on repeat examination, neuro exam inconsistent. Pt waxing and waning mental status, concerning more for delirium. Given unclear etiology of symptoms, pt sent for emergent MRI; MRI negative for acute infarct. Labs concerning for positive trop and hyponatremia. Trop likely secondary to demand as pt appears more encephalopathic at this time. Question symptomatic from hyponatremia which has worsened from outpatient labs form 126 to 122 today. Will repeat labs and plan for admission for further workup, monitoring, and treatment.

## 2022-06-16 NOTE — ED PROVIDER NOTE - NSFOLLOWUPINSTRUCTIONS_ED_ALL_ED_FT
Please follow up closely with your neurologist about your recent stroke and management of the symptoms  Make sure to stay well hydrated and fed, your sodium was low today likely from dehydration    Headache    A headache is pain or discomfort felt around the head or neck area. The specific cause of a headache may not be found as there are many types including tension headaches, migraine headaches, and cluster headaches. Watch your condition for any changes. Things you can do to manage your pain include taking over the counter and prescription medications as instructed by your health care provider, lying down in a dark quiet room, limiting stress, getting regular sleep, and refraining from alcohol and tobacco products.    SEEK IMMEDIATE MEDICAL CARE IF YOU HAVE ANY OF THE FOLLOWING SYMPTOMS: fever, vomiting, stiff neck, loss of vision, problems with speech, muscle weakness, loss of balance, trouble walking, passing out, or confusion.       Hyponatremia      Hyponatremia is when the amount of salt (sodium) in your blood is too low. When salt levels are low, your body may take in extra water. This can cause swelling throughout the body. The swelling often affects the brain.      What are the causes?    This condition may be caused by:  •Certain medical problems or conditions.      •Vomiting a lot.      •Having watery poop (diarrhea) often.      •Certain medicines or illegal drugs.      •Not having enough water in the body (dehydration).      •Drinking too much water.       •Eating a diet that is low in salt.      •Large burns on your body.       •Too much sweating.        What increases the risk?    You are more likely to get this condition if you:  •Have long-term (chronic) kidney disease.      •Have heart failure.       •Have a medical condition that causes you to have watery poop often.      •Do very hard exercises.      •Take medicines that affect the amount of salt is in your blood.        What are the signs or symptoms?    Symptoms of this condition include:  •Headache.       •Feeling like you may vomit (nausea).      •Vomiting.      •Being very tired (lethargic).      •Muscle weakness and cramps.      •Not wanting to eat as much as normal (loss of appetite).      •Feeling weak or light-headed.       Severe symptoms of this condition include:  •Confusion.       •Feeling restless (agitation).      •Having a fast heart rate.      •Passing out (fainting).      •Seizures.       •Coma.        How is this treated?     Treatment for this condition depends on the cause. Treatment may include:  •Getting fluids through an IV tube that is put into one of your veins.      •Taking medicines to fix the salt levels in your blood. If medicines are causing the problem, your medicines will need to be changed.      •Limiting how much water or fluid you take in.      •Monitoring in the hospital to watch your symptoms.        Follow these instructions at home:     •Take over-the-counter and prescription medicines only as told by your doctor. Many medicines can make this condition worse. Talk with your doctor about any medicines that you are taking.    •Eat and drink exactly as you are told by your doctor.  •Eat only the foods you are told to eat.      •Limit how much fluid you take.        • Do not drink alcohol.      •Keep all follow-up visits as told by your doctor. This is important.        Contact a doctor if:    •You feel more like you may vomit.      •You feel more tired.      •Your headache gets worse.      •You feel more confused.      •You feel weaker.      •Your symptoms go away and then they come back.      •You have trouble following the diet instructions.        Get help right away if:    •You have a seizure.      •You pass out.      •You keep having watery poop.      •You keep vomiting.        Summary    •Hyponatremia is when the amount of salt in your blood is too low.      •When salt levels are low, you can have swelling throughout the body. The swelling mostly affects the brain.      •Treatment depends on the cause. Treatment may include getting IV fluids, medicines, or not drinking as much fluid.

## 2022-06-16 NOTE — CONSULT NOTE ADULT - SUBJECTIVE AND OBJECTIVE BOX
**STROKE CODE CONSULT NOTE**    Last known well time/Time of onset of symptoms: 6pm 6/16    HPI: 89y Female with PMHx of HLD, hypothyroid, headache, and vertigo presents to ED with recurrent frontal headache and confusion, LKN was 6pm. Patient went to her neighbor around 9pm to complain she wasn't at her baseline, noted to be confused that the time. BIBEMS at 9:41pm when stroke code was activated. NIH at the time was 1 for baseline L upper field cut, no new deficits rest of neurologic exam was intact. CTH was negative for acute findings.     Of note patient was in the ED 4 days ago complaining of the same symptoms, work up was noncontributory.     T(C): 36.6 (06-16-22 @ 21:37), Max: 36.6 (06-16-22 @ 21:37)  HR: 60 (06-16-22 @ 21:37) (60 - 60)  BP: 194/89 (06-16-22 @ 21:37) (194/89 - 194/89)  RR: 18 (06-16-22 @ 21:37) (18 - 18)  SpO2: 98% (06-16-22 @ 21:37) (98% - 98%)    PAST MEDICAL & SURGICAL HISTORY:  Hyperlipidemia  Hyperlipemia  Hypothyroidism  Hypothyroidism  Dizziness  Vertigo    Cytomegalovirus infection not present  No significant past surgical history    FAMILY HISTORY:  SOCIAL HISTORY:   Patient lives with home alone    ROS:   Constitutional: No fever, weight loss or fatigue  Neurological: As per HPI    MEDICATIONS  (STANDING):  MEDICATIONS  (PRN):  Allergies  No Known Allergies  Intolerances      Vital Signs Last 24 Hrs  T(C): 36.6 (16 Jun 2022 21:37), Max: 36.6 (16 Jun 2022 21:37)  T(F): 97.9 (16 Jun 2022 21:37), Max: 97.9 (16 Jun 2022 21:37)  HR: 60 (16 Jun 2022 21:37) (60 - 60)  BP: 194/89 (16 Jun 2022 21:37) (194/89 - 194/89)  BP(mean): --  RR: 18 (16 Jun 2022 21:37) (18 - 18)  SpO2: 98% (16 Jun 2022 21:37) (98% - 98%)    Neurologic:  AxOx3 able to follow simple and complex commands  PERRL, EMOI, L upper field cut (baseline from stroke in may), FS  5/5 throughout, sensations intact B/L  No dysmetria     NIHSS: 1 (baseline L upper field cut)     Fingerstick Blood Glucose: CAPILLARY BLOOD GLUCOSE  POCT Blood Glucose.: 133 mg/dL (16 Jun 2022 21:46)    RADIOLOGY & ADDITIONAL STUDIES:  Stroke [facility] 6/16: negative for acute stroke    -----------------------------------------------------------------------------------------------------------------  IV-tPA (Y/N):    No           Reason IV-tPA not given: low NIH

## 2022-06-16 NOTE — ED PROVIDER NOTE - OBJECTIVE STATEMENT
89 y/o F pt with PMhx of vertigo, HTN, and HLD presents to ED c/o feeling nauseous and headache x 3-4hrs prior to ED arrival. Pt states she feels dizzy and off balance, but it doesn't feel like her prior episodes of vertigo. Pt admits to not having eaten today. She denies any vision changes, difficulty ambulating, or any other acute complaints. She is on blood thinners. Pt relates being evaluated for something similar in the past, but it was not deemed to be a stroke. However, because of acute onset of headache, dizziness, and possible AMS earlier per neighborr, code stroke called for stroke/TIA.

## 2022-06-16 NOTE — ED PROVIDER NOTE - CARE PLAN
1 Principal Discharge DX:	Dehydration with hyponatremia   Principal Discharge DX:	Dehydration with hyponatremia  Secondary Diagnosis:	Dizziness  Secondary Diagnosis:	H/O headache

## 2022-06-17 ENCOUNTER — NON-APPOINTMENT (OUTPATIENT)
Age: 87
End: 2022-06-17

## 2022-06-17 ENCOUNTER — APPOINTMENT (OUTPATIENT)
Dept: HEART AND VASCULAR | Facility: CLINIC | Age: 87
End: 2022-06-17
Payer: MEDICARE

## 2022-06-17 DIAGNOSIS — E03.9 HYPOTHYROIDISM, UNSPECIFIED: ICD-10-CM

## 2022-06-17 DIAGNOSIS — R42 DIZZINESS AND GIDDINESS: ICD-10-CM

## 2022-06-17 DIAGNOSIS — R53.81 OTHER MALAISE: ICD-10-CM

## 2022-06-17 DIAGNOSIS — I10 ESSENTIAL (PRIMARY) HYPERTENSION: ICD-10-CM

## 2022-06-17 DIAGNOSIS — Z29.9 ENCOUNTER FOR PROPHYLACTIC MEASURES, UNSPECIFIED: ICD-10-CM

## 2022-06-17 DIAGNOSIS — G43.909 MIGRAINE, UNSPECIFIED, NOT INTRACTABLE, WITHOUT STATUS MIGRAINOSUS: ICD-10-CM

## 2022-06-17 DIAGNOSIS — E78.5 HYPERLIPIDEMIA, UNSPECIFIED: ICD-10-CM

## 2022-06-17 DIAGNOSIS — E87.1 HYPO-OSMOLALITY AND HYPONATREMIA: ICD-10-CM

## 2022-06-17 DIAGNOSIS — Z86.73 PERSONAL HISTORY OF TRANSIENT ISCHEMIC ATTACK (TIA), AND CEREBRAL INFARCTION WITHOUT RESIDUAL DEFICITS: ICD-10-CM

## 2022-06-17 LAB
ANION GAP SERPL CALC-SCNC: 11 MMOL/L — SIGNIFICANT CHANGE UP (ref 5–17)
BUN SERPL-MCNC: 7 MG/DL — SIGNIFICANT CHANGE UP (ref 7–23)
CALCIUM SERPL-MCNC: 9.3 MG/DL — SIGNIFICANT CHANGE UP (ref 8.4–10.5)
CHLORIDE SERPL-SCNC: 94 MMOL/L — LOW (ref 96–108)
CO2 SERPL-SCNC: 25 MMOL/L — SIGNIFICANT CHANGE UP (ref 22–31)
CREAT SERPL-MCNC: 0.56 MG/DL — SIGNIFICANT CHANGE UP (ref 0.5–1.3)
EGFR: 87 ML/MIN/1.73M2 — SIGNIFICANT CHANGE UP
GLUCOSE SERPL-MCNC: 122 MG/DL — HIGH (ref 70–99)
MAGNESIUM SERPL-MCNC: 1.9 MG/DL — SIGNIFICANT CHANGE UP (ref 1.6–2.6)
PHOSPHATE SERPL-MCNC: 2.5 MG/DL — SIGNIFICANT CHANGE UP (ref 2.5–4.5)
POTASSIUM SERPL-MCNC: 3.8 MMOL/L — SIGNIFICANT CHANGE UP (ref 3.5–5.3)
POTASSIUM SERPL-SCNC: 3.8 MMOL/L — SIGNIFICANT CHANGE UP (ref 3.5–5.3)
SODIUM SERPL-SCNC: 130 MMOL/L — LOW (ref 135–145)
T4 FREE SERPL-MCNC: 1.56 NG/DL — SIGNIFICANT CHANGE UP (ref 0.93–1.7)
TSH SERPL-MCNC: 1.79 UIU/ML — SIGNIFICANT CHANGE UP (ref 0.27–4.2)

## 2022-06-17 PROCEDURE — 99223 1ST HOSP IP/OBS HIGH 75: CPT

## 2022-06-17 PROCEDURE — 93298 REM INTERROG DEV EVAL SCRMS: CPT

## 2022-06-17 PROCEDURE — G2066: CPT

## 2022-06-17 RX ORDER — AMLODIPINE BESYLATE 2.5 MG/1
5 TABLET ORAL ONCE
Refills: 0 | Status: DISCONTINUED | OUTPATIENT
Start: 2022-06-17 | End: 2022-06-17

## 2022-06-17 RX ORDER — ACETAMINOPHEN 500 MG
975 TABLET ORAL ONCE
Refills: 0 | Status: COMPLETED | OUTPATIENT
Start: 2022-06-17 | End: 2022-06-17

## 2022-06-17 RX ORDER — ACETAMINOPHEN 500 MG
975 TABLET ORAL EVERY 6 HOURS
Refills: 0 | Status: DISCONTINUED | OUTPATIENT
Start: 2022-06-17 | End: 2022-06-21

## 2022-06-17 RX ORDER — LANOLIN ALCOHOL/MO/W.PET/CERES
5 CREAM (GRAM) TOPICAL AT BEDTIME
Refills: 0 | Status: DISCONTINUED | OUTPATIENT
Start: 2022-06-17 | End: 2022-06-21

## 2022-06-17 RX ORDER — MIRTAZAPINE 45 MG/1
7.5 TABLET, ORALLY DISINTEGRATING ORAL EVERY 24 HOURS
Refills: 0 | Status: DISCONTINUED | OUTPATIENT
Start: 2022-06-17 | End: 2022-06-21

## 2022-06-17 RX ORDER — AMLODIPINE BESYLATE 2.5 MG/1
5 TABLET ORAL ONCE
Refills: 0 | Status: COMPLETED | OUTPATIENT
Start: 2022-06-17 | End: 2022-06-17

## 2022-06-17 RX ORDER — ACETAMINOPHEN 500 MG
650 TABLET ORAL ONCE
Refills: 0 | Status: COMPLETED | OUTPATIENT
Start: 2022-06-17 | End: 2022-06-17

## 2022-06-17 RX ORDER — ATORVASTATIN CALCIUM 80 MG/1
40 TABLET, FILM COATED ORAL AT BEDTIME
Refills: 0 | Status: DISCONTINUED | OUTPATIENT
Start: 2022-06-17 | End: 2022-06-21

## 2022-06-17 RX ORDER — DULOXETINE HYDROCHLORIDE 30 MG/1
20 CAPSULE, DELAYED RELEASE ORAL EVERY 24 HOURS
Refills: 0 | Status: DISCONTINUED | OUTPATIENT
Start: 2022-06-17 | End: 2022-06-18

## 2022-06-17 RX ORDER — ENOXAPARIN SODIUM 100 MG/ML
40 INJECTION SUBCUTANEOUS EVERY 24 HOURS
Refills: 0 | Status: DISCONTINUED | OUTPATIENT
Start: 2022-06-17 | End: 2022-06-21

## 2022-06-17 RX ORDER — ASPIRIN/CALCIUM CARB/MAGNESIUM 324 MG
81 TABLET ORAL EVERY 24 HOURS
Refills: 0 | Status: DISCONTINUED | OUTPATIENT
Start: 2022-06-17 | End: 2022-06-21

## 2022-06-17 RX ORDER — CLOPIDOGREL BISULFATE 75 MG/1
75 TABLET, FILM COATED ORAL EVERY 24 HOURS
Refills: 0 | Status: DISCONTINUED | OUTPATIENT
Start: 2022-06-17 | End: 2022-06-21

## 2022-06-17 RX ORDER — AMLODIPINE BESYLATE 2.5 MG/1
5 TABLET ORAL EVERY 24 HOURS
Refills: 0 | Status: DISCONTINUED | OUTPATIENT
Start: 2022-06-18 | End: 2022-06-21

## 2022-06-17 RX ORDER — LEVOTHYROXINE SODIUM 125 MCG
75 TABLET ORAL DAILY
Refills: 0 | Status: DISCONTINUED | OUTPATIENT
Start: 2022-06-17 | End: 2022-06-21

## 2022-06-17 RX ORDER — MECLIZINE HCL 12.5 MG
12.5 TABLET ORAL EVERY 6 HOURS
Refills: 0 | Status: DISCONTINUED | OUTPATIENT
Start: 2022-06-17 | End: 2022-06-17

## 2022-06-17 RX ORDER — MECLIZINE HCL 12.5 MG
12.5 TABLET ORAL EVERY 6 HOURS
Refills: 0 | Status: DISCONTINUED | OUTPATIENT
Start: 2022-06-17 | End: 2022-06-21

## 2022-06-17 RX ADMIN — ATORVASTATIN CALCIUM 40 MILLIGRAM(S): 80 TABLET, FILM COATED ORAL at 22:19

## 2022-06-17 RX ADMIN — ENOXAPARIN SODIUM 40 MILLIGRAM(S): 100 INJECTION SUBCUTANEOUS at 21:29

## 2022-06-17 RX ADMIN — SODIUM CHLORIDE 1000 MILLILITER(S): 9 INJECTION INTRAMUSCULAR; INTRAVENOUS; SUBCUTANEOUS at 01:00

## 2022-06-17 RX ADMIN — Medication 1000 MILLIGRAM(S): at 01:00

## 2022-06-17 RX ADMIN — Medication 650 MILLIGRAM(S): at 07:57

## 2022-06-17 RX ADMIN — Medication 975 MILLIGRAM(S): at 23:00

## 2022-06-17 RX ADMIN — MIRTAZAPINE 7.5 MILLIGRAM(S): 45 TABLET, ORALLY DISINTEGRATING ORAL at 22:32

## 2022-06-17 RX ADMIN — DULOXETINE HYDROCHLORIDE 20 MILLIGRAM(S): 30 CAPSULE, DELAYED RELEASE ORAL at 15:38

## 2022-06-17 RX ADMIN — Medication 975 MILLIGRAM(S): at 22:19

## 2022-06-17 RX ADMIN — CLOPIDOGREL BISULFATE 75 MILLIGRAM(S): 75 TABLET, FILM COATED ORAL at 17:57

## 2022-06-17 RX ADMIN — Medication 81 MILLIGRAM(S): at 17:57

## 2022-06-17 RX ADMIN — Medication 75 MICROGRAM(S): at 11:40

## 2022-06-17 RX ADMIN — Medication 975 MILLIGRAM(S): at 15:38

## 2022-06-17 RX ADMIN — AMLODIPINE BESYLATE 5 MILLIGRAM(S): 2.5 TABLET ORAL at 01:18

## 2022-06-17 RX ADMIN — Medication 975 MILLIGRAM(S): at 17:39

## 2022-06-17 NOTE — H&P ADULT - PROBLEM SELECTOR PLAN 9
F: s/p 1L NS, encourage PO intake  E: monitor Na, replete as needed  N: DASH/TLC  D: lovenox  Dispo; [facility] then [facility] vs home pending PT/OT

## 2022-06-17 NOTE — H&P ADULT - HISTORY OF PRESENT ILLNESS
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 Pt was feeling her baseline around 630pm 6/16. Then, pt began to feel dizzy and off balance, but it didn't feel like her prior episodes of vertigo. Pt admits to not having eaten yesterday. She denies any vision changes, difficulty ambulating, or any other acute complaints. Pt relates being evaluated for something similar in the past week, but it was not deemed to be an acute stroke and was discharged from this ED. However, because of acute onset of headache, dizziness, and possible AMS earlier per neighbor, code stroke called for stroke/TIA. Stroke workup was negative. Symptoms resolved overnight. Pt spent the night in the ED and slept comfortably. However when awakening this AM she was dizzy again and unable to walk. Of note patient was in the ED 4 days ago complaining of the same symptoms, work up was noncontributory.      ED Course:  Arrived at ED at 10pm, code stroke was called [facility] at the time was 1 for baseline L upper field cut, no new deficits rest of neurologic exam was intact. CTH was negative for acute findings and redemonstrated previous stroke. Pt remained in ED and was found to have difficulty walking with dizziness this morning.    Vitals: Temp 97.9, HR 60, /89 improved to 163/78, RR 18, SaO2 97%  Signicant labs: WBC 6.5, Na 126-> 130, serum osmolality 264, urine Na 64 and urine osmolality 159  EKG:normal sinus rhythm at 61bpm with PACs  Imaging: No acute intracranial hemorrhage or new site of demarcated transcortical   infarction. Continued evolution of a previous right PCA territory infarct.  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 Pt was feeling her baseline around 630pm 6/16. Then, pt began to feel dizzy and off balance, but it didn't feel like her prior episodes of vertigo. Pt admits to not having eaten yesterday and poor appetite over the past month. She denies any vision changes, difficulty ambulating, or any other acute complaints. Pt relates being evaluated for something similar in the past week, but it was not deemed to be an acute stroke and was discharged from this ED. However, because of acute onset of headache, dizziness, and possible AMS earlier per neighbor, code stroke called for stroke/TIA. Stroke workup was negative. Symptoms resolved overnight. Pt spent the night in the ED and slept comfortably. However when awakening this AM she was dizzy again and unable to walk. Of note patient was in the ED 4 days ago complaining of the same symptoms, work up was noncontributory.      ED Course:  Arrived at ED at 10pm, code stroke was called [facility] at the time was 1 for baseline L upper field cut, no new deficits rest of neurologic exam was intact. CTH was negative for acute findings and redemonstrated previous stroke. Pt remained in ED and was found to have difficulty walking with dizziness this morning.    Vitals: Temp 97.9, HR 60, /89 improved to 163/78, RR 18, SaO2 97%  Signicant labs: WBC 6.5, Na 126-> 130, serum osmolality 264, urine Na 64 and urine osmolality 159  EKG:normal sinus rhythm at 61bpm with PACs  Imaging: No acute intracranial hemorrhage or new site of demarcated transcortical   infarction. Continued evolution of a previous right PCA territory infarct.  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 Pt was feeling her baseline around 630pm 6/16. Then, pt began to feel dizzy and off balance, but it didn't feel like her prior episodes of vertigo. Pt feels the room spinning and has to close her eyes. Pt admits to not having eaten yesterday and poor appetite over the past month. She denies any vision changes, difficulty ambulating, or any other acute complaints. Pt relates being evaluated for something similar in the past week, but it was not deemed to be an acute stroke and was discharged from this ED. However, because of acute onset of headache, dizziness, and possible AMS earlier per neighbor, code stroke called for stroke/TIA. Stroke workup was negative. Symptoms resolved overnight. Pt spent the night in the ED and slept comfortably. However when awakening this AM she was dizzy again and unable to walk.    ED Course:  Arrived at ED at 10pm, code stroke was called [facility] at the time was 1 for baseline L upper field cut, no new deficits rest of neurologic exam was intact. CTH was negative for acute findings and redemonstrated previous stroke. Pt remained in ED and was found to have difficulty walking with dizziness this morning.    Vitals: Temp 97.9, HR 60, /89 improved to 163/78, RR 18, SaO2 97%  Signicant labs: WBC 6.5, Na 126-> 130, serum osmolality 264, urine Na 64 and urine osmolality 159  EKG:normal sinus rhythm at 61bpm with PACs  Imaging: No acute intracranial hemorrhage or new site of demarcated transcortical   infarction. Continued evolution of a previous right PCA territory infarct.  Inteventions: IV tylenol x1, PO tylenol x1, amlodipine 5mg, IVF NS 1L  Consults: Stroke code - neurology

## 2022-06-17 NOTE — H&P ADULT - PROBLEM SELECTOR PLAN 3
Pt unable to ambulate today. Likely due to debility     - PT consult  - OT consult  - encourage PO intake Pt unable to ambulate today. Likely due to debility. Says she has no appetite    - PT consult  - OT consult  - encourage PO intake  - consider starting mirtazapine for appetite and sleep Pt has a hx of hyponatremia on prior labs. Pt endorses poor po intake. Labs consistent with hypoosmolar hyponatremia likely 2/2 "tea-toast diet"/ poor PO intake. Sodium correcting with food and 1L NS.     - encourage PO intake  - monitor Na daily

## 2022-06-17 NOTE — H&P ADULT - PROBLEM SELECTOR PLAN 5
Pt hypertensive on admission. On amlodipine 5mg at home    - restart amlodipine 5mg Pt had a recent acute/subacute stroke in May 2022. Follows with Dr. Mckeon. CT head this admission showed evolution of the prior stroke. No new stroke    - c/w aspirin 81  - c/w plavix 75  - c/w atorvastatin

## 2022-06-17 NOTE — ED ADULT NURSE REASSESSMENT NOTE - NS ED NURSE REASSESS COMMENT FT1
Patient resting in bed in no acute distress at this time, talking on cell phone. Patient pending transport back home.

## 2022-06-17 NOTE — H&P ADULT - NSHPPHYSICALEXAM_GEN_ALL_CORE
Vital Signs Last 24 Hrs  T(C): 36.8 (17 Jun 2022 09:25), Max: 36.8 (17 Jun 2022 09:25)  T(F): 98.2 (17 Jun 2022 09:25), Max: 98.2 (17 Jun 2022 09:25)  HR: 62 (17 Jun 2022 09:25) (60 - 79)  BP: 163/78 (17 Jun 2022 09:25) (163/78 - 194/89)  BP(mean): --  RR: 18 (17 Jun 2022 09:25) (16 - 18)  SpO2: 97% (17 Jun 2022 09:25) (97% - 99%)    CONSTITUTIONAL: Well-appearing; in no apparent distress.   HEAD: Normocephalic; atraumatic.   EYES:  conjunctiva and sclera clear  ENT: normal nose; no rhinorrhea; normal pharynx with no erythema or lesions.   NECK: Supple; non-tender;   CARDIOVASCULAR: Normal S1, S2; no murmurs, rubs, or gallops. Regular rate and rhythm.   RESPIRATORY: Breathing easily; breath sounds clear and equal bilaterally; no wheezes, rhonchi, or rales.  GI: Soft; non-distended; non-tender; no palpable organomegaly.   EXT: No cyanosis or edema; N/V intact  SKIN: Normal for age and race; warm; dry; good turgor; no apparent lesions or rash.   NEURO: AxOx3 able to follow simple and complex commands, PERRL, EMOI, L upper field cut (baseline from stroke in may), 5/5 throughout, sensations intact B/L. No dysmetriaA & O x 1; L pronator drift, no facial droop, slurred speech, content of speech sometimes inappropriate, unable to assess gait, diffusely weak in all extremities.   PSYCHOLOGICAL: The patient’s mood and manner are appropriate. Vital Signs Last 24 Hrs  T(C): 36.8 (17 Jun 2022 09:25), Max: 36.8 (17 Jun 2022 09:25)  T(F): 98.2 (17 Jun 2022 09:25), Max: 98.2 (17 Jun 2022 09:25)  HR: 62 (17 Jun 2022 09:25) (60 - 79)  BP: 163/78 (17 Jun 2022 09:25) (163/78 - 194/89)  BP(mean): --  RR: 18 (17 Jun 2022 09:25) (16 - 18)  SpO2: 97% (17 Jun 2022 09:25) (97% - 99%)    CONSTITUTIONAL: Well-appearing; in no apparent distress.   HEAD: Normocephalic; atraumatic.   EYES:  conjunctiva and sclera clear  ENT: normal nose; no rhinorrhea; normal pharynx with no erythema or lesions.   NECK: Supple; non-tender;   CARDIOVASCULAR: Normal S1, S2; no murmurs, rubs, or gallops. Regular rate and rhythm.   RESPIRATORY: Breathing easily; breath sounds clear and equal bilaterally; no wheezes, rhonchi, or rales.  GI: Soft; non-distended; non-tender; no palpable organomegaly.   EXT: No cyanosis or edema; N/V intact  SKIN: Normal for age and race; warm; dry; good turgor; no apparent lesions or rash.   NEURO: AxOx3 able to follow simple and complex commands, PERRL, EMOI, L upper field cut (baseline from stroke in may), 5/5 throughout, sensations intact B/L. No dysmetria  PSYCHOLOGICAL: The patient’s mood and manner are appropriate.

## 2022-06-17 NOTE — H&P ADULT - PROBLEM SELECTOR PLAN 6
- c/w atorvastatin  - f/u lipid profile Pt hypertensive on admission. On amlodipine 5mg at home    - restart amlodipine 5mg

## 2022-06-17 NOTE — H&P ADULT - NSHPLABSRESULTS_GEN_ALL_CORE
12.3   6.51  )-----------( 255      ( 16 Jun 2022 22:06 )             35.5   06-17    130<L>  |  94<L>  |  7   ----------------------------<  122<H>  3.8   |  25  |  0.56    Ca    9.3      17 Jun 2022 00:23    TPro  7.2  /  Alb  4.5  /  TBili  0.5  /  DBili  x   /  AST  20  /  ALT  14  /  AlkPhos  58  06-16  < from: CT Brain Stroke Protocol (06.16.22 @ 21:59) >      IMPRESSION:  No acute intracranial hemorrhage or new site of demarcated transcortical   infarction.    Continued evolution of aright PCA territory infarct.      Last images acquired at 9:52 PM and findings relayed to an Ni PA at 9:56   PM.    < end of copied text >

## 2022-06-17 NOTE — H&P ADULT - PROBLEM SELECTOR PLAN 1
Pt presented with headache and vertigo. Pt has hx of recurrent headaches with vertigo. Course has been waxing and waning during ED course. Pt unsteady on feet this morning so was admitted. Hx of recent stroke in May 2022 with residual VF cut. Stroke code called overnight and was negative for new stroke. Can be due to poor PO intake and dehydration    - encourage PO intake  - PT consult  - social work  - OT consult  - consider neurology consult for persistent headaches and vertigo however pt follows with Dr. Mckeon already outpatient Pt presented with headache and vertigo. Pt has hx of recurrent headaches with vertigo. Course has been waxing and waning during ED course. Pt unsteady on feet this morning so was admitted. Hx of recent stroke in May 2022 with residual VF cut. Stroke code called overnight and was negative for new stroke. Can be due to poor PO intake and dehydration    - encourage PO intake  - PT consult  - social work  - OT consult  - consider neurology consult for persistent headaches and vertigo however pt follows with Dr. Mckeon and Dr. Falcon already outpatient  - consider vestibular therapy with PT  - meclizine PRN  - check orthostatics

## 2022-06-17 NOTE — PATIENT PROFILE ADULT - FALL HARM RISK - HARM RISK INTERVENTIONS
Assistance with ambulation/Assistance OOB with selected safe patient handling equipment/Communicate Risk of Fall with Harm to all staff/Discuss with provider need for PT consult/Monitor gait and stability/Reinforce activity limits and safety measures with patient and family/Sit up slowly, dangle for a short time, stand at bedside before walking/Tailored Fall Risk Interventions/Visual Cue: Yellow wristband and red socks/Bed in lowest position, wheels locked, appropriate side rails in place/Call bell, personal items and telephone in reach/Instruct patient to call for assistance before getting out of bed or chair/Non-slip footwear when patient is out of bed/[place] to call system/Physically safe environment - no spills, clutter or unnecessary equipment/Purposeful Proactive Rounding/Room/bathroom lighting operational, light cord in reach

## 2022-06-17 NOTE — H&P ADULT - PROBLEM SELECTOR PLAN 4
Pt had a recent acute/subacute stroke in May 2022. Follows with Dr. Mckeon. CT head this admission showed evolution of the prior stroke. No new stroke    - c/w aspirin 81  - c/w plavix 75  - c/w atorvastatin Pt unable to ambulate today. Likely due to debility. Says she has no appetite    - PT consult  - OT consult  - encourage PO intake  - consider starting mirtazapine for appetite and sleep

## 2022-06-17 NOTE — H&P ADULT - NSHPREVIEWOFSYSTEMS_GEN_ALL_CORE
REVIEW OF SYSTEMS:    CONSTITUTIONAL: +weakness, no fevers or chills, +headache  EYES/ENT: No visual changes;  + vertigo, no throat pain   NECK: No pain or stiffness  RESPIRATORY: No cough, wheezing, hemoptysis; No shortness of breath  CARDIOVASCULAR: No chest pain or palpitations  GASTROINTESTINAL: No abdominal or epigastric pain. No nausea, vomiting, or hematemesis; No diarrhea or constipation. No melena or hematochezia.  GENITOURINARY: No dysuria, frequency or hematuria  NEUROLOGICAL: No numbness or weakness  SKIN: No itching, rashes

## 2022-06-17 NOTE — H&P ADULT - ASSESSMENT
89y Female with PMHx of prior stroke (5/22), HLD, hypothyroid, headache, and vertigo presents to ED with recurrent frontal headache and confusion. BIBEMS at 9:41pm when stroke code was activated. CTH was negative for acute findings. Pt now admitted for recurrent vertigo and headache with inability to ambulate. Pt also found to have worse hyponatremia than baseline but is improving with fluids and diet. Pt pending physical therapy and social work. 89y Female with PMHx of prior stroke (5/22), HLD, hypothyroid, headache, and vertigo presents to ED with recurrent frontal headache and nausea. BIBEMS at 9:41pm when stroke code was activated. CTH was negative for acute findings. Pt now admitted for recurrent vertigo and headache with inability to ambulate. Pt also found to have worse hyponatremia than baseline but is improving with fluids and diet. Pt pending physical therapy and social work.

## 2022-06-17 NOTE — H&P ADULT - NSICDXPASTMEDICALHX_GEN_ALL_CORE_FT
PAST MEDICAL HISTORY:  Dizziness Vertigo    H/O: stroke     Hyperlipidemia Hyperlipemia    Hypothyroidism Hypothyroidism    Vertigo

## 2022-06-17 NOTE — H&P ADULT - NSHPSOCIALHISTORY_GEN_ALL_CORE
lives at home alone, first floor apartment with 4 steps into building, does not use a walker. Usually ambulates to grocery store but not when she is feeling dizzy

## 2022-06-17 NOTE — H&P ADULT - PROBLEM SELECTOR PLAN 8
F: s/p 1L NS, encourage PO intake  E: monitor Na, replete as needed  N: DASH/TLC  D: lovenox  Dispo; [facility] then [facility] vs home pending PT/OT - c/w synthroid 75mg  - TSH and T4 normal

## 2022-06-17 NOTE — H&P ADULT - PROBLEM SELECTOR PLAN 2
Pt has a hx of hyponatremia on prior labs. Pt endorses poor po intake. Labs consistent with hypoosmolar hyponatremia likely 2/2 "tea-toast diet"/ poor PO intake. Sodium correcting with food and 1L NS.     - encourage PO intake  - monitor Na daily Complains of chronic headaches.     - c/w tylenol 1g prn  - c/w duloxetine at home 20mg daily

## 2022-06-18 LAB
ANION GAP SERPL CALC-SCNC: 10 MMOL/L — SIGNIFICANT CHANGE UP (ref 5–17)
ANION GAP SERPL CALC-SCNC: 12 MMOL/L — SIGNIFICANT CHANGE UP (ref 5–17)
ANION GAP SERPL CALC-SCNC: 13 MMOL/L — SIGNIFICANT CHANGE UP (ref 5–17)
BASOPHILS # BLD AUTO: 0.02 K/UL — SIGNIFICANT CHANGE UP (ref 0–0.2)
BASOPHILS NFR BLD AUTO: 0.3 % — SIGNIFICANT CHANGE UP (ref 0–2)
BUN SERPL-MCNC: 12 MG/DL — SIGNIFICANT CHANGE UP (ref 7–23)
BUN SERPL-MCNC: 21 MG/DL — SIGNIFICANT CHANGE UP (ref 7–23)
BUN SERPL-MCNC: 9 MG/DL — SIGNIFICANT CHANGE UP (ref 7–23)
CALCIUM SERPL-MCNC: 9.6 MG/DL — SIGNIFICANT CHANGE UP (ref 8.4–10.5)
CALCIUM SERPL-MCNC: 9.8 MG/DL — SIGNIFICANT CHANGE UP (ref 8.4–10.5)
CALCIUM SERPL-MCNC: 9.8 MG/DL — SIGNIFICANT CHANGE UP (ref 8.4–10.5)
CHLORIDE SERPL-SCNC: 93 MMOL/L — LOW (ref 96–108)
CHLORIDE SERPL-SCNC: 94 MMOL/L — LOW (ref 96–108)
CHLORIDE SERPL-SCNC: 94 MMOL/L — LOW (ref 96–108)
CHOLEST SERPL-MCNC: 213 MG/DL — HIGH
CO2 SERPL-SCNC: 22 MMOL/L — SIGNIFICANT CHANGE UP (ref 22–31)
CO2 SERPL-SCNC: 23 MMOL/L — SIGNIFICANT CHANGE UP (ref 22–31)
CO2 SERPL-SCNC: 25 MMOL/L — SIGNIFICANT CHANGE UP (ref 22–31)
CREAT SERPL-MCNC: 0.62 MG/DL — SIGNIFICANT CHANGE UP (ref 0.5–1.3)
CREAT SERPL-MCNC: 0.77 MG/DL — SIGNIFICANT CHANGE UP (ref 0.5–1.3)
CREAT SERPL-MCNC: 0.89 MG/DL — SIGNIFICANT CHANGE UP (ref 0.5–1.3)
EGFR: 62 ML/MIN/1.73M2 — SIGNIFICANT CHANGE UP
EGFR: 73 ML/MIN/1.73M2 — SIGNIFICANT CHANGE UP
EGFR: 85 ML/MIN/1.73M2 — SIGNIFICANT CHANGE UP
EOSINOPHIL # BLD AUTO: 0.12 K/UL — SIGNIFICANT CHANGE UP (ref 0–0.5)
EOSINOPHIL NFR BLD AUTO: 1.8 % — SIGNIFICANT CHANGE UP (ref 0–6)
GLUCOSE SERPL-MCNC: 101 MG/DL — HIGH (ref 70–99)
GLUCOSE SERPL-MCNC: 132 MG/DL — HIGH (ref 70–99)
GLUCOSE SERPL-MCNC: 150 MG/DL — HIGH (ref 70–99)
HCT VFR BLD CALC: 36.7 % — SIGNIFICANT CHANGE UP (ref 34.5–45)
HDLC SERPL-MCNC: 64 MG/DL — SIGNIFICANT CHANGE UP
HGB BLD-MCNC: 13 G/DL — SIGNIFICANT CHANGE UP (ref 11.5–15.5)
IMM GRANULOCYTES NFR BLD AUTO: 0.6 % — SIGNIFICANT CHANGE UP (ref 0–1.5)
LIPID PNL WITH DIRECT LDL SERPL: 124 MG/DL — HIGH
LYMPHOCYTES # BLD AUTO: 1.35 K/UL — SIGNIFICANT CHANGE UP (ref 1–3.3)
LYMPHOCYTES # BLD AUTO: 19.9 % — SIGNIFICANT CHANGE UP (ref 13–44)
MAGNESIUM SERPL-MCNC: 2 MG/DL — SIGNIFICANT CHANGE UP (ref 1.6–2.6)
MCHC RBC-ENTMCNC: 32.7 PG — SIGNIFICANT CHANGE UP (ref 27–34)
MCHC RBC-ENTMCNC: 35.4 GM/DL — SIGNIFICANT CHANGE UP (ref 32–36)
MCV RBC AUTO: 92.2 FL — SIGNIFICANT CHANGE UP (ref 80–100)
MONOCYTES # BLD AUTO: 0.72 K/UL — SIGNIFICANT CHANGE UP (ref 0–0.9)
MONOCYTES NFR BLD AUTO: 10.6 % — SIGNIFICANT CHANGE UP (ref 2–14)
NEUTROPHILS # BLD AUTO: 4.52 K/UL — SIGNIFICANT CHANGE UP (ref 1.8–7.4)
NEUTROPHILS NFR BLD AUTO: 66.8 % — SIGNIFICANT CHANGE UP (ref 43–77)
NON HDL CHOLESTEROL: 149 MG/DL — HIGH
NRBC # BLD: 0 /100 WBCS — SIGNIFICANT CHANGE UP (ref 0–0)
PHOSPHATE SERPL-MCNC: 3.5 MG/DL — SIGNIFICANT CHANGE UP (ref 2.5–4.5)
PLATELET # BLD AUTO: 283 K/UL — SIGNIFICANT CHANGE UP (ref 150–400)
POTASSIUM SERPL-MCNC: 3.8 MMOL/L — SIGNIFICANT CHANGE UP (ref 3.5–5.3)
POTASSIUM SERPL-MCNC: 3.8 MMOL/L — SIGNIFICANT CHANGE UP (ref 3.5–5.3)
POTASSIUM SERPL-MCNC: 4 MMOL/L — SIGNIFICANT CHANGE UP (ref 3.5–5.3)
POTASSIUM SERPL-SCNC: 3.8 MMOL/L — SIGNIFICANT CHANGE UP (ref 3.5–5.3)
POTASSIUM SERPL-SCNC: 3.8 MMOL/L — SIGNIFICANT CHANGE UP (ref 3.5–5.3)
POTASSIUM SERPL-SCNC: 4 MMOL/L — SIGNIFICANT CHANGE UP (ref 3.5–5.3)
RBC # BLD: 3.98 M/UL — SIGNIFICANT CHANGE UP (ref 3.8–5.2)
RBC # FLD: 12.1 % — SIGNIFICANT CHANGE UP (ref 10.3–14.5)
SODIUM SERPL-SCNC: 128 MMOL/L — LOW (ref 135–145)
SODIUM SERPL-SCNC: 129 MMOL/L — LOW (ref 135–145)
SODIUM SERPL-SCNC: 129 MMOL/L — LOW (ref 135–145)
TRIGL SERPL-MCNC: 126 MG/DL — SIGNIFICANT CHANGE UP
WBC # BLD: 6.77 K/UL — SIGNIFICANT CHANGE UP (ref 3.8–10.5)
WBC # FLD AUTO: 6.77 K/UL — SIGNIFICANT CHANGE UP (ref 3.8–10.5)

## 2022-06-18 PROCEDURE — 70450 CT HEAD/BRAIN W/O DYE: CPT | Mod: 26

## 2022-06-18 PROCEDURE — 99233 SBSQ HOSP IP/OBS HIGH 50: CPT | Mod: GC

## 2022-06-18 PROCEDURE — 70551 MRI BRAIN STEM W/O DYE: CPT | Mod: 26

## 2022-06-18 PROCEDURE — 99222 1ST HOSP IP/OBS MODERATE 55: CPT

## 2022-06-18 RX ORDER — OXYBUTYNIN CHLORIDE 5 MG
10 TABLET ORAL DAILY
Refills: 0 | Status: DISCONTINUED | OUTPATIENT
Start: 2022-06-18 | End: 2022-06-21

## 2022-06-18 RX ORDER — VALPROIC ACID (AS SODIUM SALT) 250 MG/5ML
500 SOLUTION, ORAL ORAL ONCE
Refills: 0 | Status: COMPLETED | OUTPATIENT
Start: 2022-06-18 | End: 2022-06-18

## 2022-06-18 RX ORDER — MAGNESIUM SULFATE 500 MG/ML
1 VIAL (ML) INJECTION ONCE
Refills: 0 | Status: COMPLETED | OUTPATIENT
Start: 2022-06-18 | End: 2022-06-18

## 2022-06-18 RX ORDER — SODIUM CHLORIDE 9 MG/ML
500 INJECTION INTRAMUSCULAR; INTRAVENOUS; SUBCUTANEOUS ONCE
Refills: 0 | Status: COMPLETED | OUTPATIENT
Start: 2022-06-18 | End: 2022-06-18

## 2022-06-18 RX ORDER — DULOXETINE HYDROCHLORIDE 30 MG/1
40 CAPSULE, DELAYED RELEASE ORAL EVERY 24 HOURS
Refills: 0 | Status: DISCONTINUED | OUTPATIENT
Start: 2022-06-19 | End: 2022-06-21

## 2022-06-18 RX ORDER — SODIUM CHLORIDE 9 MG/ML
1 INJECTION INTRAMUSCULAR; INTRAVENOUS; SUBCUTANEOUS EVERY 8 HOURS
Refills: 0 | Status: DISCONTINUED | OUTPATIENT
Start: 2022-06-18 | End: 2022-06-18

## 2022-06-18 RX ORDER — DIPHENHYDRAMINE HCL 50 MG
12.5 CAPSULE ORAL ONCE
Refills: 0 | Status: COMPLETED | OUTPATIENT
Start: 2022-06-18 | End: 2022-06-18

## 2022-06-18 RX ORDER — METOCLOPRAMIDE HCL 10 MG
25 TABLET ORAL ONCE
Refills: 0 | Status: COMPLETED | OUTPATIENT
Start: 2022-06-18 | End: 2022-06-18

## 2022-06-18 RX ORDER — POTASSIUM CHLORIDE 20 MEQ
20 PACKET (EA) ORAL ONCE
Refills: 0 | Status: COMPLETED | OUTPATIENT
Start: 2022-06-18 | End: 2022-06-18

## 2022-06-18 RX ADMIN — ENOXAPARIN SODIUM 40 MILLIGRAM(S): 100 INJECTION SUBCUTANEOUS at 23:09

## 2022-06-18 RX ADMIN — Medication 12.5 MILLIGRAM(S): at 21:09

## 2022-06-18 RX ADMIN — Medication 110 MILLIGRAM(S): at 23:10

## 2022-06-18 RX ADMIN — Medication 5 MILLIGRAM(S): at 23:55

## 2022-06-18 RX ADMIN — SODIUM CHLORIDE 1 GRAM(S): 9 INJECTION INTRAMUSCULAR; INTRAVENOUS; SUBCUTANEOUS at 19:52

## 2022-06-18 RX ADMIN — AMLODIPINE BESYLATE 5 MILLIGRAM(S): 2.5 TABLET ORAL at 07:00

## 2022-06-18 RX ADMIN — Medication 81 MILLIGRAM(S): at 17:39

## 2022-06-18 RX ADMIN — MIRTAZAPINE 7.5 MILLIGRAM(S): 45 TABLET, ORALLY DISINTEGRATING ORAL at 23:09

## 2022-06-18 RX ADMIN — Medication 20 MILLIEQUIVALENT(S): at 11:01

## 2022-06-18 RX ADMIN — Medication 75 MICROGRAM(S): at 07:00

## 2022-06-18 RX ADMIN — Medication 975 MILLIGRAM(S): at 10:52

## 2022-06-18 RX ADMIN — DULOXETINE HYDROCHLORIDE 20 MILLIGRAM(S): 30 CAPSULE, DELAYED RELEASE ORAL at 17:39

## 2022-06-18 RX ADMIN — Medication 100 GRAM(S): at 21:11

## 2022-06-18 RX ADMIN — Medication 975 MILLIGRAM(S): at 09:52

## 2022-06-18 RX ADMIN — Medication 0.5 MILLIGRAM(S): at 15:19

## 2022-06-18 RX ADMIN — Medication 10 MILLIGRAM(S): at 23:10

## 2022-06-18 RX ADMIN — CLOPIDOGREL BISULFATE 75 MILLIGRAM(S): 75 TABLET, FILM COATED ORAL at 17:39

## 2022-06-18 RX ADMIN — ATORVASTATIN CALCIUM 40 MILLIGRAM(S): 80 TABLET, FILM COATED ORAL at 23:10

## 2022-06-18 NOTE — PROGRESS NOTE ADULT - ATTENDING COMMENTS
Patient was seen and examined at bedside on 6/18/2022 at 830 am. Patient reports that she still feels persistently dizzy. Denies SOB, CP. ROS is otherwise negative. Vitals, labwork and pertinent imaging reviewed. Physical exam - NAD, AAO x 4, PERRLA, EOMI, MMM, supple neck, chest - CTA b/l, CV - rrr, s1s2, no m/r/g, abd - soft, NTND, + BS, ext - wwp, psych - normal affect    Plan as above with the following: '  -Check MRI  -PT consult

## 2022-06-18 NOTE — CONSULT NOTE ADULT - SUBJECTIVE AND OBJECTIVE BOX
Neurology Consult    Patient is a 89y old  Female who presents with a chief complaint of dizziness , headache/ nausea (2022 12:23)      HPI:  89 y/o F pt with PMHx of prior subacute CVA (), vertigo, HTN, and HLD presents to ED c/o feeling nauseous and headache x 3-4hrs prior to ED arrival. Pt was feeling her baseline around 630pm . Then, pt began to feel dizzy and off balance, but it didn't feel like her prior episodes of vertigo. Pt feels the room spinning and has to close her eyes. Pt admits to not having eaten yesterday and poor appetite over the past month. She denies any vision changes, difficulty ambulating, or any other acute complaints. Pt relates being evaluated for something similar in the past week, but it was not deemed to be an acute stroke and was discharged from this ED. However, because of acute onset of headache, dizziness, and possible AMS earlier per neighbor, code stroke called for stroke/TIA. Stroke workup was negative. Symptoms resolved overnight. Pt spent the night in the ED and slept comfortably. However when awakening this AM she was dizzy again and unable to walk.    Neurology team consulted for HA management  Patient is known for neurology service, follows with Dr Mckeon as an outpatient since  for chronic migraine HA management. last follow up was 6/15. Patient seen and examined, she continues to have unrelenting headaches. She has been trying to limit Tyelnol and ibuprofen but due to lack of insurance medication coverage, she was unable to try Nurtec. She reports worsening nausea with diminished appetite and generalized fatigue as well. She has no new focal neurologic deficits she was found to have mild JIMI on outpatient sleep apnea testing.         PAST MEDICAL & SURGICAL HISTORY:  Hyperlipidemia  Hyperlipemia      Hypothyroidism  Hypothyroidism      Dizziness  Vertigo      Vertigo      H/O: stroke      Cytomegalovirus infection not present  No significant past surgical history      FAMILY HISTORY:      Social History:    lives at home alone, first floor apartment with 4 steps into building, does not use a walker. Usually ambulates to grocery store but not when she is feeling dizzy     Allergies    No Known Allergies    Intolerances        MEDICATIONS  (STANDING):  amLODIPine   Tablet 5 milliGRAM(s) Oral every 24 hours  aspirin enteric coated 81 milliGRAM(s) Oral every 24 hours  atorvastatin 40 milliGRAM(s) Oral at bedtime  clopidogrel Tablet 75 milliGRAM(s) Oral every 24 hours  DULoxetine 20 milliGRAM(s) Oral every 24 hours  enoxaparin Injectable 40 milliGRAM(s) SubCutaneous every 24 hours  levothyroxine 75 MICROGram(s) Oral daily  melatonin 5 milliGRAM(s) Oral at bedtime  mirtazapine 7.5 milliGRAM(s) Oral every 24 hours  oxybutynin 10 milliGRAM(s) Oral daily  sodium chloride 1 Gram(s) Oral every 8 hours    MEDICATIONS  (PRN):  acetaminophen     Tablet .. 975 milliGRAM(s) Oral every 6 hours PRN Mild Pain (1 - 3)  meclizine 12.5 milliGRAM(s) Oral every 6 hours PRN Dizziness      Review of systems:    Constitutional: as per HPI  Eyes: No eye pain or discharge  ENMT:  No difficulty hearing; No sinus or throat pain  Neck: No pain or stiffness  Respiratory: No cough, wheezing, chills or hemoptysis  Cardiovascular: No chest pain, palpitations, shortness of breath, dyspnea on exertion  Gastrointestinal: No abdominal pain, nausea, vomiting or hematemesis; No diarrhea or constipation.   Genitourinary: No dysuria, frequency, hematuria or incontinence  Neurological: As per HPI  Skin: No rashes or lesions   Endocrine: No heat or cold intolerance; No hair loss  Musculoskeletal: No joint pain or swelling  Psychiatric: No depression, anxiety, mood swings  Heme/Lymph: No easy bruising or bleeding gums    Vital Signs Last 24 Hrs  T(C): 36.8 (2022 12:24), Max: 36.8 (2022 12:24)  T(F): 98.2 (2022 12:24), Max: 98.2 (2022 12:24)  HR: 59 (2022 12:24) (59 - 66)  BP: 148/77 (2022 12:24) (148/77 - 171/73)  BP(mean): --  RR: 19 (2022 12:24) (17 - 19)  SpO2: 97% (2022 12:24) (97% - 98%)      Neurological Examination:  General:  Appearance is consistent with chronologic age.  No abnormal facies.  Gross skin survey within normal limits.    Cognitive/Language:  Awake, alert, and oriented to person, place, time and date.  Recent and remote memory intact.  Fund of knowledge is appropriate.  Naming, repetition and comprehension intact.   Nondysarthric.    Cranial Nerves  - Eyes: Visual acuity intact, Visual fields full.  EOMI w/o nystagmus, skew or reported double vision.  PERRL.  No ptosis/weakness of eyelid closure.    - Face:  Facial sensation normal V1 - 3, no facial asymmetry.    - Ears/Nose/Throat:  Hearing grossly intact b/l to finger rub.  Palate elevates midline.  Tongue and uvula midline.   Motor examination:  (MRC grade R/L) 5/5 UE; 5/5 LE.  No observable drift. Normal tone and bulk. No tenderness, twitching, tremors or involuntary movements.  Sensory examination:   Intact to light touch and pinprick, pain, temperature and proprioception and vibration in all extremities.  Reflexes:   2+ b/l biceps, triceps, brachioradialis, patella and achilles.  Plantar response downgoing b/l.  Jaw jerk, Jovanna, clonus absent.  Cerebellum:   FTN/HKS intact.  No dysmetria or dysdiadokinesia.  Gait narrow based and normal.      Labs:   CBC Full  -  ( 2022 05:30 )  WBC Count : 6.77 K/uL  RBC Count : 3.98 M/uL  Hemoglobin : 13.0 g/dL  Hematocrit : 36.7 %  Platelet Count - Automated : 283 K/uL  Mean Cell Volume : 92.2 fl  Mean Cell Hemoglobin : 32.7 pg  Mean Cell Hemoglobin Concentration : 35.4 gm/dL  Auto Neutrophil # : 4.52 K/uL  Auto Lymphocyte # : 1.35 K/uL  Auto Monocyte # : 0.72 K/uL  Auto Eosinophil # : 0.12 K/uL  Auto Basophil # : 0.02 K/uL  Auto Neutrophil % : 66.8 %  Auto Lymphocyte % : 19.9 %  Auto Monocyte % : 10.6 %  Auto Eosinophil % : 1.8 %  Auto Basophil % : 0.3 %    06-18    128<L>  |  94<L>  |  9   ----------------------------<  101<H>  3.8   |  22  |  0.62    Ca    9.6      2022 05:30  Phos  3.5     06-18  Mg     2.0     06-18    TPro  7.2  /  Alb  4.5  /  TBili  0.5  /  DBili  x   /  AST  20  /  ALT  14  /  AlkPhos  58  06-16    LIVER FUNCTIONS - ( 2022 22:06 )  Alb: 4.5 g/dL / Pro: 7.2 g/dL / ALK PHOS: 58 U/L / ALT: 14 U/L / AST: 20 U/L / GGT: x           PT/INR - ( 2022 22:06 )   PT: 11.7 sec;   INR: 0.98          PTT - ( 2022 22:06 )  PTT:29.8 sec  Urinalysis Basic - ( 2022 22:43 )    Color: Yellow / Appearance: Clear / S.010 / pH: x  Gluc: x / Ketone: NEGATIVE  / Bili: Negative / Urobili: 0.2 E.U./dL   Blood: x / Protein: NEGATIVE mg/dL / Nitrite: NEGATIVE   Leuk Esterase: NEGATIVE / RBC: < 5 /HPF / WBC < 5 /HPF   Sq Epi: x / Non Sq Epi: x / Bacteria: None /HPF          Neuroimaging:  [facility]:     CT Brain Stroke Protocol (22 @ 21:59) >  No acute intracranial hemorrhage or new site of demarcated transcortical   infarction.  [facility] territory infarct.           Neurology Consult    Patient is a 89y old  Female who presents with a chief complaint of dizziness , headache/ nausea (2022 12:23)      HPI:  89 y/o F pt with PMHx of prior subacute CVA (), vertigo, HTN, and HLD presents to ED c/o feeling nauseous and headache x 3-4hrs prior to ED arrival. Pt was feeling her baseline around 630pm . Then, pt began to feel dizzy and off balance, but it didn't feel like her prior episodes of vertigo. Pt feels the room spinning and has to close her eyes. Pt admits to not having eaten yesterday and poor appetite over the past month. She denies any vision changes, difficulty ambulating, or any other acute complaints. Pt relates being evaluated for something similar in the past week, but it was not deemed to be an acute stroke and was discharged from this ED. However, because of acute onset of headache, dizziness, and possible AMS earlier per neighbor, code stroke called for stroke/TIA. Stroke workup was negative. Symptoms resolved overnight. Pt spent the night in the ED and slept comfortably. However when awakening this AM she was dizzy again and unable to walk.    Neurology team consulted for HA management  Patient is known for neurology service, follows with Dr Mckeon as an outpatient since  for chronic migraine HA management. last follow up was 6/15. Patient seen and examined, she continues to have unrelenting headaches. She has been trying to limit Tyelnol and ibuprofen but due to lack of insurance medication coverage, she was unable to try Nurtec. She reports worsening nausea with diminished appetite and generalized fatigue as well. She has no new focal neurologic deficits she was found to have mild JIMI on outpatient sleep apnea testing.         PAST MEDICAL & SURGICAL HISTORY:  Hyperlipidemia  Hyperlipemia      Hypothyroidism  Hypothyroidism      Dizziness  Vertigo      Vertigo      H/O: stroke      Cytomegalovirus infection not present  No significant past surgical history      FAMILY HISTORY:      Social History:    lives at home alone, first floor apartment with 4 steps into building, does not use a walker. Usually ambulates to grocery store but not when she is feeling dizzy     Allergies    No Known Allergies    Intolerances        MEDICATIONS  (STANDING):  amLODIPine   Tablet 5 milliGRAM(s) Oral every 24 hours  aspirin enteric coated 81 milliGRAM(s) Oral every 24 hours  atorvastatin 40 milliGRAM(s) Oral at bedtime  clopidogrel Tablet 75 milliGRAM(s) Oral every 24 hours  DULoxetine 20 milliGRAM(s) Oral every 24 hours  enoxaparin Injectable 40 milliGRAM(s) SubCutaneous every 24 hours  levothyroxine 75 MICROGram(s) Oral daily  melatonin 5 milliGRAM(s) Oral at bedtime  mirtazapine 7.5 milliGRAM(s) Oral every 24 hours  oxybutynin 10 milliGRAM(s) Oral daily  sodium chloride 1 Gram(s) Oral every 8 hours    MEDICATIONS  (PRN):  acetaminophen     Tablet .. 975 milliGRAM(s) Oral every 6 hours PRN Mild Pain (1 - 3)  meclizine 12.5 milliGRAM(s) Oral every 6 hours PRN Dizziness      Review of systems:    Constitutional: as per HPI  Eyes: No eye pain or discharge  ENMT:  No difficulty hearing; No sinus or throat pain  Neck: No pain or stiffness  Respiratory: No cough, wheezing, chills or hemoptysis  Cardiovascular: No chest pain, palpitations, shortness of breath, dyspnea on exertion  Gastrointestinal: No abdominal pain, nausea, vomiting or hematemesis; No diarrhea or constipation.   Genitourinary: No dysuria, frequency, hematuria or incontinence  Neurological: As per HPI  Skin: No rashes or lesions   Endocrine: No heat or cold intolerance; No hair loss  Musculoskeletal: No joint pain or swelling  Psychiatric: No depression, anxiety, mood swings  Heme/Lymph: No easy bruising or bleeding gums    Vital Signs Last 24 Hrs  T(C): 36.8 (2022 12:24), Max: 36.8 (2022 12:24)  T(F): 98.2 (2022 12:24), Max: 98.2 (2022 12:24)  HR: 59 (2022 12:24) (59 - 66)  BP: 148/77 (2022 12:24) (148/77 - 171/73)  BP(mean): --  RR: 19 (2022 12:24) (17 - 19)  SpO2: 97% (2022 12:24) (97% - 98%)      Neurological Examination:  General:  Appearance is consistent with chronologic age.  No abnormal facies.  Gross skin survey within normal limits.    Cognitive/Language:  Awake, alert, and oriented to person, place, time and date.  Recent and remote memory intact.  Fund of knowledge is appropriate.  Naming, repetition and comprehension intact.   Nondysarthric.    Cranial Nerves  - Eyes: Visual acuity intact, Visual fields full.  EOMI w/o nystagmus, skew or reported double vision.  PERRL.  No ptosis/weakness of eyelid closure.    - Face:  Facial sensation normal V1 - 3, no facial asymmetry.    - Ears/Nose/Throat:  Hearing grossly intact b/l to finger rub.  Palate elevates midline.  Tongue and uvula midline.   Motor examination:  (MRC grade R/L) 5/5 UE; 5/5 LE.  No observable drift. Normal tone and bulk. No tenderness, twitching, tremors or involuntary movements.  Sensory examination:   Intact to light touch and pinprick, pain, temperature and proprioception and vibration in all extremities.  Reflexes:   2+ b/l biceps, triceps, brachioradialis, patella and achilles.  Plantar response downgoing b/l.  Jaw jerk, Jovanna, clonus absent.  Cerebellum:   FTN/HKS intact.  No dysmetria or dysdiadokinesia.  Gait narrow based and normal.      Labs:   CBC Full  -  ( 2022 05:30 )  WBC Count : 6.77 K/uL  RBC Count : 3.98 M/uL  Hemoglobin : 13.0 g/dL  Hematocrit : 36.7 %  Platelet Count - Automated : 283 K/uL  Mean Cell Volume : 92.2 fl  Mean Cell Hemoglobin : 32.7 pg  Mean Cell Hemoglobin Concentration : 35.4 gm/dL  Auto Neutrophil # : 4.52 K/uL  Auto Lymphocyte # : 1.35 K/uL  Auto Monocyte # : 0.72 K/uL  Auto Eosinophil # : 0.12 K/uL  Auto Basophil # : 0.02 K/uL  Auto Neutrophil % : 66.8 %  Auto Lymphocyte % : 19.9 %  Auto Monocyte % : 10.6 %  Auto Eosinophil % : 1.8 %  Auto Basophil % : 0.3 %    06-18    128<L>  |  94<L>  |  9   ----------------------------<  101<H>  3.8   |  22  |  0.62    Ca    9.6      2022 05:30  Phos  3.5     06-18  Mg     2.0     06-18    TPro  7.2  /  Alb  4.5  /  TBili  0.5  /  DBili  x   /  AST  20  /  ALT  14  /  AlkPhos  58  06-16    LIVER FUNCTIONS - ( 2022 22:06 )  Alb: 4.5 g/dL / Pro: 7.2 g/dL / ALK PHOS: 58 U/L / ALT: 14 U/L / AST: 20 U/L / GGT: x           PT/INR - ( 2022 22:06 )   PT: 11.7 sec;   INR: 0.98          PTT - ( 2022 22:06 )  PTT:29.8 sec  Urinalysis Basic - ( 2022 22:43 )    Color: Yellow / Appearance: Clear / S.010 / pH: x  Gluc: x / Ketone: NEGATIVE  / Bili: Negative / Urobili: 0.2 E.U./dL   Blood: x / Protein: NEGATIVE mg/dL / Nitrite: NEGATIVE   Leuk Esterase: NEGATIVE / RBC: < 5 /HPF / WBC < 5 /HPF   Sq Epi: x / Non Sq Epi: x / Bacteria: None /HPF          Neuroimaging:  [facility]:     CT Brain Stroke Protocol (22 @ 21:59) >  No acute intracranial hemorrhage or new site of demarcated transcortical   infarction.  Continued evolution of aright PCA territory infarct.        Assessment/Plan:  Patient is a 89-year-old female with a history of hypertension, hyperlipidemia, hypothyroidism, vertigo, chronic refractory headaches, and recent R PCA stroke, presented for dizziness and HA. Neurology team consulted for HA management.    Impression:  Chronic intractable migraine PATEL  Pending MR brain official read, on my read old R occipital stroke on T2 Flair   Acute HA management would give:  --->Magnesium 1 g IV once  --->Benadryl 12.5 mg IV once  --->Depakote 500 mh IV once  --->Reglan 25 mg IV once  increase duloxetine to 40 mg for mood/headache/neuro pain  Plan was for Ubrelvy PRN for severe migraines, no more than 2 times per week. Would ask patient to bring her own medication  PT evaluation   Possible nerve block as outpatient Neurology Consult    Patient is a 89y old  Female who presents with a chief complaint of dizziness , headache/ nausea (2022 12:23)      HPI:  89 y/o F pt with PMHx of prior subacute CVA (), vertigo, HTN, and HLD presents to ED c/o feeling nauseous and headache x 3-4hrs prior to ED arrival. Pt was feeling her baseline around 630pm . Then, pt began to feel dizzy and off balance, but it didn't feel like her prior episodes of vertigo. Pt feels the room spinning and has to close her eyes. Pt admits to not having eaten yesterday and poor appetite over the past month. She denies any vision changes, difficulty ambulating, or any other acute complaints. Pt relates being evaluated for something similar in the past week, but it was not deemed to be an acute stroke and was discharged from this ED. However, because of acute onset of headache, dizziness, and possible AMS earlier per neighbor, code stroke called for stroke/TIA. Stroke workup was negative. Symptoms resolved overnight. Pt spent the night in the ED and slept comfortably. However when awakening this AM she was dizzy again and unable to walk.    Neurology team consulted for HA management  Patient is known for neurology service, follows with Dr Mckeon as an outpatient since  for chronic migraine HA management. last follow up was 6/15. Patient seen and examined, she continues to have unrelenting headaches. She has been trying to limit Tyelnol and ibuprofen but due to lack of insurance medication coverage, she was unable to try Nurtec. She reports worsening nausea with diminished appetite and generalized fatigue as well. She has no new focal neurologic deficits she was found to have mild JIMI on outpatient sleep apnea testing.         PAST MEDICAL & SURGICAL HISTORY:  Hyperlipidemia  Hyperlipemia      Hypothyroidism  Hypothyroidism      Dizziness  Vertigo      Vertigo      H/O: stroke      Cytomegalovirus infection not present  No significant past surgical history      FAMILY HISTORY:      Social History:    lives at home alone, first floor apartment with 4 steps into building, does not use a walker. Usually ambulates to grocery store but not when she is feeling dizzy     Allergies    No Known Allergies    Intolerances        MEDICATIONS  (STANDING):  amLODIPine   Tablet 5 milliGRAM(s) Oral every 24 hours  aspirin enteric coated 81 milliGRAM(s) Oral every 24 hours  atorvastatin 40 milliGRAM(s) Oral at bedtime  clopidogrel Tablet 75 milliGRAM(s) Oral every 24 hours  DULoxetine 20 milliGRAM(s) Oral every 24 hours  enoxaparin Injectable 40 milliGRAM(s) SubCutaneous every 24 hours  levothyroxine 75 MICROGram(s) Oral daily  melatonin 5 milliGRAM(s) Oral at bedtime  mirtazapine 7.5 milliGRAM(s) Oral every 24 hours  oxybutynin 10 milliGRAM(s) Oral daily  sodium chloride 1 Gram(s) Oral every 8 hours    MEDICATIONS  (PRN):  acetaminophen     Tablet .. 975 milliGRAM(s) Oral every 6 hours PRN Mild Pain (1 - 3)  meclizine 12.5 milliGRAM(s) Oral every 6 hours PRN Dizziness      Review of systems:    Constitutional: as per HPI  Eyes: No eye pain or discharge  ENMT:  No difficulty hearing; No sinus or throat pain  Neck: No pain or stiffness  Respiratory: No cough, wheezing, chills or hemoptysis  Cardiovascular: No chest pain, palpitations, shortness of breath, dyspnea on exertion  Gastrointestinal: No abdominal pain, nausea, vomiting or hematemesis; No diarrhea or constipation.   Genitourinary: No dysuria, frequency, hematuria or incontinence  Neurological: As per HPI  Skin: No rashes or lesions   Endocrine: No heat or cold intolerance; No hair loss  Musculoskeletal: No joint pain or swelling  Psychiatric: No depression, anxiety, mood swings  Heme/Lymph: No easy bruising or bleeding gums    Vital Signs Last 24 Hrs  T(C): 36.8 (2022 12:24), Max: 36.8 (2022 12:24)  T(F): 98.2 (2022 12:24), Max: 98.2 (2022 12:24)  HR: 59 (2022 12:24) (59 - 66)  BP: 148/77 (2022 12:24) (148/77 - 171/73)  BP(mean): --  RR: 19 (2022 12:24) (17 - 19)  SpO2: 97% (2022 12:24) (97% - 98%)      Neurological Examination:  General:  Appearance is consistent with chronologic age.  No abnormal facies.  Gross skin survey within normal limits.    Cognitive/Language:  Awake, alert, and oriented to person, place, time and date.  Recent and remote memory intact.  Fund of knowledge is appropriate.  Naming, repetition and comprehension intact.   Nondysarthric.    Cranial Nerves  - Eyes: Visual acuity intact, Visual fields full.  EOMI w/o nystagmus, skew or reported double vision.  PERRL.  No ptosis/weakness of eyelid closure.    - Face:  Facial sensation normal V1 - 3, no facial asymmetry.    - Ears/Nose/Throat:  Hearing grossly intact b/l to finger rub.  Palate elevates midline.  Tongue and uvula midline.   Motor examination:  (MRC grade R/L) 5/5 UE; 5/5 LE.  No observable drift. Normal tone and bulk. No tenderness, twitching, tremors or involuntary movements.  Sensory examination:   Intact to light touch and pinprick, pain, temperature and proprioception and vibration in all extremities.  Reflexes:   2+ b/l biceps, triceps, brachioradialis, patella and achilles.  Plantar response downgoing b/l.  Jaw jerk, Jovanna, clonus absent.  Cerebellum:   FTN/HKS intact.  No dysmetria or dysdiadokinesia.  Gait narrow based and normal.      Labs:   CBC Full  -  ( 2022 05:30 )  WBC Count : 6.77 K/uL  RBC Count : 3.98 M/uL  Hemoglobin : 13.0 g/dL  Hematocrit : 36.7 %  Platelet Count - Automated : 283 K/uL  Mean Cell Volume : 92.2 fl  Mean Cell Hemoglobin : 32.7 pg  Mean Cell Hemoglobin Concentration : 35.4 gm/dL  Auto Neutrophil # : 4.52 K/uL  Auto Lymphocyte # : 1.35 K/uL  Auto Monocyte # : 0.72 K/uL  Auto Eosinophil # : 0.12 K/uL  Auto Basophil # : 0.02 K/uL  Auto Neutrophil % : 66.8 %  Auto Lymphocyte % : 19.9 %  Auto Monocyte % : 10.6 %  Auto Eosinophil % : 1.8 %  Auto Basophil % : 0.3 %    06-18    128<L>  |  94<L>  |  9   ----------------------------<  101<H>  3.8   |  22  |  0.62    Ca    9.6      2022 05:30  Phos  3.5     06-18  Mg     2.0     06-18    TPro  7.2  /  Alb  4.5  /  TBili  0.5  /  DBili  x   /  AST  20  /  ALT  14  /  AlkPhos  58  06-16    LIVER FUNCTIONS - ( 2022 22:06 )  Alb: 4.5 g/dL / Pro: 7.2 g/dL / ALK PHOS: 58 U/L / ALT: 14 U/L / AST: 20 U/L / GGT: x           PT/INR - ( 2022 22:06 )   PT: 11.7 sec;   INR: 0.98          PTT - ( 2022 22:06 )  PTT:29.8 sec  Urinalysis Basic - ( 2022 22:43 )    Color: Yellow / Appearance: Clear / S.010 / pH: x  Gluc: x / Ketone: NEGATIVE  / Bili: Negative / Urobili: 0.2 E.U./dL   Blood: x / Protein: NEGATIVE mg/dL / Nitrite: NEGATIVE   Leuk Esterase: NEGATIVE / RBC: < 5 /HPF / WBC < 5 /HPF   Sq Epi: x / Non Sq Epi: x / Bacteria: None /HPF          Neuroimaging:  [facility]:     CT Brain Stroke Protocol (22 @ 21:59) >  No acute intracranial hemorrhage or new site of demarcated transcortical   infarction.  Continued evolution of aright PCA territory infarct.        Assessment/Plan:  Patient is a 89-year-old female with a history of hypertension, hyperlipidemia, hypothyroidism, vertigo, chronic refractory headaches, and recent R PCA stroke, presented for dizziness and HA. Neurology team consulted for HA management.    Impression:  Chronic intractable migraine PATEL  Pending MR brain official read, on my read old R occipital stroke on T2 Flair   Acute HA management would give:  --->Magnesium 1 g IV once  --->Benadryl 12.5 mg IV once  --->Depakote 500 mh IV once  --->Reglan 25 mg IV once  If refractory HA then consider 0,25 Haldol mg PO PRN if QTc is normal  increase duloxetine to 40 mg for mood/headache/neuro pain  Plan was for Ubrelvy PRN for severe migraines, no more than 2 times per week. Would ask patient to bring her own medication  PT evaluation   Possible nerve block as outpatient

## 2022-06-18 NOTE — PHYSICAL THERAPY INITIAL EVALUATION ADULT - PERTINENT HX OF CURRENT PROBLEM, REHAB EVAL
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 Pt was feeling her baseline around 630pm 6/16. Then, pt began to feel dizzy and off balance, but it didn't feel like her prior episodes of vertigo. Pt feels the room spinning and has to close her eyes. Pt admits to not having eaten yesterday and poor appetite over the past month.

## 2022-06-18 NOTE — PHYSICAL THERAPY INITIAL EVALUATION ADULT - ADDITIONAL COMMENTS
Pt currently resides in 1st floor [facility] alone. Denies HHA. Primarily amb indep w/o AD. Denies falls within past 6 months. Receives OPPT 1x/week.

## 2022-06-18 NOTE — PHYSICAL THERAPY INITIAL EVALUATION ADULT - GAIT DEVIATIONS NOTED, PT EVAL
Pt w/ dec step length/dariel. Dec weight shifting abilities however no LOb/falls or knee buckling noted. Maneuvers RW well through unit to avoid obstacles in path. No SOB/coughs observed./decreased dariel/decreased step length/decreased weight-shifting ability

## 2022-06-18 NOTE — CONSULT NOTE ADULT - ATTENDING COMMENTS
migraine and migraine associated dizziness. neurological examination is reassuring. medication options somewhat limited due to cardiovascular risk factors    increase cymbalta to 40mg daily  headache cocktail as outlined in the note  can try very low doses of haldol as an abortive  agree with ubrelvy but this is not on formulary in the hospital

## 2022-06-18 NOTE — PROGRESS NOTE ADULT - SUBJECTIVE AND OBJECTIVE BOX
Internal Medicine Progress Note  Liss Noreen, PGY-1  Pager: [phone]    ******INCOMPLETE******    Hospital Course:    OVERNIGHT EVENTS/INTERVAL HPI:    OBJECTIVE:  Vital Signs Last 24 Hrs  T(C): 36.8 (2022 12:24), Max: 36.8 (2022 12:24)  T(F): 98.2 (2022 12:24), Max: 98.2 (2022 12:24)  HR: 59 (2022 12:24) (59 - 66)  BP: 148/77 (2022 12:24) (148/77 - 171/73)  BP(mean): --  RR: 19 (2022 12:24) (17 - 19)  SpO2: 97% (2022 12:24) (97% - 98%)  I&O's Detail    Physical Exam:  GENERAL: Awake, alert and interactive, no acute distress, appears comfortable  NEURO: A&Ox4, no focal deficits, 5/5 strength in all ext, reflexes 2+ throughout, CN 2-12 intact  HEENT: Normocephalic, atraumatic, no conjunctivitis or scleral icterus, oral mucosa moist, no oral lesions noted  NECK: Supple, no LAD, no JVD, thyroid not palpable  CARDIAC: Regular rate and rhythm, +S1/S2, no murmurs/rubs/gallops  PULM: Breathing comfortably on RA, clear to auscultation bilaterally, no wheezes/rales/rhonchi  ABDOMEN: Soft, nontender, nondistended, +bs, no hepatosplenomegaly, no rebound tenderness or fluid wave, no CVA tenderness  : Deferred  MSK: Range of motion grossly intact, no back tenderness  SKIN: Warm and dry, no rashes, lesions  VASC: Cap refil < 2 sec, 2+ peripheral pulses, no edema, no LE tenderness  Psych: Appropriate affect    Medications:  MEDICATIONS  (STANDING):  amLODIPine   Tablet 5 milliGRAM(s) Oral every 24 hours  aspirin enteric coated 81 milliGRAM(s) Oral every 24 hours  atorvastatin 40 milliGRAM(s) Oral at bedtime  clopidogrel Tablet 75 milliGRAM(s) Oral every 24 hours  DULoxetine 20 milliGRAM(s) Oral every 24 hours  enoxaparin Injectable 40 milliGRAM(s) SubCutaneous every 24 hours  levothyroxine 75 MICROGram(s) Oral daily  melatonin 5 milliGRAM(s) Oral at bedtime  mirtazapine 7.5 milliGRAM(s) Oral every 24 hours  oxybutynin 10 milliGRAM(s) Oral daily  sodium chloride 1 Gram(s) Oral every 8 hours    MEDICATIONS  (PRN):  acetaminophen     Tablet .. 975 milliGRAM(s) Oral every 6 hours PRN Mild Pain (1 - 3)  meclizine 12.5 milliGRAM(s) Oral every 6 hours PRN Dizziness      Labs:                        13.0   6.77  )-----------( 283      ( 2022 05:30 )             36.7     06-18    128<L>  |  94<L>  |  9   ----------------------------<  101<H>  3.8   |  22  |  0.62    Ca    9.6      2022 05:30  Phos  3.5     06-18  Mg     2.0     06-18    TPro  7.2  /  Alb  4.5  /  TBili  0.5  /  DBili  x   /  AST  20  /  ALT  14  /  AlkPhos  58  06-16    PT/INR - ( 2022 22:06 )   PT: 11.7 sec;   INR: 0.98          PTT - ( 2022 22:06 )  PTT:29.8 sec    Urinalysis Basic - ( 2022 22:43 )    Color: Yellow / Appearance: Clear / S.010 / pH: x  Gluc: x / Ketone: NEGATIVE  / Bili: Negative / Urobili: 0.2 E.U./dL   Blood: x / Protein: NEGATIVE mg/dL / Nitrite: NEGATIVE   Leuk Esterase: NEGATIVE / RBC: < 5 /HPF / WBC < 5 /HPF   Sq Epi: x / Non Sq Epi: x / Bacteria: None /HPF      COVID-19 PCR: Negative (2022 22:05)  COVID-19 PCR: Negative (27 May 2022 23:02)  COVID-19 PCR: Negative (11 May 2022 22:43)  COVID-19 PCR: Negative (23 Dec 2021 20:07)      Radiology: Reviewed Internal Medicine Progress Note  Liss Sorto, PGY-1  Pager: [phone]    Hospital Course:  89 y/o F pt with PMHx of prior subacute CVA (), vertigo, HTN, and HLD presents to ED c/o feeling nauseous and headache x 3-4hrs prior to ED arrival.    OVERNIGHT EVENTS/INTERVAL HPI:    OBJECTIVE:  Vital Signs Last 24 Hrs  T(C): 36.8 (2022 12:24), Max: 36.8 (2022 12:24)  T(F): 98.2 (2022 12:24), Max: 98.2 (2022 12:24)  HR: 59 (2022 12:24) (59 - 66)  BP: 148/77 (2022 12:24) (148/77 - 171/73)  BP(mean): --  RR: 19 (2022 12:24) (17 - 19)  SpO2: 97% (2022 12:24) (97% - 98%)  I&O's Detail    Physical Exam:  GENERAL: Awake, alert and interactive, no acute distress, appears comfortable  NEURO: A&Ox4, no focal deficits, 5/5 strength in all ext, reflexes 2+ throughout, CN 2-12 intact  HEENT: Normocephalic, atraumatic, no conjunctivitis or scleral icterus, oral mucosa moist, no oral lesions noted  NECK: Supple, no LAD, no JVD, thyroid not palpable  CARDIAC: Regular rate and rhythm, +S1/S2, no murmurs/rubs/gallops  PULM: Breathing comfortably on RA, clear to auscultation bilaterally, no wheezes/rales/rhonchi  ABDOMEN: Soft, nontender, nondistended, +bs, no hepatosplenomegaly, no rebound tenderness or fluid wave, no CVA tenderness  : Deferred  MSK: Range of motion grossly intact, no back tenderness  SKIN: Warm and dry, no rashes, lesions  VASC: Cap refil < 2 sec, 2+ peripheral pulses, no edema, no LE tenderness  Psych: Appropriate affect    Medications:  MEDICATIONS  (STANDING):  amLODIPine   Tablet 5 milliGRAM(s) Oral every 24 hours  aspirin enteric coated 81 milliGRAM(s) Oral every 24 hours  atorvastatin 40 milliGRAM(s) Oral at bedtime  clopidogrel Tablet 75 milliGRAM(s) Oral every 24 hours  DULoxetine 20 milliGRAM(s) Oral every 24 hours  enoxaparin Injectable 40 milliGRAM(s) SubCutaneous every 24 hours  levothyroxine 75 MICROGram(s) Oral daily  melatonin 5 milliGRAM(s) Oral at bedtime  mirtazapine 7.5 milliGRAM(s) Oral every 24 hours  oxybutynin 10 milliGRAM(s) Oral daily  sodium chloride 1 Gram(s) Oral every 8 hours    MEDICATIONS  (PRN):  acetaminophen     Tablet .. 975 milliGRAM(s) Oral every 6 hours PRN Mild Pain (1 - 3)  meclizine 12.5 milliGRAM(s) Oral every 6 hours PRN Dizziness      Labs:                        13.0   6.77  )-----------( 283      ( 2022 05:30 )             36.7     06-18    128<L>  |  94<L>  |  9   ----------------------------<  101<H>  3.8   |  22  |  0.62    Ca    9.6      2022 05:30  Phos  3.5     06-18  Mg     2.0     06-18    TPro  7.2  /  Alb  4.5  /  TBili  0.5  /  DBili  x   /  AST  20  /  ALT  14  /  AlkPhos  58  06-16    PT/INR - ( 2022 22:06 )   PT: 11.7 sec;   INR: 0.98          PTT - ( 2022 22:06 )  PTT:29.8 sec    Urinalysis Basic - ( 2022 22:43 )    Color: Yellow / Appearance: Clear / S.010 / pH: x  Gluc: x / Ketone: NEGATIVE  / Bili: Negative / Urobili: 0.2 E.U./dL   Blood: x / Protein: NEGATIVE mg/dL / Nitrite: NEGATIVE   Leuk Esterase: NEGATIVE / RBC: < 5 /HPF / WBC < 5 /HPF   Sq Epi: x / Non Sq Epi: x / Bacteria: None /HPF      COVID-19 PCR: Negative (2022 22:05)  COVID-19 PCR: Negative (27 May 2022 23:02)  COVID-19 PCR: Negative (11 May 2022 22:43)  COVID-19 PCR: Negative (23 Dec 2021 20:07)      Radiology: Reviewed Internal Medicine Progress Note  Liss Sorto, PGY-1  Pager: [phone]    Hospital Course:  89 y/o F pt with PMHx of prior subacute CVA (), vertigo, HTN, and HLD presents to ED c/o feeling nauseous and headache x 3-4hrs prior to ED arrival.    OVERNIGHT EVENTS/INTERVAL HPI: Patient got MRI today and was seen by neurology. Patient reports continued HA and nausea/dizziness, unchanged. No new complaints.    OBJECTIVE:  Vital Signs Last 24 Hrs  T(C): 36.8 (2022 12:24), Max: 36.8 (2022 12:24)  T(F): 98.2 (2022 12:24), Max: 98.2 (2022 12:24)  HR: 59 (2022 12:24) (59 - 66)  BP: 148/77 (2022 12:24) (148/77 - 171/73)  BP(mean): --  RR: 19 (2022 12:24) (17 - 19)  SpO2: 97% (2022 12:24) (97% - 98%)  I&O's Detail    Physical Exam:  GENERAL: Awake, alert and interactive,  disheveled appearance  NEURO: A&Ox4, no focal deficits  HEENT: Normocephalic, atraumatic, no conjunctivitis or scleral icterus, MM dry  NECK: Supple  CARDIAC: Regular rate and rhythm, +S1/S2, no murmurs/rubs/gallops  PULM: Breathing comfortably on RA, clear to auscultation bilaterally, no wheezes/rales/rhonchi  ABDOMEN: Soft, nontender, nondistended  MSK: Range of motion grossly intact  SKIN: Warm and dry, no rashes, lesions  VASC: 1+ peripheral pulses, no edema, no LE tenderness  Psych: Appropriate affect    Medications:  MEDICATIONS  (STANDING):  amLODIPine   Tablet 5 milliGRAM(s) Oral every 24 hours  aspirin enteric coated 81 milliGRAM(s) Oral every 24 hours  atorvastatin 40 milliGRAM(s) Oral at bedtime  clopidogrel Tablet 75 milliGRAM(s) Oral every 24 hours  DULoxetine 20 milliGRAM(s) Oral every 24 hours  enoxaparin Injectable 40 milliGRAM(s) SubCutaneous every 24 hours  levothyroxine 75 MICROGram(s) Oral daily  melatonin 5 milliGRAM(s) Oral at bedtime  mirtazapine 7.5 milliGRAM(s) Oral every 24 hours  oxybutynin 10 milliGRAM(s) Oral daily  sodium chloride 1 Gram(s) Oral every 8 hours    MEDICATIONS  (PRN):  acetaminophen     Tablet .. 975 milliGRAM(s) Oral every 6 hours PRN Mild Pain (1 - 3)  meclizine 12.5 milliGRAM(s) Oral every 6 hours PRN Dizziness      Labs:                        13.0   6.77  )-----------( 283      ( 2022 05:30 )             36.7     06-18    128<L>  |  94<L>  |  9   ----------------------------<  101<H>  3.8   |  22  |  0.62    Ca    9.6      2022 05:30  Phos  3.5     06-18  Mg     2.0     06-18    TPro  7.2  /  Alb  4.5  /  TBili  0.5  /  DBili  x   /  AST  20  /  ALT  14  /  AlkPhos  58  06-16    PT/INR - ( 2022 22:06 )   PT: 11.7 sec;   INR: 0.98          PTT - ( 2022 22:06 )  PTT:29.8 sec    Urinalysis Basic - ( 2022 22:43 )    Color: Yellow / Appearance: Clear / S.010 / pH: x  Gluc: x / Ketone: NEGATIVE  / Bili: Negative / Urobili: 0.2 E.U./dL   Blood: x / Protein: NEGATIVE mg/dL / Nitrite: NEGATIVE   Leuk Esterase: NEGATIVE / RBC: < 5 /HPF / WBC < 5 /HPF   Sq Epi: x / Non Sq Epi: x / Bacteria: None /HPF      COVID-19 PCR: Negative (2022 22:05)  COVID-19 PCR: Negative (27 May 2022 23:02)  COVID-19 PCR: Negative (11 May 2022 22:43)  COVID-19 PCR: Negative (23 Dec 2021 20:07)      Radiology: Reviewed Internal Medicine Progress Note  Liss Sorto, PGY-1  Pager: [phone]    Hospital Course:  89 y/o F pt with PMHx of prior subacute CVA (), vertigo, HTN, and HLD presents to ED c/o feeling nauseous and headache x 3-4hrs prior to ED arrival.    OVERNIGHT EVENTS/INTERVAL HPI: Patient got MRI today and neurology consulted. Patient reports continued HA and nausea/dizziness, unchanged. No new complaints.    OBJECTIVE:  Vital Signs Last 24 Hrs  T(C): 36.8 (2022 12:24), Max: 36.8 (2022 12:24)  T(F): 98.2 (2022 12:24), Max: 98.2 (2022 12:24)  HR: 59 (2022 12:24) (59 - 66)  BP: 148/77 (2022 12:24) (148/77 - 171/73)  BP(mean): --  RR: 19 (2022 12:24) (17 - 19)  SpO2: 97% (2022 12:24) (97% - 98%)  I&O's Detail    Physical Exam:  GENERAL: Awake, alert and interactive,  disheveled appearance  NEURO: A&Ox4, no focal deficits  HEENT: Normocephalic, atraumatic, no conjunctivitis or scleral icterus, MM dry  NECK: Supple  CARDIAC: Regular rate and rhythm, +S1/S2, no murmurs/rubs/gallops  PULM: Breathing comfortably on RA, clear to auscultation bilaterally, no wheezes/rales/rhonchi  ABDOMEN: Soft, nontender, nondistended  MSK: Range of motion grossly intact  SKIN: Warm and dry, no rashes, lesions  VASC: 1+ peripheral pulses, no edema, no LE tenderness  Psych: Appropriate affect    Medications:  MEDICATIONS  (STANDING):  amLODIPine   Tablet 5 milliGRAM(s) Oral every 24 hours  aspirin enteric coated 81 milliGRAM(s) Oral every 24 hours  atorvastatin 40 milliGRAM(s) Oral at bedtime  clopidogrel Tablet 75 milliGRAM(s) Oral every 24 hours  DULoxetine 20 milliGRAM(s) Oral every 24 hours  enoxaparin Injectable 40 milliGRAM(s) SubCutaneous every 24 hours  levothyroxine 75 MICROGram(s) Oral daily  melatonin 5 milliGRAM(s) Oral at bedtime  mirtazapine 7.5 milliGRAM(s) Oral every 24 hours  oxybutynin 10 milliGRAM(s) Oral daily  sodium chloride 1 Gram(s) Oral every 8 hours    MEDICATIONS  (PRN):  acetaminophen     Tablet .. 975 milliGRAM(s) Oral every 6 hours PRN Mild Pain (1 - 3)  meclizine 12.5 milliGRAM(s) Oral every 6 hours PRN Dizziness      Labs:                        13.0   6.77  )-----------( 283      ( 2022 05:30 )             36.7     06-18    128<L>  |  94<L>  |  9   ----------------------------<  101<H>  3.8   |  22  |  0.62    Ca    9.6      2022 05:30  Phos  3.5     06-18  Mg     2.0     06-18    TPro  7.2  /  Alb  4.5  /  TBili  0.5  /  DBili  x   /  AST  20  /  ALT  14  /  AlkPhos  58  06-16    PT/INR - ( 2022 22:06 )   PT: 11.7 sec;   INR: 0.98          PTT - ( 2022 22:06 )  PTT:29.8 sec    Urinalysis Basic - ( 2022 22:43 )    Color: Yellow / Appearance: Clear / S.010 / pH: x  Gluc: x / Ketone: NEGATIVE  / Bili: Negative / Urobili: 0.2 E.U./dL   Blood: x / Protein: NEGATIVE mg/dL / Nitrite: NEGATIVE   Leuk Esterase: NEGATIVE / RBC: < 5 /HPF / WBC < 5 /HPF   Sq Epi: x / Non Sq Epi: x / Bacteria: None /HPF      COVID-19 PCR: Negative (2022 22:05)  COVID-19 PCR: Negative (27 May 2022 23:02)  COVID-19 PCR: Negative (11 May 2022 22:43)  COVID-19 PCR: Negative (23 Dec 2021 20:07)      Radiology: Reviewed

## 2022-06-18 NOTE — CONSULT NOTE ADULT - ASSESSMENT
Patient is a 89-year-old female with a history of hypertension, hyperlipidemia, hypothyroidism, vertigo, chronic refractory headaches, and recent R PCA stroke, presented for dizziness and HA. Neurology team consulted for HA management.    Impression:  Chronic intractable migraine HA  --Pending MR brain official read, looks ok on my real except for old R PCA stroke  --c/w duloxetine for mood/headache/neuro pain  --Plan was for Ubrelvy PRN for severe migraines, no more than 2 times per week. Nurtec was not approved by insurance  --Possible nerve block as outpatient   Patient is a 89-year-old female with a history of hypertension, hyperlipidemia, hypothyroidism, vertigo, chronic refractory headaches, and recent R PCA stroke, presented for dizziness and HA. Neurology team consulted for HA management.    Impression:  Chronic intractable migraine HA  --Pending MR brain official read, on my read old R occipital stroke on T2 Flair   --c/w duloxetine for mood/headache/neuro pain  --Plan was for Ubrelvy PRN for severe migraines, no more than 2 times per week. Would ask patient to bring her own medication  --C/w Lorazepam 1 mg q 12h prn dizziness  --PT evaluation   --Possible nerve block as outpatient

## 2022-06-18 NOTE — CONSULT NOTE ADULT - SUBJECTIVE AND OBJECTIVE BOX
Patient is a 89y old  Female who presents with a chief complaint of       HPI:  89 y/o F pt with PMHx of prior subacute CVA (), vertigo, HTN, and HLD presents to ED c/o feeling nauseous and headache x 3-4hrs prior to ED arrival. Pt was feeling her baseline around 630pm . Then, pt began to feel dizzy and off balance, but it didn't feel like her prior episodes of vertigo. Pt feels the room spinning and has to close her eyes. Pt admits to not having eaten yesterday and poor appetite over the past month. She denies any vision changes, difficulty ambulating, or any other acute complaints. Pt relates being evaluated for something similar in the past week, but it was not deemed to be an acute stroke and was discharged from this ED. However, because of acute onset of headache, dizziness, and possible AMS earlier per neighbor, code stroke called for stroke/TIA. Stroke workup was negative. Symptoms resolved overnight. Pt spent the night in the ED and slept comfortably. However when awakening this AM she was dizzy again and unable to walk.    ED Course:  Arrived at ED at 10pm, code stroke was called [facility] at the time was 1 for baseline L upper field cut, no new deficits rest of neurologic exam was intact. CTH was negative for acute findings and redemonstrated previous stroke. Pt remained in ED and was found to have difficulty walking with dizziness this morning.    Vitals: Temp 97.9, HR 60, /89 improved to 163/78, RR 18, SaO2 97%  Signicant labs: WBC 6.5, Na 126-> 130, serum osmolality 264, urine Na 64 and urine osmolality 159  EKG:normal sinus rhythm at 61bpm with PACs  Imaging: No acute intracranial hemorrhage or new site of demarcated transcortical   infarction. Continued evolution of a previous right PCA territory infarct.  Inteventions: IV tylenol x1, PO tylenol x1, amlodipine 5mg, IVF NS 1L  Consults: Stroke code - neurology (2022 10:10)      PAST MEDICAL & SURGICAL HISTORY:  Hyperlipidemia  Hyperlipemia      Hypothyroidism  Hypothyroidism      Dizziness  Vertigo      Vertigo      H/O: stroke      Cytomegalovirus infection not present  No significant past surgical history          MEDICATIONS  (STANDING):  amLODIPine   Tablet 5 milliGRAM(s) Oral every 24 hours  aspirin enteric coated 81 milliGRAM(s) Oral every 24 hours  atorvastatin 40 milliGRAM(s) Oral at bedtime  clopidogrel Tablet 75 milliGRAM(s) Oral every 24 hours  DULoxetine 20 milliGRAM(s) Oral every 24 hours  enoxaparin Injectable 40 milliGRAM(s) SubCutaneous every 24 hours  levothyroxine 75 MICROGram(s) Oral daily  melatonin 5 milliGRAM(s) Oral at bedtime  mirtazapine 7.5 milliGRAM(s) Oral every 24 hours    MEDICATIONS  (PRN):  acetaminophen     Tablet .. 975 milliGRAM(s) Oral every 6 hours PRN Mild Pain (1 - 3)  meclizine 12.5 milliGRAM(s) Oral every 6 hours PRN Dizziness          FAMILY HISTORY:    CBC Full  -  ( 2022 05:30 )  WBC Count : 6.77 K/uL  RBC Count : 3.98 M/uL  Hemoglobin : 13.0 g/dL  Hematocrit : 36.7 %  Platelet Count - Automated : 283 K/uL  Mean Cell Volume : 92.2 fl  Mean Cell Hemoglobin : 32.7 pg  Mean Cell Hemoglobin Concentration : 35.4 gm/dL  Auto Neutrophil # : 4.52 K/uL  Auto Lymphocyte # : 1.35 K/uL  Auto Monocyte # : 0.72 K/uL  Auto Eosinophil # : 0.12 K/uL  Auto Basophil # : 0.02 K/uL  Auto Neutrophil % : 66.8 %  Auto Lymphocyte % : 19.9 %  Auto Monocyte % : 10.6 %  Auto Eosinophil % : 1.8 %  Auto Basophil % : 0.3 %      06-18    128<L>  |  94<L>  |  9   ----------------------------<  101<H>  3.8   |  22  |  0.62    Ca    9.6      2022 05:30  Phos  3.5     06-18  Mg     2.0     06-18    TPro  7.2  /  Alb  4.5  /  TBili  0.5  /  DBili  x   /  AST  20  /  ALT  14  /  AlkPhos  58  06-16      Urinalysis Basic - ( 2022 22:43 )    Color: Yellow / Appearance: Clear / S.010 / pH: x  Gluc: x / Ketone: NEGATIVE  / Bili: Negative / Urobili: 0.2 E.U./dL   Blood: x / Protein: NEGATIVE mg/dL / Nitrite: NEGATIVE   Leuk Esterase: NEGATIVE / RBC: < 5 /HPF / WBC < 5 /HPF   Sq Epi: x / Non Sq Epi: x / Bacteria: None /HPF          Radiology :    < from: CT Brain Stroke Protocol (22 @ 21:59) >  ACC: [id number] EXAM:  CT BRAIN STROKE PROTOCOL                          PROCEDURE DATE:  2022          INTERPRETATION:  INDICATIONS: Stroke code, confusion    TECHNIQUE:  Serial axial images were obtained from the skull base to the   vertex without the use of intravenous contrast. Coronal and sagittal   reformatted images were obtained.    COMPARISON EXAMINATION: Head CT 2022    FINDINGS:  VENTRICLES AND SULCI: The ventricles and sulci are within normal limits.  INTRA-AXIAL: No intracranial mass, acute hemorrhage, or midline shift is   present. Redemonstrated right PCA territory evolving infarction. No new   transcortical infarction. Small chronic infarct left parietal gray-white   junction, unchanged. Stable mild subcortical and periventricular-white   matter T2-weighted hyperintensity, nonspecific but favored to reflect   sequela of chronic microvascular ischemia.  EXTRA-AXIAL: No extra-axial fluid collection is present.  VISUALIZED SINUSES: No air-fluid levels are identified.  VISUALIZED MASTOIDS:  Clear.  CALVARIUM:  Normal.  MISCELLANEOUS:  Post bilateral native ocular lens replacements.      IMPRESSION:  No acute intracranial hemorrhage or new site of demarcated transcortical   infarction.    Continued evolution of a right PCA territory infarct.                  Vital Signs Last 24 Hrs  T(C): 36.7 (2022 05:51), Max: 36.7 (2022 05:51)  T(F): 98.1 (2022 05:51), Max: 98.1 (2022 05:51)  HR: 66 (2022 05:51) (62 - 66)  BP: 171/73 (2022 05:51) (154/82 - 171/73)  BP(mean): --  RR: 18 (2022 05:51) (17 - 18)  SpO2: 98% (2022 05:51) (98% - 98%)        REVIEW OF SYSTEMS:      CONSTITUTIONAL: No fever, weight loss, or fatigue  EYES: No eye pain, visual disturbances, or discharge  ENMT:  No difficulty hearing, tinnitus, vertigo; No sinus or throat pain  NECK: No pain or stiffness  BREASTS: No pain, masses, or nipple discharge  RESPIRATORY: No cough, wheezing, chills or hemoptysis; No shortness of breath  CARDIOVASCULAR: No chest pain, palpitations, dizziness, or leg swelling  GASTROINTESTINAL: No abdominal or epigastric pain. No nausea, vomiting, or hematemesis; No diarrhea or constipation. No melena or hematochezia.  GENITOURINARY: No dysuria, frequency, hematuria, or incontinence  NEUROLOGICAL:  per Neurology  SKIN: No itching, burning, rashes, or lesions   LYMPH NODES: No enlarged glands  ENDOCRINE: No heat or cold intolerance; No hair loss  MUSCULOSKELETAL: No joint pain or swelling; No muscle, back, or extremity pain  PSYCHIATRIC: No depression, anxiety, mood swings, or difficulty sleeping  HEME/LYMPH: No easy bruising, or bleeding gums  ALLERGY AND IMMUNOLOGIC: No hives or eczema  VASCULAR: no swelling , erythema          Physical Exam:   frail 89 y o woman  lying in semi Aquino's position , awake , alert ,  c/o dizziness    Head : normocephalic , atraumatic    Eyes : PERRLA , EOMI , no nystagmus , sclera anicteric    ENT : nasal discharge , uvula midline , no oropharyngeal erythema / exudate    Neck : supple , negative JVD , negative carotid bruits , no thyromegaly    Chest : CTA bilaterally , neg wheeze / rhonchi / rales / crackles / egophany    Cardiovascular: regular rate and rhythm , neg murmurs / rubs / gallops    Abdomen : soft , non distended , non tender to palpation in all 4 quadrants , negative rebound / guarding , normal bowel sounds    Extremities : WWP , neg cyanosis /clubbing / edema      Neurologic Exam:    Alert and oriented to person , place , date/year, speech fluent w/o dysarthria , follows commands , recent and remote memory intact , repetition intact , comprehension intact ,  attention/concentration intact , fund of knowledge appropriate    Cranial Nerves:     II :                         pupils equal , round and reactive to light ,  ? L upper field cut ( h/o L PCA infarct)  III/ IV/VI :              extraocular movements intact , neg nystagmus , neg ptosis  V :                        facial sensation intact , V1-3 normal  VII :                      face symmetric , no droop , normal eye closure and smile  VIII :                     hearing intact to finger rub bilaterally  IX and X :             no hoarseness , gag intact , palate/ uvula rise symmetrically  XI :                       SCM / trapezius strength intact bilateral  XII :                      no tongue deviation    Motor Exam:    Right UE:           no focal weakness ,  > 3+/5 throughout ,  negative pronator drift                               Left UE:             no focal weakness,  > 3+/5 throughout ,   negative pronator drift        Right LE:            no focal weakness,  > 3+/5 throughout      Left LE:              no focal weakness,  > 3+/5 throughout        Sensation:         intact to light touch x 4 extremities                         no neglect or extinction on double simultaneous testing                       DTR :                     biceps/brachioradialis : equal                                              patella/ankle : equal                                                                               neg Babinski          Coordination :      Finger to Nose :  neg dysmetria bilaterally      Gait :  not tested              PM&R Impression : admitted for c/o headache , nausea , dizziness    1) no focal weakness      Recommendations / Plan :     1) Physical / Occupational therapy focusing on therapeutic exercises , bed mobility/transfer out of bed evaluation , progressive ambulation with assistive       devices prn .    2) Anticipated Disposition Plan / Recs  :  pending functional progress

## 2022-06-18 NOTE — CONSULT NOTE ADULT - ASSESSMENT
per  Neurology    89 y o Female with PMHx of prior stroke (5/22), HLD, hypothyroid, headache, and vertigo presents to ED with recurrent frontal headache and nausea. BIBEMS at 9:41pm when stroke code was activated. CTH was negative for acute findings. Pt now admitted for recurrent vertigo and headache with inability to ambulate. Pt also found to have worse hyponatremia than baseline but is improving with fluids and diet. Pt pending physical therapy and social work.    Problem/Plan - 1:  ·  Problem: Vertigo.   ·  Plan: Pt presented with headache and vertigo. Pt has hx of recurrent headaches with vertigo. Course has been waxing and waning during ED course. Pt unsteady on feet this morning so was admitted. Hx of recent stroke in May 2022 with residual VF cut. Stroke code called overnight and was negative for new stroke. Can be due to poor PO intake and dehydration    - encourage PO intake  - PT consult  - social work  - OT consult  - consider neurology consult for persistent headaches and vertigo however pt follows with Dr. Mckeon and Dr. Falcon already outpatient  - consider vestibular therapy with PT  - meclizine PRN  - check orthostatics.    Problem/Plan - 2:  ·  Problem: Migraine headache.  ·  Plan: Complains of chronic headaches.     - c/w tylenol 1g prn  - c/w duloxetine at home 20mg daily.    Problem/Plan - 3:  ·  Problem: Hyponatremia.  ·  Plan: Pt has a hx of hyponatremia on prior labs. Pt endorses poor po intake. Labs consistent with hypoosmolar hyponatremia likely 2/2 "tea-toast diet"/ poor PO intake. Sodium correcting with food and 1L NS.     - encourage PO intake  - monitor Na daily.    Problem/Plan - 4:  ·  Problem: Physical debility.  ·  Plan: Pt unable to ambulate today. Likely due to debility. Says she has no appetite    - PT consult  - OT consult  - encourage PO intake  - consider starting mirtazapine for appetite and sleep.    Problem/Plan - 5:  ·  Problem: History of stroke.  ·  Plan: Pt had a recent acute/subacute stroke in May 2022. Follows with Dr. Mckeon. CT head this admission showed evolution of the prior stroke. No new stroke    - c/w aspirin 81  - c/w plavix 75  - c/w atorvastatin.    Problem/Plan - 6:  ·  Problem: Hypertension.  ·  Plan: Pt hypertensive on admission. On amlodipine 5mg at home    - restart amlodipine 5mg.    Problem/Plan - 7:  ·  Problem: Hyperlipidemia.  ·  Plan: - c/w atorvastatin  - f/u lipid profile.    Problem/Plan - 8:  ·  Problem: Hypothyroid.  ·  Plan: - c/w synthroid 75mg  - TSH and T4 normal.    Problem/Plan - 9:  ·  Problem: Prophylactic measure.   ·  Plan: F: s/p 1L NS, encourage PO intake  E: monitor Na, replete as needed  N: DASH/TLC  D: lovenox  Dispo; RMF

## 2022-06-18 NOTE — PROGRESS NOTE ADULT - ASSESSMENT
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 89y Female with PMHx of prior stroke (5/22), HLD, hypothyroid, headache, and vertigo who presented to ED with recurrent frontal headache and nausea also found to have hyponatremia, pending MRI read for further w/u.

## 2022-06-19 ENCOUNTER — TRANSCRIPTION ENCOUNTER (OUTPATIENT)
Age: 87
End: 2022-06-19

## 2022-06-19 DIAGNOSIS — I95.1 ORTHOSTATIC HYPOTENSION: ICD-10-CM

## 2022-06-19 LAB
ANION GAP SERPL CALC-SCNC: 11 MMOL/L — SIGNIFICANT CHANGE UP (ref 5–17)
BASOPHILS # BLD AUTO: 0.04 K/UL — SIGNIFICANT CHANGE UP (ref 0–0.2)
BASOPHILS NFR BLD AUTO: 0.5 % — SIGNIFICANT CHANGE UP (ref 0–2)
BUN SERPL-MCNC: 20 MG/DL — SIGNIFICANT CHANGE UP (ref 7–23)
CALCIUM SERPL-MCNC: 9.3 MG/DL — SIGNIFICANT CHANGE UP (ref 8.4–10.5)
CHLORIDE SERPL-SCNC: 97 MMOL/L — SIGNIFICANT CHANGE UP (ref 96–108)
CO2 SERPL-SCNC: 23 MMOL/L — SIGNIFICANT CHANGE UP (ref 22–31)
CREAT SERPL-MCNC: 0.83 MG/DL — SIGNIFICANT CHANGE UP (ref 0.5–1.3)
EGFR: 67 ML/MIN/1.73M2 — SIGNIFICANT CHANGE UP
EOSINOPHIL # BLD AUTO: 0.15 K/UL — SIGNIFICANT CHANGE UP (ref 0–0.5)
EOSINOPHIL NFR BLD AUTO: 1.8 % — SIGNIFICANT CHANGE UP (ref 0–6)
GLUCOSE SERPL-MCNC: 129 MG/DL — HIGH (ref 70–99)
HCT VFR BLD CALC: 37.7 % — SIGNIFICANT CHANGE UP (ref 34.5–45)
HGB BLD-MCNC: 12.5 G/DL — SIGNIFICANT CHANGE UP (ref 11.5–15.5)
IMM GRANULOCYTES NFR BLD AUTO: 1 % — SIGNIFICANT CHANGE UP (ref 0–1.5)
LYMPHOCYTES # BLD AUTO: 1.53 K/UL — SIGNIFICANT CHANGE UP (ref 1–3.3)
LYMPHOCYTES # BLD AUTO: 18.7 % — SIGNIFICANT CHANGE UP (ref 13–44)
MAGNESIUM SERPL-MCNC: 2.4 MG/DL — SIGNIFICANT CHANGE UP (ref 1.6–2.6)
MCHC RBC-ENTMCNC: 31.4 PG — SIGNIFICANT CHANGE UP (ref 27–34)
MCHC RBC-ENTMCNC: 33.2 GM/DL — SIGNIFICANT CHANGE UP (ref 32–36)
MCV RBC AUTO: 94.7 FL — SIGNIFICANT CHANGE UP (ref 80–100)
MONOCYTES # BLD AUTO: 1.05 K/UL — HIGH (ref 0–0.9)
MONOCYTES NFR BLD AUTO: 12.8 % — SIGNIFICANT CHANGE UP (ref 2–14)
NEUTROPHILS # BLD AUTO: 5.35 K/UL — SIGNIFICANT CHANGE UP (ref 1.8–7.4)
NEUTROPHILS NFR BLD AUTO: 65.2 % — SIGNIFICANT CHANGE UP (ref 43–77)
NRBC # BLD: 0 /100 WBCS — SIGNIFICANT CHANGE UP (ref 0–0)
PHOSPHATE SERPL-MCNC: 4.3 MG/DL — SIGNIFICANT CHANGE UP (ref 2.5–4.5)
PLATELET # BLD AUTO: 266 K/UL — SIGNIFICANT CHANGE UP (ref 150–400)
POTASSIUM SERPL-MCNC: 4 MMOL/L — SIGNIFICANT CHANGE UP (ref 3.5–5.3)
POTASSIUM SERPL-SCNC: 4 MMOL/L — SIGNIFICANT CHANGE UP (ref 3.5–5.3)
RBC # BLD: 3.98 M/UL — SIGNIFICANT CHANGE UP (ref 3.8–5.2)
RBC # FLD: 12.6 % — SIGNIFICANT CHANGE UP (ref 10.3–14.5)
SODIUM SERPL-SCNC: 131 MMOL/L — LOW (ref 135–145)
WBC # BLD: 8.2 K/UL — SIGNIFICANT CHANGE UP (ref 3.8–10.5)
WBC # FLD AUTO: 8.2 K/UL — SIGNIFICANT CHANGE UP (ref 3.8–10.5)

## 2022-06-19 PROCEDURE — 99232 SBSQ HOSP IP/OBS MODERATE 35: CPT

## 2022-06-19 PROCEDURE — 93010 ELECTROCARDIOGRAM REPORT: CPT

## 2022-06-19 PROCEDURE — 99233 SBSQ HOSP IP/OBS HIGH 50: CPT | Mod: GC

## 2022-06-19 RX ORDER — VALPROIC ACID (AS SODIUM SALT) 250 MG/5ML
500 SOLUTION, ORAL ORAL ONCE
Refills: 0 | Status: COMPLETED | OUTPATIENT
Start: 2022-06-19 | End: 2022-06-19

## 2022-06-19 RX ORDER — MAGNESIUM SULFATE 500 MG/ML
2 VIAL (ML) INJECTION ONCE
Refills: 0 | Status: COMPLETED | OUTPATIENT
Start: 2022-06-19 | End: 2022-06-19

## 2022-06-19 RX ORDER — SODIUM CHLORIDE 9 MG/ML
1 INJECTION INTRAMUSCULAR; INTRAVENOUS; SUBCUTANEOUS EVERY 8 HOURS
Refills: 0 | Status: DISCONTINUED | OUTPATIENT
Start: 2022-06-19 | End: 2022-06-20

## 2022-06-19 RX ORDER — MAGNESIUM SULFATE 500 MG/ML
1 VIAL (ML) INJECTION ONCE
Refills: 0 | Status: DISCONTINUED | OUTPATIENT
Start: 2022-06-19 | End: 2022-06-19

## 2022-06-19 RX ORDER — METOCLOPRAMIDE HCL 10 MG
25 TABLET ORAL ONCE
Refills: 0 | Status: COMPLETED | OUTPATIENT
Start: 2022-06-19 | End: 2022-06-19

## 2022-06-19 RX ORDER — DIPHENHYDRAMINE HCL 50 MG
12.5 CAPSULE ORAL ONCE
Refills: 0 | Status: COMPLETED | OUTPATIENT
Start: 2022-06-19 | End: 2022-06-19

## 2022-06-19 RX ADMIN — Medication 81 MILLIGRAM(S): at 17:43

## 2022-06-19 RX ADMIN — AMLODIPINE BESYLATE 5 MILLIGRAM(S): 2.5 TABLET ORAL at 07:07

## 2022-06-19 RX ADMIN — CLOPIDOGREL BISULFATE 75 MILLIGRAM(S): 75 TABLET, FILM COATED ORAL at 17:43

## 2022-06-19 RX ADMIN — Medication 110 MILLIGRAM(S): at 11:58

## 2022-06-19 RX ADMIN — Medication 75 MICROGRAM(S): at 07:06

## 2022-06-19 RX ADMIN — ATORVASTATIN CALCIUM 40 MILLIGRAM(S): 80 TABLET, FILM COATED ORAL at 23:26

## 2022-06-19 RX ADMIN — Medication 5 MILLIGRAM(S): at 23:26

## 2022-06-19 RX ADMIN — SODIUM CHLORIDE 1 GRAM(S): 9 INJECTION INTRAMUSCULAR; INTRAVENOUS; SUBCUTANEOUS at 23:26

## 2022-06-19 RX ADMIN — ENOXAPARIN SODIUM 40 MILLIGRAM(S): 100 INJECTION SUBCUTANEOUS at 23:25

## 2022-06-19 RX ADMIN — SODIUM CHLORIDE 1 GRAM(S): 9 INJECTION INTRAMUSCULAR; INTRAVENOUS; SUBCUTANEOUS at 11:58

## 2022-06-19 RX ADMIN — Medication 150 GRAM(S): at 13:17

## 2022-06-19 RX ADMIN — Medication 55 MILLIGRAM(S): at 00:05

## 2022-06-19 RX ADMIN — SODIUM CHLORIDE 500 MILLILITER(S): 9 INJECTION INTRAMUSCULAR; INTRAVENOUS; SUBCUTANEOUS at 00:23

## 2022-06-19 RX ADMIN — DULOXETINE HYDROCHLORIDE 40 MILLIGRAM(S): 30 CAPSULE, DELAYED RELEASE ORAL at 17:43

## 2022-06-19 RX ADMIN — Medication 10 MILLIGRAM(S): at 11:58

## 2022-06-19 RX ADMIN — Medication 12.5 MILLIGRAM(S): at 11:59

## 2022-06-19 RX ADMIN — MIRTAZAPINE 7.5 MILLIGRAM(S): 45 TABLET, ORALLY DISINTEGRATING ORAL at 23:26

## 2022-06-19 RX ADMIN — Medication 55 MILLIGRAM(S): at 11:58

## 2022-06-19 NOTE — OCCUPATIONAL THERAPY INITIAL EVALUATION ADULT - DIAGNOSIS, OT EVAL
Pt p/w overall good strength with slight decrease in balance +orthostatic limiting ADL and functional mobility performance

## 2022-06-19 NOTE — PROGRESS NOTE ADULT - ATTENDING COMMENTS
vestibular migraine improving.  some orthostasic hypotension when worked with PT today.  However, patient's dizziness not only occurring with position change so I am unsure of the clinical significance. when we re-evaluated she was sedated from the benadryl in the headache medication we gave so we did not re-assess the orthostatic hypotension further.  MRI brain re-assuring    home cymbalta increased to 40mg daily  stop additional PRNs if possible as the combination of medications likely contributed OH    tomorrow please check orthostatic vitals (BP and HR)  at 0 minutes lying down  at 1 minute sitting up  at 2 minutes standing up  at 3 minutes standing up  at 4 minutes standing up

## 2022-06-19 NOTE — OCCUPATIONAL THERAPY INITIAL EVALUATION ADULT - MD ORDER
Patient is a 89-year-old female with a history of hypertension, hyperlipidemia, hypothyroidism, vertigo, chronic refractory headaches, and recent R PCA stroke, presented for dizziness and HA. Neurology team consulted for HA management.

## 2022-06-19 NOTE — PROGRESS NOTE ADULT - ASSESSMENT
Patient is a 89-year-old female with a history of hypertension, hyperlipidemia, hypothyroidism, vertigo, chronic refractory headaches, and recent R PCA stroke, presented for dizziness and HA. Neurology team consulted for HA management.    Impression:  Chronic intractable migraine PATEL  Pending MR brain official read, prelim: stable area of encephalomalacia involving the medial right  occipital lobe consistent with history of recent infarct.    Acute HA management would give:  --->Magnesium 1 g IV once  --->Benadryl 12.5 mg IV once  --->Depakote 500 mh IV once  --->Reglan 25 mg IV once    Can give 1 additional time today.     If refractory HA then consider 0,25 Haldol mg PO PRN if QTc is normal  Increase duloxetine to 40 mg for mood/headache/neuro pain  Plan was for Ubrelvy PRN for severe migraines, no more than 2 times per week. Would ask patient to bring her own medication  PT evaluation- rec outpatient PT   Possible nerve block as outpatient

## 2022-06-19 NOTE — DISCHARGE NOTE PROVIDER - NSDCFUSCHEDAPPT_GEN_ALL_CORE_FT
Cris Rosenberg  [facility] Physician [facility]  NEUROLOGY [address]  Scheduled Appointment: 06/29/2022    Bo Pereira  [facility] Physician [facility]  Gastro [address]  Scheduled Appointment: 07/08/2022    [facility] Physician [facility]  PULMMED [address]  Scheduled Appointment: 07/25/2022     Cris Rosenberg  [facility] Physician [facility]  NEUROLOGY [address]  Scheduled Appointment: 06/29/2022    Bo Pereira  [facility] Physician [facility]  Gastro [address]  Scheduled Appointment: 07/08/2022    [facility]  HEARTVASC [address]  Scheduled Appointment: 07/22/2022    [facility]  PULMMED [address]  Scheduled Appointment: 07/25/2022

## 2022-06-19 NOTE — DISCHARGE NOTE PROVIDER - HOSPITAL COURSE
#Discharge: do not delete    Patient is __ yo M/F with past medical history of _____  Presented with _____, found to have _____  Problem List/Main Diagnoses (system-based):   Inpatient treatment course:   New medications:   Labs to be followed outpatient:   Exam to be followed outpatient:    #Discharge: do not delete    89y Female with PMHx of prior stroke (5/22), HLD, hypothyroid, headache, and vertigo who presented to ED with recurrent frontal headache and nausea also found to have hyponatremia. MRI showing no acute changes. Symptoms improved with headache cocktail x2 and patient is stable to be discharged home on Cymbalta with close outpatient follow-up.    #Vertigo.   Pt presented with headache and vertigo. Pt has hx of recurrent headaches with vertigo. Hx of recent stroke in May 2022 with residual VF cut. Stroke code called overnight and was negative for new stroke. Can be due to poor PO intake and dehydration  -MRI brain with no acute changes   - Stroke neuro recommending migraine cocktail x2 (Depakote, benadryl, reglan, and magnesium) with improvement   - meclizine PRN  -mirtazapine 7.5mg qhs for appetite/sleep.    # Migraine headache.   - MRI with no acute changes as above   - c/w duloxetine at home 20mg daily.  - F/u outpatient with Dr. Mckeon      # Hyponatremia.    Pt has a hx of hyponatremia on prior labs. Pt endorses poor po intake. Labs consistent with hypoosmolar hyponatremia likely 2/2 "tea-toast diet"/ poor PO intake vs SIADH.   -Sodium initially corrected with food and 1L NS, now worsening  -start NaCl tabs, repeat BMP after. will give fluid bolus if not improving and more of a hypoosmolar hyponatremia picture not SIADH.  -Repeat sodium _____     # History of stroke.  Pt had a recent acute/subacute stroke in May 2022. Follows with Dr. Mckeon. CT head this admission showed evolution of the prior stroke. No new stroke  - c/w aspirin 81  - c/w plavix 75  - c/w atorvastatin 40mg    # Hypertension.   Pt hypertensive on admission. On amlodipine 5mg at home  - c/w amlodipine 5mg.    #Hyperlipidemia.   ·  Plan: - c/w atorvastatin.    # Hypothyroid.   - c/w synthroid 75mg    Inpatient treatment course: as above     New medications:     Labs to be followed outpatient: CBC, BMP (monitor sodium)     Exam to be followed outpatient:   Exam with PCP   Neurology #Discharge: do not delete    89y Female with PMHx of prior stroke (5/22), HLD, hypothyroid, headache, and vertigo who presented to ED with recurrent frontal headache and nausea also found to have hyponatremia. MRI showing no acute changes. Symptoms improved with headache cocktail x2 and patient is stable to be discharged home on Cymbalta with close outpatient follow-up.    #Vertigo.   Pt presented with headache and vertigo. Pt has hx of recurrent headaches with vertigo. Hx of recent stroke in May 2022 with residual VF cut. Stroke code called overnight and was negative for new stroke. Can be due to poor PO intake and dehydration  -MRI brain with no acute changes   -Neuro recommending migraine cocktail x2 (Depakote, benadryl, reglan, and magnesium) with improvement   - c/w meclizine PRN  -mirtazapine 7.5mg qhs for appetite/sleep.    # Migraine headache.   - MRI with no acute changes as above   - increased Cymbalta from 20mg to 40mg  - F/u outpatient with Dr. Mckeon      # Hyponatremia.    Pt has a hx of hyponatremia on prior labs. Pt endorses poor po intake.      #Orthostatic positive    # History of stroke.  Pt had a recent acute/subacute stroke in May 2022. Follows with Dr. Mckeon. CT head this admission showed evolution of the prior stroke. No new stroke  - c/w aspirin 81  - c/w plavix 75  - c/w atorvastatin 40mg    # Hypertension.   Pt hypertensive on admission. On amlodipine 5mg at home  - c/w amlodipine 5mg.    #Hyperlipidemia.   ·  Plan: - c/w atorvastatin.    # Hypothyroid.   - c/w synthroid 75mg    Inpatient treatment course: as above     New medications:     Labs to be followed outpatient: CBC, BMP (monitor sodium)     Exam to be followed outpatient:   Exam with PCP   Neurology 89y Female with PMHx of prior stroke (5/22), HLD, hypothyroid, headache, and vertigo who presented to ED with recurrent frontal headache and nausea also found to have hyponatremia. MRI showing no acute changes. Symptoms improved with headache cocktail x2 and patient is stable to be discharged home on Cymbalta with close outpatient follow-up.    #Vertigo.   Pt presented with headache and vertigo. Pt has hx of recurrent headaches with vertigo. Hx of recent stroke in May 2022 with residual VF cut. Stroke code called overnight and was negative for new stroke. Can be due to poor PO intake and dehydration  -MRI brain with no acute changes   -Neuro recommending migraine cocktail x2 (Depakote, benadryl, reglan, and magnesium) with improvement   - c/w meclizine 12.5mg BID PRN  -mirtazapine 7.5mg qhs and melatonin 5mg qhs for appetite/sleep.    # Migraine headache.   - MRI with no acute changes as above   - increased Cymbalta from 20mg to 40mg  - F/u outpatient with Dr. Mckeon   - Traceyrelvabe PRN for rescue migraine HA (patient has at home)     #SIADH   Pt has a hx of hyponatremia on prior labs. Pt endorses poor po intake.    -urine studies c/w SIADH  -resolved with NaCl tabs and fluid restriction, continue fluid restriction on d/c    #Orthostatic positive:  -improved with Na management and compression stockings.    # History of stroke.  Pt had a recent acute/subacute stroke in May 2022. Follows with Dr. Mckeon. CT head this admission showed evolution of the prior stroke. No new stroke  - c/w aspirin 81  - c/w plavix 75  - c/w atorvastatin 40mg (for lovastatin)    # Hypertension.   Pt hypertensive on admission. On amlodipine 5mg at home  - c/w amlodipine 5mg.    #Hyperlipidemia.   ·  Plan: - c/w atorvastatin.    # Hypothyroid.   - c/w synthroid 75mg    Patient with d/c'd with HPT, HOT, and vestibular PT    New medications: mirtazapine 7.5mg qhs, melatonin 5mg qhs, meclizine 12.5mg BID prn  Changes to medications: Cymbalta 20mg --> 40mg  Medications discontinued: None  Other: compression stockings, fluid restriction to 1.5L    Labs to be followed outpatient: CBC, BMP (monitor sodium)     Exam to be followed outpatient:   Exam/orthostatics with PCP   Neurology     Physical Exam:  GENERAL: Awake, alert and interactive,  disheveled appearance  NEURO: A&Ox4, no focal deficits  HEENT: Normocephalic, atraumatic, no conjunctivitis or scleral icterus, MM dry  NECK: Supple  CARDIAC: Regular rate and rhythm, +S1/S2, no murmurs/rubs/gallops  PULM: Breathing comfortably on RA, clear to auscultation bilaterally, no wheezes/rales/rhonchi  ABDOMEN: Soft, nontender, nondistended  MSK: Range of motion grossly intact  SKIN: Warm and dry, no rashes, lesions  VASC: 1+ peripheral pulses, no edema, no LE tenderness  Psych: Appropriate affect

## 2022-06-19 NOTE — DISCHARGE NOTE PROVIDER - NSDCMRMEDTOKEN_GEN_ALL_CORE_FT
amLODIPine 5 mg oral tablet: 1 tab(s) orally every 24 hours  aspirin 81 mg oral delayed release tablet: 1 tab(s) orally once a day  clopidogrel 75 mg oral tablet: 1 tab(s) orally once a day  LORazepam 1 mg oral tablet: 1 tab(s) orally 2 times a day  lovastatin 20 mg oral tablet: 1 tab(s) orally once a day  oxybutynin 10 mg/24 hr oral tablet, extended release: 1 tab(s) orally once a day  Synthroid 75 mcg (0.075 mg) oral tablet: 1 tab(s) orally once a day   amLODIPine 5 mg oral tablet: 1 tab(s) orally every 24 hours  aspirin 81 mg oral delayed release tablet: 1 tab(s) orally once a day  clopidogrel 75 mg oral tablet: 1 tab(s) orally once a day  LORazepam 1 mg oral tablet: 1 tab(s) orally 2 times a day  lovastatin 20 mg oral tablet: 1 tab(s) orally once a day  Outpatient occupational therapy : Evaluate and treat   Outpatient physical therapy : Evaluate and treat   Outpatient vestibular therapy : Evaluate and treat   oxybutynin 10 mg/24 hr oral tablet, extended release: 1 tab(s) orally once a day  Synthroid 75 mcg (0.075 mg) oral tablet: 1 tab(s) orally once a day   amLODIPine 5 mg oral tablet: 1 tab(s) orally every 24 hours  aspirin 81 mg oral delayed release tablet: 1 tab(s) orally once a day  clopidogrel 75 mg oral tablet: 1 tab(s) orally once a day  DULoxetine 40 mg oral delayed release capsule: 1 cap(s) orally every 24 hours  lovastatin 20 mg oral tablet: 1 tab(s) orally once a day  meclizine 12.5 mg oral tablet: 1 tab(s) orally every 6 hours, As needed, Dizziness  melatonin 5 mg oral tablet: 1 tab(s) orally once a day (at bedtime)  mirtazapine 7.5 mg oral tablet: 1 tab(s) orally every 24 hours  Outpatient occupational therapy : Evaluate and treat   Outpatient physical therapy : Evaluate and treat   Outpatient vestibular therapy : Evaluate and treat   oxybutynin 10 mg/24 hr oral tablet, extended release: 1 tab(s) orally once a day  Synthroid 75 mcg (0.075 mg) oral tablet: 1 tab(s) orally once a day   amLODIPine 5 mg oral tablet: 1 tab(s) orally every 24 hours  aspirin 81 mg oral delayed release tablet: 1 tab(s) orally once a day  clopidogrel 75 mg oral tablet: 1 tab(s) orally once a day  DULoxetine 40 mg oral delayed release capsule: 1 cap(s) orally every 24 hours  lovastatin 20 mg oral tablet: 1 tab(s) orally once a day  meclizine 12.5 mg oral tablet: 1 tab(s) orally every 6 hours, As needed, Dizziness  melatonin 5 mg oral tablet: 1 tab(s) orally once a day (at bedtime)  mirtazapine 7.5 mg oral tablet: 1 tab(s) orally every 24 hours  Outpatient occupational therapy : Evaluate and treat   Outpatient physical therapy : Evaluate and treat   Outpatient vestibular therapy : Evaluate and treat   oxybutynin 10 mg/24 hr oral tablet, extended release: 1 tab(s) orally once a day  Rolling Walker :   Synthroid 75 mcg (0.075 mg) oral tablet: 1 tab(s) orally once a day

## 2022-06-19 NOTE — OCCUPATIONAL THERAPY INITIAL EVALUATION ADULT - LIVES WITH, PROFILE
Pt lives in apt with 4 steps to enter. Pt at baseline is ind for ADLs and functional mobility including grocery shopping. No DME needed./alone

## 2022-06-19 NOTE — PROGRESS NOTE ADULT - SUBJECTIVE AND OBJECTIVE BOX
Neurology Progress Note:    INTERVAL HPI/OVERNIGHT EVENTS: No acute overnight events, no fever spikes. Patient examined at bedside- in no acute distress. Can give additional HA cocktail for headaches.       VITAL SIGNS:  T(F): 97.8 (06-19-22 @ 06:30)  HR: 70 (06-19-22 @ 06:30)  BP: 146/72 (06-19-22 @ 06:30)  RR: 18 (06-19-22 @ 06:30)  SpO2: 98% (06-19-22 @ 06:30)  Wt(kg): --    Input & Output:      PHYSICAL EXAM:  General:  Appearance is consistent with chronologic age.  No abnormal facies.  Gross skin survey within normal limits.    Cognitive/Language:  Awake, alert, and oriented to person, place, time and date.  Recent and remote memory intact.  Fund of knowledge is appropriate.  Naming, repetition and comprehension intact.   Nondysarthric.    Cranial Nerves  - Eyes: Visual acuity intact, Visual fields full.  EOMI w/o nystagmus, skew or reported double vision.  PERRL.  No ptosis/weakness of eyelid closure.    - Face:  Facial sensation normal V1 - 3, no facial asymmetry.    - Ears/Nose/Throat:  Hearing grossly intact b/l to finger rub.  Palate elevates midline.  Tongue and uvula midline.   Motor examination:  (MRC grade R/L) 5/5 UE; 5/5 LE.  No observable drift. Normal tone and bulk. No tenderness, twitching, tremors or involuntary movements.  Sensory examination:   Intact to light touch and pinprick, pain, temperature and proprioception and vibration in all extremities.  Reflexes:   2+ b/l biceps, triceps, brachioradialis, patella and achilles.  Plantar response downgoing b/l.  Jaw jerk, Jovanna, clonus absent.  Cerebellum:   FTN/HKS intact.  No dysmetria or dysdiadokinesia.  Gait narrow based and normal.    MEDICATIONS  (STANDING):  amLODIPine   Tablet 5 milliGRAM(s) Oral every 24 hours  aspirin enteric coated 81 milliGRAM(s) Oral every 24 hours  atorvastatin 40 milliGRAM(s) Oral at bedtime  clopidogrel Tablet 75 milliGRAM(s) Oral every 24 hours  diphenhydrAMINE Injectable 12.5 milliGRAM(s) IV Push once  DULoxetine 40 milliGRAM(s) Oral every 24 hours  enoxaparin Injectable 40 milliGRAM(s) SubCutaneous every 24 hours  levothyroxine 75 MICROGram(s) Oral daily  magnesium sulfate  IVPB 2 Gram(s) IV Intermittent once  melatonin 5 milliGRAM(s) Oral at bedtime  metoclopramide  IVPB 25 milliGRAM(s) IV Intermittent once  mirtazapine 7.5 milliGRAM(s) Oral every 24 hours  oxybutynin 10 milliGRAM(s) Oral daily  sodium chloride 1 Gram(s) Oral every 8 hours  valproate sodium  IVPB 500 milliGRAM(s) IV Intermittent once    MEDICATIONS  (PRN):  acetaminophen     Tablet .. 975 milliGRAM(s) Oral every 6 hours PRN Mild Pain (1 - 3)  meclizine 12.5 milliGRAM(s) Oral every 6 hours PRN Dizziness      Allergies    No Known Allergies    Intolerances        LABS:                        12.5   8.20  )-----------( 266      ( 19 Jun 2022 09:50 )             37.7     06-19    131<L>  |  97  |  20  ----------------------------<  129<H>  4.0   |  23  |  0.83    Ca    9.3      19 Jun 2022 09:50  Phos  4.3     06-19  Mg     2.4     06-19            RADIOLOGY & ADDITIONAL TESTS:    MR Head:  1. There is a stable area of encephalomalacia involving the medial right  occipital lobe consistent with history of recent infarct.  2. There are no findings of acute/new infarct.

## 2022-06-19 NOTE — OCCUPATIONAL THERAPY INITIAL EVALUATION ADULT - LEVEL OF INDEPENDENCE: DRESS LOWER BODY, OT EVAL
2/2 difficulties to bend 2/2 increase dizziness and unable to cross less/maximum assist (25% patients effort)

## 2022-06-19 NOTE — PROGRESS NOTE ADULT - ASSESSMENT
89y Female with PMHx of prior stroke (5/22), HLD, hypothyroid, headache, and vertigo who presented to ED with recurrent frontal headache and nausea also found to have hyponatremia, pending MRI read for further w/u.

## 2022-06-19 NOTE — DISCHARGE NOTE PROVIDER - PROVIDER TOKENS
PROVIDER:[TOKEN:[[id number]],FOLLOWUP:[1 week]],PROVIDER:[TOKEN:[[id number]],SCHEDULEDAPPT:[07/08/2022],SCHEDULEDAPPTTIME:[04:00 PM]],PROVIDER:[TOKEN:[[id number]],FOLLOWUP:[2 weeks]]

## 2022-06-19 NOTE — DISCHARGE NOTE PROVIDER - CARE PROVIDER_API CALL
Carmen Mckeon)  Neurology  [address] [address]  Phone: [phone]  Fax: [phone]  Follow Up Time: 1 week    Bo Pereira  GASTROENTEROLOGY  [address] [address]  Phone: [phone]  Fax: [phone]  Scheduled Appointment: 07/08/2022 04:00 PM    Sandie Perez  MEDICAL ONCOLOGY  [address] [place] [address]  Phone: [phone]  Fax: [phone]  Follow Up Time: 2 weeks

## 2022-06-19 NOTE — DISCHARGE NOTE PROVIDER - NSFTFHOMEHTHYNRD_GEN_ALL_CORE
PT RESTING IN BED AWAKE. PT IS ALERT AND ORIENTED X3. SHIFT ASSESSMENT
COMPLETED AT THIS TIME. IV PATENT X1. PT REQUESTING PAIN MEDICATION. MEDICATED
PER MAR. NITRO GTT STOPPED AT THIS TIME DUE TO BRADYCARDIA. CALL LIGHT IN
REACH. WILL CONTINUE TO MONITOR. Yes

## 2022-06-19 NOTE — DISCHARGE NOTE PROVIDER - NSDCCPCAREPLAN_GEN_ALL_CORE_FT
PRINCIPAL DISCHARGE DIAGNOSIS  Diagnosis: Migraine headache  Assessment and Plan of Treatment: You came in with headaches. We gave you a migraine cocktail through the IV twice. We increased your Cymbalta to 40mg daily. You can also take Ubrelvy no more than twice per week. You can also take mirtazipine and melatonin at night to help with sleep. Mirtazapine will also help your appetite.      SECONDARY DISCHARGE DIAGNOSES  Diagnosis: Vertigo  Assessment and Plan of Treatment: You have a condition called vertigo. You can continue to take meclizine 12.5mg twice per day. You can also take Ubrelvy no more than twice per week.    Diagnosis: SIADH (syndrome of inappropriate ADH production)  Assessment and Plan of Treatment: We noted that your sodium (salt) levels were low due to a condition called SIADH. We would like you to limit your fluid intake to 1.5 liters (six 8-ounce glasses) per day to prevent this from happening again. We also suggest you wear compression stockings.     PRINCIPAL DISCHARGE DIAGNOSIS  Diagnosis: Migraine headache  Assessment and Plan of Treatment: You came in with headaches. We gave you a migraine cocktail through the IV twice. We increased your Cymbalta to 40mg daily. You can also take Ubrelvy no more than twice per week. You can also take mirtazipine and melatonin at night to help with sleep. Mirtazapine will also help your appetite.      SECONDARY DISCHARGE DIAGNOSES  Diagnosis: Vertigo  Assessment and Plan of Treatment: You have a condition called vertigo. You can continue to take meclizine 12.5mg twice per day. You can also take Ubrelvy no more than twice per week. You will also continue with vestibular therapy outpatient and you will have physical and occupational therapy at home.    Diagnosis: SIADH (syndrome of inappropriate ADH production)  Assessment and Plan of Treatment: We noted that your sodium (salt) levels were low due to a condition called SIADH. We would like you to limit your fluid intake to 1.5 liters (six 8-ounce glasses) per day to prevent this from happening again. We also suggest you wear compression stockings.

## 2022-06-19 NOTE — DISCHARGE NOTE PROVIDER - CARE PROVIDERS DIRECT ADDRESSES
,DirectAddress_Unknown,[email or web address],DirectAddress_Unknown

## 2022-06-19 NOTE — PROGRESS NOTE ADULT - SUBJECTIVE AND OBJECTIVE BOX
Hospital Course:  89 y/o F pt with PMHx of prior subacute CVA (), vertigo, HTN, and HLD presents to ED c/o feeling nauseous and headache x 3-4hrs prior to ED arrival.    OVERNIGHT EVENTS/INTERVAL HPI: PAULINO. Patient reports continued dizziness worsened with walking. No new complaints.      Vital Signs Last 24 Hrs  T(C): 36.6 (2022 06:30), Max: 36.8 (2022 12:24)  T(F): 97.8 (2022 06:30), Max: 98.2 (2022 12:24)  HR: 70 (2022 06:30) (59 - 70)  BP: 146/72 (2022 06:30) (113/67 - 148/77)  BP(mean): --  RR: 18 (2022 06:30) (18 - 19)  SpO2: 98% (2022 06:30) (97% - 98%)      Physical Exam:  GENERAL: Awake, alert and interactive,  disheveled appearance  NEURO: A&Ox4, no focal deficits  HEENT: Normocephalic, atraumatic, no conjunctivitis or scleral icterus, MM dry  NECK: Supple  CARDIAC: Regular rate and rhythm, +S1/S2, no murmurs/rubs/gallops  PULM: Breathing comfortably on RA, clear to auscultation bilaterally, no wheezes/rales/rhonchi  ABDOMEN: Soft, nontender, nondistended  MSK: Range of motion grossly intact  SKIN: Warm and dry, no rashes, lesions  VASC: 1+ peripheral pulses, no edema, no LE tenderness  Psych: Appropriate affect      MEDICATIONS  (STANDING):  amLODIPine   Tablet 5 milliGRAM(s) Oral every 24 hours  aspirin enteric coated 81 milliGRAM(s) Oral every 24 hours  atorvastatin 40 milliGRAM(s) Oral at bedtime  clopidogrel Tablet 75 milliGRAM(s) Oral every 24 hours  diphenhydrAMINE Injectable 12.5 milliGRAM(s) IV Push once  DULoxetine 40 milliGRAM(s) Oral every 24 hours  enoxaparin Injectable 40 milliGRAM(s) SubCutaneous every 24 hours  levothyroxine 75 MICROGram(s) Oral daily  magnesium sulfate  IVPB 2 Gram(s) IV Intermittent once  melatonin 5 milliGRAM(s) Oral at bedtime  metoclopramide  IVPB 25 milliGRAM(s) IV Intermittent once  mirtazapine 7.5 milliGRAM(s) Oral every 24 hours  oxybutynin 10 milliGRAM(s) Oral daily  valproate sodium  IVPB 500 milliGRAM(s) IV Intermittent once    MEDICATIONS  (PRN):  acetaminophen     Tablet .. 975 milliGRAM(s) Oral every 6 hours PRN Mild Pain (1 - 3)  meclizine 12.5 milliGRAM(s) Oral every 6 hours PRN Dizziness          Labs:                          12.5   8.20  )-----------( 266      ( 2022 09:50 )             37.7   06-19    131<L>  |  97  |  20  ----------------------------<  129<H>  4.0   |  23  |  0.83    Ca    9.3      2022 09:50  Phos  4.3       Mg     2.4     -19                   Urinalysis Basic - ( 2022 22:43 )    Color: Yellow / Appearance: Clear / S.010 / pH: x  Gluc: x / Ketone: NEGATIVE  / Bili: Negative / Urobili: 0.2 E.U./dL   Blood: x / Protein: NEGATIVE mg/dL / Nitrite: NEGATIVE   Leuk Esterase: NEGATIVE / RBC: < 5 /HPF / WBC < 5 /HPF   Sq Epi: x / Non Sq Epi: x / Bacteria: None /HPF      COVID-19 PCR: Negative (2022 22:05)  COVID-19 PCR: Negative (27 May 2022 23:02)  COVID-19 PCR: Negative (11 May 2022 22:43)  COVID-19 PCR: Negative (23 Dec 2021 20:07)      Radiology: Reviewed

## 2022-06-20 ENCOUNTER — TRANSCRIPTION ENCOUNTER (OUTPATIENT)
Age: 87
End: 2022-06-20

## 2022-06-20 DIAGNOSIS — E22.2 SYNDROME OF INAPPROPRIATE SECRETION OF ANTIDIURETIC HORMONE: ICD-10-CM

## 2022-06-20 PROBLEM — R42 DIZZINESS AND GIDDINESS: Chronic | Status: ACTIVE | Noted: 2022-06-17

## 2022-06-20 PROBLEM — Z86.73 PERSONAL HISTORY OF TRANSIENT ISCHEMIC ATTACK (TIA), AND CEREBRAL INFARCTION WITHOUT RESIDUAL DEFICITS: Chronic | Status: ACTIVE | Noted: 2022-06-17

## 2022-06-20 LAB
ANION GAP SERPL CALC-SCNC: 10 MMOL/L — SIGNIFICANT CHANGE UP (ref 5–17)
ANION GAP SERPL CALC-SCNC: 10 MMOL/L — SIGNIFICANT CHANGE UP (ref 5–17)
BUN SERPL-MCNC: 17 MG/DL — SIGNIFICANT CHANGE UP (ref 7–23)
BUN SERPL-MCNC: 19 MG/DL — SIGNIFICANT CHANGE UP (ref 7–23)
CALCIUM SERPL-MCNC: 8.8 MG/DL — SIGNIFICANT CHANGE UP (ref 8.4–10.5)
CALCIUM SERPL-MCNC: 9 MG/DL — SIGNIFICANT CHANGE UP (ref 8.4–10.5)
CHLORIDE SERPL-SCNC: 103 MMOL/L — SIGNIFICANT CHANGE UP (ref 96–108)
CHLORIDE SERPL-SCNC: 104 MMOL/L — SIGNIFICANT CHANGE UP (ref 96–108)
CO2 SERPL-SCNC: 25 MMOL/L — SIGNIFICANT CHANGE UP (ref 22–31)
CO2 SERPL-SCNC: 26 MMOL/L — SIGNIFICANT CHANGE UP (ref 22–31)
CREAT SERPL-MCNC: 0.7 MG/DL — SIGNIFICANT CHANGE UP (ref 0.5–1.3)
CREAT SERPL-MCNC: 0.74 MG/DL — SIGNIFICANT CHANGE UP (ref 0.5–1.3)
EGFR: 77 ML/MIN/1.73M2 — SIGNIFICANT CHANGE UP
EGFR: 82 ML/MIN/1.73M2 — SIGNIFICANT CHANGE UP
GLUCOSE SERPL-MCNC: 183 MG/DL — HIGH (ref 70–99)
GLUCOSE SERPL-MCNC: 93 MG/DL — SIGNIFICANT CHANGE UP (ref 70–99)
HCT VFR BLD CALC: 35.6 % — SIGNIFICANT CHANGE UP (ref 34.5–45)
HGB BLD-MCNC: 11.7 G/DL — SIGNIFICANT CHANGE UP (ref 11.5–15.5)
MAGNESIUM SERPL-MCNC: 2.1 MG/DL — SIGNIFICANT CHANGE UP (ref 1.6–2.6)
MCHC RBC-ENTMCNC: 31.7 PG — SIGNIFICANT CHANGE UP (ref 27–34)
MCHC RBC-ENTMCNC: 32.9 GM/DL — SIGNIFICANT CHANGE UP (ref 32–36)
MCV RBC AUTO: 96.5 FL — SIGNIFICANT CHANGE UP (ref 80–100)
NRBC # BLD: 0 /100 WBCS — SIGNIFICANT CHANGE UP (ref 0–0)
PHOSPHATE SERPL-MCNC: 3.9 MG/DL — SIGNIFICANT CHANGE UP (ref 2.5–4.5)
PLATELET # BLD AUTO: 220 K/UL — SIGNIFICANT CHANGE UP (ref 150–400)
POTASSIUM SERPL-MCNC: 3.8 MMOL/L — SIGNIFICANT CHANGE UP (ref 3.5–5.3)
POTASSIUM SERPL-MCNC: 3.9 MMOL/L — SIGNIFICANT CHANGE UP (ref 3.5–5.3)
POTASSIUM SERPL-SCNC: 3.8 MMOL/L — SIGNIFICANT CHANGE UP (ref 3.5–5.3)
POTASSIUM SERPL-SCNC: 3.9 MMOL/L — SIGNIFICANT CHANGE UP (ref 3.5–5.3)
RBC # BLD: 3.69 M/UL — LOW (ref 3.8–5.2)
RBC # FLD: 12.7 % — SIGNIFICANT CHANGE UP (ref 10.3–14.5)
SODIUM SERPL-SCNC: 139 MMOL/L — SIGNIFICANT CHANGE UP (ref 135–145)
SODIUM SERPL-SCNC: 139 MMOL/L — SIGNIFICANT CHANGE UP (ref 135–145)
WBC # BLD: 5.74 K/UL — SIGNIFICANT CHANGE UP (ref 3.8–10.5)
WBC # FLD AUTO: 5.74 K/UL — SIGNIFICANT CHANGE UP (ref 3.8–10.5)

## 2022-06-20 PROCEDURE — 99239 HOSP IP/OBS DSCHRG MGMT >30: CPT

## 2022-06-20 RX ORDER — POTASSIUM CHLORIDE 20 MEQ
20 PACKET (EA) ORAL ONCE
Refills: 0 | Status: COMPLETED | OUTPATIENT
Start: 2022-06-20 | End: 2022-06-20

## 2022-06-20 RX ORDER — MIRTAZAPINE 45 MG/1
1 TABLET, ORALLY DISINTEGRATING ORAL
Qty: 30 | Refills: 2
Start: 2022-06-20 | End: 2022-09-17

## 2022-06-20 RX ORDER — DULOXETINE HYDROCHLORIDE 30 MG/1
1 CAPSULE, DELAYED RELEASE ORAL
Qty: 30 | Refills: 2
Start: 2022-06-20 | End: 2022-09-17

## 2022-06-20 RX ORDER — MECLIZINE HCL 12.5 MG
1 TABLET ORAL
Qty: 120 | Refills: 2
Start: 2022-06-20 | End: 2022-09-17

## 2022-06-20 RX ORDER — SODIUM CHLORIDE 9 MG/ML
1 INJECTION INTRAMUSCULAR; INTRAVENOUS; SUBCUTANEOUS EVERY 12 HOURS
Refills: 0 | Status: DISCONTINUED | OUTPATIENT
Start: 2022-06-20 | End: 2022-06-21

## 2022-06-20 RX ORDER — LANOLIN ALCOHOL/MO/W.PET/CERES
1 CREAM (GRAM) TOPICAL
Qty: 30 | Refills: 2
Start: 2022-06-20 | End: 2022-09-17

## 2022-06-20 RX ADMIN — Medication 20 MILLIEQUIVALENT(S): at 14:54

## 2022-06-20 RX ADMIN — Medication 75 MICROGRAM(S): at 06:45

## 2022-06-20 RX ADMIN — Medication 10 MILLIGRAM(S): at 12:57

## 2022-06-20 RX ADMIN — DULOXETINE HYDROCHLORIDE 40 MILLIGRAM(S): 30 CAPSULE, DELAYED RELEASE ORAL at 14:54

## 2022-06-20 RX ADMIN — Medication 975 MILLIGRAM(S): at 00:29

## 2022-06-20 RX ADMIN — MIRTAZAPINE 7.5 MILLIGRAM(S): 45 TABLET, ORALLY DISINTEGRATING ORAL at 21:28

## 2022-06-20 RX ADMIN — Medication 975 MILLIGRAM(S): at 01:29

## 2022-06-20 RX ADMIN — ENOXAPARIN SODIUM 40 MILLIGRAM(S): 100 INJECTION SUBCUTANEOUS at 21:29

## 2022-06-20 RX ADMIN — SODIUM CHLORIDE 1 GRAM(S): 9 INJECTION INTRAMUSCULAR; INTRAVENOUS; SUBCUTANEOUS at 08:16

## 2022-06-20 RX ADMIN — Medication 81 MILLIGRAM(S): at 17:55

## 2022-06-20 RX ADMIN — ATORVASTATIN CALCIUM 40 MILLIGRAM(S): 80 TABLET, FILM COATED ORAL at 21:28

## 2022-06-20 RX ADMIN — Medication 5 MILLIGRAM(S): at 21:28

## 2022-06-20 RX ADMIN — AMLODIPINE BESYLATE 5 MILLIGRAM(S): 2.5 TABLET ORAL at 06:45

## 2022-06-20 RX ADMIN — SODIUM CHLORIDE 1 GRAM(S): 9 INJECTION INTRAMUSCULAR; INTRAVENOUS; SUBCUTANEOUS at 19:52

## 2022-06-20 RX ADMIN — CLOPIDOGREL BISULFATE 75 MILLIGRAM(S): 75 TABLET, FILM COATED ORAL at 17:55

## 2022-06-20 RX ADMIN — Medication 12.5 MILLIGRAM(S): at 19:51

## 2022-06-20 NOTE — PROGRESS NOTE ADULT - ATTENDING COMMENTS
see discharge summary Patient was seen and examined by me at bedside on 6/20/22 . I agree with resident's note, subjective, objective physical exam, assessment and plan with following modifications/additions.    possible discharge today   no other complaints   discussed with zahira   case management working on Home Health Care

## 2022-06-20 NOTE — PROGRESS NOTE ADULT - PROBLEM SELECTOR PLAN 5
Pt hypertensive on admission. On amlodipine 5mg at home  - c/w amlodipine 5mg
Pt hypertensive on admission. On amlodipine 5mg at home  - c/w amlodipine 5mg
Pt had a recent acute/subacute stroke in May 2022. Follows with Dr. Mckeon. CT head this admission showed evolution of the prior stroke. No new stroke  - c/w aspirin 81  - c/w plavix 75  - c/w atorvastatin 40mg  -f/u stroke recs

## 2022-06-20 NOTE — PROGRESS NOTE ADULT - PROBLEM SELECTOR PLAN 3
Pt has a hx of hyponatremia on prior labs. Pt endorses poor po intake. Labs consistent with hypoosmolar hyponatremia likely 2/2 "tea-toast diet"/ poor PO intake vs SIADH.   -Sodium initially corrected with food and 1L NS, now worsening  -start NaCl tabs, repeat BMP after. will give fluid bolus if not improving and more of a hypoosmolar hyponatremia picture not SIADH
Pt has a hx of hyponatremia on prior labs. Pt endorses poor po intake however, urine studies c/w SIADH  -resolved with NaCl tabs and fluid restriction  - continue fluid restriction on d/c    #Orthostatic positive:  -improved with Na management and compression stockings.
Complains of chronic headaches.   -f/u MR brain to r/o anatomical issues  - c/w tylenol 1g prn  - c/w duloxetine at home 20mg daily

## 2022-06-20 NOTE — PROGRESS NOTE ADULT - ASSESSMENT
per Internal Medicine    89 y o Female with PMHx of prior stroke (5/22), HLD, hypothyroid, headache, and vertigo who presented to ED with recurrent frontal headache and nausea also found to have hyponatremia, pending MRI read for further w/u.    Problem/Plan - 1:  ·  Problem: Orthostatic hypotension.   ·  Plan: Pt w/ symptomatic orthostatic hypotension this AM when working with PT  -Will c/w compression stockings, salt tabs  -Will need to ensure she is no longer orthostatic/symptomatic prior to d/c home.    Problem/Plan - 2:  ·  Problem: Vertigo.   ·  Plan: Pt presented with headache and vertigo. Pt has hx of recurrent headaches with vertigo. Hx of recent stroke in May 2022 with residual VF cut. Stroke code called overnight and was negative for new stroke. Can be due to poor PO intake and dehydration  -f/u MRI brain  -f/u stroke recs  - encourage PO intake  -f/u PT recs  - meclizine PRN  -mirtazapine 7.5mg qhs for appetite/sleep.    Problem/Plan - 3:  ·  Problem: Migraine headache.   ·  Plan: Complains of chronic headaches.   -f/u MR brain to r/o anatomical issues  - c/w tylenol 1g prn  - c/w duloxetine at home 20mg daily.    Problem/Plan - 4:  ·  Problem: Hyponatremia.   ·  Plan: Improving  Pt has a hx of hyponatremia on prior labs. Pt endorses poor po intake. Labs consistent with hypoosmolar hyponatremia likely 2/2 "tea-toast diet"/ poor PO intake vs SIADH.   -Sodium initially corrected with food and 1L NS, now worsening  -start NaCl tabs, repeat BMP after. will give fluid bolus if not improving and more of a hypoosmolar hyponatremia picture not SIADH.    Problem/Plan - 5:  ·  Problem: History of stroke.   ·  Plan: Pt had a recent acute/subacute stroke in May 2022. Follows with Dr. Mckeon. CT head this admission showed evolution of the prior stroke. No new stroke  - c/w aspirin 81  - c/w plavix 75  - c/w atorvastatin 40mg  -f/u stroke recs.    Problem/Plan - 6:  ·  Problem: Hypertension.   ·  Plan: Pt hypertensive on admission. On amlodipine 5mg at home  - c/w amlodipine 5mg.    Problem/Plan - 7:  ·  Problem: Hyperlipidemia.   ·  Plan: - c/w atorvastatin.    Problem/Plan - 8:  ·  Problem: Hypothyroid.   ·  Plan: - c/w synthroid 75mg  - TSH and T4 normal.    Problem/Plan - 9:  ·  Problem: Prophylactic measure.   ·  Plan: F: Encourage PO intake  E: monitor Na, replete as needed  N: Regular  D: lovenox  Dispo; RMF.

## 2022-06-20 NOTE — PROGRESS NOTE ADULT - PROBLEM SELECTOR PLAN 8
F: Encourage PO intake  E: monitor Na, replete as needed  N: Regular  D: lovenox  Dispo; RMF
F: Encourage PO intake  E: monitor Na, replete as needed  N: Regular  D: lovenox  Dispo; RMF
- c/w synthroid 75mg  - TSH and T4 normal

## 2022-06-20 NOTE — PROGRESS NOTE ADULT - ASSESSMENT
89-year-old female with a history of hypertension, hyperlipidemia, hypothyroidism, vertigo, chronic refractory headaches, and recent R PCA stroke, presented for dizziness and HA. Neurology team consulted for HA management.    #Vestibular Migraine  - [place] Hillpike negative on exam today, Orthostatics wnl this AM  - Can give additional 1g IV mag once  - Please c/w Cymbalta 40mg daily upon discharge  - Patient can get Ubrelvy PRN for rescue migraine HA (no more than twice a week)  - Lifestyle modifications to avoid triggers, keep good sleep and reduce stress  - Patient ti c/w her outpt PT with vestibular PT  - Patient to follow up with neurology as out patient      Please call neurology with any additional questions   Recommendations are final upon attending attestation

## 2022-06-20 NOTE — PROGRESS NOTE ADULT - SUBJECTIVE AND OBJECTIVE BOX
Neurology Progress Note    Interval History:    No acute overnight events. Patient examined at bedside with no new complaints. Patient has head dizziness constant while in bed, that is getting worse with position movements and standing. Orthostatic resolved in the afternoon. Patient denies n/v/d, fever, chills, cp, palpitations, sob, abd pain, leg swelling, rashes, dysuria, and changes in BM.     PAST MEDICAL & SURGICAL HISTORY:  Hyperlipidemia  Hypothyroidism  Dizziness  Vertigo    H/O: stroke    Cytomegalovirus infection not present  No significant past surgical history    Medications:  acetaminophen     Tablet .. 975 milliGRAM(s) Oral every 6 hours PRN  amLODIPine   Tablet 5 milliGRAM(s) Oral every 24 hours  aspirin enteric coated 81 milliGRAM(s) Oral every 24 hours  atorvastatin 40 milliGRAM(s) Oral at bedtime  clopidogrel Tablet 75 milliGRAM(s) Oral every 24 hours  DULoxetine 40 milliGRAM(s) Oral every 24 hours  enoxaparin Injectable 40 milliGRAM(s) SubCutaneous every 24 hours  levothyroxine 75 MICROGram(s) Oral daily  meclizine 12.5 milliGRAM(s) Oral every 6 hours PRN  melatonin 5 milliGRAM(s) Oral at bedtime  mirtazapine 7.5 milliGRAM(s) Oral every 24 hours  oxybutynin 10 milliGRAM(s) Oral daily  sodium chloride 1 Gram(s) Oral every 12 hours    Vital Signs Last 24 Hrs  T(C): 36.3 (20 Jun 2022 11:00), Max: 36.8 (19 Jun 2022 21:59)  T(F): 97.4 (20 Jun 2022 11:00), Max: 98.2 (19 Jun 2022 21:59)  HR: 76 (20 Jun 2022 11:00) (70 - 80)  BP: 146/69 (20 Jun 2022 11:00) (146/69 - 175/76)  BP(mean): --  RR: 16 (20 Jun 2022 11:00) (16 - 18)  SpO2: 100% (20 Jun 2022 11:00) (96% - 100%)    General:  Appearance is consistent with chronologic age.  No abnormal facies.  Gross skin survey within normal limits.    Cognitive/Language:  Awake, alert, and oriented to person, place, time and date.  Recent and remote memory intact.  Fund of knowledge is appropriate.  Naming, repetition and comprehension intact.   Nondysarthric.    Cranial Nerves  - Eyes: Visual acuity intact, Visual fields full.  EOMI w/o nystagmus, skew or reported double vision.  PERRL.  No ptosis/weakness of eyelid closure. [place] Hillpike negative  - Face:  Facial sensation normal V1 - 3, no facial asymmetry.    - Ears/Nose/Throat:  Hearing grossly intact b/l to finger rub.  Palate elevates midline.  Tongue and uvula midline.   Motor examination:  (MRC grade R/L) 5/5 UE; 5/5 LE.  No observable drift. Normal tone and bulk. No tenderness, twitching, tremors or involuntary movements.  Sensory examination:   Intact to light touch and pinprick, pain, temperature and proprioception and vibration in all extremities.  Reflexes:   2+ b/l biceps, triceps, brachioradialis, patella and achilles.  Plantar response downgoing b/l.  Jaw jerk, Jovanna, clonus absent.  Cerebellum:   FTN/HKS intact.  No dysmetria or dysdiadokinesia.  Gait narrow based and normal.    Labs:  CBC Full  -  ( 20 Jun 2022 08:37 )  WBC Count : 5.74 K/uL  RBC Count : 3.69 M/uL  Hemoglobin : 11.7 g/dL  Hematocrit : 35.6 %  Platelet Count - Automated : 220 K/uL  Mean Cell Volume : 96.5 fl  Mean Cell Hemoglobin : 31.7 pg  Mean Cell Hemoglobin Concentration : 32.9 gm/dL  Auto Neutrophil # : x  Auto Lymphocyte # : x  Auto Monocyte # : x  Auto Eosinophil # : x  Auto Basophil # : x  Auto Neutrophil % : x  Auto Lymphocyte % : x  Auto Monocyte % : x  Auto Eosinophil % : x  Auto Basophil % : x    06-20    139  |  104  |  17  ----------------------------<  183<H>  3.9   |  25  |  0.70    Ca    8.8      20 Jun 2022 10:00  Phos  3.9     06-20  Mg     2.1     06-20    MR Head:  1. There is a stable area of encephalomalacia involving the medial right  occipital lobe consistent with history of recent infarct.  2. There are no findings of acute/new infarct.

## 2022-06-20 NOTE — PROGRESS NOTE ADULT - PROBLEM SELECTOR PLAN 1
Pt presented with headache and vertigo. Pt has hx of recurrent headaches with vertigo. Hx of recent stroke in May 2022 with residual VF cut. Stroke code called overnight and was negative for new stroke. Can be due to poor PO intake and dehydration  -f/u MRI brain  -f/u stroke recs  - encourage PO intake  -f/u PT recs  - meclizine PRN  - check orthostatics  -mirtazapine 7.5mg qhs for appetite/sleep
Pt presented with headache and vertigo. Pt has hx of recurrent headaches with vertigo. Hx of recent stroke in May 2022 with residual VF cut. Stroke code called overnight and was negative for new stroke. Can be due to poor PO intake and dehydration  -MRI brain with no acute changes   -Neuro recommending migraine cocktail x2 (Depakote, benadryl, reglan, and magnesium) with improvement   - c/w meclizine 12.5mg BID PRN  -mirtazapine 7.5mg qhs and melatonin 5mg qhs for appetite/sleep.
Pt w/ symptomatic orthostatic hypotension this AM when working with PT  -Will c/w compression stockings, salt tabs  -Will need to ensure she is no longer orthostatic/symptomatic prior to d/c home

## 2022-06-20 NOTE — PROGRESS NOTE ADULT - SUBJECTIVE AND OBJECTIVE BOX
Internal Medicine Progress Note  Liss Noreen, PGY-1  Pager: [phone]    OVERNIGHT EVENTS/INTERVAL HPI: c/o CP with anxiety o/n, ECG without ischemic changes. Tylenol 1g given for HA. HA improved this AM and without nausea. Continues to have dizziness. No new complaints.    OBJECTIVE:  Vital Signs Last 24 Hrs  T(C): 36.3 (20 Jun 2022 11:00), Max: 36.8 (19 Jun 2022 21:59)  T(F): 97.4 (20 Jun 2022 11:00), Max: 98.2 (19 Jun 2022 21:59)  HR: 76 (20 Jun 2022 11:00) (70 - 80)  BP: 146/69 (20 Jun 2022 11:00) (146/69 - 175/76)  BP(mean): --  RR: 16 (20 Jun 2022 11:00) (16 - 18)  SpO2: 100% (20 Jun 2022 11:00) (96% - 100%)  I&O's Detail    Physical Exam:  GENERAL: Awake, alert and interactive,  disheveled appearance  NEURO: A&Ox4, no focal deficits  HEENT: Normocephalic, atraumatic, no conjunctivitis or scleral icterus, MM dry  NECK: Supple  CARDIAC: Regular rate and rhythm, +S1/S2, no murmurs/rubs/gallops  PULM: Breathing comfortably on RA, clear to auscultation bilaterally, no wheezes/rales/rhonchi  ABDOMEN: Soft, nontender, nondistended  MSK: Range of motion grossly intact  SKIN: Warm and dry, no rashes, lesions  VASC: 1+ peripheral pulses, no edema, no LE tenderness  Psych: Appropriate affect    Medications:  MEDICATIONS  (STANDING):  amLODIPine   Tablet 5 milliGRAM(s) Oral every 24 hours  aspirin enteric coated 81 milliGRAM(s) Oral every 24 hours  atorvastatin 40 milliGRAM(s) Oral at bedtime  clopidogrel Tablet 75 milliGRAM(s) Oral every 24 hours  DULoxetine 40 milliGRAM(s) Oral every 24 hours  enoxaparin Injectable 40 milliGRAM(s) SubCutaneous every 24 hours  levothyroxine 75 MICROGram(s) Oral daily  melatonin 5 milliGRAM(s) Oral at bedtime  mirtazapine 7.5 milliGRAM(s) Oral every 24 hours  oxybutynin 10 milliGRAM(s) Oral daily  sodium chloride 1 Gram(s) Oral every 12 hours    MEDICATIONS  (PRN):  acetaminophen     Tablet .. 975 milliGRAM(s) Oral every 6 hours PRN Mild Pain (1 - 3)  meclizine 12.5 milliGRAM(s) Oral every 6 hours PRN Dizziness      Labs:                        11.7   5.74  )-----------( 220      ( 20 Jun 2022 08:37 )             35.6     06-20    139  |  104  |  17  ----------------------------<  183<H>  3.9   |  25  |  0.70    Ca    8.8      20 Jun 2022 10:00  Phos  3.9     06-20  Mg     2.1     06-20            COVID-19 PCR: Negative (16 Jun 2022 22:05)  COVID-19 PCR: Negative (27 May 2022 23:02)  COVID-19 PCR: Negative (11 May 2022 22:43)  COVID-19 PCR: Negative (23 Dec 2021 20:07)      Radiology: Reviewed

## 2022-06-20 NOTE — PROGRESS NOTE ADULT - PROBLEM SELECTOR PLAN 2
Complains of chronic headaches.   -f/u MR brain to r/o anatomical issues  - c/w tylenol 1g prn  - c/w duloxetine at home 20mg daily
- MRI with no acute changes as above   - increased Cymbalta from 20mg to 40mg  - F/u outpatient with Dr. Mckeon   - Darian SO for rescue migraine HA on d/c (patient has at home)
Pt presented with headache and vertigo. Pt has hx of recurrent headaches with vertigo. Hx of recent stroke in May 2022 with residual VF cut. Stroke code called overnight and was negative for new stroke. Can be due to poor PO intake and dehydration  -f/u MRI brain  -f/u stroke recs  - encourage PO intake  -f/u PT recs  - meclizine PRN  -mirtazapine 7.5mg qhs for appetite/sleep

## 2022-06-20 NOTE — DISCHARGE NOTE NURSING/CASE MANAGEMENT/SOCIAL WORK - NSDCPEFALRISK_GEN_ALL_CORE
For information on Fall & Injury Prevention, visit: [email or web address] OR  [email or web address] OR  https://www.cdc.gov/steadi/patient.html

## 2022-06-20 NOTE — PROGRESS NOTE ADULT - PROBLEM SELECTOR PLAN 4
Pt had a recent acute/subacute stroke in May 2022. Follows with Dr. Mckeon. CT head this admission showed evolution of the prior stroke. No new stroke  - c/w aspirin 81  - c/w plavix 75  - c/w atorvastatin 40mg  -f/u stroke recs
Pt had a recent acute/subacute stroke in May 2022. Follows with Dr. Mckeon. CT head this admission showed evolution of the prior stroke. No new stroke  - c/w aspirin 81  - c/w plavix 75  - c/w atorvastatin 40mg
Improving  Pt has a hx of hyponatremia on prior labs. Pt endorses poor po intake. Labs consistent with hypoosmolar hyponatremia likely 2/2 "tea-toast diet"/ poor PO intake vs SIADH.   -Sodium initially corrected with food and 1L NS, now worsening  -start NaCl tabs, repeat BMP after. will give fluid bolus if not improving and more of a hypoosmolar hyponatremia picture not SIADH

## 2022-06-20 NOTE — PROGRESS NOTE ADULT - PROBLEM SELECTOR PLAN 6
Pt hypertensive on admission. On amlodipine 5mg at home  - c/w amlodipine 5mg
- c/w atorvastatin
- c/w atorvastatin

## 2022-06-20 NOTE — PROGRESS NOTE ADULT - SUBJECTIVE AND OBJECTIVE BOX
Physical Medicine and Rehabilitation Progress Note :    Patient is a 89y old  Female who presents with a chief complaint of Dizziness (19 Jun 2022 11:04)      HPI:  89 y/o F pt with PMHx of prior subacute CVA (5/22), vertigo, HTN, and HLD presents to ED c/o feeling nauseous and headache x 3-4hrs prior to ED arrival. Pt was feeling her baseline around 630pm 6/16. Then, pt began to feel dizzy and off balance, but it didn't feel like her prior episodes of vertigo. Pt feels the room spinning and has to close her eyes. Pt admits to not having eaten yesterday and poor appetite over the past month. She denies any vision changes, difficulty ambulating, or any other acute complaints. Pt relates being evaluated for something similar in the past week, but it was not deemed to be an acute stroke and was discharged from this ED. However, because of acute onset of headache, dizziness, and possible AMS earlier per neighbor, code stroke called for stroke/TIA. Stroke workup was negative. Symptoms resolved overnight. Pt spent the night in the ED and slept comfortably. However when awakening this AM she was dizzy again and unable to walk.    ED Course:  Arrived at ED at 10pm, code stroke was called [facility] at the time was 1 for baseline L upper field cut, no new deficits rest of neurologic exam was intact. CTH was negative for acute findings and redemonstrated previous stroke. Pt remained in ED and was found to have difficulty walking with dizziness this morning.    Vitals: Temp 97.9, HR 60, /89 improved to 163/78, RR 18, SaO2 97%  Signicant labs: WBC 6.5, Na 126-> 130, serum osmolality 264, urine Na 64 and urine osmolality 159  EKG:normal sinus rhythm at 61bpm with PACs  Imaging: No acute intracranial hemorrhage or new site of demarcated transcortical   infarction. Continued evolution of a previous right PCA territory infarct.  Inteventions: IV tylenol x1, PO tylenol x1, amlodipine 5mg, IVF NS 1L  Consults: Stroke code - neurology (17 Jun 2022 10:10)                            11.7   5.74  )-----------( 220      ( 20 Jun 2022 08:37 )             35.6       06-20    139  |  104  |  17  ----------------------------<  183<H>  3.9   |  25  |  0.70    Ca    8.8      20 Jun 2022 10:00  Phos  3.9     06-20  Mg     2.1     06-20      Vital Signs Last 24 Hrs  T(C): 36.3 (20 Jun 2022 11:00), Max: 36.8 (19 Jun 2022 21:59)  T(F): 97.4 (20 Jun 2022 11:00), Max: 98.2 (19 Jun 2022 21:59)  HR: 76 (20 Jun 2022 11:00) (70 - 80)  BP: 146/69 (20 Jun 2022 11:00) (113/64 - 175/76)  BP(mean): --  RR: 16 (20 Jun 2022 11:00) (16 - 19)  SpO2: 100% (20 Jun 2022 11:00) (96% - 100%)    MEDICATIONS  (STANDING):  amLODIPine   Tablet 5 milliGRAM(s) Oral every 24 hours  aspirin enteric coated 81 milliGRAM(s) Oral every 24 hours  atorvastatin 40 milliGRAM(s) Oral at bedtime  clopidogrel Tablet 75 milliGRAM(s) Oral every 24 hours  DULoxetine 40 milliGRAM(s) Oral every 24 hours  enoxaparin Injectable 40 milliGRAM(s) SubCutaneous every 24 hours  levothyroxine 75 MICROGram(s) Oral daily  melatonin 5 milliGRAM(s) Oral at bedtime  mirtazapine 7.5 milliGRAM(s) Oral every 24 hours  oxybutynin 10 milliGRAM(s) Oral daily  potassium chloride    Tablet ER 20 milliEquivalent(s) Oral once  sodium chloride 1 Gram(s) Oral every 12 hours    MEDICATIONS  (PRN):  acetaminophen     Tablet .. 975 milliGRAM(s) Oral every 6 hours PRN Mild Pain (1 - 3)  meclizine 12.5 milliGRAM(s) Oral every 6 hours PRN Dizziness    Currently Undergoing Physical/ Occupational Therapy at bedside    PT/OT Functional Status Assessment :   6/19/2022    Previous Level of Function:     · Bed Mobility/Transfers	independent  · Bathing	independent  · Upper Body Dressing	independent  · Lower Body Dressing	independent  · Grooming	independent  · Toileting	independent  · Eating	independent  · Home Management Skills	independent    · Ambulatory Devices Needed	no  · Adaptive Equipment Needed	no    Cognitive Status Examination:   · Level of Consciousness	alert  · Orientation	oriented to person, place, time and situation  · Follow Commands/Answers Questions	100% of the time; able to follow multistep instructions  · Personal Safety and Judgment	intact  · Short Term Memory	intact  · Long Term Memory	intact    Range of Motion Exam:   · Range of Motion Examination, Upper Extremity	bilateral UE Active ROM was WFL  (within functional limits)  · Range of Motion Examination, Lower Extremity	bilateral LE Active ROM was WFL  (within functional limits)    Manual Muscle Testing:   · Manual Muscle Testing Results	B UE/LE 4/5    Muscle Tone Assessment:   · Muscle Tone Assessment	normal    Bed Mobility: Sit to Supine:     · Level of [place]	supervision  · Physical Assist/Nonphysical Assist	verbal cues    Bed Mobility: Supine to Sit:     · Level of [place]	supervision  · Physical Assist/Nonphysical Assist	verbal cues    Bed Mobility Analysis:     · Impairments Contributing to Impaired Bed Mobility	impaired balance; decreased strength    Transfer: Sit to Stand:     · Level of [place]	contact guard  · Physical Assist/Nonphysical Assist	verbal cues; 1 person assist  · Assistive Device	no DME    Transfer: Stand to Sit:     · Level of [place]	contact guard  · Physical Assist/Nonphysical Assist	1 person assist; verbal cues  · Assistive Device	no DME    Sit/Stand Transfer Safety Analysis:     · Transfer Safety Concerns Noted	decreased balance during turns; decreased weight-shifting ability  · Impairments Contributing to Impaired Transfers	impaired balance    Balance Skills Assessment:     · Sitting Balance: Static	good balance  · Sitting Balance: Dynamic	good balance  · Sit-to-Stand Balance	fair balance  · Standing Balance: Static	fair balance  · Standing Balance: Dynamic	fair balance  +orthostatic    Sensory Examination:     Grossly Intact:   · Gross Sensory Examination	Grossly Intact      Light Touch Sensation:   · Left UE	within normal limits  · Right UE	within normal limits  · Left LE	within normal limits  · Right LE	within normal limits      Visual Assessment:   Visual Assessment:    Visual Assessment:   · Visual Acuity	not tested; Pt reports increase dizziness when looking downwards (difficult to formally assess)  · Visual Tracking	normal  · Visual Neglect	not observed  · Visual Field Cuts	none  · Visual Scanning	WFL    Fine Motor Coordination:     Fine Motor Coordination:   · Left Hand Thumb/Finger Opposition Skills	normal performance  · Right Hand Thumb/Finger Opposition Skills	normal performance  · Left Hand, Manipulation of Objects	normal performance  · Right Hand, Manipulation of Objects	normal performance    Lower Body Dressing Training:     · Level of [place]	maximum assist (25% patients effort); 2/2 difficulties to bend 2/2 increase dizziness and unable to cross less    Clinical Impression:   · Rehab Potential	good, to achieve stated therapy goals  · Anticipated Discharge Disposition	home w/ OT; home with home OT        PM&R Impression : as above    Current Disposition Plan Recommendations :     d/c home , outpatient PT/OT

## 2022-06-20 NOTE — DISCHARGE NOTE NURSING/CASE MANAGEMENT/SOCIAL WORK - PATIENT PORTAL LINK FT
You can access the FollowMyHealth Patient Portal offered by [facility] by registering at the following website: [email or web address]. By joining [facility]’s FollowMyHealth portal, you will also be able to view your health information using other applications (apps) compatible with our system.

## 2022-06-20 NOTE — PROGRESS NOTE ADULT - PROBLEM SELECTOR PLAN 7
- c/w synthroid 75mg  - TSH and T4 normal
- c/w synthroid 75mg  - TSH and T4 normal
- c/w atorvastatin

## 2022-06-21 VITALS
OXYGEN SATURATION: 96 % | TEMPERATURE: 98 F | HEART RATE: 68 BPM | RESPIRATION RATE: 17 BRPM | DIASTOLIC BLOOD PRESSURE: 74 MMHG | SYSTOLIC BLOOD PRESSURE: 146 MMHG

## 2022-06-21 PROCEDURE — 85610 PROTHROMBIN TIME: CPT

## 2022-06-21 PROCEDURE — 93005 ELECTROCARDIOGRAM TRACING: CPT

## 2022-06-21 PROCEDURE — 82962 GLUCOSE BLOOD TEST: CPT

## 2022-06-21 PROCEDURE — 84443 ASSAY THYROID STIM HORMONE: CPT

## 2022-06-21 PROCEDURE — 96374 THER/PROPH/DIAG INJ IV PUSH: CPT

## 2022-06-21 PROCEDURE — 96361 HYDRATE IV INFUSION ADD-ON: CPT

## 2022-06-21 PROCEDURE — 84439 ASSAY OF FREE THYROXINE: CPT

## 2022-06-21 PROCEDURE — 97161 PT EVAL LOW COMPLEX 20 MIN: CPT

## 2022-06-21 PROCEDURE — 99285 EMERGENCY DEPT VISIT HI MDM: CPT | Mod: 25

## 2022-06-21 PROCEDURE — 81001 URINALYSIS AUTO W/SCOPE: CPT

## 2022-06-21 PROCEDURE — 70551 MRI BRAIN STEM W/O DYE: CPT

## 2022-06-21 PROCEDURE — 70450 CT HEAD/BRAIN W/O DYE: CPT | Mod: MA

## 2022-06-21 PROCEDURE — 80061 LIPID PANEL: CPT

## 2022-06-21 PROCEDURE — 84100 ASSAY OF PHOSPHORUS: CPT

## 2022-06-21 PROCEDURE — 84484 ASSAY OF TROPONIN QUANT: CPT

## 2022-06-21 PROCEDURE — 87635 SARS-COV-2 COVID-19 AMP PRB: CPT

## 2022-06-21 PROCEDURE — 99232 SBSQ HOSP IP/OBS MODERATE 35: CPT | Mod: GC

## 2022-06-21 PROCEDURE — 85025 COMPLETE CBC W/AUTO DIFF WBC: CPT

## 2022-06-21 PROCEDURE — 85730 THROMBOPLASTIN TIME PARTIAL: CPT

## 2022-06-21 PROCEDURE — 83930 ASSAY OF BLOOD OSMOLALITY: CPT

## 2022-06-21 PROCEDURE — 84300 ASSAY OF URINE SODIUM: CPT

## 2022-06-21 PROCEDURE — 80048 BASIC METABOLIC PNL TOTAL CA: CPT

## 2022-06-21 PROCEDURE — 97116 GAIT TRAINING THERAPY: CPT

## 2022-06-21 PROCEDURE — 83735 ASSAY OF MAGNESIUM: CPT

## 2022-06-21 PROCEDURE — 83935 ASSAY OF URINE OSMOLALITY: CPT

## 2022-06-21 PROCEDURE — 85027 COMPLETE CBC AUTOMATED: CPT

## 2022-06-21 PROCEDURE — 36415 COLL VENOUS BLD VENIPUNCTURE: CPT

## 2022-06-21 PROCEDURE — 80053 COMPREHEN METABOLIC PANEL: CPT

## 2022-06-21 RX ADMIN — Medication 10 MILLIGRAM(S): at 13:07

## 2022-06-21 RX ADMIN — AMLODIPINE BESYLATE 5 MILLIGRAM(S): 2.5 TABLET ORAL at 06:49

## 2022-06-21 RX ADMIN — Medication 75 MICROGRAM(S): at 06:49

## 2022-06-21 RX ADMIN — SODIUM CHLORIDE 1 GRAM(S): 9 INJECTION INTRAMUSCULAR; INTRAVENOUS; SUBCUTANEOUS at 09:06

## 2022-06-25 DIAGNOSIS — R53.81 OTHER MALAISE: ICD-10-CM

## 2022-06-25 DIAGNOSIS — R42 DIZZINESS AND GIDDINESS: ICD-10-CM

## 2022-06-25 DIAGNOSIS — Z79.82 LONG TERM (CURRENT) USE OF ASPIRIN: ICD-10-CM

## 2022-06-25 DIAGNOSIS — Z20.822 CONTACT WITH AND (SUSPECTED) EXPOSURE TO COVID-19: ICD-10-CM

## 2022-06-25 DIAGNOSIS — I95.1 ORTHOSTATIC HYPOTENSION: ICD-10-CM

## 2022-06-25 DIAGNOSIS — G43.919 MIGRAINE, UNSPECIFIED, INTRACTABLE, WITHOUT STATUS MIGRAINOSUS: ICD-10-CM

## 2022-06-25 DIAGNOSIS — E22.2 SYNDROME OF INAPPROPRIATE SECRETION OF ANTIDIURETIC HORMONE: ICD-10-CM

## 2022-06-25 DIAGNOSIS — E03.9 HYPOTHYROIDISM, UNSPECIFIED: ICD-10-CM

## 2022-06-25 DIAGNOSIS — I10 ESSENTIAL (PRIMARY) HYPERTENSION: ICD-10-CM

## 2022-06-25 DIAGNOSIS — E78.5 HYPERLIPIDEMIA, UNSPECIFIED: ICD-10-CM

## 2022-06-25 DIAGNOSIS — Z86.73 PERSONAL HISTORY OF TRANSIENT ISCHEMIC ATTACK (TIA), AND CEREBRAL INFARCTION WITHOUT RESIDUAL DEFICITS: ICD-10-CM

## 2022-06-25 DIAGNOSIS — E86.0 DEHYDRATION: ICD-10-CM

## 2022-06-29 ENCOUNTER — APPOINTMENT (OUTPATIENT)
Dept: NEUROLOGY | Facility: CLINIC | Age: 87
End: 2022-06-29

## 2022-06-29 VITALS
BODY MASS INDEX: 24.74 KG/M2 | HEART RATE: 73 BPM | DIASTOLIC BLOOD PRESSURE: 72 MMHG | TEMPERATURE: 97.9 F | OXYGEN SATURATION: 96 % | HEIGHT: 60 IN | WEIGHT: 126 LBS | SYSTOLIC BLOOD PRESSURE: 134 MMHG

## 2022-06-29 DIAGNOSIS — I63.9 CEREBRAL INFARCTION, UNSPECIFIED: ICD-10-CM

## 2022-06-29 DIAGNOSIS — M54.81 OCCIPITAL NEURALGIA: ICD-10-CM

## 2022-06-29 PROCEDURE — 99215 OFFICE O/P EST HI 40 MIN: CPT | Mod: 25

## 2022-06-29 PROCEDURE — 64405 NJX AA&/STRD GR OCPL NRV: CPT

## 2022-06-29 RX ORDER — LOVASTATIN 20 MG/1
20 TABLET ORAL DAILY
Refills: 0 | Status: ACTIVE | COMMUNITY

## 2022-06-29 RX ORDER — MECLIZINE HYDROCHLORIDE 25 MG/1
25 TABLET ORAL
Refills: 0 | Status: ACTIVE | COMMUNITY

## 2022-06-29 RX ORDER — ASPIRIN 81 MG
81 TABLET, DELAYED RELEASE (ENTERIC COATED) ORAL DAILY
Refills: 0 | Status: ACTIVE | COMMUNITY

## 2022-06-29 NOTE — ASSESSMENT
[FreeTextEntry1] : Patient is a 89-year-old female with a history of hypertension, hyperlipidemia, hypothyroidism, vertigo, chronic refractory headaches, and recent ischemic stroke. More recent admission last week for persistent headaches. \par \par Plan for headaches- encouraged her to take ubrelvy, at start of headache. I am ok with her taking ubrelvy up to 3 or even 4 times a week as needed for headaches. Advised to take rescue medication (ubrelvy, tylenol) at the start of her headaches for maximum efficacy.  Counseled her on the importance of limiting over-the-counter analgesics given the risk of rebound headaches. B/l occipital nerve blocks given today which should also help with headaches as I suspect b/l occipital neurolgia is triggering them... She should start a headache journal to better understand her headache patterns and evaluate need for further migraine treatment. Can stop duloxetine. \par \par Plan for stroke- c/w ASA/ plavix, statin for secondary stroke prevention. Advised that she can stop plavix as it has been >1month since her stroke although she says she is comfortable staying on it through mid-August as previously discussed. After August, she will continue with aspirin monotherpy. She should continue to f/u with EP for ILR monitoring. Will arrange for [facility] to complete her initial stroke workup as she is now agreeable. She should f/u with sleep medicine as well for moderate JIMI seen on overnight sleep study. \par \par RTO 2 months, or sooner as needed.

## 2022-06-29 NOTE — HISTORY OF PRESENT ILLNESS
[FreeTextEntry1] : Patient is a 89-year-old female with a history of hypertension, hyperlipidemia, hypothyroidism, vertigo, chronic refractory headaches, and recent ischemic stroke in May 2022. With regard to the patient's history of stroke, she was first evaluated in the stroke clinic in January 2022 at which time an outpatient MRI showed evidence of chronic cortical ischemic strokes. She was recommended to complete her stroke work-up and start dual antiplatelet therapy, statin. The patient did not follow through with the testing/medication changes. Unfortunately, she was admitted mid May 2022 after presenting with acute headache and was found to have a right PCA territory infarct with CTA showing an occlusion of the proximal right PCA. She underwent a TTE which showed a dilated left atrium and mild LVH but otherwise was unremarkable. She refused [facility]N inpatient, with plans to complete this as outpatient. She underwent loop recorder placement with no events to date. She has been taking aspirin, Plavix, and statin without side effects. UNfortunately she continued to have headaches and she was readmitted about a week ago. Her repeat scans including MRI were negative for any new stroke. She had EEG to evaluate for seizure/ risk for seizures given prior complaints of flashes of light in L peripheral vision and that was negative. She was given headache cocktail x2 with improvement in her headaches. She was d/c home on Cymbalta as well as Ubrelvy PRN for better headache management.  SInce d/c pt has been overall doing better. She says headaches are slightly less severe and frequent. She only took Ubrelvy x1 and is not sure if it was effective. Headaches still occur a few times a week. She has been taking Tylenol rarely with improvement in headaches. She is nervous to take too many medications. She is requesting to come off some of her medications. She has already stopped the Cymbalta since she felt she was not tolerating it well. Mirtazapine has been helping her sleep. She says she is ready to undergo EVELYN as initially recommended in May after her stroke. \par

## 2022-06-29 NOTE — PHYSICAL EXAM
[FreeTextEntry1] : The patient is alert and oriented x3, naming intact x3, repetition normal, follows three-step commands, and is able to participate fully in the history taking.\par Speech is normal with no evidence of dysarthria.\par Memory is intact: Immediate recall 3 out of 3, short-term 3 out of 3, remote memory intact\par Cranial nerves II through XII intact\par Motor exam: Upper and lower extremities 5 out of 5 power, normal tone. No abnormal movements noted.\par Sensory exam: Intact to light touch and pinprick. Romberg negative.\par Coordination and vestibular exam: Finger to nose intact, no evidence of truncal or appendicular ataxia. No evidence of nystagmus. no vestibular symptoms elicited with head turning during ambulation.\par Gait: Normal stance and gait. Able to walk on heels and toes. \par Reflexes: Reduced throughout. No pathological reflexes. Downgoing toes.\par B/l occipital tenderness.

## 2022-06-29 NOTE — DISCUSSION/SUMMARY
[FreeTextEntry1] : Occipital Nerve Block\par \par Indication: Occipital Neuralgia\par                  Cervicogenic Headache\par                                   \par Patient consented for nerve block to greater / lesser occipital nerve BILATERALLY.  Risks and benefits and alternative treatments discussed. \par \par Skin prepped with alcohol \par \par Ethyl Chloride spray used as topical anesthetic\par \par Medications:\par LIDOCAINE 2%  Lot # -OK ( Exp 08/2018) -  3 cc (x2)\par \par Decadron- 2 cc (x2) \par \par Decadron and lidocaine mixed in 2 separate 5 cc syringes and using 30 g needles. The left AND right   greater and lesser occipital nerves were injected. \par \par Patient tolerated procedure well.

## 2022-06-30 ENCOUNTER — INPATIENT (INPATIENT)
Facility: HOSPITAL | Age: 87
LOS: 2 days | Discharge: HOME HEALTH | DRG: 149 | End: 2022-07-03
Attending: PSYCHIATRY & NEUROLOGY | Admitting: PSYCHIATRY & NEUROLOGY
Payer: MEDICARE

## 2022-06-30 VITALS
OXYGEN SATURATION: 99 % | RESPIRATION RATE: 18 BRPM | WEIGHT: 145.06 LBS | DIASTOLIC BLOOD PRESSURE: 71 MMHG | SYSTOLIC BLOOD PRESSURE: 146 MMHG | HEIGHT: 60 IN | HEART RATE: 61 BPM | TEMPERATURE: 98 F

## 2022-06-30 LAB
A1C WITH ESTIMATED AVERAGE GLUCOSE RESULT: 5.4 % — SIGNIFICANT CHANGE UP (ref 4–5.6)
ALBUMIN SERPL ELPH-MCNC: 4.1 G/DL — SIGNIFICANT CHANGE UP (ref 3.3–5)
ALP SERPL-CCNC: 54 U/L — SIGNIFICANT CHANGE UP (ref 40–120)
ALT FLD-CCNC: 22 U/L — SIGNIFICANT CHANGE UP (ref 10–45)
ANION GAP SERPL CALC-SCNC: 9 MMOL/L — SIGNIFICANT CHANGE UP (ref 5–17)
APPEARANCE UR: CLEAR — SIGNIFICANT CHANGE UP
AST SERPL-CCNC: 22 U/L — SIGNIFICANT CHANGE UP (ref 10–40)
BACTERIA # UR AUTO: PRESENT /HPF
BASOPHILS # BLD AUTO: 0.02 K/UL — SIGNIFICANT CHANGE UP (ref 0–0.2)
BASOPHILS NFR BLD AUTO: 0.2 % — SIGNIFICANT CHANGE UP (ref 0–2)
BILIRUB SERPL-MCNC: 0.2 MG/DL — SIGNIFICANT CHANGE UP (ref 0.2–1.2)
BILIRUB UR-MCNC: NEGATIVE — SIGNIFICANT CHANGE UP
BUN SERPL-MCNC: 20 MG/DL — SIGNIFICANT CHANGE UP (ref 7–23)
CALCIUM SERPL-MCNC: 9.5 MG/DL — SIGNIFICANT CHANGE UP (ref 8.4–10.5)
CHLORIDE SERPL-SCNC: 106 MMOL/L — SIGNIFICANT CHANGE UP (ref 96–108)
CO2 SERPL-SCNC: 26 MMOL/L — SIGNIFICANT CHANGE UP (ref 22–31)
COLOR SPEC: YELLOW — SIGNIFICANT CHANGE UP
CREAT SERPL-MCNC: 0.66 MG/DL — SIGNIFICANT CHANGE UP (ref 0.5–1.3)
DIFF PNL FLD: ABNORMAL
EGFR: 83 ML/MIN/1.73M2 — SIGNIFICANT CHANGE UP
EOSINOPHIL # BLD AUTO: 0.06 K/UL — SIGNIFICANT CHANGE UP (ref 0–0.5)
EOSINOPHIL NFR BLD AUTO: 0.7 % — SIGNIFICANT CHANGE UP (ref 0–6)
EPI CELLS # UR: SIGNIFICANT CHANGE UP /HPF (ref 0–5)
ESTIMATED AVERAGE GLUCOSE: 108 MG/DL — SIGNIFICANT CHANGE UP (ref 68–114)
GLUCOSE SERPL-MCNC: 95 MG/DL — SIGNIFICANT CHANGE UP (ref 70–99)
GLUCOSE UR QL: NEGATIVE — SIGNIFICANT CHANGE UP
HCT VFR BLD CALC: 34.2 % — LOW (ref 34.5–45)
HGB BLD-MCNC: 11.3 G/DL — LOW (ref 11.5–15.5)
IMM GRANULOCYTES NFR BLD AUTO: 0.9 % — SIGNIFICANT CHANGE UP (ref 0–1.5)
KETONES UR-MCNC: NEGATIVE — SIGNIFICANT CHANGE UP
LEUKOCYTE ESTERASE UR-ACNC: NEGATIVE — SIGNIFICANT CHANGE UP
LYMPHOCYTES # BLD AUTO: 1.21 K/UL — SIGNIFICANT CHANGE UP (ref 1–3.3)
LYMPHOCYTES # BLD AUTO: 13.7 % — SIGNIFICANT CHANGE UP (ref 13–44)
MAGNESIUM SERPL-MCNC: 2.3 MG/DL — SIGNIFICANT CHANGE UP (ref 1.6–2.6)
MCHC RBC-ENTMCNC: 32 PG — SIGNIFICANT CHANGE UP (ref 27–34)
MCHC RBC-ENTMCNC: 33 GM/DL — SIGNIFICANT CHANGE UP (ref 32–36)
MCV RBC AUTO: 96.9 FL — SIGNIFICANT CHANGE UP (ref 80–100)
MONOCYTES # BLD AUTO: 1.04 K/UL — HIGH (ref 0–0.9)
MONOCYTES NFR BLD AUTO: 11.8 % — SIGNIFICANT CHANGE UP (ref 2–14)
NEUTROPHILS # BLD AUTO: 6.42 K/UL — SIGNIFICANT CHANGE UP (ref 1.8–7.4)
NEUTROPHILS NFR BLD AUTO: 72.7 % — SIGNIFICANT CHANGE UP (ref 43–77)
NITRITE UR-MCNC: NEGATIVE — SIGNIFICANT CHANGE UP
NRBC # BLD: 0 /100 WBCS — SIGNIFICANT CHANGE UP (ref 0–0)
PH UR: 7.5 — SIGNIFICANT CHANGE UP (ref 5–8)
PLATELET # BLD AUTO: 253 K/UL — SIGNIFICANT CHANGE UP (ref 150–400)
POTASSIUM SERPL-MCNC: 4.3 MMOL/L — SIGNIFICANT CHANGE UP (ref 3.5–5.3)
POTASSIUM SERPL-SCNC: 4.3 MMOL/L — SIGNIFICANT CHANGE UP (ref 3.5–5.3)
PROT SERPL-MCNC: 6.9 G/DL — SIGNIFICANT CHANGE UP (ref 6–8.3)
PROT UR-MCNC: NEGATIVE MG/DL — SIGNIFICANT CHANGE UP
RBC # BLD: 3.53 M/UL — LOW (ref 3.8–5.2)
RBC # FLD: 12.9 % — SIGNIFICANT CHANGE UP (ref 10.3–14.5)
RBC CASTS # UR COMP ASSIST: < 5 /HPF — SIGNIFICANT CHANGE UP
SARS-COV-2 RNA SPEC QL NAA+PROBE: NEGATIVE — SIGNIFICANT CHANGE UP
SODIUM SERPL-SCNC: 141 MMOL/L — SIGNIFICANT CHANGE UP (ref 135–145)
SP GR SPEC: 1.01 — SIGNIFICANT CHANGE UP (ref 1–1.03)
TROPONIN T SERPL-MCNC: <0.01 NG/ML — SIGNIFICANT CHANGE UP (ref 0–0.01)
UROBILINOGEN FLD QL: 0.2 E.U./DL — SIGNIFICANT CHANGE UP
WBC # BLD: 8.83 K/UL — SIGNIFICANT CHANGE UP (ref 3.8–10.5)
WBC # FLD AUTO: 8.83 K/UL — SIGNIFICANT CHANGE UP (ref 3.8–10.5)
WBC UR QL: < 5 /HPF — SIGNIFICANT CHANGE UP

## 2022-06-30 PROCEDURE — 70450 CT HEAD/BRAIN W/O DYE: CPT | Mod: 26,MA

## 2022-06-30 PROCEDURE — 93010 ELECTROCARDIOGRAM REPORT: CPT

## 2022-06-30 PROCEDURE — 99285 EMERGENCY DEPT VISIT HI MDM: CPT

## 2022-06-30 RX ORDER — OXYBUTYNIN CHLORIDE 5 MG
1 TABLET ORAL
Qty: 0 | Refills: 0 | DISCHARGE

## 2022-06-30 RX ORDER — CLOPIDOGREL BISULFATE 75 MG/1
75 TABLET, FILM COATED ORAL DAILY
Refills: 0 | Status: DISCONTINUED | OUTPATIENT
Start: 2022-07-01 | End: 2022-07-03

## 2022-06-30 RX ORDER — MECLIZINE HCL 12.5 MG
12.5 TABLET ORAL ONCE
Refills: 0 | Status: COMPLETED | OUTPATIENT
Start: 2022-06-30 | End: 2022-06-30

## 2022-06-30 RX ORDER — MAGNESIUM SULFATE 500 MG/ML
1 VIAL (ML) INJECTION ONCE
Refills: 0 | Status: COMPLETED | OUTPATIENT
Start: 2022-06-30 | End: 2022-06-30

## 2022-06-30 RX ORDER — ENOXAPARIN SODIUM 100 MG/ML
40 INJECTION SUBCUTANEOUS EVERY 24 HOURS
Refills: 0 | Status: DISCONTINUED | OUTPATIENT
Start: 2022-06-30 | End: 2022-07-03

## 2022-06-30 RX ORDER — SODIUM CHLORIDE 9 MG/ML
500 INJECTION INTRAMUSCULAR; INTRAVENOUS; SUBCUTANEOUS ONCE
Refills: 0 | Status: COMPLETED | OUTPATIENT
Start: 2022-06-30 | End: 2022-06-30

## 2022-06-30 RX ORDER — ASPIRIN/CALCIUM CARB/MAGNESIUM 324 MG
81 TABLET ORAL DAILY
Refills: 0 | Status: DISCONTINUED | OUTPATIENT
Start: 2022-07-01 | End: 2022-07-03

## 2022-06-30 RX ORDER — OXYBUTYNIN CHLORIDE 5 MG
10 TABLET ORAL DAILY
Refills: 0 | Status: DISCONTINUED | OUTPATIENT
Start: 2022-07-01 | End: 2022-07-01

## 2022-06-30 RX ORDER — LOVASTATIN 20 MG
1 TABLET ORAL
Qty: 0 | Refills: 0 | DISCHARGE

## 2022-06-30 RX ORDER — AMLODIPINE BESYLATE 2.5 MG/1
5 TABLET ORAL DAILY
Refills: 0 | Status: DISCONTINUED | OUTPATIENT
Start: 2022-06-30 | End: 2022-07-03

## 2022-06-30 RX ORDER — METOCLOPRAMIDE HCL 10 MG
10 TABLET ORAL ONCE
Refills: 0 | Status: COMPLETED | OUTPATIENT
Start: 2022-06-30 | End: 2022-06-30

## 2022-06-30 RX ORDER — ACETAMINOPHEN 500 MG
1000 TABLET ORAL ONCE
Refills: 0 | Status: COMPLETED | OUTPATIENT
Start: 2022-06-30 | End: 2022-06-30

## 2022-06-30 RX ORDER — ATORVASTATIN CALCIUM 80 MG/1
40 TABLET, FILM COATED ORAL AT BEDTIME
Refills: 0 | Status: DISCONTINUED | OUTPATIENT
Start: 2022-06-30 | End: 2022-07-03

## 2022-06-30 RX ORDER — LEVOTHYROXINE SODIUM 125 MCG
1 TABLET ORAL
Qty: 0 | Refills: 0 | DISCHARGE

## 2022-06-30 RX ORDER — MIRTAZAPINE 45 MG/1
7.5 TABLET, ORALLY DISINTEGRATING ORAL AT BEDTIME
Refills: 0 | Status: DISCONTINUED | OUTPATIENT
Start: 2022-06-30 | End: 2022-07-03

## 2022-06-30 RX ORDER — LEVOTHYROXINE SODIUM 125 MCG
75 TABLET ORAL DAILY
Refills: 0 | Status: DISCONTINUED | OUTPATIENT
Start: 2022-07-01 | End: 2022-07-03

## 2022-06-30 RX ADMIN — Medication 100 GRAM(S): at 16:34

## 2022-06-30 RX ADMIN — SODIUM CHLORIDE 500 MILLILITER(S): 9 INJECTION INTRAMUSCULAR; INTRAVENOUS; SUBCUTANEOUS at 12:11

## 2022-06-30 RX ADMIN — Medication 104 MILLIGRAM(S): at 16:15

## 2022-06-30 RX ADMIN — Medication 400 MILLIGRAM(S): at 16:15

## 2022-06-30 RX ADMIN — AMLODIPINE BESYLATE 5 MILLIGRAM(S): 2.5 TABLET ORAL at 19:11

## 2022-06-30 RX ADMIN — MIRTAZAPINE 7.5 MILLIGRAM(S): 45 TABLET, ORALLY DISINTEGRATING ORAL at 22:57

## 2022-06-30 RX ADMIN — ENOXAPARIN SODIUM 40 MILLIGRAM(S): 100 INJECTION SUBCUTANEOUS at 22:57

## 2022-06-30 RX ADMIN — ATORVASTATIN CALCIUM 40 MILLIGRAM(S): 80 TABLET, FILM COATED ORAL at 22:57

## 2022-06-30 RX ADMIN — Medication 12.5 MILLIGRAM(S): at 12:11

## 2022-06-30 NOTE — ED PROVIDER NOTE - CLINICAL SUMMARY MEDICAL DECISION MAKING FREE TEXT BOX
Impression: Hx of prior CVA in May 2022, HLD, hypothyroid, headache and vertigo, BIBEMS for headache and sensation of dysequilibrium and inability to walk since last night. Nonfocal neuro exam. Negative cerebellar signs other than inability to stand and ambulate independently. EKG nonischemic. Check labs, CT head, consult stroke team. Impression: Hx of prior CVA in May 2022, HLD, hypothyroid, headache and vertigo, BIBEMS for headache and inability to stand/ walk since last night. Unable to stand and ambulate independently. Remainder of neuro exam non-focal. EKG nonischemic. No acute findings on ct head. Labs reassuring. No signs of infection on ua. Pt seen by stroke team; do not suspect cva, ? difficulty standing/ ambulating 2/2 vertigo. Will admit to stroke svc for further work up and management.

## 2022-06-30 NOTE — H&P ADULT - NSHPPHYSICALEXAM_GEN_ALL_CORE
Physical exam:  Constitutional: No acute distress, conversant  Eyes: Anicteric sclerae, moist conjunctivae, see below for CNs  Neck: trachea midline  Neurologic:  -Mental status: Awake, alert, oriented to person, age, and month. Speech is fluent with intact naming, repetition, and comprehension, no dysarthria. Recent and remote memory intact. Follows commands. Attention/concentration intact. Fund of knowledge appropriate.  -Cranial nerves:   II: Left upper quadrantanopia.  III, IV, VI: Extraocular movements are intact without nystagmus. Pupils equally round and reactive to light  V:  Facial sensation V1-V3 equal and intact   VII: Face is symmetric   Motor: Normal bulk and tone. No pronator drift. Strength bilateral upper extremity 5/5, bilateral lower extremities 5/5.  Rapid alternating movements intact and symmetric  Sensation: Intact to light touch bilaterally. No neglect or extinction on double simultaneous testing.  Coordination: No dysmetria on finger-to-nose bilaterally  Gait: unable to walk, stood up and did not feel well and began leaning back      NIHSS: 1

## 2022-06-30 NOTE — H&P ADULT - NSHPLABSRESULTS_GEN_ALL_CORE
LABS:                        11.3   8.83  )-----------( 253      ( 2022 11:03 )             34.2     06-30    141  |  106  |  20  ----------------------------<  95  4.3   |  26  |  0.66    Ca    9.5      2022 11:03  Mg     2.3     06-30    TPro  6.9  /  Alb  4.1  /  TBili  0.2  /  DBili  x   /  AST  22  /  ALT  22  /  AlkPhos  54  06-30      CARDIAC MARKERS ( 2022 11:03 )  x     / <0.01 ng/mL / x     / x     / x          Urinalysis Basic - ( 2022 11:16 )    Color: Yellow / Appearance: Clear / S.010 / pH: x  Gluc: x / Ketone: NEGATIVE  / Bili: Negative / Urobili: 0.2 E.U./dL   Blood: x / Protein: NEGATIVE mg/dL / Nitrite: NEGATIVE   Leuk Esterase: NEGATIVE / RBC: < 5 /HPF / WBC < 5 /HPF   Sq Epi: x / Non Sq Epi: 0-5 /HPF / Bacteria: Present /HPF        RADIOLOGY & ADDITIONAL STUDIES:    < from: CT Head No Cont (22 @ 11:56) >  IMPRESSION: No intracranial hemorrhageor acute transcortical infarct.   Multifocal areas of old ischemia as detailed above. Stable exam.    < end of copied text >    -----------------------------------------------------------------------------------------------------------------  IV-tPA (Y/N):   no  Reason IV-tPA not given: outside window

## 2022-06-30 NOTE — ED ADULT NURSE REASSESSMENT NOTE - NS ED NURSE REASSESS COMMENT FT1
pt resting on stretcher unlabored respirations nsr noted to ccm pt endorses dizziness worse when positional change from laying to sitting for meclizine administration. Medicated per orders 500 NS bolus infusing pending further eval

## 2022-06-30 NOTE — H&P ADULT - NSHPREVIEWOFSYSTEMS_GEN_ALL_CORE
ROS:   Constitutional: No fever, weight loss or fatigue  Eyes: No eye pain, visual disturbances, or discharge  ENMT:  No difficulty hearing, tinnitus; No sinus or throat pain  Neck: No pain or stiffness  Respiratory: No cough, wheezing, chills or hemoptysis  Cardiovascular: No chest pain, palpitations, shortness of breath, or leg swelling  Gastrointestinal: No abdominal pain. No nausea, vomiting or hematemesis; No diarrhea or constipation. Nohematochezia.  Genitourinary: No dysuria, frequency, hematuria or incontinence  Neurological: As per HPI

## 2022-06-30 NOTE — H&P ADULT - NS ATTEND AMEND GEN_ALL_CORE FT
The patient is a 89-year-old female with a history of hypertension, hyperlipidemia hypothyroidism, vertigo, chronic refractory headaches, and recent right PCA stroke in May 2022.  She is admitted with gait instability.  Head CT was unremarkable.  Plan for MRI for further evaluation. Continue home med regimen for now. The patient is a 89-year-old female with a history of hypertension, hyperlipidemia hypothyroidism, vertigo, chronic refractory headaches, and recent right PCA stroke in May 2022.  She is admitted with gait instability.  Head CT was unremarkable.  Plan for MRI for further evaluation. Continue home med regimen for now. If MRI is negative, suspect symptoms secondary to vertigo and she may benefit from vestibular therapy. Exam is nonfocal.

## 2022-06-30 NOTE — PATIENT PROFILE ADULT - FALL HARM RISK - HARM RISK INTERVENTIONS
Assistance with ambulation/Assistance OOB with selected safe patient handling equipment/Communicate Risk of Fall with Harm to all staff/Discuss with provider need for PT consult/Monitor gait and stability/Reinforce activity limits and safety measures with patient and family/Sit up slowly, dangle for a short time, stand at bedside before walking/Tailored Fall Risk Interventions/Visual Cue: Yellow wristband and red socks/Bed in lowest position, wheels locked, appropriate side rails in place/Call bell, personal items and telephone in reach/Instruct patient to call for assistance before getting out of bed or chair/Non-slip footwear when patient is out of bed/Foley to call system/Physically safe environment - no spills, clutter or unnecessary equipment/Purposeful Proactive Rounding/Room/bathroom lighting operational, light cord in reach

## 2022-06-30 NOTE — ED PROVIDER NOTE - PHYSICAL EXAMINATION
VITAL SIGNS: I have reviewed nursing notes and confirm.  CONSTITUTIONAL: In no acute distress.   SKIN:  warm and dry, no acute rash.   HEAD:  normocephalic, atraumatic.  EYES: Horizontal nystagmus. conjunctiva and sclera clear.  ENT: No nasal discharge; airway clear.   NECK: Supple; non tender.  CARD: S1, S2 normal; no murmurs, gallops, or rubs. Regular rate and rhythm.   RESP:  Clear to auscultation b/l, no wheezes, rales or rhonchi.  ABD: Normal bowel sounds; soft; non-distended; non-tender; no guarding/ rebound.  EXT: Normal ROM. No clubbing, cyanosis or edema. 2+ pulses to b/l ue/le.  NEURO: Alert & oriented x3. Cranial nerves II-XII intact. No facial droop. No slurred speech. Motor and sensation equal and intact bilaterally. Negative pronator drift. Finger to nose, heel to shin, rapid alternating movement negative. Able to stand w/ assistance. Unable to ambulate independently due to imbalance.  PSYCH: Cooperative, mood and affect appropriate. VITAL SIGNS: I have reviewed nursing notes and confirm.  CONSTITUTIONAL: In no acute distress.   SKIN:  warm and dry, no acute rash.   HEAD:  normocephalic, atraumatic.  EYES: PERRLA. EOMI. + Horizontal nystagmus. conjunctiva and sclera clear.  ENT: No nasal discharge; airway clear.   NECK: Supple; non tender.  CARD: S1, S2 normal; no murmurs, gallops, or rubs. Regular rate and rhythm.   RESP:  Clear to auscultation b/l, no wheezes, rales or rhonchi.  ABD: Normal bowel sounds; soft; non-distended; non-tender; no guarding/ rebound.  EXT: Normal ROM. No clubbing, cyanosis or edema. Pulses intact and equal to b/l ue/le.  NEURO: Alert & oriented x3. Cranial nerves II-XII intact. No facial droop. No slurred speech. Motor and sensation equal and intact bilaterally. Negative pronator drift. Finger to nose, heel to shin, rapid alternating movement negative. Able to stand w/ assistance however pt leaning backwards and unable to maintain truncal support independently.  Unable to ambulate independently due to imbalance.  PSYCH: Cooperative, mood and affect appropriate.

## 2022-06-30 NOTE — ED ADULT TRIAGE NOTE - CHIEF COMPLAINT QUOTE
Patient c/o constant pain to the back of her head, dizziness that she felt last night before going to bed.  Hx HTN, vertigo.

## 2022-06-30 NOTE — H&P ADULT - HISTORY OF PRESENT ILLNESS
HPI: 89y Female with PMHx of HTN, HLD, hypothyroidism, vertigo, chronic refractory headaches, recent right PCA stroke in May 2022, well known to stroke service in and outpatient, coming in with difficulty standing up.   Pt was first evaluated in the stroke clinic in January 2022 and had seen chronic cortical ischemic strokes. Pt did not follow through testing, and was admitted to [place] May 2022 with a right PCA stroke. ILR placed, pt refused [facility] inpatient. Pt has returned to the hospital for headaches and has followed up with the [facility] outpatient clinic for treatment, including injections for occipital neuralgia. Pt recently saw the outpatient NP yesterday, pt had said she was worried about taking too many medications. Pt had stopped the Cymbalta on her own, and took Ubrelvy for her headaches and was not sure it helped so had only taken it once. Per outpatient NP note, pt had said the Mirtazapine has been helping her sleep.    Pt states that she stopped one medication yesterday, thinks it was her sleeping one, and last night she was unable to sleep the entire night. She thinks that this is why she was unable to walk since last night.    Per ED attending, pt was leaning backwards when she stood up. When she tried to sit up for me, she got extremely dizzy and wanted to lay down.    On re-examination, pt was willing to stand, but unable to stand up straight and kept leaning backwards

## 2022-06-30 NOTE — H&P ADULT - ASSESSMENT
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 On reassessment pt did not improve, admit for MRI and physical therapy.  NIHSS 1 (from prior stroke).    Neuro  #CVA workup  - continue aspirin 81mg and plavix 75mg daily  - continue atorvastatin 80mg daily  - q4hr stroke neuro checks and vitals  - obtain MRI Brain without contrast  - Stroke Code HCT Results: neg  - Stroke education  -continue home remeron    Cards  #HTN  - permissive hypertension, Goal -180  - restarted home amlodipine  - consider TTE pending MRI    #HLD  - high dose statin as above in CVA  - LDL results:     Pulm  - call provider if SPO2 < 94%    GI  #Nutrition/Fluids/Electrolytes   - replete K<4 and Mg <2  - Diet: Regular    Urology  #overactive bladder  -continue oxybutynin    Endocrine  #DM  - A1C results:     - TSH results:  -continue home synthroid    DVT Prophylaxis  - lovenox sq for DVT prophylaxis   - SCDs for DVT prophylaxis     Pt discussed during rounds with Dr. Mike MCDANIELS Goals: Goals to be reviewed at interdisciplinary rounds with case management, social work, physical therapy, occupational therapy, and speech language pathology.   Please see specific therapy  notes for in depth goals.  Dispo: pending eval

## 2022-07-01 ENCOUNTER — TRANSCRIPTION ENCOUNTER (OUTPATIENT)
Age: 87
End: 2022-07-01

## 2022-07-01 LAB
A1C WITH ESTIMATED AVERAGE GLUCOSE RESULT: 5.4 % — SIGNIFICANT CHANGE UP (ref 4–5.6)
ANION GAP SERPL CALC-SCNC: 11 MMOL/L — SIGNIFICANT CHANGE UP (ref 5–17)
BUN SERPL-MCNC: 21 MG/DL — SIGNIFICANT CHANGE UP (ref 7–23)
CALCIUM SERPL-MCNC: 9.2 MG/DL — SIGNIFICANT CHANGE UP (ref 8.4–10.5)
CHLORIDE SERPL-SCNC: 108 MMOL/L — SIGNIFICANT CHANGE UP (ref 96–108)
CHOLEST SERPL-MCNC: 210 MG/DL — HIGH
CO2 SERPL-SCNC: 23 MMOL/L — SIGNIFICANT CHANGE UP (ref 22–31)
CREAT SERPL-MCNC: 0.65 MG/DL — SIGNIFICANT CHANGE UP (ref 0.5–1.3)
EGFR: 84 ML/MIN/1.73M2 — SIGNIFICANT CHANGE UP
ESTIMATED AVERAGE GLUCOSE: 108 MG/DL — SIGNIFICANT CHANGE UP (ref 68–114)
GLUCOSE SERPL-MCNC: 97 MG/DL — SIGNIFICANT CHANGE UP (ref 70–99)
HCT VFR BLD CALC: 37 % — SIGNIFICANT CHANGE UP (ref 34.5–45)
HDLC SERPL-MCNC: 59 MG/DL — SIGNIFICANT CHANGE UP
HGB BLD-MCNC: 12.1 G/DL — SIGNIFICANT CHANGE UP (ref 11.5–15.5)
LIPID PNL WITH DIRECT LDL SERPL: 126 MG/DL — HIGH
MAGNESIUM SERPL-MCNC: 3 MG/DL — HIGH (ref 1.6–2.6)
MCHC RBC-ENTMCNC: 32.4 PG — SIGNIFICANT CHANGE UP (ref 27–34)
MCHC RBC-ENTMCNC: 32.7 GM/DL — SIGNIFICANT CHANGE UP (ref 32–36)
MCV RBC AUTO: 98.9 FL — SIGNIFICANT CHANGE UP (ref 80–100)
NON HDL CHOLESTEROL: 151 MG/DL — HIGH
NRBC # BLD: 0 /100 WBCS — SIGNIFICANT CHANGE UP (ref 0–0)
PHOSPHATE SERPL-MCNC: 3.9 MG/DL — SIGNIFICANT CHANGE UP (ref 2.5–4.5)
PLATELET # BLD AUTO: 251 K/UL — SIGNIFICANT CHANGE UP (ref 150–400)
POTASSIUM SERPL-MCNC: 4.2 MMOL/L — SIGNIFICANT CHANGE UP (ref 3.5–5.3)
POTASSIUM SERPL-SCNC: 4.2 MMOL/L — SIGNIFICANT CHANGE UP (ref 3.5–5.3)
RBC # BLD: 3.74 M/UL — LOW (ref 3.8–5.2)
RBC # FLD: 12.9 % — SIGNIFICANT CHANGE UP (ref 10.3–14.5)
SODIUM SERPL-SCNC: 142 MMOL/L — SIGNIFICANT CHANGE UP (ref 135–145)
TRIGL SERPL-MCNC: 124 MG/DL — SIGNIFICANT CHANGE UP
TSH SERPL-MCNC: 2.07 UIU/ML — SIGNIFICANT CHANGE UP (ref 0.27–4.2)
WBC # BLD: 8.48 K/UL — SIGNIFICANT CHANGE UP (ref 3.8–10.5)
WBC # FLD AUTO: 8.48 K/UL — SIGNIFICANT CHANGE UP (ref 3.8–10.5)

## 2022-07-01 PROCEDURE — 99221 1ST HOSP IP/OBS SF/LOW 40: CPT

## 2022-07-01 PROCEDURE — 99222 1ST HOSP IP/OBS MODERATE 55: CPT

## 2022-07-01 PROCEDURE — 70551 MRI BRAIN STEM W/O DYE: CPT | Mod: 26

## 2022-07-01 RX ORDER — OXYBUTYNIN CHLORIDE 5 MG
5 TABLET ORAL
Refills: 0 | Status: DISCONTINUED | OUTPATIENT
Start: 2022-07-01 | End: 2022-07-03

## 2022-07-01 RX ORDER — ACETAMINOPHEN 500 MG
650 TABLET ORAL EVERY 6 HOURS
Refills: 0 | Status: DISCONTINUED | OUTPATIENT
Start: 2022-07-01 | End: 2022-07-03

## 2022-07-01 RX ORDER — LANOLIN ALCOHOL/MO/W.PET/CERES
3 CREAM (GRAM) TOPICAL AT BEDTIME
Refills: 0 | Status: DISCONTINUED | OUTPATIENT
Start: 2022-07-01 | End: 2022-07-03

## 2022-07-01 RX ADMIN — MIRTAZAPINE 7.5 MILLIGRAM(S): 45 TABLET, ORALLY DISINTEGRATING ORAL at 22:39

## 2022-07-01 RX ADMIN — CLOPIDOGREL BISULFATE 75 MILLIGRAM(S): 75 TABLET, FILM COATED ORAL at 12:56

## 2022-07-01 RX ADMIN — Medication 75 MICROGRAM(S): at 06:34

## 2022-07-01 RX ADMIN — Medication 3 MILLIGRAM(S): at 03:38

## 2022-07-01 RX ADMIN — Medication 3 MILLIGRAM(S): at 22:40

## 2022-07-01 RX ADMIN — AMLODIPINE BESYLATE 5 MILLIGRAM(S): 2.5 TABLET ORAL at 06:35

## 2022-07-01 RX ADMIN — ENOXAPARIN SODIUM 40 MILLIGRAM(S): 100 INJECTION SUBCUTANEOUS at 22:39

## 2022-07-01 RX ADMIN — Medication 5 MILLIGRAM(S): at 19:29

## 2022-07-01 RX ADMIN — Medication 81 MILLIGRAM(S): at 12:56

## 2022-07-01 RX ADMIN — Medication 5 MILLIGRAM(S): at 06:35

## 2022-07-01 RX ADMIN — Medication 650 MILLIGRAM(S): at 10:32

## 2022-07-01 RX ADMIN — ATORVASTATIN CALCIUM 40 MILLIGRAM(S): 80 TABLET, FILM COATED ORAL at 22:39

## 2022-07-01 RX ADMIN — Medication 650 MILLIGRAM(S): at 11:32

## 2022-07-01 NOTE — OCCUPATIONAL THERAPY INITIAL EVALUATION ADULT - PERTINENT HX OF CURRENT PROBLEM, REHAB EVAL
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 On reassessment pt did not improve, admit for MRI and physical therapy. NIHSS 1 (from prior stroke).

## 2022-07-01 NOTE — OCCUPATIONAL THERAPY INITIAL EVALUATION ADULT - MODALITIES TREATMENT COMMENTS
Cranial Nerves II - XII: II: PERRLA; visual fields are full to confrontation III, IV, VI: EOMI, no nystagmus appreciated V: facial sensation intact to light touch V1-V3 b/l VII: no ptosis, no facial droop, symmetric eyebrow raise and closure VIII: hearing intact to finger rub b/l  XI: head turning and shoulder shrug intact b/l XII: tongue protrusion midline // pt able to ambulate ~100ft CGAx1 using RW, and ascend/descend 5 stairs CGAx1 using b/l hand rails. Pt demo no acute LOB throughout.

## 2022-07-01 NOTE — DISCHARGE NOTE PROVIDER - CARE PROVIDER_API CALL
Jimy Moeller)  Otolaryngology  [address], [address]  [place] [address]  Phone: [phone]  Fax: [phone]  Follow Up Time: 1 week    Cris Rosenberg  8th floor Neurology Dept  [address] [place] [id number]  Phone: [phone]  Fax: (   )    -  Follow Up Time: 2 weeks

## 2022-07-01 NOTE — OCCUPATIONAL THERAPY INITIAL EVALUATION ADULT - ASSISTIVE DEVICE FOR TRANSFER: SIT/STAND, REHAB EVAL
sit<->stand CGAx1, initially w/o AD, however pt demo unsteadiness. Pt then used RW while standing, with significant improvement, requiring SBA-CGA to maintain static standing balance/rolling walker

## 2022-07-01 NOTE — DISCHARGE NOTE PROVIDER - CARE PROVIDERS DIRECT ADDRESSES
,[email or web address]

## 2022-07-01 NOTE — PHYSICAL THERAPY INITIAL EVALUATION ADULT - PERTINENT HX OF CURRENT PROBLEM, REHAB EVAL
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 Pt did not follow through testing, and was admitted to [facility] May 2022 with a right PCA stroke. ILR placed, pt refused [facility] inpatient.

## 2022-07-01 NOTE — DISCHARGE NOTE PROVIDER - NSDCCPCAREPLAN_GEN_ALL_CORE_FT
PRINCIPAL DISCHARGE DIAGNOSIS  Diagnosis: Vertigo  Assessment and Plan of Treatment: WHAT YOU NEED TO KNOW:  Vertigo is a condition that causes you to feel dizzy. You may feel that you or everything around you is moving or spinning. You may also feel like you are being pulled down or toward your side.  DISCHARGE INSTRUCTIONS:  Seek care immediately if:  You have a headache and a stiff neck.  You have shaking chills and a fever.  You vomit over and over with no relief.  You have blood, pus, or fluid coming out of your ears.  You are confused.  Contact your healthcare provider if:  Your symptoms do not get better with treatment.  You have questions about your condition or care.  Medicines:  Medicine may be given to help relieve your symptoms.  Take your medicine as directed. Contact your healthcare provider if you think your medicine is not helping or if you have side effects. Tell him or her if you are allergic to any medicine. Keep a list of the medicines, vitamins, and herbs you take. Include the amounts, and when and why you take them. Bring the list or the pill bottles to follow-up visits. Carry your medicine list with you in case of an emergency.  Manage your symptoms:  Do not drive, walk without help, or operate heavy machinery when you are dizzy.  Move slowly when you move from one position to another position. Get up slowly from sitting or lying down. Sit or lie down right away if you feel dizzy.  Drink plenty of liquids. Liquids help prevent dehydration. Ask how much liquid to drink each day and which liquids are best for you.  Vestibular and balance rehabilitation therapy (VBRT) is used to teach you exercises to improve your balance and strength. These exercises may help decrease your vertigo and improve your balance. Ask for more information about this therapy.  Follow up with your doctor as directed: Write down your questions so you remember to ask them during your visits.      SECONDARY DISCHARGE DIAGNOSES  Diagnosis: Weakness  Assessment and Plan of Treatment:      PRINCIPAL DISCHARGE DIAGNOSIS  Diagnosis: Vertigo  Assessment and Plan of Treatment: WHAT YOU NEED TO KNOW:  Vertigo is a condition that causes you to feel dizzy. You may feel that you or everything around you is moving or spinning. You may also feel like you are being pulled down or toward your side.  DISCHARGE INSTRUCTIONS:  Seek care immediately if:  You have a headache and a stiff neck.  You have shaking chills and a fever.  You vomit over and over with no relief.  You have blood, pus, or fluid coming out of your ears.  You are confused.  Contact your healthcare provider if:  Your symptoms do not get better with treatment.  You have questions about your condition or care.  Medicines:  Medicine may be given to help relieve your symptoms.  Take your medicine as directed. Contact your healthcare provider if you think your medicine is not helping or if you have side effects. Tell him or her if you are allergic to any medicine. Keep a list of the medicines, vitamins, and herbs you take. Include the amounts, and when and why you take them. Bring the list or the pill bottles to follow-up visits. Carry your medicine list with you in case of an emergency.  Manage your symptoms:  Do not drive, walk without help, or operate heavy machinery when you are dizzy.  Move slowly when you move from one position to another position. Get up slowly from sitting or lying down. Sit or lie down right away if you feel dizzy.  Drink plenty of liquids. Liquids help prevent dehydration. Ask how much liquid to drink each day and which liquids are best for you.  Vestibular and balance rehabilitation therapy (VBRT) is used to teach you exercises to improve your balance and strength. These exercises may help decrease your vertigo and improve your balance. Ask for more information about this therapy.  Follow up with your doctor as directed: Write down your questions so you remember to ask them during your visits.  Please follow up with the Ear, Nose and Throat doctor so that an audiogram and vetibular testing can be performed. Please also follow up with neurology at [facility].      SECONDARY DISCHARGE DIAGNOSES  Diagnosis: Weakness  Assessment and Plan of Treatment:

## 2022-07-01 NOTE — PHYSICAL THERAPY INITIAL EVALUATION ADULT - MODALITIES TREATMENT COMMENTS
R hand dominant; (L) hand  5/5, (R) hand grip5 /5. CN Testing: B/L Frontalis intact; B/L buccinator intact; smile symmetrical; tongue protrusion at midline; B/L eyes open/close intact; Shoulder elevation: intact bilaterally; Vision H-Test: bilateral tracking and smooth pursuit intact; Convergence/Divergence: intact; Vision Quadrant Test: intact bilaterally

## 2022-07-01 NOTE — OCCUPATIONAL THERAPY INITIAL EVALUATION ADULT - DIAGNOSIS, OT EVAL
Pt presenting with impaired balance and decreased functional activity tolerance, impacting ability to perform functional mobility/ADLs. Pt presenting with no significant new onset of neurological deficits at time of evaluation.

## 2022-07-01 NOTE — OCCUPATIONAL THERAPY INITIAL EVALUATION ADULT - GENERAL OBSERVATIONS, REHAB EVAL
Pt received semi supine in bed, NAD, +heplock, +tele. Pt A&Ox4, agreeable to OT, and tolerated session well.

## 2022-07-01 NOTE — DISCHARGE NOTE PROVIDER - NSDCFUSCHEDAPPT_GEN_ALL_CORE_FT
Bo Pereira  [facility] Physician [facility]  Gastro [address]  Scheduled Appointment: 07/08/2022    Herman Santiago  [facility]  HEARTVASC [address]  Scheduled Appointment: 07/21/2022    [facility]  HEARTVASC [address]  Scheduled Appointment: 07/22/2022    [facility]  PULMMED [address]  Scheduled Appointment: 07/25/2022

## 2022-07-01 NOTE — DISCHARGE NOTE PROVIDER - HOSPITAL COURSE
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 MRI performed and was negative for acute infarction; patient's symptoms likely due to vertigo episode. ENT was consulted while inpatient, recommending ---------------------      Patient had the following workup done in house:  CT Head: The CT examination demonstrates age appropriate volume loss. There our encephalomalacia changes within the posterior medial right temporal and right occipital lobe compatible with previous infarct along the right PCA territory. There also are separate smaller foci of encephalomalacia changes within the right lateral occipital and left frontal lobe compatible with previous infarcts.   MR Head Non Contrast: No acute ischemia    Physical exam at discharge:    New medications on discharge:    Further outpatient workup:   Hospital course:   89y Female with PMHx of HTN, HLD, hypothyroidism, vertigo, chronic refractory headaches, recent right PCA stroke in May 2022, well known to stroke service in and outpatient, coming in with difficulty standing up. MRI performed and was negative for acute infarction; patient's symptoms likely due to vertigo episode. ENT was consulted while inpatient, recommending outpatient follow up for vestibular testing and audiogram. Pt was seen by physical therapy and recommended for home with rolling walker and home PT and home OT.     Patient had the following workup done in house:  CT Head: The CT examination demonstrates age appropriate volume loss. There our encephalomalacia changes within the posterior medial right temporal and right occipital lobe compatible with previous infarct along the right PCA territory. There also are separate smaller foci of encephalomalacia changes within the right lateral occipital and left frontal lobe compatible with previous infarcts.   MR Head Non Contrast: No acute ischemia, mastoid and middle ear well aerated.      Physical exam at discharge:   -Mental status: Awake, alert, oriented to person, age, and month. Speech is fluent with intact naming, repetition, and comprehension, no dysarthria. Recent and remote memory intact. Follows commands. Attention/concentration intact. Fund of knowledge appropriate.  -Cranial nerves:   II: Left upper quadrantanopia.  III, IV, VI: Extraocular movements are intact without nystagmus. Pupils equally round and reactive to light  V:  Facial sensation V1-V3 equal and intact   VII: Face is symmetric   Motor: Normal bulk and tone. No pronator drift. Strength bilateral upper extremity 5/5, bilateral lower extremities 5/5.  Rapid alternating movements intact and symmetric  Sensation: Intact to light touch bilaterally. No neglect or extinction on double simultaneous testing.  Coordination: No dysmetria on finger-to-nose bilaterally    New medications on discharge: None    Further outpatient workup: Follow up with neurology, follow up with ENT

## 2022-07-01 NOTE — OCCUPATIONAL THERAPY INITIAL EVALUATION ADULT - PLANNED THERAPY INTERVENTIONS, OT EVAL
ADL retraining/balance training/bed mobility training/fine motor coordination training/strengthening/stretching/transfer training

## 2022-07-01 NOTE — DISCHARGE NOTE PROVIDER - PROVIDER TOKENS
PROVIDER:[TOKEN:[[id number]],FOLLOWUP:[1 week]],FREE:[LAST:[Bass],FIRST:[Cris],PHONE:[phone]],FAX:[(   )    -],ADDRESS:[[address] [place] [address] Neurology Dept  [address] [place] [address]],FOLLOWUP:[2 weeks]]

## 2022-07-01 NOTE — OCCUPATIONAL THERAPY INITIAL EVALUATION ADULT - ADDITIONAL COMMENTS
Current MRS 1 (from prior stroke). Pt is R hand dominant. Pt lives on ground floor of apartment with [address], and performed all ADLs/functional mobility independently prior to admission.

## 2022-07-01 NOTE — DISCHARGE NOTE PROVIDER - NSDCFUADDAPPT_GEN_ALL_CORE_FT
Dr. Jimy Moeller - Ear Nose and Throat Specialist (ENT)  [address], [address], [place], [place] [address]  Call [phone] to make an appointment

## 2022-07-01 NOTE — DISCHARGE NOTE PROVIDER - NSDCMRMEDTOKEN_GEN_ALL_CORE_FT
AMLODIPINE BESYLATE 5 MG TAB: TAKE 1 TABLET BY MOUTH EVERY DAY FOR 30 DAYS  ASPIRIN EC 81 MG TABLET: TAKE 1 TABLET BY MOUTH EVERY DAY  clopidogrel 75 mg oral tablet: 1 tab(s) orally once a day  CLOPIDOGREL 75 MG TABLET: TAKE 1 TABLET BY MOUTH EVERY DAY FOR 30 DAYS  LEVOTHYROXINE 75 MCG TABLET: TAKE 1 TABLET BY MOUTH EVERY DAY IN THE MORNING ON EMPTY STOMACH FOR 90 DAYS  LORAZEPAM 1 MG TABLET: TAKE 1 TABLET BY MOUTH TWICE A DAY AS NEEDED FOR 30 DAYS  MIRTAZAPINE 7.5 MG TABLET: TAKE 1 TABLET BY MOUTH EVERY DAY  Outpatient occupational therapy : Evaluate and treat   Outpatient physical therapy : Evaluate and treat   Outpatient vestibular therapy : Evaluate and treat   oxybutynin 10 mg/24 hr oral tablet, extended release: 1 tab(s) orally once a day  OXYBUTYNIN CL ER 10 MG TABLET: TAKE 1 TABLET BY MOUTH EVERY DAY FOR 30 DAYS   AMLODIPINE BESYLATE 5 MG TAB: 1 tab(s) orally once a day  ASPIRIN EC 81 MG TABLET: 1 tab(s) orally once a day  CLOPIDOGREL 75 MG TABLET: 1 tab(s) orally once a day  LEVOTHYROXINE 75 MCG TABLET: 1 tab(s) orally once a day  LORAZEPAM 1 MG TABLET: 1 tab(s) orally 2 times a day, As Needed  MIRTAZAPINE 7.5 MG TABLET: 1 tab(s) orally once a day  Outpatient occupational therapy : Evaluate and treat   Outpatient physical therapy : Evaluate and treat   Outpatient vestibular therapy : Evaluate and treat   oxybutynin 10 mg/24 hr oral tablet, extended release: 1 tab(s) orally once a day

## 2022-07-02 PROCEDURE — 99233 SBSQ HOSP IP/OBS HIGH 50: CPT

## 2022-07-02 PROCEDURE — 99232 SBSQ HOSP IP/OBS MODERATE 35: CPT

## 2022-07-02 RX ORDER — MECLIZINE HCL 12.5 MG
12.5 TABLET ORAL THREE TIMES A DAY
Refills: 0 | Status: DISCONTINUED | OUTPATIENT
Start: 2022-07-02 | End: 2022-07-03

## 2022-07-02 RX ADMIN — ENOXAPARIN SODIUM 40 MILLIGRAM(S): 100 INJECTION SUBCUTANEOUS at 22:20

## 2022-07-02 RX ADMIN — CLOPIDOGREL BISULFATE 75 MILLIGRAM(S): 75 TABLET, FILM COATED ORAL at 11:50

## 2022-07-02 RX ADMIN — ATORVASTATIN CALCIUM 40 MILLIGRAM(S): 80 TABLET, FILM COATED ORAL at 22:19

## 2022-07-02 RX ADMIN — Medication 75 MICROGRAM(S): at 07:08

## 2022-07-02 RX ADMIN — Medication 3 MILLIGRAM(S): at 22:19

## 2022-07-02 RX ADMIN — Medication 81 MILLIGRAM(S): at 11:50

## 2022-07-02 RX ADMIN — AMLODIPINE BESYLATE 5 MILLIGRAM(S): 2.5 TABLET ORAL at 07:09

## 2022-07-02 RX ADMIN — Medication 5 MILLIGRAM(S): at 07:08

## 2022-07-02 NOTE — CONSULT NOTE ADULT - SUBJECTIVE AND OBJECTIVE BOX
Patient is a 89y old  Female who presents with a chief complaint of headache and weakness (2022 18:42)        HPI:  HPI: 89y Female with PMHx of HTN, HLD, hypothyroidism, vertigo, chronic refractory headaches, recent right PCA stroke in May 2022, well known to stroke service in and outpatient, coming in with difficulty standing up.   Pt was first evaluated in the stroke clinic in 2022 and had seen chronic cortical ischemic strokes. Pt did not follow through testing, and was admitted to [facility] May 2022 with a right PCA stroke. ILR placed, pt refused [facility] inpatient. Pt has returned to the hospital for headaches and has followed up with the [facility] outpatient clinic for treatment, including injections for occipital neuralgia. Pt recently saw the outpatient NP yesterday, pt had said she was worried about taking too many medications. Pt had stopped the Cymbalta on her own, and took Ubrelvy for her headaches and was not sure it helped so had only taken it once. Per outpatient NP note, pt had said the Mirtazapine has been helping her sleep.    Pt states that she stopped one medication yesterday, thinks it was her sleeping one, and last night she was unable to sleep the entire night. She thinks that this is why she was unable to walk since last night.    Per ED attending, pt was leaning backwards when she stood up. When she tried to sit up for me, she got extremely dizzy and wanted to lay down.    On re-examination, pt was willing to stand, but unable to stand up straight and kept leaning backwards (2022 18:42)      PAST MEDICAL & SURGICAL HISTORY:  Hyperlipidemia  Hyperlipemia      Hypothyroidism  Hypothyroidism      Dizziness  Vertigo      Vertigo      H/O: stroke      Cytomegalovirus infection not present  No significant past surgical history          MEDICATIONS  (STANDING):  amLODIPine   Tablet 5 milliGRAM(s) Oral daily  aspirin enteric coated 81 milliGRAM(s) Oral daily  atorvastatin 40 milliGRAM(s) Oral at bedtime  clopidogrel Tablet 75 milliGRAM(s) Oral daily  enoxaparin Injectable 40 milliGRAM(s) SubCutaneous every 24 hours  levothyroxine 75 MICROGram(s) Oral daily  melatonin 3 milliGRAM(s) Oral at bedtime  mirtazapine 7.5 milliGRAM(s) Oral at bedtime  oxybutynin 5 milliGRAM(s) Oral two times a day    MEDICATIONS  (PRN):        Social History:                   -  Home Living Status:  lives alone in a first floor apartment         -  Prior Home Care Services :   has private hire aide        Baseline Functional Level Prior to Admission :             - ADL's/ IADL's :  states she is independent         - ambulatory status PTA :  walks without DME        FAMILY HISTORY:    CBC Full  -  ( 2022 07:01 )  WBC Count : 8.48 K/uL  RBC Count : 3.74 M/uL  Hemoglobin : 12.1 g/dL  Hematocrit : 37.0 %  Platelet Count - Automated : 251 K/uL  Mean Cell Volume : 98.9 fl  Mean Cell Hemoglobin : 32.4 pg  Mean Cell Hemoglobin Concentration : 32.7 gm/dL  Auto Neutrophil # : x  Auto Lymphocyte # : x  Auto Monocyte # : x  Auto Eosinophil # : x  Auto Basophil # : x  Auto Neutrophil % : x  Auto Lymphocyte % : x  Auto Monocyte % : x  Auto Eosinophil % : x  Auto Basophil % : x      07-01    142  |  108  |  21  ----------------------------<  97  4.2   |  23  |  0.65    Ca    9.2      2022 07:01  Phos  3.9     07-01  Mg     3.0     07-01    TPro  6.9  /  Alb  4.1  /  TBili  0.2  /  DBili  x   /  AST  22  /  ALT  22  /  AlkPhos  54        Urinalysis Basic - ( 2022 11:16 )    Color: Yellow / Appearance: Clear / S.010 / pH: x  Gluc: x / Ketone: NEGATIVE  / Bili: Negative / Urobili: 0.2 E.U./dL   Blood: x / Protein: NEGATIVE mg/dL / Nitrite: NEGATIVE   Leuk Esterase: NEGATIVE / RBC: < 5 /HPF / WBC < 5 /HPF   Sq Epi: x / Non Sq Epi: 0-5 /HPF / Bacteria: Present /HPF          Radiology :    < from: CT Head No Cont (22 @ 11:56) >  ACC: [id number] EXAM:  CT BRAIN                          PROCEDURE DATE:  2022          INTERPRETATION:  PROCEDURE: CT head without intravenous contrast    INDICATION: Headache    TECHNIQUE: Multiple axial images were obtained at 5 mm intervalsfrom the   skull base to the vertex. Sagittal and coronal reformatted images were   obtained from the axial data set. The images were reviewed in brain and   bone windows.    COMPARISON: CT and MRI 2022    FINDINGS: The CT examination demonstrates age appropriate volume loss.   There our encephalomalacia changes within the posterior medial right   temporal and right occipital lobe compatible with previous infarct along   the right PCA territory. There also are separate smaller foci of   encephalomalacia changes within the right lateral occipital and left   frontal lobe compatible with previous infarcts. The cortical infarcts are   better seen on the prior MRI. There is no midline shift or extra axial   collections. The gray white differentiation appears within normal limits.   There is no intracranial hemorrhage or acute transcortical infarct.    The bony windows demonstrates no fractures. The lenses are absent   secondary to prior cataract surgery. There is mucosal thickening with   fluid with central hyperdensity which may represent inspissated   secretions or fungal disease. The rest of the visualized paranasal   sinuses are within normal limits. The mastoid air cells are well aerated.    IMPRESSION: No intracranial hemorrhageor acute transcortical infarct.   Multifocal areas of old ischemia as detailed above. Stable exam.                    Vital Signs Last 24 Hrs  T(C): 36.7 (2022 05:44), Max: 37.1 (2022 18:22)  T(F): 98.1 (2022 05:44), Max: 98.7 (2022 18:22)  HR: 58 (2022 08:20) (57 - 66)  BP: 153/70 (2022 08:20) (120/56 - 182/82)  BP(mean): 100 (2022 08:20) (81 - 118)  RR: 18 (2022 08:20) (18 - 18)  SpO2: 96% (2022 08:20) (94% - 99%)        REVIEW OF SYSTEMS:      CONSTITUTIONAL: No fever, weight loss, or fatigue  EYES: No eye pain, visual disturbances, or discharge  ENMT:  No difficulty hearing, tinnitus, vertigo; No sinus or throat pain  NECK: No pain or stiffness  BREASTS: No pain, masses, or nipple discharge  RESPIRATORY: No cough, wheezing, chills or hemoptysis; No shortness of breath  CARDIOVASCULAR: No chest pain, palpitations, dizziness, or leg swelling  GASTROINTESTINAL: No abdominal or epigastric pain. No nausea, vomiting, or hematemesis; No diarrhea or constipation. No melena or hematochezia.  GENITOURINARY: No dysuria, frequency, hematuria, or incontinence  NEUROLOGICAL: per HPI  SKIN: No itching, burning, rashes, or lesions   LYMPH NODES: No enlarged glands  ENDOCRINE: No heat or cold intolerance; No hair loss  MUSCULOSKELETAL: No joint pain or swelling; No muscle, back, or extremity pain  PSYCHIATRIC: No depression, anxiety, mood swings, or difficulty sleeping  HEME/LYMPH: No easy bruising, or bleeding gums  ALLERGY AND IMMUNOLOGIC: No hives or eczema  VASCULAR: no swelling , erythema          Physical Exam:   89 y o woman lying in semi Aquino's position , awake , alert , no acute complaints of headache    Head : normocephalic , atraumatic    Eyes : PERRLA , EOMI , no nystagmus , sclera anicteric    ENT : nasal discharge , uvula midline , no oropharyngeal erythema / exudate    Neck : supple , negative JVD , negative carotid bruits , no thyromegaly    Chest : CTA bilaterally , neg wheeze / rhonchi / rales / crackles / egophany    Cardiovascular: regular rate and rhythm , neg murmurs / rubs / gallops    Abdomen : soft , non distended , non tender to palpation in all 4 quadrants , negative rebound / guarding , normal bowel sounds    Extremities : WWP , neg cyanosis /clubbing / edema       Neurologic Exam:    Alert and oriented to person , place , date/year, speech fluent w/o dysarthria , follows commands , recent and remote memory intact , repetition intact , comprehension intact ,  attention/concentration intact , fund of knowledge appropriate    Cranial Nerves:     II :                         pupils equal , round and reactive to light , ? left upper quad defect  III/ IV/VI :              extraocular movements intact , neg nystagmus , neg ptosis  V :                        facial sensation intact , V1-3 normal  VII :                      face symmetric , no droop , normal eye closure and smile  VIII :                     hearing intact to finger rub bilaterally  IX and X :             no hoarseness , gag intact , palate/ uvula rise symmetrically  XI :                       SCM / trapezius strength intact bilateral  XII :                      no tongue deviation    Motor Exam:    Right UE:           no focal weakness ,  > 3+/5 throughout      negative pronator drift                               Left UE:             no focal weakness,  > 3+/5 throughout      negative pronator drift        Right LE:            no focal weakness,  > 3+/5 throughout    Left LE:              no focal weakness,  > 3+/5 throughout        Sensation:         intact to light touch x 4 extremities                         no neglect or extinction on double simultaneous testing                       DTR :                     biceps/brachioradialis : equal                                              patella/ankle : equal                                                                               neg Babinski        Coordination :      Finger to Nose :  neg dysmetria bilaterally      Gait :  not tested                PM&R Impression : admitted for c/o difficulty secondary to LE weakness , CT brain imaging negative for acute pathology , MRI brain pending    1) deconditioned    2) no focal weakness    3) h/o L PCA stroke with ? left upper quad defect      Recommendations / Plan :     1) Physical / Occupational therapy focusing on therapeutic exercises , bed mobility/transfer out of bed evaluation , progressive ambulation with assistive       devices prn .    2) Anticipated Disposition Plan / Recs  :   pending functional progress                
 **STROKE CODE CONSULT NOTE**    Last known well time/Time of onset of symptoms:    HPI: 89y Female with PMHx of HTN, HLD, hypothyroidism, vertigo, chronic refractory headaches, recent right PCA stroke in May 2022, well known to stroke service in and outpatient, coming in with difficulty standing up.   Pt was first evaluated in the stroke clinic in 2022 and had seen chronic cortical ischemic strokes. Pt did not follow through testing, and was admitted to [place] May 2022 with a right PCA stroke. ILR placed, pt refused [facility] inpatient. Pt has returned to the hospital for headaches and has followed up with the [facility] outpatient clinic for treatment, including injections for occipital neuralgia. Pt recently saw the outpatient NP yesterday, pt had said she was worried about taking too many medications. Pt had stopped the Cymbalta on her own, and took Ubrelvy for her headaches and was not sure it helped so had only taken it once. Per outpatient NP note, pt had said the Mirtazapine has been helping her sleep.    Pt states that she stopped one medication yesterday, thinks it was her sleeping one, and last night she was unable to sleep the entire night. She thinks that this is why she was unable to walk since last night.    Per ED attending, pt was leaning backwards when she stood up. When she tried to sit up for me, she got extremely dizzy and wanted to lay down     T(C): 37 (22 @ 12:24), Max: 37 (22 @ 12:24)  HR: 57 (22 @ 12:24) (57 - 61)  BP: 160/80 (22 @ 12:24) (146/71 - 160/80)  RR: 18 (22 @ 12:24) (18 - 18)  SpO2: 99% (22 @ 12:24) (99% - 99%)    PAST MEDICAL & SURGICAL HISTORY:  Hyperlipidemia  Hyperlipemia      Hypothyroidism  Hypothyroidism      Dizziness  Vertigo      Vertigo      H/O: stroke      Cytomegalovirus infection not present  No significant past surgical history          FAMILY HISTORY:      SOCIAL HISTORY:   Patient lives alone    ROS:   Constitutional: No fever, weight loss or fatigue  Eyes: No eye pain, visual disturbances, or discharge  ENMT:  No difficulty hearing, tinnitus; No sinus or throat pain  Neck: No pain or stiffness  Respiratory: No cough, wheezing, chills or hemoptysis  Cardiovascular: No chest pain, palpitations, shortness of breath, or leg swelling  Gastrointestinal: No abdominal pain. No nausea, vomiting or hematemesis; No diarrhea or constipation. Nohematochezia.  Genitourinary: No dysuria, frequency, hematuria or incontinence  Neurological: As per HPI  Skin: No itching, burning, rashes or lesions   Endocrine: No heat or cold intolerance; No hair loss  Musculoskeletal: No joint pain or swelling; No muscle, back or extremity pain  Heme/Lymph: No easy bruising or bleeding gums    MEDICATIONS  (STANDING):    MEDICATIONS  (PRN):    Allergies    No Known Allergies    Intolerances      Vital Signs Last 24 Hrs  T(C): 37 (2022 12:24), Max: 37 (2022 12:24)  T(F): 98.6 (2022 12:24), Max: 98.6 (2022 12:24)  HR: 57 (2022 12:24) (57 - 61)  BP: 160/80 (2022 12:24) (146/71 - 160/80)  BP(mean): --  RR: 18 (2022 12:24) (18 - 18)  SpO2: 99% (2022 12:24) (99% - 99%)    Physical exam:  Constitutional: No acute distress, conversant  Eyes: Anicteric sclerae, moist conjunctivae, see below for CNs  Neck: trachea midline  Neurologic:  -Mental status: Awake, alert, oriented to person, age, and month. Speech is fluent with intact naming, repetition, and comprehension, no dysarthria. Recent and remote memory intact. Follows commands. Attention/concentration intact. Fund of knowledge appropriate.  -Cranial nerves:   II: Left upper quadrantanopia.  III, IV, VI: Extraocular movements are intact without nystagmus. Pupils equally round and reactive to light  V:  Facial sensation V1-V3 equal and intact   VII: Face is symmetric   Motor: Normal bulk and tone. No pronator drift. Strength bilateral upper extremity 5/5, bilateral lower extremities 5/5.  Rapid alternating movements intact and symmetric  Sensation: Intact to light touch bilaterally. No neglect or extinction on double simultaneous testing.  Coordination: No dysmetria on finger-to-nose bilaterally      NIHSS: 1    Fingerstick Blood Glucose: CAPILLARY BLOOD GLUCOSE        LABS:                        11.3   8.83  )-----------( 253      ( 2022 11:03 )             34.2         141  |  106  |  20  ----------------------------<  95  4.3   |  26  |  0.66    Ca    9.5      2022 11:03  Mg     2.3         TPro  6.9  /  Alb  4.1  /  TBili  0.2  /  DBili  x   /  AST  22  /  ALT  22  /  AlkPhos  54        CARDIAC MARKERS ( 2022 11:03 )  x     / <0.01 ng/mL / x     / x     / x          Urinalysis Basic - ( 2022 11:16 )    Color: Yellow / Appearance: Clear / S.010 / pH: x  Gluc: x / Ketone: NEGATIVE  / Bili: Negative / Urobili: 0.2 E.U./dL   Blood: x / Protein: NEGATIVE mg/dL / Nitrite: NEGATIVE   Leuk Esterase: NEGATIVE / RBC: < 5 /HPF / WBC < 5 /HPF   Sq Epi: x / Non Sq Epi: 0-5 /HPF / Bacteria: Present /HPF        RADIOLOGY & ADDITIONAL STUDIES:    < from: CT Head No Cont (22 @ 11:56) >  IMPRESSION: No intracranial hemorrhageor acute transcortical infarct.   Multifocal areas of old ischemia as detailed above. Stable exam.    < end of copied text >    -----------------------------------------------------------------------------------------------------------------  IV-tPA (Y/N):   no  Reason IV-tPA not given: outside window  
MEDICINE CO-MANAGEMENT CONSULT NOTE    HPI:  HPI: 89y Female with PMHx of HTN, HLD, hypothyroidism, vertigo, chronic refractory headaches, recent right PCA stroke in May 2022, well known to stroke service in and outpatient, coming in with difficulty standing up.   Pt was first evaluated in the stroke clinic in 2022 and had seen chronic cortical ischemic strokes. Pt did not follow through testing, and was admitted to [place] May 2022 with a right PCA stroke. ILR placed, pt refused [facility] inpatient. Pt has returned to the hospital for headaches and has followed up with the [facility] outpatient clinic for treatment, including injections for occipital neuralgia. Pt recently saw the outpatient NP yesterday, pt had said she was worried about taking too many medications. Pt had stopped the Cymbalta on her own, and took Ubrelvy for her headaches and was not sure it helped so had only taken it once. Per outpatient NP note, pt had said the Mirtazapine has been helping her sleep.    Pt states that she stopped one medication yesterday, thinks it was her sleeping one, and last night she was unable to sleep the entire night. She thinks that this is why she was unable to walk since last night.    Per ED attending, pt was leaning backwards when she stood up. When she tried to sit up for me, she got extremely dizzy and wanted to lay down.    On re-examination, pt was willing to stand, but unable to stand up straight and kept leaning backwards (2022 18:42)      PAST MEDICAL & SURGICAL HISTORY:  Hyperlipidemia  Hyperlipemia      Hypothyroidism  Hypothyroidism      Dizziness  Vertigo      Vertigo      H/O: stroke      Cytomegalovirus infection not present  No significant past surgical history          Home Meds: Home Medications:  AMLODIPINE BESYLATE 5 MG TAB: TAKE 1 TABLET BY MOUTH EVERY DAY FOR 30 DAYS (2022 18:22)  ASPIRIN EC 81 MG TABLET: TAKE 1 TABLET BY MOUTH EVERY DAY (2022 18:22)  clopidogrel 75 mg oral tablet: 1 tab(s) orally once a day (2022 18:22)  CLOPIDOGREL 75 MG TABLET: TAKE 1 TABLET BY MOUTH EVERY DAY FOR 30 DAYS (2022 18:22)  LEVOTHYROXINE 75 MCG TABLET: TAKE 1 TABLET BY MOUTH EVERY DAY IN THE MORNING ON EMPTY STOMACH FOR 90 DAYS (2022 18:22)  LORAZEPAM 1 MG TABLET: TAKE 1 TABLET BY MOUTH TWICE A DAY AS NEEDED FOR 30 DAYS (2022 18:22)  MIRTAZAPINE 7.5 MG TABLET: TAKE 1 TABLET BY MOUTH EVERY DAY (2022 18:22)  oxybutynin 10 mg/24 hr oral tablet, extended release: 1 tab(s) orally once a day (2022 18:22)  OXYBUTYNIN CL ER 10 MG TABLET: TAKE 1 TABLET BY MOUTH EVERY DAY FOR 30 DAYS (2022 18:22)      FAMILY HISTORY: no hx of CVA      Social History:  Lives alone (2022 18:42)  denies tobacco use hx      Allergies    No Known Allergies    Intolerances        REVIEW OF SYSTEMS  +difficulty standing +fatigue +weakness  denies fevers chills night sweats  denies slurred speech  +chronic vertigo and +headaches    all other systems reviewed and are negative        PHYSICAL EXAM:  General: NAD  HENMT: +MMM, no LAD  RESPIRATORY: no increased WOB, CTAB  CVS: RRR, no m/r/g, extremities warm and well perfused, DP 2+ bilaterally  ABD: +BS, NT/ND  EXT: no pitting edema  Neuro: alert and oriented to person, place, time and situation. CN II-XII intact, 5/5 motor upper and lower extremity strength bilaterally. Sensation grossly equal and intact in upper and lower [id number]    Labs:  CAPILLARY BLOOD GLUCOSE                              12.1   8.48  )-----------( 251      ( 2022 07:01 )             37.0         07    142  |  108  |  21  ----------------------------<  97  4.2   |  23  |  0.65      Calcium, Total Serum: 9.2 mg/dL (22 @ 07:01)      LFTs:             6.9  | 0.2  | 22       ------------------[54      ( 2022 11:03 )  4.1  | x    | 22          Lipase:x      Amylase:x             Coags:    CARDIAC MARKERS ( 2022 11:03 )  x     / <0.01 ng/mL / x     / x     / x              Urinalysis Basic - ( 2022 11:16 )    Color: Yellow / Appearance: Clear / S.010 / pH: x  Gluc: x / Ketone: NEGATIVE  / Bili: Negative / Urobili: 0.2 E.U./dL   Blood: x / Protein: NEGATIVE mg/dL / Nitrite: NEGATIVE   Leuk Esterase: NEGATIVE / RBC: < 5 /HPF / WBC < 5 /HPF   Sq Epi: x / Non Sq Epi: 0-5 /HPF / Bacteria: Present /HPF            IMAGING  < from: CT Head No Cont (22 @ 11:56) >    IMPRESSION: No intracranial hemorrhageor acute transcortical infarct.   Multifocal areas of old ischemia as detailed above. Stable exam.    --- End of Report ---    < end of copied text >  < from: MR Head No Cont (22 @ 16:57) >  IMPRESSION: No acute ischemia. Stable exam.    < end of copied text >  
ENT Consult Note    HPI: 89F with PMH HTN, HLD, hypothyroidism, admitted for CVA workup due to gait instability. 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 She reports unsteadiness only when standing. denies sensation of room spinning or movement. Denies vertigo when turning her head in either direction. She has no change in hearing, no ear fullness, pain, or drainage. 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 She recently saw an ENT outpatient who reportedly told her that she didn’t have vertigo.       PAST MEDICAL & SURGICAL HISTORY:  Hyperlipidemia  Hyperlipemia      Hypothyroidism  Hypothyroidism      Dizziness  Vertigo      Vertigo      H/O: stroke      Cytomegalovirus infection not present  No significant past surgical history          MEDICATIONS  (STANDING):  amLODIPine   Tablet 5 milliGRAM(s) Oral daily  aspirin enteric coated 81 milliGRAM(s) Oral daily  atorvastatin 40 milliGRAM(s) Oral at bedtime  clopidogrel Tablet 75 milliGRAM(s) Oral daily  enoxaparin Injectable 40 milliGRAM(s) SubCutaneous every 24 hours  levothyroxine 75 MICROGram(s) Oral daily  melatonin 3 milliGRAM(s) Oral at bedtime  mirtazapine 7.5 milliGRAM(s) Oral at bedtime  oxybutynin 5 milliGRAM(s) Oral two times a day    MEDICATIONS  (PRN):  acetaminophen     Tablet .. 650 milliGRAM(s) Oral every 6 hours PRN Moderate Pain (4 - 6), Severe Pain (7 - 10)  meclizine 12.5 milliGRAM(s) Oral three times a day PRN Dizziness      Social History:  Lives alone (30 Jun 2022 18:42)      Vital Signs Last 24 Hrs  T(C): 36.9 (02 Jul 2022 18:19), Max: 36.9 (02 Jul 2022 05:48)  T(F): 98.4 (02 Jul 2022 18:19), Max: 98.4 (02 Jul 2022 05:48)  HR: 78 (02 Jul 2022 20:22) (62 - 78)  BP: 118/58 (02 Jul 2022 20:22) (118/58 - 146/65)  BP(mean): 82 (02 Jul 2022 20:22) (82 - 95)  RR: 20 (02 Jul 2022 20:22) (16 - 20)  SpO2: 95% (02 Jul 2022 20:22) (93% - 96%)    PHYSICAL EXAM:    CONSTITUTIONAL: Well nourished, well developed, NON-DYSMORPHIC, and in no acute distress.    HEAD: normocephalic, atraumatic.  EYES: EOMI, no nystagmus at rest or with head movement, no nystagmus on sully-hallpike  EARS: The right/left pinna was normal. B/l cerumen impaction partially removed. The right/left external auditory canal was normal. Tuning fork lateralizes to the left ear, AC>BC b/l  NOSE: Normal external nose. Anterior nasal cavity patent with no obstruction. Inferior turbinates normally sized.  ORAL CAVITY/OROPHARYNX: normal mucosa. No erythema, lesions or bleeding.  NECK: No cervical lymphadenopathy  RESPIRATORY: Respirations unlabored, no increased work of breathing with use of accessory muscles and retractions. No stridor.  CARDIAC: Warm extremities, no cyanosis.                              12.1   8.48  )-----------( 251      ( 01 Jul 2022 07:01 )             37.0       07-01    142  |  108  |  21  ----------------------------<  97  4.2   |  23  |  0.65    Ca    9.2      01 Jul 2022 07:01  Phos  3.9     07-01  Mg     3.0     07-01            
INTERVAL HPI/OVERNIGHT EVENTS:  Denies any headaches, weakness or dizziness. Wants to get up and stand. She wants to go home     T(C): 36.9 (22 @ 05:48), Max: 36.9 (22 @ 05:48)  HR: 64 (22 @ 08:02) (62 - 80)  BP: 146/65 (22 @ 08:02) (107/55 - 146/65)  RR: 18 (22 @ 08:02) (16 - 18)  SpO2: 93% (22 @ 08:02) (93% - 98%)  Wt(kg): --Vital Signs Last 24 Hrs  T(C): 36.9 (2022 05:48), Max: 36.9 (2022 05:48)  T(F): 98.4 (2022 05:48), Max: 98.4 (2022 05:48)  HR: 64 (2022 08:02) (62 - 80)  BP: 146/65 (2022 08:02) (107/55 - 146/65)  BP(mean): 93 (2022 08:02) (78 - 93)  RR: 18 (2022 08:02) (16 - 18)  SpO2: 93% (2022 08:02) (93% - 98%)    Constitutional: WDWN resting comfortably in bed; NAD  Head: NC/AT  Eyes: PERRL, EOMI, anicteric sclera  ENT: no nasal discharge, MMM  Neck: supple; no JVD appreciated  Respiratory: CTA B/L; no W/R/R, no increased work of breathing  Cardiac: +S1/S2; RRR; no M/R/G  Gastrointestinal: soft, NT/ND; no rebound or guarding; +BSx4  Extremities: WWP, no cyanosis; no peripheral edema  Musculoskeletal: NROM x4; no joint swelling, tenderness or erythema  Vascular: 2+ radial pulses B/L  Dermatologic: skin warm, dry and intact  Neurologic: Alert and oriented, no focal deficits appreciated  Psychiatric: affect and characteristics of appearance, verbalizations, behaviors are appropriate    Consultant(s) Notes Reviewed:  [x ] YES  [ ] NO  Care Discussed with Consultants/Other Providers [ x] YES  [ ] NO    LABS:                        12.1   8.48  )-----------( 251      ( 2022 07:01 )             37.0     07-01    142  |  108  |  21  ----------------------------<  97  4.2   |  23  |  0.65    Ca    9.2      2022 07:01  Phos  3.9     07-01  Mg     3.0     07-    TPro  6.9  /  Alb  4.1  /  TBili  0.2  /  DBili  x   /  AST  22  /  ALT  22  /  AlkPhos  54  06-30      Urinalysis Basic - ( 2022 11:16 )    Color: Yellow / Appearance: Clear / S.010 / pH: x  Gluc: x / Ketone: NEGATIVE  / Bili: Negative / Urobili: 0.2 E.U./dL   Blood: x / Protein: NEGATIVE mg/dL / Nitrite: NEGATIVE   Leuk Esterase: NEGATIVE / RBC: < 5 /HPF / WBC < 5 /HPF   Sq Epi: x / Non Sq Epi: 0-5 /HPF / Bacteria: Present /HPF      CAPILLARY BLOOD GLUCOSE            Urinalysis Basic - ( 2022 11:16 )    Color: Yellow / Appearance: Clear / S.010 / pH: x  Gluc: x / Ketone: NEGATIVE  / Bili: Negative / Urobili: 0.2 E.U./dL   Blood: x / Protein: NEGATIVE mg/dL / Nitrite: NEGATIVE   Leuk Esterase: NEGATIVE / RBC: < 5 /HPF / WBC < 5 /HPF   Sq Epi: x / Non Sq Epi: 0-5 /HPF / Bacteria: Present /HPF        acetaminophen     Tablet .. 650 milliGRAM(s) Oral every 6 hours PRN  amLODIPine   Tablet 5 milliGRAM(s) Oral daily  aspirin enteric coated 81 milliGRAM(s) Oral daily  atorvastatin 40 milliGRAM(s) Oral at bedtime  clopidogrel Tablet 75 milliGRAM(s) Oral daily  enoxaparin Injectable 40 milliGRAM(s) SubCutaneous every 24 hours  levothyroxine 75 MICROGram(s) Oral daily  melatonin 3 milliGRAM(s) Oral at bedtime  mirtazapine 7.5 milliGRAM(s) Oral at bedtime  oxybutynin 5 milliGRAM(s) Oral two times a day      RADIOLOGY & ADDITIONAL TESTS:    Imaging Personally Reviewed:  [ ] YES  [ ] NO

## 2022-07-02 NOTE — CONSULT NOTE ADULT - ASSESSMENT
89F PMH HTN, HLD, hypothyroidism, admitted for CVA workup due to gait instability. 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 She’s only complaining of unsteadiness when she stands up but no sense of room spinning or moving. She denies vertiginous symptoms when turning her head. She recently saw an ENT outpatient who reportedly told her that she didn’t have vertigo. She has no change in hearing, no ear fullness, pain, or drainage. 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 [place]-hallpike was negative. CTH and MRI were negative for acute stroke, mastoid and middle ear well aerated.    - No acute ENT intervention at this time  - Recommend outpatient follow up for vestibular testing and audiogram  - Patient can follow up with her own ENT or with Dr. Laboy or Dr. Moeller [phone]  - Cerumen removal as outpatient as needed  - Discussed with attending
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   NIHSS 1 (from prior stroke)    -Recommend orthostatic vital signs  -recommend treatment for dizziness - fluids, trial of meclizine  -reassess    Discussed with Dr. Mckeon  
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 On reassessment pt did not improve, admit for MRI and physical therapy.  NIHSS 1 (from prior stroke).    Neuro  #CVA workup  - continue aspirin 81mg and plavix 75mg daily  - continue atorvastatin 80mg daily  - q4hr stroke neuro checks and vitals  - obtain MRI Brain without contrast  - Stroke Code HCT Results: neg  - Stroke education  -continue home remeron    Cards  #HTN  - permissive hypertension, Goal -180  - restarted home amlodipine  - consider TTE pending MRI    #HLD  - high dose statin as above in CVA  - LDL results:     Pulm  - call provider if SPO2 < 94%    GI  #Nutrition/Fluids/Electrolytes   - replete K<4 and Mg <2  - Diet: Regular    Urology  #overactive bladder  -continue oxybutynin    Endocrine  #DM  - A1C results:     - TSH results:  -continue home synthroid    DVT Prophylaxis  - lovenox sq for DVT prophylaxis   - SCDs for DVT prophylaxis 
89yoF hypothyroidism, HTN. HLD, recent rigth PCA stroke 5/2022  p/w with difficulty standing up    #new CVA rule out   Asa, lipitor 80  MRI showing no stroke  PT eval  DC planning her neuro, likely home PT     #HTN  c/w home norvasc 5qd    #Hypothyroidism  c/w home synthroid     Reconsult as needed 
A/P  1. weakness w difficulty standing up, rule out CVA  2. hx of recent R PCA stroke in 5/2022  3. essential HTN  4. hypothyoidism, stable on synthroid  5. chronic vertigo  6. chronic refractory headaches  7. overactive bladder, stable    - CTH and MR brain w/o any acute infarcts  - continue amlodipine for BP control  - discharge planning after PT eval -- home w home PT  - labs WNL    discussed with neuro/stroke service

## 2022-07-02 NOTE — PROGRESS NOTE ADULT - SUBJECTIVE AND OBJECTIVE BOX
Neurology Stroke Progress Note    INTERVAL HPI/OVERNIGHT EVENTS:  No acute overnight events. Patient seen and examined. Denies any complaints, eager to work with PT    MEDICATIONS  (STANDING):  amLODIPine   Tablet 5 milliGRAM(s) Oral daily  aspirin enteric coated 81 milliGRAM(s) Oral daily  atorvastatin 40 milliGRAM(s) Oral at bedtime  clopidogrel Tablet 75 milliGRAM(s) Oral daily  enoxaparin Injectable 40 milliGRAM(s) SubCutaneous every 24 hours  levothyroxine 75 MICROGram(s) Oral daily  melatonin 3 milliGRAM(s) Oral at bedtime  mirtazapine 7.5 milliGRAM(s) Oral at bedtime  oxybutynin 5 milliGRAM(s) Oral two times a day    MEDICATIONS  (PRN):  acetaminophen     Tablet .. 650 milliGRAM(s) Oral every 6 hours PRN Moderate Pain (4 - 6), Severe Pain (7 - 10)      Allergies    No Known Allergies    Intolerances        Vital Signs Last 24 Hrs  T(C): 36.7 (2022 17:15), Max: 37.1 (2022 18:22)  T(F): 98.1 (2022 17:15), Max: 98.7 (2022 18:22)  HR: 74 (2022 15:05) (58 - 74)  BP: 120/58 (2022 15:05) (120/56 - 182/82)  BP(mean): 82 (2022 15:05) (81 - 118)  RR: 18 (2022 15:05) (18 - 18)  SpO2: 97% (2022 15:05) (94% - 99%)    Physical exam:  General: No acute distress, awake and alert    Neurologic:  -Mental status: Awake, alert, oriented to person, age, and month. Speech is fluent with intact naming, repetition, and comprehension, no dysarthria. Recent and remote memory intact. Follows commands. Attention/concentration intact. Fund of knowledge appropriate.  -Cranial nerves:   II: Left upper quadrantanopia.  III, IV, VI: Extraocular movements are intact without nystagmus. Pupils equally round and reactive to light  V:  Facial sensation V1-V3 equal and intact   VII: Face is symmetric   Motor: Normal bulk and tone. No pronator drift. Strength bilateral upper extremity 5/5, bilateral lower extremities 5/5.  Rapid alternating movements intact and symmetric  Sensation: Intact to light touch bilaterally. No neglect or extinction on double simultaneous testing.  Coordination: No dysmetria on finger-to-nose bilaterally  Gait: unable to walk, stood up and did not feel well and began leaning back    LABS:                        12.1   8.48  )-----------( 251      ( 2022 07:01 )             37.0     07-01    142  |  108  |  21  ----------------------------<  97  4.2   |  23  |  0.65    Ca    9.2      2022 07:01  Phos  3.9     07-01  Mg     3.0     07-01    TPro  6.9  /  Alb  4.1  /  TBili  0.2  /  DBili  x   /  AST  22  /  ALT  22  /  AlkPhos  54  06-30      Urinalysis Basic - ( 2022 11:16 )    Color: Yellow / Appearance: Clear / S.010 / pH: x  Gluc: x / Ketone: NEGATIVE  / Bili: Negative / Urobili: 0.2 E.U./dL   Blood: x / Protein: NEGATIVE mg/dL / Nitrite: NEGATIVE   Leuk Esterase: NEGATIVE / RBC: < 5 /HPF / WBC < 5 /HPF   Sq Epi: x / Non Sq Epi: 0-5 /HPF / Bacteria: Present /HPF    RADIOLOGY & ADDITIONAL TESTS:    < from: MR Head No Cont (22 @ 16:57) >  IMPRESSION: No acute ischemia. Stable exam.    < end of copied text >  
INTERVAL HPI/OVERNIGHT EVENTS:  O/N:  This morning: Patient was seen and examined at bedside.      VITAL SIGNS:  T(F): 97.2 (07-02-22 @ 10:00)  HR: 70 (07-02-22 @ 12:00)  BP: 134/62 (07-02-22 @ 12:00)  RR: 17 (07-02-22 @ 12:00)  SpO2: 96% (07-02-22 @ 12:00)    Physical exam:  General: No acute distress, awake and alert    Neurologic:  -Mental status: Awake, alert, oriented to person, age, and month. Speech is fluent with intact naming, repetition, and comprehension, no dysarthria. Recent and remote memory intact. Follows commands. Attention/concentration intact. Fund of knowledge appropriate.  -Cranial nerves:   II: Left upper quadrantanopia.  III, IV, VI: Extraocular movements are intact without nystagmus. Pupils equally round and reactive to light  V:  Facial sensation V1-V3 equal and intact   VII: Face is symmetric   Motor: Normal bulk and tone. No pronator drift. Strength bilateral upper extremity 5/5, bilateral lower extremities 5/5.  Rapid alternating movements intact and symmetric  Sensation: Intact to light touch bilaterally. No neglect or extinction on double simultaneous testing.  Coordination: No dysmetria on finger-to-nose bilaterally  Gait: unable to walk, stood up and did not feel well and began leaning back    MEDICATIONS  (STANDING):  amLODIPine   Tablet 5 milliGRAM(s) Oral daily  aspirin enteric coated 81 milliGRAM(s) Oral daily  atorvastatin 40 milliGRAM(s) Oral at bedtime  clopidogrel Tablet 75 milliGRAM(s) Oral daily  enoxaparin Injectable 40 milliGRAM(s) SubCutaneous every 24 hours  levothyroxine 75 MICROGram(s) Oral daily  melatonin 3 milliGRAM(s) Oral at bedtime  mirtazapine 7.5 milliGRAM(s) Oral at bedtime  oxybutynin 5 milliGRAM(s) Oral two times a day    MEDICATIONS  (PRN):  acetaminophen     Tablet .. 650 milliGRAM(s) Oral every 6 hours PRN Moderate Pain (4 - 6), Severe Pain (7 - 10)    Allegies    No Known Allergies    Intolerances    LABS:                        12.1   8.48  )-----------( 251      ( 01 Jul 2022 07:01 )             37.0     07-01    142  |  108  |  21  ----------------------------<  97  4.2   |  23  |  0.65    Ca    9.2      01 Jul 2022 07:01  Phos  3.9     07-01  Mg     3.0     07-01    RADIOLOGY & ADDITIONAL TESTS:  Reviewed

## 2022-07-02 NOTE — PROGRESS NOTE ADULT - ASSESSMENT
89y Female with PMHx of HTN, HLD, hypothyroidism, vertigo, chronic refractory headaches, possible right PCA stroke in May 2022, well known to stroke service in and outpatient, coming in with difficulty standing up. On reassessment pt did not improve, admit for MRI and physical therapy.  NIHSS 1 (from prior stroke). DC on 7/2    Neuro  #CVA workup- neagtive  - continue aspirin 81mg and plavix 75mg daily  - continue atorvastatin 80mg daily  - q4hr stroke neuro checks and vitals  - MRI Brain without contrast was negative acute stroke  - Stroke Code HCT Results: neg  - Stroke education  - continue home remeron    #Vertigo  Episodic, a/w b/y headache, ear fullness, fluctates in severity  Most likely peripheral? Possible Meineres disease?  - ENT consult  - PRN meclizine   - PT recommending home with PT  - Encourage out of bed to chair and w/ walker today     Cards  #HTN  - permissive hypertension, Goal -180  - restarted home amlodipine  - no need for TTE as recently done and no acute changes in her condition     #HLD  - high dose statin as above in CVA  - LDL results: 126    Pulm  - call provider if SPO2 < 94%    GI  #Nutrition/Fluids/Electrolytes   - replete K<4 and Mg <2  - Diet: Regular    Urology  #overactive bladder  -continue oxybutynin    Endocrine  #DM  - A1C results: 5.4    - TSH results: WNL   -continue home synthroid    DVT Prophylaxis  - lovenox sq for DVT prophylaxis   - SCDs for DVT prophylaxis     Pt discussed during rounds with Dr. Mike MCDANIELS Goals: Goals to be reviewed at interdisciplinary rounds with case management, social work, physical therapy, occupational therapy, and speech language pathology.   Please see specific therapy  notes for in depth goals.    Dispo: home with continued home PT/OT

## 2022-07-03 ENCOUNTER — TRANSCRIPTION ENCOUNTER (OUTPATIENT)
Age: 87
End: 2022-07-03

## 2022-07-03 VITALS
DIASTOLIC BLOOD PRESSURE: 59 MMHG | SYSTOLIC BLOOD PRESSURE: 122 MMHG | HEART RATE: 72 BPM | OXYGEN SATURATION: 97 % | RESPIRATION RATE: 18 BRPM

## 2022-07-03 LAB
ANION GAP SERPL CALC-SCNC: 10 MMOL/L — SIGNIFICANT CHANGE UP (ref 5–17)
BUN SERPL-MCNC: 26 MG/DL — HIGH (ref 7–23)
CALCIUM SERPL-MCNC: 8.7 MG/DL — SIGNIFICANT CHANGE UP (ref 8.4–10.5)
CHLORIDE SERPL-SCNC: 106 MMOL/L — SIGNIFICANT CHANGE UP (ref 96–108)
CO2 SERPL-SCNC: 22 MMOL/L — SIGNIFICANT CHANGE UP (ref 22–31)
CREAT SERPL-MCNC: 0.67 MG/DL — SIGNIFICANT CHANGE UP (ref 0.5–1.3)
EGFR: 83 ML/MIN/1.73M2 — SIGNIFICANT CHANGE UP
GLUCOSE SERPL-MCNC: 99 MG/DL — SIGNIFICANT CHANGE UP (ref 70–99)
HCT VFR BLD CALC: 34.7 % — SIGNIFICANT CHANGE UP (ref 34.5–45)
HGB BLD-MCNC: 11.1 G/DL — LOW (ref 11.5–15.5)
MAGNESIUM SERPL-MCNC: 2.1 MG/DL — SIGNIFICANT CHANGE UP (ref 1.6–2.6)
MCHC RBC-ENTMCNC: 31.4 PG — SIGNIFICANT CHANGE UP (ref 27–34)
MCHC RBC-ENTMCNC: 32 GM/DL — SIGNIFICANT CHANGE UP (ref 32–36)
MCV RBC AUTO: 98.3 FL — SIGNIFICANT CHANGE UP (ref 80–100)
NRBC # BLD: 0 /100 WBCS — SIGNIFICANT CHANGE UP (ref 0–0)
PHOSPHATE SERPL-MCNC: 3.9 MG/DL — SIGNIFICANT CHANGE UP (ref 2.5–4.5)
PLATELET # BLD AUTO: 233 K/UL — SIGNIFICANT CHANGE UP (ref 150–400)
POTASSIUM SERPL-MCNC: 4.7 MMOL/L — SIGNIFICANT CHANGE UP (ref 3.5–5.3)
POTASSIUM SERPL-SCNC: 4.7 MMOL/L — SIGNIFICANT CHANGE UP (ref 3.5–5.3)
RBC # BLD: 3.53 M/UL — LOW (ref 3.8–5.2)
RBC # FLD: 13.2 % — SIGNIFICANT CHANGE UP (ref 10.3–14.5)
SODIUM SERPL-SCNC: 138 MMOL/L — SIGNIFICANT CHANGE UP (ref 135–145)
WBC # BLD: 7.44 K/UL — SIGNIFICANT CHANGE UP (ref 3.8–10.5)
WBC # FLD AUTO: 7.44 K/UL — SIGNIFICANT CHANGE UP (ref 3.8–10.5)

## 2022-07-03 PROCEDURE — 97161 PT EVAL LOW COMPLEX 20 MIN: CPT

## 2022-07-03 PROCEDURE — 87635 SARS-COV-2 COVID-19 AMP PRB: CPT

## 2022-07-03 PROCEDURE — 70450 CT HEAD/BRAIN W/O DYE: CPT | Mod: MA

## 2022-07-03 PROCEDURE — 36415 COLL VENOUS BLD VENIPUNCTURE: CPT

## 2022-07-03 PROCEDURE — 84443 ASSAY THYROID STIM HORMONE: CPT

## 2022-07-03 PROCEDURE — 96374 THER/PROPH/DIAG INJ IV PUSH: CPT

## 2022-07-03 PROCEDURE — 70551 MRI BRAIN STEM W/O DYE: CPT

## 2022-07-03 PROCEDURE — 83735 ASSAY OF MAGNESIUM: CPT

## 2022-07-03 PROCEDURE — 83036 HEMOGLOBIN GLYCOSYLATED A1C: CPT

## 2022-07-03 PROCEDURE — 80053 COMPREHEN METABOLIC PANEL: CPT

## 2022-07-03 PROCEDURE — 96375 TX/PRO/DX INJ NEW DRUG ADDON: CPT

## 2022-07-03 PROCEDURE — 84484 ASSAY OF TROPONIN QUANT: CPT

## 2022-07-03 PROCEDURE — 80048 BASIC METABOLIC PNL TOTAL CA: CPT

## 2022-07-03 PROCEDURE — 81001 URINALYSIS AUTO W/SCOPE: CPT

## 2022-07-03 PROCEDURE — 84100 ASSAY OF PHOSPHORUS: CPT

## 2022-07-03 PROCEDURE — 85027 COMPLETE CBC AUTOMATED: CPT

## 2022-07-03 PROCEDURE — 99285 EMERGENCY DEPT VISIT HI MDM: CPT

## 2022-07-03 PROCEDURE — 85025 COMPLETE CBC W/AUTO DIFF WBC: CPT

## 2022-07-03 PROCEDURE — 80061 LIPID PANEL: CPT

## 2022-07-03 RX ORDER — CLOPIDOGREL BISULFATE 75 MG/1
0 TABLET, FILM COATED ORAL
Qty: 0 | Refills: 1 | DISCHARGE

## 2022-07-03 RX ORDER — CLOPIDOGREL BISULFATE 75 MG/1
1 TABLET, FILM COATED ORAL
Qty: 0 | Refills: 1 | DISCHARGE

## 2022-07-03 RX ORDER — AMLODIPINE BESYLATE 2.5 MG/1
1 TABLET ORAL
Qty: 0 | Refills: 1 | DISCHARGE

## 2022-07-03 RX ORDER — MIRTAZAPINE 45 MG/1
1 TABLET, ORALLY DISINTEGRATING ORAL
Qty: 0 | Refills: 2 | DISCHARGE

## 2022-07-03 RX ORDER — MIRTAZAPINE 45 MG/1
0 TABLET, ORALLY DISINTEGRATING ORAL
Qty: 0 | Refills: 2 | DISCHARGE

## 2022-07-03 RX ORDER — OXYBUTYNIN CHLORIDE 5 MG
0 TABLET ORAL
Qty: 0 | Refills: 1 | DISCHARGE

## 2022-07-03 RX ORDER — AMLODIPINE BESYLATE 2.5 MG/1
0 TABLET ORAL
Qty: 0 | Refills: 1 | DISCHARGE

## 2022-07-03 RX ORDER — LEVOTHYROXINE SODIUM 125 MCG
0 TABLET ORAL
Qty: 0 | Refills: 3 | DISCHARGE

## 2022-07-03 RX ORDER — ASPIRIN/CALCIUM CARB/MAGNESIUM 324 MG
1 TABLET ORAL
Qty: 0 | Refills: 2 | DISCHARGE

## 2022-07-03 RX ORDER — LEVOTHYROXINE SODIUM 125 MCG
1 TABLET ORAL
Qty: 0 | Refills: 3 | DISCHARGE

## 2022-07-03 RX ORDER — ASPIRIN/CALCIUM CARB/MAGNESIUM 324 MG
0 TABLET ORAL
Qty: 0 | Refills: 2 | DISCHARGE

## 2022-07-03 RX ORDER — CLOPIDOGREL BISULFATE 75 MG/1
1 TABLET, FILM COATED ORAL
Qty: 0 | Refills: 0 | DISCHARGE

## 2022-07-03 RX ADMIN — Medication 650 MILLIGRAM(S): at 04:22

## 2022-07-03 RX ADMIN — Medication 5 MILLIGRAM(S): at 06:10

## 2022-07-03 RX ADMIN — Medication 75 MICROGRAM(S): at 06:11

## 2022-07-03 RX ADMIN — Medication 650 MILLIGRAM(S): at 05:01

## 2022-07-03 RX ADMIN — Medication 81 MILLIGRAM(S): at 11:33

## 2022-07-03 RX ADMIN — CLOPIDOGREL BISULFATE 75 MILLIGRAM(S): 75 TABLET, FILM COATED ORAL at 11:33

## 2022-07-03 RX ADMIN — Medication 5 MILLIGRAM(S): at 00:23

## 2022-07-03 RX ADMIN — AMLODIPINE BESYLATE 5 MILLIGRAM(S): 2.5 TABLET ORAL at 06:11

## 2022-07-03 NOTE — DISCHARGE NOTE NURSING/CASE MANAGEMENT/SOCIAL WORK - NSDCPEFALRISK_GEN_ALL_CORE
For information on Fall & Injury Prevention, visit: [email or web address] OR  [email or web address] OR  https://www.cdc.gov/steadi/patient.html

## 2022-07-03 NOTE — DISCHARGE NOTE NURSING/CASE MANAGEMENT/SOCIAL WORK - NSDCFUADDAPPT_GEN_ALL_CORE_FT
Dr. Jimy Moeller - Ear Nose and Throat Specialist (ENT)  [address], [address], [place], [place] [address]  Call [phone] to make an appointment

## 2022-07-03 NOTE — DISCHARGE NOTE NURSING/CASE MANAGEMENT/SOCIAL WORK - PATIENT PORTAL LINK FT
You can access the FollowMyHealth Patient Portal offered by [facility] by registering at the following website: [email or web address]. By joining [facility] [employer]’s FollowMyHealth portal, you will also be able to view your health information using other applications (apps) compatible with our system.

## 2022-07-05 RX ORDER — DULOXETINE HYDROCHLORIDE 20 MG/1
20 CAPSULE, DELAYED RELEASE PELLETS ORAL
Qty: 180 | Refills: 0 | Status: DISCONTINUED | COMMUNITY
Start: 2022-06-15 | End: 2022-07-05

## 2022-07-07 ENCOUNTER — APPOINTMENT (OUTPATIENT)
Dept: OTOLARYNGOLOGY | Facility: CLINIC | Age: 87
End: 2022-07-07

## 2022-07-07 VITALS
BODY MASS INDEX: 24.74 KG/M2 | RESPIRATION RATE: 17 BRPM | HEART RATE: 64 BPM | HEIGHT: 60 IN | SYSTOLIC BLOOD PRESSURE: 130 MMHG | WEIGHT: 126 LBS | OXYGEN SATURATION: 64 % | TEMPERATURE: 98.1 F | DIASTOLIC BLOOD PRESSURE: 70 MMHG

## 2022-07-07 DIAGNOSIS — R42 DIZZINESS AND GIDDINESS: ICD-10-CM

## 2022-07-07 DIAGNOSIS — Z87.898 PERSONAL HISTORY OF OTHER SPECIFIED CONDITIONS: ICD-10-CM

## 2022-07-07 DIAGNOSIS — H61.23 IMPACTED CERUMEN, BILATERAL: ICD-10-CM

## 2022-07-07 PROCEDURE — 69210 REMOVE IMPACTED EAR WAX UNI: CPT

## 2022-07-07 PROCEDURE — 99203 OFFICE O/P NEW LOW 30 MIN: CPT | Mod: 25

## 2022-07-07 NOTE — PROCEDURE
[Cerumen Impaction] : Cerumen Impaction [Same] : same as the Pre Op Dx. [] : Removal of Cerumen [FreeTextEntry5] : b copious cerumen removed atraumatically with suction L>R, b TMs intact

## 2022-07-07 NOTE — ASSESSMENT
[FreeTextEntry1] : 1. vertigo with associated headaches\par -VNG\par -\par -pt has meclizine prescribed by neurologist; advised to take it if it helps, when episode starts\par 2. b cerumen impaction\par -cerumen removed\par -ears feel better\par -advised to avoid Q tips \par RTC with VNG and \par \par PAN Tam was scribe for this note; it was reviewed and modified as necessary by Jimy Moeller MD\par

## 2022-07-07 NOTE — PHYSICAL EXAM
[de-identified] : b copious cerumen removed atraumatically with suction L>R, b TMs intact  [Normal] : no nystagmus [] : [place]-Hallpike test is negative [de-identified] : gait wide based

## 2022-07-07 NOTE — HISTORY OF PRESENT ILLNESS
[de-identified] : 89 y/o F here with friend presenting with intermittent vertigo and associated headaches for the past two years. In May 2022 she was found to have r PCA territory infarct and had a CTA which showed an occlusion of proximal right PCA. She has been hospitalized three times. She was most recently admitted for persistent headaches. She is being managed by neurology but explains they wished to rule out otologic etiology of her vertigo. No FH or SH pertinent to cc. Former smoker. Denies fevers, chills, sweats.

## 2022-07-08 ENCOUNTER — APPOINTMENT (OUTPATIENT)
Dept: GASTROENTEROLOGY | Facility: CLINIC | Age: 87
End: 2022-07-08

## 2022-07-08 VITALS
DIASTOLIC BLOOD PRESSURE: 70 MMHG | TEMPERATURE: 97.7 F | OXYGEN SATURATION: 98 % | HEART RATE: 72 BPM | WEIGHT: 130 LBS | RESPIRATION RATE: 16 BRPM | HEIGHT: 60 IN | BODY MASS INDEX: 25.52 KG/M2 | SYSTOLIC BLOOD PRESSURE: 125 MMHG

## 2022-07-08 DIAGNOSIS — G93.89 OTHER SPECIFIED DISORDERS OF BRAIN: ICD-10-CM

## 2022-07-08 DIAGNOSIS — Z86.73 PERSONAL HISTORY OF TRANSIENT ISCHEMIC ATTACK (TIA), AND CEREBRAL INFARCTION W/OUT RESIDUAL DEFICITS: ICD-10-CM

## 2022-07-08 DIAGNOSIS — E03.9 HYPOTHYROIDISM, UNSPECIFIED: ICD-10-CM

## 2022-07-08 DIAGNOSIS — Z86.73 PERSONAL HISTORY OF TRANSIENT ISCHEMIC ATTACK (TIA), AND CEREBRAL INFARCTION WITHOUT RESIDUAL DEFICITS: ICD-10-CM

## 2022-07-08 DIAGNOSIS — I10 ESSENTIAL (PRIMARY) HYPERTENSION: ICD-10-CM

## 2022-07-08 DIAGNOSIS — N32.81 OVERACTIVE BLADDER: ICD-10-CM

## 2022-07-08 DIAGNOSIS — Z86.79 PERSONAL HISTORY OF OTHER DISEASES OF THE CIRCULATORY SYSTEM: ICD-10-CM

## 2022-07-08 DIAGNOSIS — R11.0 NAUSEA: ICD-10-CM

## 2022-07-08 DIAGNOSIS — E11.9 TYPE 2 DIABETES MELLITUS WITHOUT COMPLICATIONS: ICD-10-CM

## 2022-07-08 DIAGNOSIS — R42 DIZZINESS AND GIDDINESS: ICD-10-CM

## 2022-07-08 DIAGNOSIS — Z82.3 FAMILY HISTORY OF STROKE: ICD-10-CM

## 2022-07-08 DIAGNOSIS — G44.89 OTHER HEADACHE SYNDROME: ICD-10-CM

## 2022-07-08 DIAGNOSIS — Z82.49 FAMILY HISTORY OF ISCHEMIC HEART DISEASE AND OTHER DISEASES OF THE CIRCULATORY SYSTEM: ICD-10-CM

## 2022-07-08 DIAGNOSIS — R26.81 UNSTEADINESS ON FEET: ICD-10-CM

## 2022-07-08 DIAGNOSIS — E78.5 HYPERLIPIDEMIA, UNSPECIFIED: ICD-10-CM

## 2022-07-08 DIAGNOSIS — R53.1 WEAKNESS: ICD-10-CM

## 2022-07-08 PROCEDURE — 99204 OFFICE O/P NEW MOD 45 MIN: CPT

## 2022-07-08 NOTE — ASSESSMENT
[FreeTextEntry1] : Patient is a 89 year old female with a history of CVA s/p ILR, Migraines, Vertigo and OA who presents due to nausea and dysphagia.\par \par 1. Nausea: Patient currently asymptomatic as her dizziness has resolved.  Patient's nausea was always related to headache and dizziness in the past.  Patient to contact the office if nausea recurs.  She is to continue her migraine medication and meclizine.\par \par 2. Dysphagia: Patient advised to undergo X Ray Esophagram to evaluate dysphagia.  \par \par Patient advised no further colonoscopy due to elevated risk associated with anaesthesia and her other chronic medical conditions.  Patient advised possible upper endoscopic evaluation pending results of the esophagram.  \par \par Patient advised to schedule follow up office visit in 2 months.\par \par \par Marvel BULLOCK; PA, am scribing for and in the presence of Dr. Bo Pereira the following sections: history of present illness, past medical/family/social history; review of systems; vital signs; physical exam; disposition.\par \par Bo BULLOCK MD, personally performed the services described in the documentation, reviewed the documentation recorded by the scribe in my presence and it accurately and completely records my words and actions.	\par \par

## 2022-07-08 NOTE — PHYSICAL EXAM
[General Appearance - Alert] : alert [General Appearance - In No Acute Distress] : in no acute distress [Respiration, Rhythm And Depth] : normal respiratory rhythm and effort [Exaggerated Use Of Accessory Muscles For Inspiration] : no accessory muscle use [Abdomen Soft] : soft [Abdomen Tenderness] : non-tender [Oriented To Time, Place, And Person] : oriented to person, place, and time [Impaired Insight] : insight and judgment were intact

## 2022-07-13 NOTE — ED ADULT NURSE NOTE - NS ED NURSE LEVEL OF CONSCIOUSNESS MENTAL STATUS
Awake/Alert/Cooperative Topical Retinoid counseling:  Patient advised to apply a pea-sized amount only at bedtime and wait 30 minutes after washing their face before applying.  If too drying, patient may add a non-comedogenic moisturizer. The patient verbalized understanding of the proper use and possible adverse effects of retinoids.  All of the patient's questions and concerns were addressed.

## 2022-07-14 ENCOUNTER — RX RENEWAL (OUTPATIENT)
Age: 87
End: 2022-07-14

## 2022-07-15 ENCOUNTER — APPOINTMENT (OUTPATIENT)
Dept: OTOLARYNGOLOGY | Facility: CLINIC | Age: 87
End: 2022-07-15

## 2022-07-15 VITALS
SYSTOLIC BLOOD PRESSURE: 121 MMHG | TEMPERATURE: 98.6 F | OXYGEN SATURATION: 98 % | DIASTOLIC BLOOD PRESSURE: 72 MMHG | WEIGHT: 130 LBS | HEIGHT: 60 IN | BODY MASS INDEX: 25.52 KG/M2 | HEART RATE: 64 BPM

## 2022-07-15 DIAGNOSIS — R42 DIZZINESS AND GIDDINESS: ICD-10-CM

## 2022-07-15 DIAGNOSIS — H90.3 SENSORINEURAL HEARING LOSS, BILATERAL: ICD-10-CM

## 2022-07-15 PROCEDURE — 92557 COMPREHENSIVE HEARING TEST: CPT

## 2022-07-15 PROCEDURE — 92550 TYMPANOMETRY & REFLEX THRESH: CPT

## 2022-07-15 PROCEDURE — 92537 CALORIC VSTBLR TEST W/REC: CPT

## 2022-07-15 PROCEDURE — 92540 BASIC VESTIBULAR EVALUATION: CPT

## 2022-07-15 PROCEDURE — 99213 OFFICE O/P EST LOW 20 MIN: CPT

## 2022-07-15 NOTE — ASSESSMENT
[FreeTextEntry1] : Impression: non-otologic vertigo - given copy of her vng and I recommended she follow up with her neurologist and her internist\par \par b borderline hl - discussed hae - she prefers to hold off\par rtc 1 yr with

## 2022-07-15 NOTE — HISTORY OF PRESENT ILLNESS
[de-identified] : 8 d followup appt for this 89 o f with headache and intermittent vertigo. She was found to have r pca infarct and occlusion of r pca on cta. She was sent here by Neuro to r/o otologic source for her vertigo.

## 2022-07-21 ENCOUNTER — APPOINTMENT (OUTPATIENT)
Dept: HEART AND VASCULAR | Facility: CLINIC | Age: 87
End: 2022-07-21

## 2022-07-21 ENCOUNTER — NON-APPOINTMENT (OUTPATIENT)
Age: 87
End: 2022-07-21

## 2022-07-21 VITALS
OXYGEN SATURATION: 97 % | WEIGHT: 129.98 LBS | HEIGHT: 60 IN | HEART RATE: 70 BPM | SYSTOLIC BLOOD PRESSURE: 124 MMHG | BODY MASS INDEX: 25.52 KG/M2 | DIASTOLIC BLOOD PRESSURE: 72 MMHG | TEMPERATURE: 98.3 F

## 2022-07-21 DIAGNOSIS — R06.02 SHORTNESS OF BREATH: ICD-10-CM

## 2022-07-21 PROCEDURE — 99214 OFFICE O/P EST MOD 30 MIN: CPT

## 2022-07-21 PROCEDURE — 93000 ELECTROCARDIOGRAM COMPLETE: CPT

## 2022-07-21 NOTE — HISTORY OF PRESENT ILLNESS
[FreeTextEntry1] : 89 F HTN HLD Hypothyroid, Vertigo, Headaches, PCA stroke May 2022 s/p ILR, Osteoarthritis, Overactive Bladder, \par \par referred by [facility] has had three hospitalizations for headaches and difficulty standing up her last hospitalization she is here today due to sob and worsening leg swelling. She is sob only when she talks or puts her shoes on and not when she walks she can walk 8-9 blocks before she has to stop.\par \par ecg nsr lafb 7/21/2022 \par echo 7/21/2022 EF normal no valvular heart disease

## 2022-07-21 NOTE — ASSESSMENT
[FreeTextEntry1] : - she seems to be pursing her lips and a lot of mouth breathing\par - her sob may actualy be her mask i watched her breathing improve wihtout her n95 mask\par - spoke to neurology can stop clopidogrel\par - legs are not swollen today compression stockings and leg elevation\par - she will return as needed

## 2022-07-22 ENCOUNTER — NON-APPOINTMENT (OUTPATIENT)
Age: 87
End: 2022-07-22

## 2022-07-22 ENCOUNTER — APPOINTMENT (OUTPATIENT)
Dept: HEART AND VASCULAR | Facility: CLINIC | Age: 87
End: 2022-07-22

## 2022-07-22 PROCEDURE — 93298 REM INTERROG DEV EVAL SCRMS: CPT

## 2022-07-22 PROCEDURE — G2066: CPT

## 2022-07-25 ENCOUNTER — APPOINTMENT (OUTPATIENT)
Dept: PULMONOLOGY | Facility: CLINIC | Age: 87
End: 2022-07-25

## 2022-07-25 VITALS
DIASTOLIC BLOOD PRESSURE: 59 MMHG | WEIGHT: 131 LBS | SYSTOLIC BLOOD PRESSURE: 123 MMHG | BODY MASS INDEX: 25.72 KG/M2 | HEART RATE: 69 BPM | TEMPERATURE: 97.6 F | OXYGEN SATURATION: 96 % | HEIGHT: 60 IN

## 2022-07-25 DIAGNOSIS — G47.33 OBSTRUCTIVE SLEEP APNEA (ADULT) (PEDIATRIC): ICD-10-CM

## 2022-07-25 DIAGNOSIS — Z99.89 OBSTRUCTIVE SLEEP APNEA (ADULT) (PEDIATRIC): ICD-10-CM

## 2022-07-25 PROCEDURE — 99204 OFFICE O/P NEW MOD 45 MIN: CPT

## 2022-07-25 NOTE — HISTORY OF PRESENT ILLNESS
[FreeTextEntry1] : 07/25/2022 :  RICHARD LOZA is a 89 year old female with PMHx hypertension, hyperlipidemia, unrelenting headaches for which she had EEG, hypothyroidism, vertigo, chronic refractory headaches, and PCA  ischemic stroke in May 2022 who is here for initial visit after sleep study. \par \par She also c/o insomnia and HA and had sleep study. Her study showed mild apnea and AHI 5. \par \par She denies cataplexy, RLS, parasomnia, or any other sleep behavioral issues. \par \par

## 2022-07-25 NOTE — PHYSICAL EXAM
[General Appearance - In No Acute Distress] : no acute distress [Normal Conjunctiva] : the conjunctiva exhibited no abnormalities [Normal Oropharynx] : normal oropharynx [Neck Appearance] : the appearance of the neck was normal [Neck Cervical Mass (___cm)] : no neck mass was observed [Apical Impulse] : the apical impulse was normal [Heart Sounds] : normal S1 and S2 [] : no respiratory distress [Exaggerated Use Of Accessory Muscles For Inspiration] : no accessory muscle use [Abnormal Walk] : normal gait [Involuntary Movements] : no involuntary movements were seen [No Focal Deficits] : no focal deficits [Oriented To Time, Place, And Person] : oriented to person, place, and time [Affect] : the affect was normal

## 2022-07-29 ENCOUNTER — APPOINTMENT (OUTPATIENT)
Dept: HEART AND VASCULAR | Facility: CLINIC | Age: 87
End: 2022-07-29

## 2022-07-30 ENCOUNTER — NON-APPOINTMENT (OUTPATIENT)
Age: 87
End: 2022-07-30

## 2022-08-03 ENCOUNTER — OUTPATIENT (OUTPATIENT)
Dept: OUTPATIENT SERVICES | Facility: HOSPITAL | Age: 87
LOS: 1 days | End: 2022-08-03
Payer: MEDICARE

## 2022-08-03 ENCOUNTER — RESULT REVIEW (OUTPATIENT)
Age: 87
End: 2022-08-03

## 2022-08-03 ENCOUNTER — APPOINTMENT (OUTPATIENT)
Dept: RADIOLOGY | Facility: HOSPITAL | Age: 87
End: 2022-08-03

## 2022-08-03 PROCEDURE — 74220 X-RAY XM ESOPHAGUS 1CNTRST: CPT | Mod: 26

## 2022-08-03 PROCEDURE — 74220 X-RAY XM ESOPHAGUS 1CNTRST: CPT

## 2022-08-04 LAB — SARS-COV-2 N GENE NPH QL NAA+PROBE: NOT DETECTED

## 2022-08-16 ENCOUNTER — NON-APPOINTMENT (OUTPATIENT)
Age: 87
End: 2022-08-16

## 2022-08-21 ENCOUNTER — TRANSCRIPTION ENCOUNTER (OUTPATIENT)
Age: 87
End: 2022-08-21

## 2022-08-26 ENCOUNTER — NON-APPOINTMENT (OUTPATIENT)
Age: 87
End: 2022-08-26

## 2022-08-26 ENCOUNTER — APPOINTMENT (OUTPATIENT)
Dept: HEART AND VASCULAR | Facility: CLINIC | Age: 87
End: 2022-08-26

## 2022-08-26 PROCEDURE — G2066: CPT

## 2022-08-26 PROCEDURE — 93298 REM INTERROG DEV EVAL SCRMS: CPT

## 2022-09-08 ENCOUNTER — APPOINTMENT (OUTPATIENT)
Dept: NEUROLOGY | Facility: CLINIC | Age: 87
End: 2022-09-08

## 2022-09-16 ENCOUNTER — NON-APPOINTMENT (OUTPATIENT)
Age: 87
End: 2022-09-16

## 2022-09-19 NOTE — PROGRESS NOTE ADULT - ASSESSMENT
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 On reassessment pt did not improve, admit for MRI and physical therapy.  NIHSS 1 (from prior stroke).    Neuro  #CVA workup  - continue aspirin 81mg and plavix 75mg daily  - continue atorvastatin 80mg daily  - q4hr stroke neuro checks and vitals  - MRI Brain without contrast - no acute stroke  - Stroke Code HCT Results: neg  - Stroke education  - continue home remeron    Cards  #HTN  - permissive hypertension, Goal -180  - restarted home amlodipine  - no need for TTE    #HLD  - high dose statin as above in CVA  - LDL results: 126    Pulm  - call provider if SPO2 < 94%    GI  #Nutrition/Fluids/Electrolytes   - replete K<4 and Mg <2  - Diet: Regular    Urology  #overactive bladder  -continue oxybutynin    Endocrine  #DM  - A1C results: 5.4    - TSH results:  -continue home synthroid    DVT Prophylaxis  - lovenox sq for DVT prophylaxis   - SCDs for DVT prophylaxis     Pt discussed during rounds with Dr. Mike MCDANIELS Goals: Goals to be reviewed at interdisciplinary rounds with case management, social work, physical therapy, occupational therapy, and speech language pathology.   Please see specific therapy  notes for in depth goals.    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 On reassessment pt did not improve, admit for MRI and physical therapy.  NIHSS 1 (from prior stroke). DC on 7/2    Neuro  #CVA workup  - continue aspirin 81mg and plavix 75mg daily  - continue atorvastatin 80mg daily  - q4hr stroke neuro checks and vitals  - MRI Brain without contrast - no acute stroke  - Stroke Code HCT Results: neg  - Stroke education  - continue home remeron    Cards  #HTN  - permissive hypertension, Goal -180  - restarted home amlodipine  - no need for TTE    #HLD  - high dose statin as above in CVA  - LDL results: 126    Pulm  - call provider if SPO2 < 94%    GI  #Nutrition/Fluids/Electrolytes   - replete K<4 and Mg <2  - Diet: Regular    Urology  #overactive bladder  -continue oxybutynin    Endocrine  #DM  - A1C results: 5.4    - TSH results:  -continue home synthroid    DVT Prophylaxis  - lovenox sq for DVT prophylaxis   - SCDs for DVT prophylaxis     Pt discussed during rounds with Dr. Mike MCDANIELS Goals: Goals to be reviewed at interdisciplinary rounds with case management, social work, physical therapy, occupational therapy, and speech language pathology.   Please see specific therapy  notes for in depth goals.    Dispo: home with continued home PT/OT  fall precautions

## 2022-09-22 ENCOUNTER — APPOINTMENT (OUTPATIENT)
Dept: GASTROENTEROLOGY | Facility: CLINIC | Age: 87
End: 2022-09-22

## 2022-09-22 VITALS
OXYGEN SATURATION: 97 % | DIASTOLIC BLOOD PRESSURE: 60 MMHG | BODY MASS INDEX: 25.52 KG/M2 | HEIGHT: 60 IN | SYSTOLIC BLOOD PRESSURE: 110 MMHG | TEMPERATURE: 95.4 F | RESPIRATION RATE: 14 BRPM | WEIGHT: 130 LBS | HEART RATE: 80 BPM

## 2022-09-22 DIAGNOSIS — R13.10 DYSPHAGIA, UNSPECIFIED: ICD-10-CM

## 2022-09-22 DIAGNOSIS — Q39.4 ESOPHAGEAL WEB: ICD-10-CM

## 2022-09-22 PROCEDURE — 99214 OFFICE O/P EST MOD 30 MIN: CPT

## 2022-09-23 NOTE — PHYSICAL EXAM
[Normal Appearance] : the appearance of the neck was normal [No Neck Mass] : no neck mass was observed [No CVA Tenderness] : no CVA  tenderness [No Spinal Tenderness] : no spinal tenderness [] : no rash [No Focal Deficits] : no focal deficits [Normal] : oriented to person, place, and time

## 2022-09-23 NOTE — HISTORY OF PRESENT ILLNESS
[FreeTextEntry1] : Patient is a 89 year old female with a history of CVA s/p ILR placement, Migraines, Vertigo and OA who presents due to referral from Dr. Bo Pereira due to dysphagia and cricopharyngeal bar.  Patient states that in July, she was experiencing dysphagia to solids and liquids.  She was experiencing regurgitation and occasional episodes of vomiting.  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  Patient has never had an upper endoscopy before.  Patient follows up with Dr. Santiago; Cardiologist and NP Cris Rosenberg due to history of CVA.  Patient is currently on plavix.  Patient states that since her visit with Dr. Pereira, she has not had any issues with swallowing.  She is able to now eat normally.  Patient denies abdominal pain, changes in bowel habits, dark stool and BRBPR.  \par \par Patient denies family history of colon, gastric and pancreatic cancer.\par

## 2022-09-30 ENCOUNTER — APPOINTMENT (OUTPATIENT)
Dept: HEART AND VASCULAR | Facility: CLINIC | Age: 87
End: 2022-09-30

## 2022-09-30 ENCOUNTER — NON-APPOINTMENT (OUTPATIENT)
Age: 87
End: 2022-09-30

## 2022-09-30 PROCEDURE — G2066: CPT

## 2022-09-30 PROCEDURE — 93298 REM INTERROG DEV EVAL SCRMS: CPT

## 2022-10-13 NOTE — DISCHARGE NOTE NURSING/CASE MANAGEMENT/SOCIAL WORK - NSFLUVACAGEDISCH_IMM_ALL_CORE
Consent: The risks of pain and injection site reactions were reviewed with the patient prior to the injection. Adult

## 2022-11-04 ENCOUNTER — NON-APPOINTMENT (OUTPATIENT)
Age: 87
End: 2022-11-04

## 2022-11-04 ENCOUNTER — APPOINTMENT (OUTPATIENT)
Dept: HEART AND VASCULAR | Facility: CLINIC | Age: 87
End: 2022-11-04

## 2022-11-04 PROCEDURE — G2066: CPT

## 2022-11-04 PROCEDURE — 93298 REM INTERROG DEV EVAL SCRMS: CPT

## 2022-11-09 ENCOUNTER — APPOINTMENT (OUTPATIENT)
Dept: NEUROLOGY | Facility: CLINIC | Age: 87
End: 2022-11-09

## 2022-12-05 ENCOUNTER — APPOINTMENT (OUTPATIENT)
Dept: ORTHOPEDIC SURGERY | Facility: CLINIC | Age: 87
End: 2022-12-05

## 2022-12-05 PROCEDURE — 99213 OFFICE O/P EST LOW 20 MIN: CPT

## 2022-12-05 PROCEDURE — 73564 X-RAY EXAM KNEE 4 OR MORE: CPT | Mod: 50

## 2022-12-05 NOTE — HISTORY OF PRESENT ILLNESS
[de-identified] : 89 year old female presents for follow-up evaluation of bilateral knee arthritis. Pt is continuing with nonoperative treatment. She received cortisone in the past, but did not help. She also had a HA gel injection last December 2021 with improvement. Wants to repeat gel injection but with gel 1 this time. She confirms taking Tylenol or Ibuprofen PRN. Exercises as much as she can on her own.

## 2022-12-05 NOTE — ADDENDUM
[FreeTextEntry1] : This note was written by MALISSA RED on 12/05/2022 acting as scribe for Dr. Sebastian Dowd M.D.\par \par I, Dr. Sebastian Dowd, have read and attest that all the information, medical decision making and discharge instructions within are true and accurate.

## 2022-12-05 NOTE — DISCUSSION/SUMMARY
[de-identified] : Discussed at length the nature of the patient's condition. Their bilateral knee symptoms appear secondary to degenerative arthritis. We reviewed operative and nonoperative treatment. While I believe he would eventually benefit from a TKR, he is hesitant to have surgery at this time. Therefore, we will continue nonoperatively.  \par \par The patient is scheduled for Gel 1 injections. In the interim, I suggested that she continue home exercise and Tylenol/Ibuprofen prn.\par \par Patient can continue activities as tolerated. All questions answered, understanding verbalized. Patient in agreement with plan of care.

## 2022-12-05 NOTE — PHYSICAL EXAM
[de-identified] : General appearance: well nourished and hydrated, pleasant, alert and oriented x 3, cooperative.\par HEENT: Normocephalic, EOM intact, Nasal septum midline, Oral cavity clear, External auditory canal clear.\par Cardiovascular: no apparent abnormalities, no lower leg edema, no varicosities, pedal pulses are palpable.\par Lymphatics Lymph nodes: none palpated, Lymphedema: not present.\par Neurologic: sensation is normal, no muscle weakness in upper or lower extremities, patella tendon reflexes intact .\par Dermatologic no apparent skin lesions, moist, warm, no rash.\par Spine:cervical spine appears normal and moves freely, thoracic spine appears normal and moves freely, lumbosacral spine appears normal and moves freely.\par Gait: nonantalgic. \par \par Left knee\par Inspection: trace effusion\par Wounds: none.\par Alignment: normal.\par Palpation: medial and lateral tenderness on palpation.\par ROM active (in degrees): 0-110 \par Ligamentous laxity: all ligaments appear stable,, negative ant. drawer test, negative post. drawer test, stable to varus stress test, stable to valgus stress test. negative Lachman's test, negative pivot shift test\par Meniscal Test: negative McMurrays, negative Rodolfo.\par Patellofemoral Alignment Test: Q angle-, normal.\par Muscle Test: good quad strength.\par Leg examination: calf is soft and non-tender. \par \par Right Knee\par Inspection: trace effusion, no erythema.\par Wounds: none.\par Alignment: normal.\par Palpation: medial and lateral tenderness on palpation.\par ROM: Active (in degrees): 0-110\par Ligamentous laxity (neg): all ligaments appear stable, negative ant. drawer test, negative post. drawer test, stable to varus stress test, stable to valgus stress test, negative Lachman's test, negative pivot shift test,\par Meniscal Test: negative McMurrays, negative Rodolfo.\par Patellofemoral Alignment Test: Q angle-, normal.\par Muscle Test: good quad strength.\par Leg examination: calf is soft and non-tender.\par  [de-identified] : RIGHT knee xrays, 4 views standing AP/Lateral and Merchant films, and 45 degree PA standing view, taken at the office today shows diffuse degenerative arthritis, medial joint space narrowing, sclerosis, bone on bone, narrowing of the patellofemoral joint, Kellgren and Deric grade 4\par \par LEFT knee xrays, 4 views standing AP/Lateral and Merchant films, and 45 degree PA standing view, taken at the office today shows diffuse degenerative arthritis, medial joint space narrowing, sclerosis, bone on bone, narrowing of the patellofemoral joint, Kellgren and Deric grade 4. \par

## 2022-12-06 ENCOUNTER — APPOINTMENT (OUTPATIENT)
Dept: HEART AND VASCULAR | Facility: CLINIC | Age: 87
End: 2022-12-06

## 2022-12-08 ENCOUNTER — APPOINTMENT (OUTPATIENT)
Dept: HEART AND VASCULAR | Facility: CLINIC | Age: 87
End: 2022-12-08

## 2022-12-08 VITALS
SYSTOLIC BLOOD PRESSURE: 140 MMHG | HEART RATE: 65 BPM | HEIGHT: 60 IN | BODY MASS INDEX: 25.91 KG/M2 | TEMPERATURE: 97.8 F | DIASTOLIC BLOOD PRESSURE: 63 MMHG | WEIGHT: 132 LBS

## 2022-12-08 PROCEDURE — 93291 INTERROG DEV EVAL SCRMS IP: CPT

## 2022-12-10 VITALS — BODY MASS INDEX: 25.91 KG/M2 | HEIGHT: 60 IN | WEIGHT: 132 LBS

## 2022-12-12 ENCOUNTER — APPOINTMENT (OUTPATIENT)
Dept: ORTHOPEDIC SURGERY | Facility: CLINIC | Age: 87
End: 2022-12-12

## 2022-12-12 PROCEDURE — 20610 DRAIN/INJ JOINT/BURSA W/O US: CPT | Mod: 50

## 2022-12-12 NOTE — PROCEDURE
[de-identified] : Gel One Bilateral Knee\par Discussed at length with the patient the planned Gel-One injection. The risks, benefits, convalescence and alternatives were reviewed. The possible side effects discussed included but were not limited to: pain, swelling, heat and redness. There symptoms are generally mild but if they are extensive then contact the office. Giving pain relievers by mouth such as NSAID’s or Tylenol can generally treat the reactions to Gel-One. Rare cases of infection have been noted. Rash, hives and itching may occur post injection. If you have muscle pain or cramps, flushing and or swelling of the face, rapid heart beat, nausea, dizziness, fever, chills, headache, difficulty breathing, swelling in the arms or legs, or have a prickly feeling of your skin, contact a health care provider immediately.\par \par Following this discussion, the knee was prepped with betadine and under sterile condition the Gel-One injection was performed with a 22 gauge needle. The needle was introduced into the joint, aspiration was performed to ensure intra-articular placement and the medication was injected. Upon withdrawal of the needle the site was cleaned with alcohol and a bandaid applied. The patient tolerated the injection well and there were no adverse effects. Post injection instructions included no strenuous activity for 24 hours, cryotherapy and if there are any adverse effects to contact the office.\par

## 2022-12-15 NOTE — PROCEDURE
[de-identified] : [employer] reveal linq\par battery good\par sensing good\par no events - one symptom triggered NSR

## 2022-12-15 NOTE — ADDENDUM
[FreeTextEntry1] : I, Sam Molina, hereby attest that the medical record entry for this patient accurately reflects signatures/notations that I made on the Date of Service in my capacity as an Attending Physician when I treated/diagnosed the above patient. I do hereby attest that this information is true, accurate and complete to the best of my knowledge and I understand that any falsification, omission, or concealment of material fact may subject me to administrative, civil, or, criminal liability. I agree with the note as written by my PA in its entirety.\par I was present for the entire visit and supervised the entire visit and agree with the plan as outlined.\par \par I, Kash Gaming, am scribing for and the presence of Dr. Molina the following sections: HPI, PMH,Family/social history, ROS, Physical Exam, Assessment / Plan.\par \par

## 2022-12-15 NOTE — HISTORY OF PRESENT ILLNESS
[de-identified] : 89 year old female with HTN, HLD, hypothyroidism, and CVA s/p ILR, who presents for follow up.\par \par She has no device related complaints.  No known history of arrhythmia.  No palpitations, chest pain, orthopnea or syncope.

## 2022-12-15 NOTE — DISCUSSION/SUMMARY
[FreeTextEntry1] : 89 year old female with HTN, HLD, hypothyroidism, and CVA s/p ILR, who presents for follow up.  ILR incision well healed.  Interrogation with no arrhythmias.  Home monitoring working.  She will follow up in 6 months or sooner if needed.  She knows to call with any questions or concerns in the interim.

## 2023-01-19 ENCOUNTER — APPOINTMENT (OUTPATIENT)
Dept: HEART AND VASCULAR | Facility: CLINIC | Age: 88
End: 2023-01-19
Payer: MEDICARE

## 2023-01-19 ENCOUNTER — NON-APPOINTMENT (OUTPATIENT)
Age: 88
End: 2023-01-19

## 2023-01-19 PROCEDURE — 93298 REM INTERROG DEV EVAL SCRMS: CPT

## 2023-01-19 PROCEDURE — G2066: CPT

## 2023-01-25 NOTE — PROGRESS NOTE ADULT - PROVIDER SPECIALTY LIST ADULT
Neurology
Neurology
Hospitalist
Hospitalist
Neurology
Rehab Medicine
Awake/Alert/Cooperative
Internal Medicine

## 2023-02-06 ENCOUNTER — APPOINTMENT (OUTPATIENT)
Dept: ORTHOPEDIC SURGERY | Facility: CLINIC | Age: 88
End: 2023-02-06
Payer: MEDICARE

## 2023-02-06 DIAGNOSIS — S80.02XA CONTUSION OF LEFT KNEE, INITIAL ENCOUNTER: ICD-10-CM

## 2023-02-06 PROCEDURE — 20610 DRAIN/INJ JOINT/BURSA W/O US: CPT | Mod: 50

## 2023-02-06 PROCEDURE — 73564 X-RAY EXAM KNEE 4 OR MORE: CPT | Mod: 50

## 2023-02-06 PROCEDURE — 99213 OFFICE O/P EST LOW 20 MIN: CPT | Mod: 25

## 2023-02-06 NOTE — PROCEDURE
[de-identified] : BILATERAL KNEE CORTISONE INJECTION\par Discussed at length with the patient the planned steroid and lidocaine injection. The risks, benefits, convalescence and alternatives were reviewed. The possible side effects discussed included but were not limited to: pain, swelling, heat and redness. These symptoms are generally mild but if they are extensive then contact the office. Giving pain relievers by mouth such as NSAID’s or Tylenol can generally treat the reactions to steroid and lidocaine. Rare cases of infection have been noted. Rash, hives and itching may occur post injection. If you have muscle pain or cramps, flushing and or swelling of the face, rapid heart beat, nausea, dizziness, fever, chills, headache, difficulty breathing, swelling in the arms or legs, or have a prickly feeling of your skin, contact a health care provider immediately.\par \par Following this discussion, the knee was prepped with betadine and under sterile conditions 5 cc of 2% lidocaine and 1 cc depo-medrol (40mg) were injected with a 21 gauge needle. The needle was introduced into the joint, aspiration was performed to ensure intra-articular placement and the medication was injected. Upon withdrawal of the needle the site was cleaned with alcohol and a bandaid applied. The patient tolerated the injection well and there were no adverse effects. Post injection instructions included no strenuous activity for 24 hours, cryotherapy and if there are any adverse effects to contact the office.

## 2023-02-06 NOTE — PHYSICAL EXAM
[de-identified] : General appearance: well nourished and hydrated, pleasant, alert and oriented x 3, cooperative.\par HEENT: Normocephalic, EOM intact, Nasal septum midline, Oral cavity clear, External auditory canal clear.\par Cardiovascular: no apparent abnormalities, no lower leg edema, no varicosities, pedal pulses are palpable.\par Lymphatics Lymph nodes: none palpated, Lymphedema: not present.\par Neurologic: sensation is normal, no muscle weakness in upper or lower extremities, patella tendon reflexes intact .\par Dermatologic no apparent skin lesions, moist, warm, no rash.\par Spine:cervical spine appears normal and moves freely, thoracic spine appears normal and moves freely, lumbosacral spine appears normal and moves freely.\par Gait: nonantalgic. \par \par Left knee\par Inspection: trace effusion \par Wounds: Superficial abrasion of the tibial tubercle\par Alignment: normal.\par Palpation: medial and lateral tenderness on palpation.\par ROM active (in degrees): 0-110 \par Ligamentous laxity: all ligaments appear stable,, negative ant. drawer test, negative post. drawer test, stable to varus stress test, stable to valgus stress test. negative Lachman's test, negative pivot shift test\par Meniscal Test: negative McMurrays, negative Rodolfo.\par Patellofemoral Alignment Test: Q angle-, normal.\par Muscle Test: good quad strength.\par Leg examination: calf is soft and non-tender. \par \par Right Knee\par Inspection: trace effusion, no erythema.\par Wounds: none.\par Alignment: normal.\par Palpation: medial and lateral tenderness on palpation.\par ROM: Active (in degrees): 0-110\par Ligamentous laxity (neg): all ligaments appear stable, negative ant. drawer test, negative post. drawer test, stable to varus stress test, stable to valgus stress test, negative Lachman's test, negative pivot shift test,\par Meniscal Test: negative McMurrays, negative Rodolfo.\par Patellofemoral Alignment Test: Q angle-, normal.\par Muscle Test: good quad strength.\par Leg examination: calf is soft and non-tender. [de-identified] : RIGHT knee xrays, 4 views standing AP/Lateral and Merchant films, and 45 degree PA standing view, taken at the office today shows degenerative arthritis, normal alignment, medial joint space narrowing, sclerosis, bone on bone, patella at appropriate height and central position, patellofemoral joint space narrowing, osteophytes, Kellgren and Deric grade 4\par \par LEFTknee xrays, 4 views standing AP/Lateral and Merchant films, and 45 degree PA standing view, taken at the office today shows degenerative arthritis, normal alignment, medial joint space narrowing, sclerosis, bone on bone, patella at appropriate height and central position, patellofemoral joint space narrowing, osteophytes, Kellgren and Deric grade 4

## 2023-02-06 NOTE — ADDENDUM
[FreeTextEntry1] : This note was written by MALISSA RED on 02/06/2023 acting as scribe for Dr. Sebastian Dowd M.D.\par \par I, Dr. Sebastian Dowd, have read and attest that all the information, medical decision making and discharge instructions within are true and accurate. \par \par "This note was written by Dionisio Parr on 02/06/2023 acting as scribe for Dr. Sebastian Dowd M.D.\par \par I, Dr. Sebastian Dowd, have read and attest that all the information, medical decision making and discharge instructions within are true and accurate."

## 2023-02-06 NOTE — DISCUSSION/SUMMARY
[de-identified] : Discussed at length the nature of the patient's condition. Their bilateral knee symptoms appear secondary to degenerative arthritis. The patient does have a resolving contusion to the left knee. \par \par We reviewed operative and nonoperative treatment. While I believe she would eventually benefit from a TKR, she is hesitant to have surgery at this time. Therefore, we will continue nonoperatively. In the interim, patient was given a bilateral knee cortisone injection today as detailed above for symptomatic relief. I also suggested she undergo a home exercise program, Tylenol prn and weight management.\par \par Patient can continue activities as tolerated. All questions answered, understanding verbalized. Patient in agreement with plan of care. \par \par Patient will come back in 3-4 months.

## 2023-02-06 NOTE — HISTORY OF PRESENT ILLNESS
[de-identified] : 89 year old female presents for follow-up evaluation of bilateral knee arthritis. Pt received Gel 1 on December 12th and states it did not do much for her. Confirms taking Tylenol prn and exercises on her own. Pt would like cortisone today if possible as she states she received it in the past with some help. Notes she will go back to her daily exercises. Pt reports taking a fall 3 weeks ago walking on the street. She went to the ER at [facility] where they took x-rays of her left knee and a CT scan of her head, and was discharged with no treatment.

## 2023-02-23 ENCOUNTER — NON-APPOINTMENT (OUTPATIENT)
Age: 88
End: 2023-02-23

## 2023-02-23 ENCOUNTER — APPOINTMENT (OUTPATIENT)
Dept: HEART AND VASCULAR | Facility: CLINIC | Age: 88
End: 2023-02-23
Payer: MEDICARE

## 2023-02-23 PROCEDURE — G2066: CPT

## 2023-02-23 PROCEDURE — 93298 REM INTERROG DEV EVAL SCRMS: CPT

## 2023-03-06 ENCOUNTER — APPOINTMENT (OUTPATIENT)
Dept: ORTHOPEDIC SURGERY | Facility: CLINIC | Age: 88
End: 2023-03-06

## 2023-03-09 ENCOUNTER — APPOINTMENT (OUTPATIENT)
Dept: HEART AND VASCULAR | Facility: CLINIC | Age: 88
End: 2023-03-09
Payer: MEDICARE

## 2023-03-09 VITALS
TEMPERATURE: 95 F | SYSTOLIC BLOOD PRESSURE: 116 MMHG | DIASTOLIC BLOOD PRESSURE: 56 MMHG | HEIGHT: 60 IN | BODY MASS INDEX: 25.91 KG/M2 | HEART RATE: 73 BPM | WEIGHT: 132 LBS

## 2023-03-09 PROCEDURE — 93291 INTERROG DEV EVAL SCRMS IP: CPT

## 2023-03-14 NOTE — DISCUSSION/SUMMARY
[FreeTextEntry1] : 89 year old female with HTN, HLD, hypothyroidism, and CVA s/p ILR, who presents for follow up.  ILR interrogation with no arrhythmias.  Home monitoring working.  We discussed the importance of keeping it in to assure she doesn’t have any arrhythmias that can result in another stroke.  She is amenable to this for now.  She will follow up in 6 months or sooner if needed.  She knows to call with any questions or concerns in the interim.

## 2023-03-14 NOTE — REVIEW OF SYSTEMS
[Negative] : Psychiatric [Fever] : no fever [Weight Gain (___ Lbs)] : no recent weight gain [Chills] : no chills [Weight Loss (___ Lbs)] : no recent weight loss [SOB] : no shortness of breath [Dyspnea on exertion] : not dyspnea during exertion [Chest Discomfort] : no chest discomfort [Palpitations] : no palpitations [Syncope] : no syncope

## 2023-03-14 NOTE — HISTORY OF PRESENT ILLNESS
[de-identified] : 89 year old female with HTN, HLD, hypothyroidism, and CVA s/p ILR, who presents for follow up.\par \par She has no device related complaints.  No known history of arrhythmia.  No palpitations, chest pain, orthopnea or syncope. \par \par She asks about getting her ILR out - though it does not bug her

## 2023-03-27 ENCOUNTER — APPOINTMENT (OUTPATIENT)
Dept: HEART AND VASCULAR | Facility: CLINIC | Age: 88
End: 2023-03-27
Payer: MEDICARE

## 2023-03-27 ENCOUNTER — NON-APPOINTMENT (OUTPATIENT)
Age: 88
End: 2023-03-27

## 2023-03-27 PROCEDURE — 93298 REM INTERROG DEV EVAL SCRMS: CPT

## 2023-03-27 PROCEDURE — G2066: CPT

## 2023-04-27 VITALS — BODY MASS INDEX: 25.91 KG/M2 | HEIGHT: 60 IN | WEIGHT: 132 LBS

## 2023-04-27 NOTE — PROCEDURE
[de-identified] : BILATERAL KNEE CORTISONE INJECTION\par Discussed at length with the patient the planned steroid and lidocaine injection. The risks, benefits, convalescence and alternatives were reviewed. The possible side effects discussed included but were not limited to: pain, swelling, heat and redness. These symptoms are generally mild but if they are extensive then contact the office. Giving pain relievers by mouth such as NSAID’s or Tylenol can generally treat the reactions to steroid and lidocaine. Rare cases of infection have been noted. Rash, hives and itching may occur post injection. If you have muscle pain or cramps, flushing and or swelling of the face, rapid heart beat, nausea, dizziness, fever, chills, headache, difficulty breathing, swelling in the arms or legs, or have a prickly feeling of your skin, contact a health care provider immediately.\par \par Following this discussion, the knee was prepped with betadine and under sterile conditions 5 cc of 2% lidocaine and 1 cc depo-medrol (40mg) were injected with a 21 gauge needle. The needle was introduced into the joint, aspiration was performed to ensure intra-articular placement and the medication was injected. Upon withdrawal of the needle the site was cleaned with alcohol and a bandaid applied. The patient tolerated the injection well and there were no adverse effects. Post injection instructions included no strenuous activity for 24 hours, cryotherapy and if there are any adverse effects to contact the office. \par

## 2023-04-27 NOTE — PHYSICAL EXAM
[de-identified] : Left knee\par Inspection: trace effusion \par Wounds: Superficial abrasion of the tibial tubercle\par Alignment: normal.\par Palpation: medial and lateral tenderness on palpation.\par ROM active (in degrees): 0-110 \par Ligamentous laxity: all ligaments appear stable,, negative ant. drawer test, negative post. drawer test, stable to varus stress test, stable to valgus stress test. negative Lachman's test, negative pivot shift test\par Meniscal Test: negative McMurrays, negative Rodolfo.\par Patellofemoral Alignment Test: Q angle-, normal.\par Muscle Test: good quad strength.\par Leg examination: calf is soft and non-tender. \par \par Right Knee\par Inspection: trace effusion, no erythema.\par Wounds: none.\par Alignment: normal.\par Palpation: medial and lateral tenderness on palpation.\par ROM: Active (in degrees): 0-110\par Ligamentous laxity (neg): all ligaments appear stable, negative ant. drawer test, negative post. drawer test, stable to varus stress test, stable to valgus stress test, negative Lachman's test, negative pivot shift test,\par Meniscal Test: negative McMurrays, negative Rodolfo.\par Patellofemoral Alignment Test: Q angle-, normal.\par Muscle Test: good quad strength.\par Leg examination: calf is soft and non-tender. \par  [de-identified] : RIGHT knee xrays, 4 views standing AP/Lateral and Merchant films, and 45 degree PA standing view, taken at the office today shows degenerative arthritis, normal alignment, medial joint space narrowing, sclerosis, bone on bone, patella at appropriate height and central position, patellofemoral joint space narrowing, osteophytes, Kellgren and Deric grade 4\par \par LEFTknee xrays, 4 views standing AP/Lateral and Merchant films, and 45 degree PA standing view, taken at the office today shows degenerative arthritis, normal alignment, medial joint space narrowing, sclerosis, bone on bone, patella at appropriate height and central position, patellofemoral joint space narrowing, osteophytes, Kellgren and Deric grade 4. \par \par

## 2023-04-27 NOTE — DISCUSSION/SUMMARY
[de-identified] : Discussed at length the nature of the patient's condition. Their bilateral knee symptoms appear secondary to degenerative arthritis. \par We reviewed operative and nonoperative treatment. While I believe she would eventually benefit from a TKR, she is hesitant to have surgery at this time. Therefore, we will continue nonoperatively. We will seek authorization for bilateral knee gel injections. Once we receive authorization, we will proceed accordingly. In the interim, patient was given a bilateral knee cortisone injection today as detailed above for symptomatic relief. I also suggested she undergo a home exercise program, Tylenol prn and weight management.\par Patient can continue activities as tolerated. All questions answered, understanding verbalized. Patient in agreement with plan of care. \par \par

## 2023-04-27 NOTE — HISTORY OF PRESENT ILLNESS
[de-identified] : 89 year old female presents for follow-up evaluation of bilateral knee arthritis. Pt received Gel 1 on December 12th and states it did not do much for her. Confirms taking Tylenol prn and exercises on her own. Pt would like cortisone today if possible as she states she received it in the past with some help. Notes she will go back to her daily exercises. Pt reports taking a fall 3 weeks ago walking on the street. She went to the ER at [facility] where they took x-rays of her left knee and a CT scan of her head, and was discharged with no treatment. \par  \par

## 2023-04-28 ENCOUNTER — NON-APPOINTMENT (OUTPATIENT)
Age: 88
End: 2023-04-28

## 2023-04-28 ENCOUNTER — APPOINTMENT (OUTPATIENT)
Dept: HEART AND VASCULAR | Facility: CLINIC | Age: 88
End: 2023-04-28
Payer: MEDICARE

## 2023-04-28 PROCEDURE — G2066: CPT

## 2023-04-28 PROCEDURE — 93298 REM INTERROG DEV EVAL SCRMS: CPT

## 2023-05-01 ENCOUNTER — APPOINTMENT (OUTPATIENT)
Dept: ORTHOPEDIC SURGERY | Facility: CLINIC | Age: 88
End: 2023-05-01

## 2023-06-02 ENCOUNTER — APPOINTMENT (OUTPATIENT)
Dept: HEART AND VASCULAR | Facility: CLINIC | Age: 88
End: 2023-06-02
Payer: MEDICARE

## 2023-06-02 ENCOUNTER — NON-APPOINTMENT (OUTPATIENT)
Age: 88
End: 2023-06-02

## 2023-06-02 PROCEDURE — G2066: CPT

## 2023-06-02 PROCEDURE — 93298 REM INTERROG DEV EVAL SCRMS: CPT

## 2023-06-05 ENCOUNTER — APPOINTMENT (OUTPATIENT)
Dept: ORTHOPEDIC SURGERY | Facility: CLINIC | Age: 88
End: 2023-06-05

## 2023-06-21 NOTE — PROGRESS NOTE ADULT - PROBLEM/PLAN-4
DISPLAY PLAN FREE TEXT
Provider Procedure Text (A): After obtaining clear surgical margins the defect was repaired by another provider.

## 2023-07-07 ENCOUNTER — APPOINTMENT (OUTPATIENT)
Dept: HEART AND VASCULAR | Facility: CLINIC | Age: 88
End: 2023-07-07

## 2023-07-09 VITALS — HEIGHT: 60 IN | WEIGHT: 132 LBS | BODY MASS INDEX: 25.91 KG/M2

## 2023-07-10 ENCOUNTER — APPOINTMENT (OUTPATIENT)
Dept: ORTHOPEDIC SURGERY | Facility: CLINIC | Age: 88
End: 2023-07-10
Payer: MEDICARE

## 2023-07-10 VITALS — HEART RATE: 70 BPM | SYSTOLIC BLOOD PRESSURE: 118 MMHG | DIASTOLIC BLOOD PRESSURE: 71 MMHG | OXYGEN SATURATION: 98 %

## 2023-07-10 PROCEDURE — 99213 OFFICE O/P EST LOW 20 MIN: CPT

## 2023-07-10 PROCEDURE — 73564 X-RAY EXAM KNEE 4 OR MORE: CPT | Mod: 26,50

## 2023-07-10 NOTE — DISCUSSION/SUMMARY
[de-identified] : Discussed at length the nature of the patient's condition. Their bilateral knee symptoms appear secondary to degenerative arthritis.\par \par We reviewed operative and nonoperative treatment. While I believe she would eventually benefit from a TKR, she is hesitant to have surgery at this time. Therefore, we will continue nonoperatively. We will seek authorization for bilateral knee Euflexxa injections. Once we receive authorization, we will proceed accordingly.  I suggested she undergo a home exercise program, Tylenol prn and weight management.\par \par Patient can continue activities as tolerated. All questions answered, understanding verbalized. Patient in agreement with plan of care.

## 2023-07-10 NOTE — PHYSICAL EXAM
[de-identified] : General appearance: well nourished and hydrated, pleasant, alert and oriented x 3, cooperative.\par HEENT: Normocephalic, EOM intact, Nasal septum midline, Oral cavity clear, External auditory canal clear.\par Cardiovascular: no apparent abnormalities, no lower leg edema, no varicosities, pedal pulses are palpable.\par Lymphatics Lymph nodes: none palpated, Lymphedema: not present.\par Neurologic: sensation is normal, no muscle weakness in upper or lower extremities, patella tendon reflexes intact .\par Dermatologic no apparent skin lesions, moist, warm, no rash.\par Spine:cervical spine appears normal and moves freely, thoracic spine appears normal and moves freely, lumbosacral spine appears normal and moves freely.\par Gait: nonantalgic. \par \par Left knee\par Inspection: trace effusion \par Wounds: Superficial abrasion of the tibial tubercle\par Alignment: normal.\par Palpation: medial and lateral tenderness on palpation.\par ROM active (in degrees): 0-110 \par Ligamentous laxity: all ligaments appear stable,, negative ant. drawer test, negative post. drawer test, stable to varus stress test, stable to valgus stress test. negative Lachman's test, negative pivot shift test\par Meniscal Test: negative McMurrays, negative Rodolfo.\par Patellofemoral Alignment Test: Q angle-, normal.\par Muscle Test: good quad strength.\par Leg examination: calf is soft and non-tender. \par Right Knee\par Inspection: trace effusion, no erythema.\par Wounds: none.\par Alignment: normal.\par Palpation: medial and lateral tenderness on palpation.\par ROM: Active (in degrees): 0-110\par Ligamentous laxity (neg): all ligaments appear stable, negative ant. drawer test, negative post. drawer test, stable to varus stress test, stable to valgus stress test, negative Lachman's test, negative pivot shift test,\par Meniscal Test: negative McMurrays, negative Rodolfo.\par Patellofemoral Alignment Test: Q angle-, normal.\par Muscle Test: good quad strength.\par Leg examination: calf is soft and non-tender. \par \par \par  [de-identified] : RIGHT knee x-rays, 4 views standing AP/Lateral and Merchant films, and 45 degree PA standing view, taken at the office today shows degenerative arthritis, normal alignment, medial joint space narrowing, sclerosis, bone on bone, patella at appropriate height and central position, patellofemoral joint space narrowing, osteophytes, Kellgren and Deric grade 4\par \par LEFT knee x-rays, 4 views standing AP/Lateral and Merchant films, and 45 degree PA standing view, taken at the office today shows degenerative arthritis, normal alignment, medial joint space narrowing, sclerosis, bone on bone, patella at appropriate height and central position, patellofemoral joint space narrowing, osteophytes, Kellgren and Deric grade 4

## 2023-07-10 NOTE — HISTORY OF PRESENT ILLNESS
[de-identified] : 89 year old female presents for follow-up evaluation of bilateral knee arthritis. Patient is using cane for ambulation.  Pt received Gel 1 on December 12th with not so much help but would like to try the Euflexxa instead. Confirms taking Tylenol prn and exercises on her own. Patient would like to continue non-operative treatment.  \par  \par

## 2023-07-21 VITALS — HEIGHT: 60 IN | BODY MASS INDEX: 25.91 KG/M2 | WEIGHT: 132 LBS

## 2023-07-24 ENCOUNTER — APPOINTMENT (OUTPATIENT)
Dept: ORTHOPEDIC SURGERY | Facility: CLINIC | Age: 88
End: 2023-07-24
Payer: MEDICARE

## 2023-07-24 PROCEDURE — 20610 DRAIN/INJ JOINT/BURSA W/O US: CPT | Mod: 50

## 2023-07-24 NOTE — PROCEDURE

## 2023-07-25 VITALS — HEIGHT: 60 IN | BODY MASS INDEX: 25.91 KG/M2 | WEIGHT: 132 LBS

## 2023-07-25 NOTE — PROCEDURE

## 2023-07-28 ENCOUNTER — APPOINTMENT (OUTPATIENT)
Dept: NEUROLOGY | Facility: CLINIC | Age: 88
End: 2023-07-28
Payer: MEDICARE

## 2023-07-28 ENCOUNTER — NON-APPOINTMENT (OUTPATIENT)
Age: 88
End: 2023-07-28

## 2023-07-28 VITALS
SYSTOLIC BLOOD PRESSURE: 131 MMHG | BODY MASS INDEX: 25.91 KG/M2 | OXYGEN SATURATION: 96 % | HEIGHT: 60 IN | HEART RATE: 71 BPM | WEIGHT: 132 LBS | TEMPERATURE: 97.2 F | DIASTOLIC BLOOD PRESSURE: 73 MMHG

## 2023-07-28 PROCEDURE — 99215 OFFICE O/P EST HI 40 MIN: CPT

## 2023-07-28 RX ORDER — UBROGEPANT 50 MG/1
TABLET ORAL
Qty: 10 | Refills: 0 | Status: DISCONTINUED | COMMUNITY
Start: 2022-06-15 | End: 2023-07-28

## 2023-07-28 RX ORDER — ATORVASTATIN CALCIUM 40 MG/1
40 TABLET, FILM COATED ORAL
Qty: 90 | Refills: 2 | Status: DISCONTINUED | COMMUNITY
Start: 2022-01-19 | End: 2023-07-28

## 2023-07-28 RX ORDER — MIRTAZAPINE 7.5 MG/1
7.5 TABLET, FILM COATED ORAL
Refills: 0 | Status: DISCONTINUED | COMMUNITY
End: 2023-07-28

## 2023-07-28 RX ORDER — CLOPIDOGREL BISULFATE 75 MG/1
75 TABLET, FILM COATED ORAL DAILY
Qty: 30 | Refills: 2 | Status: DISCONTINUED | COMMUNITY
Start: 2022-01-20 | End: 2023-07-28

## 2023-07-28 RX ORDER — SERTRALINE 25 MG/1
25 TABLET, FILM COATED ORAL
Refills: 0 | Status: ACTIVE | COMMUNITY

## 2023-07-28 RX ORDER — LORAZEPAM 0.5 MG/1
0.5 TABLET ORAL
Refills: 0 | Status: ACTIVE | COMMUNITY
Start: 2023-07-28

## 2023-07-28 RX ORDER — LEVOTHYROXINE SODIUM 0.05 MG/1
50 TABLET ORAL
Refills: 0 | Status: ACTIVE | COMMUNITY
Start: 2023-07-28

## 2023-07-28 RX ORDER — AMLODIPINE BESYLATE 5 MG/1
5 TABLET ORAL DAILY
Refills: 0 | Status: DISCONTINUED | COMMUNITY
End: 2023-07-28

## 2023-07-28 NOTE — PHYSICAL EXAM
[FreeTextEntry1] : The patient is alert and oriented x3, follows commands, and is able to participate fully in the history taking.\par Speech is normal with no evidence of dysarthria.\par Memory is intact: Immediate recall 3 out of 3, short-term 3 out of 3, remote memory intact.\par Cranial nerves II through XII intact. Left upper quadrant dinopsia\par Motor exam: Upper and lower extremities 5/5 power, normal tone. No abnormal movements noted.\par Sensory exam: Intact to light touch and pinprick. Romberg negative.\par Coordination and vestibular exam: Finger to nose intact with mild b/l tremor, no evidence of ataxia, nystagmus and no vestibular symptoms elicited with head turning during ambulation.\par Gait: Normal gait walking with a cane. \par Reflexes: One to 2+ in upper and lower extremities. No pathological reflexes. Downgoing toes.

## 2023-07-28 NOTE — HISTORY OF PRESENT ILLNESS
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 She remains on Aspirin and Lovastatin, tolerating well. \par \par With regard to her headaches, she continues to have headaches along with abdominal pain. Since last visit, she has had a couple emergency room visit to [place] which is where her cardiologist Dr. Hebert is affiliated with. She describes the headaches as a constant head pressure to the frontal and/or occipital regions of the brain, having about 25 headaches a month. Patient can wake up and go to sleep with a headache most nights and some days the headache may even wake her up out of her sleep. These headaches have been going on for about 2 years. Patient has been using about 4 tablets of Tylenol/Motrin daily. 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 Previously patient used to use Ubrelvy with no relief. Patient recently started taking Sertraline 25mg once daily as recommended by her PCP.

## 2023-07-28 NOTE — ASSESSMENT
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 She has refractory headaches for several years, which likely has at least some component of analgesic overuse. We will better understand other potential contributors to her headaches with time as we clear he system of OTC analgesics.\par \par Plan\par - Continue daily Aspirin and Lovastatin\par - Decrease Tylenol/Motrin use to a maximum of 2-3 doses a week\par - Titrate Lorazepam down from 2 half tablets of 0.5mg to Gabapentin 100mg\par -- Take 2 half tabs of Lorazepam for 3-5 days and then 1 half tab of Lorazepam for 3-5 days then begin Gabapentin 100mg once daily at bedtime.\par - Telephone visit weekly to assist with Lorazepam titration and to f/u on headaches\par - Continue to f/u with Dr. Hebert regarding regular health maintenance and prevention, including routine screening.\par - Counselled on signs of stroke BEFAST and to call 911 with any new or worsening neurological symptoms

## 2023-07-31 ENCOUNTER — APPOINTMENT (OUTPATIENT)
Dept: ORTHOPEDIC SURGERY | Facility: CLINIC | Age: 88
End: 2023-07-31
Payer: MEDICARE

## 2023-07-31 DIAGNOSIS — M17.0 BILATERAL PRIMARY OSTEOARTHRITIS OF KNEE: ICD-10-CM

## 2023-07-31 PROCEDURE — 20610 DRAIN/INJ JOINT/BURSA W/O US: CPT | Mod: 50

## 2023-08-01 ENCOUNTER — NON-APPOINTMENT (OUTPATIENT)
Age: 88
End: 2023-08-01

## 2023-08-01 VITALS — HEIGHT: 60 IN | BODY MASS INDEX: 25.91 KG/M2 | WEIGHT: 132 LBS

## 2023-08-01 NOTE — PROCEDURE
[de-identified] : Euflexxa # 3 Bilateral knee Discussed at length with the patient the planned Euflexxa injection. The risks, benefits, convalescence and alternatives were reviewed. The possible side effects discussed included but were not limited to: pain, swelling, heat and redness. There symptoms are generally mild but if they are extensive then contact the office. Giving pain relievers by mouth such as NSAIDs or Tylenol can generally treat the reactions to Euflexxa. Rare cases of infection have been noted. Rash, hives and itching may occur post injection. If you have muscle pain or cramps, flushing and or swelling of the face, rapid heart beat, nausea, dizziness, fever, chills, headache, difficulty breathing, swelling in the arms or legs, or have a prickly feeling of your skin, contact a health care provider immediately.  Following this discussion, the knee was prepped with betadine and under sterile condition the Euflexxa injection was performed with a 22 gauge needle. The needle was introduced into the joint, aspiration was performed to ensure intra-articular placement and the medication was injected. Upon withdrawal of the needle the site was cleaned with alcohol and a bandaid applied. The patient tolerated the injection well and there were no adverse effects. Post injection instructions included no strenuous activity for 24 hours, cryotherapy and if there are any adverse effects to contact the office.

## 2023-08-07 ENCOUNTER — APPOINTMENT (OUTPATIENT)
Dept: ORTHOPEDIC SURGERY | Facility: CLINIC | Age: 88
End: 2023-08-07

## 2023-08-07 ENCOUNTER — NON-APPOINTMENT (OUTPATIENT)
Age: 88
End: 2023-08-07

## 2023-08-10 DIAGNOSIS — F41.9 ANXIETY DISORDER, UNSPECIFIED: ICD-10-CM

## 2023-08-11 RX ORDER — VERAPAMIL HYDROCHLORIDE 120 MG/1
120 TABLET ORAL TWICE DAILY
Qty: 60 | Refills: 0 | Status: DISCONTINUED | COMMUNITY
Start: 2023-08-10 | End: 2023-08-11

## 2023-08-11 RX ORDER — GABAPENTIN 100 MG/1
100 CAPSULE ORAL
Qty: 90 | Refills: 1 | Status: DISCONTINUED | COMMUNITY
Start: 2023-07-28 | End: 2023-08-11

## 2023-08-18 ENCOUNTER — NON-APPOINTMENT (OUTPATIENT)
Age: 88
End: 2023-08-18

## 2023-08-21 RX ORDER — TOPIRAMATE 25 MG/1
25 TABLET, FILM COATED ORAL
Qty: 30 | Refills: 0 | Status: DISCONTINUED | COMMUNITY
Start: 2023-08-11 | End: 2023-08-21

## 2023-08-28 ENCOUNTER — APPOINTMENT (OUTPATIENT)
Dept: NEUROLOGY | Facility: CLINIC | Age: 88
End: 2023-08-28
Payer: MEDICARE

## 2023-08-28 VITALS
BODY MASS INDEX: 25.72 KG/M2 | HEART RATE: 72 BPM | SYSTOLIC BLOOD PRESSURE: 129 MMHG | OXYGEN SATURATION: 97 % | HEIGHT: 60 IN | TEMPERATURE: 95.7 F | WEIGHT: 131 LBS | DIASTOLIC BLOOD PRESSURE: 69 MMHG

## 2023-08-28 DIAGNOSIS — G47.00 INSOMNIA, UNSPECIFIED: ICD-10-CM

## 2023-08-28 DIAGNOSIS — R51.9 HEADACHE, UNSPECIFIED: ICD-10-CM

## 2023-08-28 DIAGNOSIS — Z86.73 PERSONAL HISTORY OF TRANSIENT ISCHEMIC ATTACK (TIA), AND CEREBRAL INFARCTION W/OUT RESIDUAL DEFICITS: ICD-10-CM

## 2023-08-28 DIAGNOSIS — R10.9 UNSPECIFIED ABDOMINAL PAIN: ICD-10-CM

## 2023-08-28 PROCEDURE — 99214 OFFICE O/P EST MOD 30 MIN: CPT

## 2023-08-28 NOTE — ASSESSMENT
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 She has refractory headaches for several years, which likely has at least some component of analgesic overuse. We will better understand other potential contributors to her headaches with time as we clear he system of OTC analgesics.  Plan - Continue daily Aspirin and Lovastatin - Repeat labs - Gastro referral for abdominal pain - Follow up with Psych for Anxiety and Sleep Specialist for Insomnia/medication to help with sleep - Continue to f/u with Dr. Hebert regarding regular health maintenance and prevention, including routine screening. - Counselled on signs of stroke BEFAST and to call 911 with any new or worsening neurological symptoms - Follow up 3 months

## 2023-08-28 NOTE — HISTORY OF PRESENT ILLNESS
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  From a stroke standpoint, the patient has had no concerning symptoms. She remains on Aspirin and Lovastatin, tolerating well.  With regard to her headaches, she continues to have frontal or occipital headaches along with abdominal pain; denies photophobia, phonophobia, or nausea. Since last visit, she has had a couple emergency room visit to [place] which is where her cardiologist Dr. Hebert is affiliated with. She describes the headaches as a constant head pressure to the frontal and/or occipital regions of the brain, having about 25 headaches a month. Patient can wake up and go to sleep with a headache most nights and some days the headache may even wake her up out of her sleep. These headaches have been going on for about 2 years. Patient has been using about 4 tablets of Tylenol/Motrin daily. 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 Previously patient used to use Ubrelvy with no relief. Patient recently started taking Sertraline 25mg once daily as recommended by her PCP. But doesn't seem to help with her anxiety/depression.  Since last visit, she continues to takes her half Lorazepam every night. She has tried Topamax, Verapamil and Gabapentin for her headaches with no success / with side effects. She did make an appointment with sleep specialist in September. She tried to call psychiatrist but they're not taking new patients.  She wants to see a dietician or nutritionist for her lack of appetite but denies wanting a referral at this time since she has previously asked her PCP.

## 2023-08-30 ENCOUNTER — NON-APPOINTMENT (OUTPATIENT)
Age: 88
End: 2023-08-30

## 2023-08-30 LAB
ALBUMIN SERPL ELPH-MCNC: 4 G/DL
ALP BLD-CCNC: 67 U/L
ALT SERPL-CCNC: 18 U/L
ANION GAP SERPL CALC-SCNC: 14 MMOL/L
AST SERPL-CCNC: 22 U/L
BILIRUB SERPL-MCNC: 0.3 MG/DL
BUN SERPL-MCNC: 23 MG/DL
CALCIUM SERPL-MCNC: 9.3 MG/DL
CHLORIDE SERPL-SCNC: 98 MMOL/L
CHOLEST SERPL-MCNC: 207 MG/DL
CO2 SERPL-SCNC: 20 MMOL/L
CREAT SERPL-MCNC: 0.73 MG/DL
EGFR: 78 ML/MIN/1.73M2
ESTIMATED AVERAGE GLUCOSE: 111 MG/DL
GLUCOSE SERPL-MCNC: 119 MG/DL
HBA1C MFR BLD HPLC: 5.5 %
HDLC SERPL-MCNC: 70 MG/DL
LDLC SERPL CALC-MCNC: 111 MG/DL
NONHDLC SERPL-MCNC: 137 MG/DL
POTASSIUM SERPL-SCNC: 4.9 MMOL/L
PROT SERPL-MCNC: 6.4 G/DL
SODIUM SERPL-SCNC: 132 MMOL/L
TRIGL SERPL-MCNC: 152 MG/DL

## 2023-09-06 NOTE — DISCHARGE NOTE PROVIDER - EXTENDED VTE YES NO FOR MLM ASPIRIN
Wt Readings from Last 3 Encounters:   09/01/23 115 kg (254 lb)   08/31/23 113 kg (250 lb)   08/22/23 115 kg (254 lb)     Appears close to euvolemia  Continue Bumex daily and metolazone MWF ,

## 2023-09-07 ENCOUNTER — APPOINTMENT (OUTPATIENT)
Dept: HEART AND VASCULAR | Facility: CLINIC | Age: 88
End: 2023-09-07

## 2023-09-11 ENCOUNTER — RX RENEWAL (OUTPATIENT)
Age: 88
End: 2023-09-11

## 2023-09-21 ENCOUNTER — APPOINTMENT (OUTPATIENT)
Dept: PULMONOLOGY | Facility: CLINIC | Age: 88
End: 2023-09-21

## 2023-10-31 DIAGNOSIS — J18.9 PNEUMONIA OF RIGHT UPPER LOBE DUE TO INFECTIOUS ORGANISM: Primary | ICD-10-CM

## 2023-11-10 ENCOUNTER — APPOINTMENT (OUTPATIENT)
Dept: GASTROENTEROLOGY | Facility: CLINIC | Age: 88
End: 2023-11-10

## 2023-11-17 ENCOUNTER — OFFICE VISIT (OUTPATIENT)
Dept: INTERNAL MEDICINE CLINIC | Facility: CLINIC | Age: 88
End: 2023-11-17
Payer: MEDICARE

## 2023-11-17 VITALS
DIASTOLIC BLOOD PRESSURE: 70 MMHG | HEART RATE: 69 BPM | OXYGEN SATURATION: 96 % | SYSTOLIC BLOOD PRESSURE: 128 MMHG | HEIGHT: 60 IN | WEIGHT: 141.6 LBS | BODY MASS INDEX: 27.8 KG/M2 | TEMPERATURE: 98.1 F

## 2023-11-17 DIAGNOSIS — E03.8 OTHER SPECIFIED HYPOTHYROIDISM: ICD-10-CM

## 2023-11-17 DIAGNOSIS — J18.9 PNEUMONIA OF RIGHT UPPER LOBE DUE TO INFECTIOUS ORGANISM: Primary | ICD-10-CM

## 2023-11-17 DIAGNOSIS — T78.40XD ALLERGY, SUBSEQUENT ENCOUNTER: ICD-10-CM

## 2023-11-17 DIAGNOSIS — E73.9 LACTOSE INTOLERANCE: ICD-10-CM

## 2023-11-17 DIAGNOSIS — E53.8 VITAMIN B 12 DEFICIENCY: ICD-10-CM

## 2023-11-17 PROBLEM — R51.9 GENERALIZED HEADACHES: Status: RESOLVED | Noted: 2023-10-24 | Resolved: 2023-11-17

## 2023-11-17 PROCEDURE — 99214 OFFICE O/P EST MOD 30 MIN: CPT | Performed by: INTERNAL MEDICINE

## 2023-11-18 NOTE — ASSESSMENT & PLAN NOTE
Patient denies any current allergy symptoms and examination reveals no evidence of active allergy signs or symptoms.   We will discontinue Claritin medication

## 2023-11-18 NOTE — ASSESSMENT & PLAN NOTE
Lactose intolerance condition reviewed with the patient today she indicates that she has had no recent symptoms of lactose intolerance and would like to discontinue her lactase treatment. She is very cautious to avoid lactose in all forms we have agreed to discontinue her lactase.

## 2023-11-18 NOTE — ASSESSMENT & PLAN NOTE
Clinical assessment of hypothyroid condition indicates stability recommend 75 mcg of levothyroxine daily.

## 2023-11-18 NOTE — ASSESSMENT & PLAN NOTE
Clinical evaluation of previous diagnosis of pneumonia indicates no evidence of congestion in the lungs bilaterally. The patient displays no shortness of breath and her oxygen saturation at this time is 96% on room air. Follow-up chest x-ray confirms resolution of her pneumonia.

## 2023-11-18 NOTE — ASSESSMENT & PLAN NOTE
Patient inquires if we can prescribe B12 injections for her indicates that her physician in [place] used to give her B12 shots on a regular basis.   I have asked her to have a B12 level to indicate if she in fact has a B12 deficiency review upon completion

## 2023-11-18 NOTE — PROGRESS NOTES
Name: Dorene Forbes      : 1932      MRN: [id number]  Encounter Provider: Bernie Oliveros MD  Encounter Date: 2023   Encounter department: [address] [place] [address]     1. Vitamin B 12 deficiency  Assessment & Plan:  Patient inquires if we can prescribe B12 injections for her indicates that her physician in [place] used to give her B12 shots on a regular basis. I have asked her to have a B12 level to indicate if she in fact has a B12 deficiency review upon completion    Orders:  -     Vitamin B12; Future    2. Pneumonia of right upper lobe due to infectious organism  Assessment & Plan:  Clinical evaluation of previous diagnosis of pneumonia indicates no evidence of congestion in the lungs bilaterally. The patient displays no shortness of breath and her oxygen saturation at this time is 96% on room air. Follow-up chest x-ray confirms resolution of her pneumonia. 3. Other specified hypothyroidism  Assessment & Plan:  Clinical assessment of hypothyroid condition indicates stability recommend 75 mcg of levothyroxine daily. 4. Lactose intolerance  Assessment & Plan:  Lactose intolerance condition reviewed with the patient today she indicates that she has had no recent symptoms of lactose intolerance and would like to discontinue her lactase treatment. She is very cautious to avoid lactose in all forms we have agreed to discontinue her lactase. 5. Allergy, subsequent encounter  Assessment & Plan:  Patient denies any current allergy symptoms and examination reveals no evidence of active allergy signs or symptoms. We will discontinue Claritin medication             Subjective      This pleasant 57-year-old female patient returns to our office today for a follow-up assessment.   She was recently hospitalized with pneumonia and I have reviewed with the patient today her follow-up chest x-ray which fortunately shows resolution of her pneumonia. The patient recently entered into an assisted living facility and [facility] and as part of her take she had to undergo screening for tuberculosis. She ended up with an indeterminant Gold QuantiFERON test.  Chest x-ray shows no indication of previous or current tuberculosis. Patient is unaware of any previous infection with tuberculosis or exposure to tuberculosis in her lifetime. Patient wanted to review her current medications indicating she would like to discontinue her lactase enzyme pills that she follows a lactose-free diet and does not believe she requires those pills any further. I indicated this is fine and we will discontinue it. Patient also indicates that she would like to discontinue her Claritin medication as she currently has no allergy symptoms as we are through the significant allergy season I am in agreement and we will discontinue this medication as well. In general the patient has acclimated to her new life and [place] in the [facility]. She has no cardiac symptoms of chest pain palpitations or shortness of breath nor any current signs or symptoms of infection. Review of Systems   All other systems reviewed and are negative.       Current Outpatient Medications on File Prior to Visit   Medication Sig    acetaminophen (TYLENOL) 500 mg tablet Take 500 mg by mouth every 6 (six) hours as needed    aspirin 81 mg chewable tablet Chew 81 mg daily    ibuprofen (MOTRIN) 600 mg tablet Take 600 mg by mouth 2 (two) times a day as needed    levothyroxine 75 mcg tablet Take 75 mcg by mouth daily    LORazepam (ATIVAN) 1 mg tablet Take 0.5 mg by mouth daily    lovastatin (ALTOPREV) 20 MG 24 hr tablet Take 20 mg by mouth    meclizine (ANTIVERT) 25 mg tablet Take 25 mg by mouth daily as needed    oxybutynin (DITROPAN-XL) 10 MG 24 hr tablet Take 10 mg by mouth daily    sertraline (ZOLOFT) 50 mg tablet Take 50 mg by mouth daily    [DISCONTINUED] lactase (LACTAID) 3,000 units tablet Take 1 tablet (3,000 Units total) by mouth 3 (three) times a day with meals    [DISCONTINUED] loratadine (CLARITIN) 10 mg tablet Take 1 tablet (10 mg total) by mouth daily       Objective     /70   Pulse 69   Temp 98.1 °F (36.7 °C)   Ht 5' (1.524 m)   Wt 64.2 kg (141 lb 9.6 oz)   SpO2 96%   BMI 27.65 kg/m²     Physical Exam  Vitals reviewed. Constitutional:       General: She is not in acute distress. Appearance: Normal appearance. She is well-developed. She is not ill-appearing. HENT:      Right Ear: Hearing and external ear normal.      Left Ear: Hearing and external ear normal.      Nose: Nose normal. No mucosal edema. Mouth/Throat:      Mouth: Mucous membranes are moist.      Pharynx: Oropharynx is clear. Uvula midline. Eyes:      General: Lids are normal.      Conjunctiva/sclera: Conjunctivae normal.      Pupils: Pupils are equal, round, and reactive to light. Neck:      Thyroid: No thyromegaly. Vascular: No carotid bruit or JVD. Cardiovascular:      Rate and Rhythm: Normal rate and regular rhythm. Heart sounds: Normal heart sounds. No murmur heard. Pulmonary:      Effort: Pulmonary effort is normal. No respiratory distress. Breath sounds: Normal breath sounds. Abdominal:      General: Bowel sounds are normal.      Palpations: Abdomen is soft. Musculoskeletal:         General: Normal range of motion. Right lower leg: No edema. Left lower leg: No edema. Lymphadenopathy:      Cervical: No cervical adenopathy. Skin:     General: Skin is warm and dry. Neurological:      Mental Status: She is alert and oriented to person, place, and time. Mental status is at baseline. Deep Tendon Reflexes: Reflexes are normal and symmetric. Psychiatric:         Mood and Affect: Mood normal.         Speech: Speech normal.         Behavior: Behavior normal. Behavior is cooperative. Thought Content:  Thought content normal.         Judgment: Judgment normal.       Bernie Oliveros MD

## 2023-11-27 ENCOUNTER — TELEPHONE (OUTPATIENT)
Dept: INTERNAL MEDICINE CLINIC | Facility: CLINIC | Age: 88
End: 2023-11-27

## 2023-11-30 ENCOUNTER — TELEPHONE (OUTPATIENT)
Dept: INTERNAL MEDICINE CLINIC | Facility: CLINIC | Age: 88
End: 2023-11-30

## 2023-11-30 NOTE — TELEPHONE ENCOUNTER
Dany Kwok,   Patient called today and asked if you have any recommendations for an ophthalmologist. Patient can be reached at: [phone]. Thank you.

## 2023-11-30 NOTE — TELEPHONE ENCOUNTER
Please call the patient back my recommendation would be Dr. Katiana Lozano office phone number is [phone] thank you

## 2023-12-01 ENCOUNTER — OFFICE VISIT (OUTPATIENT)
Dept: INTERNAL MEDICINE CLINIC | Facility: CLINIC | Age: 88
End: 2023-12-01
Payer: MEDICARE

## 2023-12-01 VITALS
HEIGHT: 60 IN | OXYGEN SATURATION: 98 % | BODY MASS INDEX: 27.09 KG/M2 | DIASTOLIC BLOOD PRESSURE: 74 MMHG | SYSTOLIC BLOOD PRESSURE: 132 MMHG | TEMPERATURE: 98.1 F | HEART RATE: 57 BPM | WEIGHT: 138 LBS

## 2023-12-01 DIAGNOSIS — H40.89 OTHER GLAUCOMA OF LEFT EYE: Primary | ICD-10-CM

## 2023-12-01 DIAGNOSIS — S22.39XD CLOSED FRACTURE OF ONE RIB WITH ROUTINE HEALING, UNSPECIFIED LATERALITY, SUBSEQUENT ENCOUNTER: ICD-10-CM

## 2023-12-01 DIAGNOSIS — E03.8 OTHER SPECIFIED HYPOTHYROIDISM: ICD-10-CM
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
## 2023-12-01 PROCEDURE — 99214 OFFICE O/P EST MOD 30 MIN: CPT | Performed by: INTERNAL MEDICINE

## 2023-12-01 NOTE — ASSESSMENT & PLAN NOTE
Patient is here today for evaluation after being seen in the emergency room on 22 November after suffering an accidental fall at her place of residence, independent living facility. Apparently patient was reaching down to  something off the floor but ended up falling backwards hitting the back of her head. Sent to the emergency room for full evaluation workup and did undergo studies including CT scan of the head neck chest with no acute fractures but old sternal fracture left rib fractures that are healing    Patient with her fall had no loss of consciousness and states today she is essentially back to normal.  States that she has already been seen and evaluated and started physical therapy treatment in order to help with ambulation stability. There were numerous abnormalities found with the studies that were performed in the ER evidence probably of osteoporosis, a pancreatic cyst, nodular densities to the lung which should be followed up with her normal physician. Patient was evaluated in the office today in no acute lesions that we need to address at this time.   She will follow-up again with her normal physician in the future and hopefully continue with her physical therapy program.

## 2023-12-01 NOTE — PROGRESS NOTES
Name: Bhavik Byers      : 1932      MRN: [id number]  Encounter Provider: Jian Hurt DO  Encounter Date: 2023   Encounter department: [facility]    Assessment & Plan     1. Other glaucoma of left eye  Assessment & Plan:  Patient states that she was seen by an ophthalmologist in [place] and she was told that she most likely has evidence of glaucoma. Was told that she needs to establish further care and evaluation. We placed a consult for her to be seen by a local ophthalmologist.    Orders:  -     Ambulatory Referral to Ophthalmology; Future    2. Accidental fall, subsequent encounter  Assessment & Plan:  Patient is here today for evaluation after being seen in the emergency room on  after suffering an accidental fall at her place of residence, independent living facility. Apparently patient was reaching down to  something off the floor but ended up falling backwards hitting the back of her head. Sent to the emergency room for full evaluation workup and did undergo studies including CT scan of the head neck chest with no acute fractures but old sternal fracture left rib fractures that are healing    Patient with her fall had no loss of consciousness and states today she is essentially back to normal.  States that she has already been seen and evaluated and started physical therapy treatment in order to help with ambulation stability. There were numerous abnormalities found with the studies that were performed in the ER evidence probably of osteoporosis, a pancreatic cyst, nodular densities to the lung which should be followed up with her normal physician. Patient was evaluated in the office today in no acute lesions that we need to address at this time.   She will follow-up again with her normal physician in the future and hopefully continue with her physical therapy program.      3. Other specified hypothyroidism  Assessment & Plan:  Is on levothyroxine continue to monitor thyroid function      4. Closed fracture of one rib with routine healing, unspecified laterality, subsequent encounter  Assessment & Plan:  And evaluation in the emergency room was found to have both a sternal fracture and left rib fracture. These were both found to be healing. Patient denies any pain or difficulties with respiration. Subjective     Patient is a 80-year-old female with a history of medical problems outlined previously who is relatively new to our medical practice. Has already established care with one of our physicians. Patient was seen today for evaluation after fall at her residential facility. He was reaching down to  something off the floor and actually ended up falling backwards. Did have full workup and evaluation in the emergency room finding no acute abnormalities that need to be addressed and after being seen by physicians was discharged back to her residential facility. She states that she has had no sequela from this recent fall. States that usually she is stable with her gait but does walk mostly with a cane. We did review the studies that were performed in the ER. Review of Systems   Constitutional: Negative. No specific change in activity level. Patient states that she has already been seen and evaluated by physical therapy   HENT: Negative. Eyes: Negative. Respiratory: Negative. Cardiovascular: Negative. Gastrointestinal: Negative. Endocrine: Negative. Genitourinary: Negative. Musculoskeletal: Negative. Admits to this some diffuse arthralgias especially noted in her knees. Some mild back pain. Skin: Negative. Allergic/Immunologic: Negative. Neurological: Negative. Hematological: Negative. Psychiatric/Behavioral: Negative. No past medical history on file. No past surgical history on file. No family history on file.   Social History     Socioeconomic History   • Marital status: Single     Spouse name: None   • Number of children: None   • Years of education: None   • Highest education level: None   Occupational History   • None   Tobacco Use   • Smoking status: Former     Types: Cigarettes     Passive exposure: Past   • Smokeless tobacco: Never   Vaping Use   • Vaping Use: Never used   Substance and Sexual Activity   • Alcohol use: None   • Drug use: None   • Sexual activity: None   Other Topics Concern   • None   Social History Narrative   • None     Social Determinants of Health     Financial Resource Strain: Not on file   Food Insecurity: Not on file   Transportation Needs: Not on file   Physical Activity: Not on file   Stress: Not on file   Social Connections: Not on file   Intimate Partner Violence: Not on file   Housing Stability: Not on file     Current Outpatient Medications on File Prior to Visit   Medication Sig   • acetaminophen (TYLENOL) 500 mg tablet Take 500 mg by mouth every 6 (six) hours as needed   • aspirin 81 mg chewable tablet Chew 81 mg daily   • ibuprofen (MOTRIN) 600 mg tablet Take 600 mg by mouth 2 (two) times a day as needed   • levothyroxine 75 mcg tablet Take 75 mcg by mouth daily   • LORazepam (ATIVAN) 1 mg tablet Take 0.5 mg by mouth daily   • lovastatin (ALTOPREV) 20 MG 24 hr tablet Take 20 mg by mouth   • meclizine (ANTIVERT) 25 mg tablet Take 25 mg by mouth daily as needed   • oxybutynin (DITROPAN-XL) 10 MG 24 hr tablet Take 10 mg by mouth daily   • sertraline (ZOLOFT) 50 mg tablet Take 50 mg by mouth daily     No Known Allergies  Immunization History   Administered Date(s) Administered   • COVID-19 Moderna mRNA Vaccine 12 Yr+ 50 mcg/0.5 mL (Spikevax) 10/23/2023   • COVID-19 PFIZER VACCINE 0.3 ML IM 01/23/2021, 02/12/2021   • INFLUENZA 10/23/2023       Objective     /74 (BP Location: Left arm, Patient Position: Sitting, Cuff Size: Standard)   Pulse 57   Temp 98.1 °F (36.7 °C)   Ht 5' (1.524 m)   Wt 62.6 kg (138 lb)   SpO2 98% BMI 26.95 kg/m²     Physical Exam  Vitals and nursing note reviewed. Constitutional:       General: She is not in acute distress. Appearance: Normal appearance. She is not ill-appearing, toxic-appearing or diaphoretic. Comments: Pleasant, articulate 80-year-old female who is awake alert in no acute distress oriented x 3 somewhat bent over gait walking with a cane. HENT:      Head: Normocephalic and atraumatic. Right Ear: Tympanic membrane, ear canal and external ear normal. There is no impacted cerumen. Left Ear: Tympanic membrane, ear canal and external ear normal. There is no impacted cerumen. Nose: Nose normal. No congestion or rhinorrhea. Mouth/Throat:      Mouth: Mucous membranes are moist.      Pharynx: Oropharynx is clear. No oropharyngeal exudate or posterior oropharyngeal erythema. Eyes:      General: No scleral icterus. Right eye: No discharge. Left eye: No discharge. Extraocular Movements: Extraocular movements intact. Conjunctiva/sclera: Conjunctivae normal.      Pupils: Pupils are equal, round, and reactive to light. Neck:      Vascular: No carotid bruit. Cardiovascular:      Rate and Rhythm: Normal rate and regular rhythm. Pulses: Normal pulses. Heart sounds: No murmur heard. No friction rub. No gallop. Pulmonary:      Effort: Pulmonary effort is normal. No respiratory distress. Breath sounds: Normal breath sounds. No stridor. No wheezing, rhonchi or rales. Comments: Very slight tenderness to scapula on palpation midportion. Chest:      Chest wall: Tenderness present. Abdominal:      General: Bowel sounds are normal.      Palpations: Abdomen is soft. Musculoskeletal:      Cervical back: Normal range of motion and neck supple. No rigidity or tenderness. Comments: Patient has diffuse arthritic changes with nothing acute.   Has some slight increased curvature of cervical spine increased kyphosis of dorsal spine arthritic changes to hands and digits. History of arthritis bilateral knees. No edema to lower extremities. Lymphadenopathy:      Cervical: No cervical adenopathy. Skin:     General: Skin is warm and dry. Neurological:      Mental Status: She is alert and oriented to person, place, and time. Mental status is at baseline. Psychiatric:         Mood and Affect: Mood normal.         Behavior: Behavior normal.         Thought Content:  Thought content normal.         Judgment: Judgment normal.   Brii Gomez, DO

## 2023-12-01 NOTE — ASSESSMENT & PLAN NOTE
Patient states that she was seen by an ophthalmologist in [place] and she was told that she most likely has evidence of glaucoma. Was told that she needs to establish further care and evaluation.   We placed a consult for her to be seen by a local ophthalmologist.

## 2023-12-01 NOTE — ASSESSMENT & PLAN NOTE
And evaluation in the emergency room was found to have both a sternal fracture and left rib fracture. These were both found to be healing. Patient denies any pain or difficulties with respiration.

## 2023-12-05 ENCOUNTER — TELEPHONE (OUTPATIENT)
Dept: INTERNAL MEDICINE CLINIC | Facility: CLINIC | Age: 88
End: 2023-12-05

## 2023-12-05 NOTE — TELEPHONE ENCOUNTER
Call was returned to the patient I reviewed her CBC comprehensive metabolic profile and vitamin B12 level. She was worried that she might be vitamin B-12 deficient but her level is fine. She continues to recuperate from her fall and indicates that most of the pain she had after the fall has resolved. She finished her physical therapy and is continuing with occupational therapy at this time.

## 2023-12-05 NOTE — TELEPHONE ENCOUNTER
Pt was in last week to see dr Himanshu Marte as you didn't have any openings. She forgot to ask him to go over her blood work with her. She wants to know if youll call her with the blood work results.

## 2023-12-20 DIAGNOSIS — R52 PAIN: Primary | ICD-10-CM

## 2023-12-20 RX ORDER — IBUPROFEN 600 MG/1
600 TABLET ORAL 2 TIMES DAILY PRN
Qty: 60 TABLET | Refills: 1 | Status: SHIPPED | OUTPATIENT
Start: 2023-12-20 | End: 2023-12-27

## 2023-12-21 ENCOUNTER — APPOINTMENT (EMERGENCY)
Dept: RADIOLOGY | Facility: HOSPITAL | Age: 88
DRG: 552 | End: 2023-12-21
Payer: MEDICARE

## 2023-12-21 ENCOUNTER — APPOINTMENT (INPATIENT)
Dept: RADIOLOGY | Facility: HOSPITAL | Age: 88
DRG: 552 | End: 2023-12-21
Payer: MEDICARE

## 2023-12-21 ENCOUNTER — HOSPITAL ENCOUNTER (INPATIENT)
Facility: HOSPITAL | Age: 88
LOS: 6 days | Discharge: NURSING, REHAB OR LONG-TERM CARE | DRG: 552 | End: 2023-12-27
Attending: EMERGENCY MEDICINE | Admitting: SURGERY
Payer: MEDICARE

## 2023-12-21 DIAGNOSIS — S32.10XA SACRAL FRACTURE, CLOSED (HCC): ICD-10-CM

## 2023-12-21 DIAGNOSIS — S32.110A CLOSED NONDISPLACED ZONE I FRACTURE OF SACRUM, INITIAL ENCOUNTER (HCC): ICD-10-CM
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
## 2023-12-21 DIAGNOSIS — S32.591A CLOSED FRACTURE OF RAMUS OF RIGHT PUBIS, INITIAL ENCOUNTER (HCC): ICD-10-CM

## 2023-12-21 PROBLEM — G89.11 ACUTE PAIN DUE TO TRAUMA: Status: ACTIVE | Noted: 2023-12-21

## 2023-12-21 LAB
25(OH)D3 SERPL-MCNC: 32.9 NG/ML (ref 30–100)
ALBUMIN SERPL BCP-MCNC: 3.7 G/DL (ref 3.5–5)
ALP SERPL-CCNC: 56 U/L (ref 34–104)
ALT SERPL W P-5'-P-CCNC: 13 U/L (ref 7–52)
ANION GAP SERPL CALCULATED.3IONS-SCNC: 14 MMOL/L
ANION GAP SERPL CALCULATED.3IONS-SCNC: 8 MMOL/L
ANION GAP SERPL CALCULATED.3IONS-SCNC: 9 MMOL/L
APTT PPP: 30 SECONDS (ref 23–37)
AST SERPL W P-5'-P-CCNC: 22 U/L (ref 13–39)
ATRIAL RATE: 66 BPM
BASOPHILS # BLD AUTO: 0.02 THOUSANDS/ÂΜL (ref 0–0.1)
BASOPHILS # BLD AUTO: 0.03 THOUSANDS/ÂΜL (ref 0–0.1)
BASOPHILS NFR BLD AUTO: 0 % (ref 0–1)
BASOPHILS NFR BLD AUTO: 0 % (ref 0–1)
BILIRUB SERPL-MCNC: 0.44 MG/DL (ref 0.2–1)
BUN SERPL-MCNC: 21 MG/DL (ref 5–25)
BUN SERPL-MCNC: 22 MG/DL (ref 5–25)
BUN SERPL-MCNC: 25 MG/DL (ref 5–25)
CALCIUM SERPL-MCNC: 8.6 MG/DL (ref 8.4–10.2)
CALCIUM SERPL-MCNC: 8.8 MG/DL (ref 8.4–10.2)
CALCIUM SERPL-MCNC: 9 MG/DL (ref 8.4–10.2)
CHLORIDE SERPL-SCNC: 104 MMOL/L (ref 96–108)
CHLORIDE SERPL-SCNC: 105 MMOL/L (ref 96–108)
CHLORIDE SERPL-SCNC: 106 MMOL/L (ref 96–108)
CO2 SERPL-SCNC: 18 MMOL/L (ref 21–32)
CO2 SERPL-SCNC: 24 MMOL/L (ref 21–32)
CO2 SERPL-SCNC: 24 MMOL/L (ref 21–32)
CREAT SERPL-MCNC: 0.56 MG/DL (ref 0.6–1.3)
CREAT SERPL-MCNC: 0.58 MG/DL (ref 0.6–1.3)
CREAT SERPL-MCNC: 0.62 MG/DL (ref 0.6–1.3)
EOSINOPHIL # BLD AUTO: 0.1 THOUSAND/ÂΜL (ref 0–0.61)
EOSINOPHIL # BLD AUTO: 0.15 THOUSAND/ÂΜL (ref 0–0.61)
EOSINOPHIL NFR BLD AUTO: 1 % (ref 0–6)
EOSINOPHIL NFR BLD AUTO: 2 % (ref 0–6)
ERYTHROCYTE [DISTWIDTH] IN BLOOD BY AUTOMATED COUNT: 12.8 % (ref 11.6–15.1)
ERYTHROCYTE [DISTWIDTH] IN BLOOD BY AUTOMATED COUNT: 13 % (ref 11.6–15.1)
ERYTHROCYTE [SEDIMENTATION RATE] IN BLOOD: 19 MM/HOUR (ref 0–29)
GFR SERPL CREATININE-BSD FRML MDRD: 79 ML/MIN/1.73SQ M
GFR SERPL CREATININE-BSD FRML MDRD: 80 ML/MIN/1.73SQ M
GFR SERPL CREATININE-BSD FRML MDRD: 81 ML/MIN/1.73SQ M
GLUCOSE SERPL-MCNC: 106 MG/DL (ref 65–140)
GLUCOSE SERPL-MCNC: 112 MG/DL (ref 65–140)
GLUCOSE SERPL-MCNC: 93 MG/DL (ref 65–140)
HCT VFR BLD AUTO: 31.8 % (ref 34.8–46.1)
HCT VFR BLD AUTO: 34.4 % (ref 34.8–46.1)
HGB BLD-MCNC: 10.4 G/DL (ref 11.5–15.4)
HGB BLD-MCNC: 11.1 G/DL (ref 11.5–15.4)
IMM GRANULOCYTES # BLD AUTO: 0.08 THOUSAND/UL (ref 0–0.2)
IMM GRANULOCYTES # BLD AUTO: 0.15 THOUSAND/UL (ref 0–0.2)
IMM GRANULOCYTES NFR BLD AUTO: 1 % (ref 0–2)
IMM GRANULOCYTES NFR BLD AUTO: 2 % (ref 0–2)
INR PPP: 0.98 (ref 0.84–1.19)
LYMPHOCYTES # BLD AUTO: 0.86 THOUSANDS/ÂΜL (ref 0.6–4.47)
LYMPHOCYTES # BLD AUTO: 1.27 THOUSANDS/ÂΜL (ref 0.6–4.47)
LYMPHOCYTES NFR BLD AUTO: 16 % (ref 14–44)
LYMPHOCYTES NFR BLD AUTO: 9 % (ref 14–44)
MCH RBC QN AUTO: 31.8 PG (ref 26.8–34.3)
MCH RBC QN AUTO: 31.8 PG (ref 26.8–34.3)
MCHC RBC AUTO-ENTMCNC: 32.3 G/DL (ref 31.4–37.4)
MCHC RBC AUTO-ENTMCNC: 32.7 G/DL (ref 31.4–37.4)
MCV RBC AUTO: 97 FL (ref 82–98)
MCV RBC AUTO: 99 FL (ref 82–98)
MONOCYTES # BLD AUTO: 0.64 THOUSAND/ÂΜL (ref 0.17–1.22)
MONOCYTES # BLD AUTO: 0.69 THOUSAND/ÂΜL (ref 0.17–1.22)
MONOCYTES NFR BLD AUTO: 7 % (ref 4–12)
MONOCYTES NFR BLD AUTO: 9 % (ref 4–12)
NEUTROPHILS # BLD AUTO: 5.51 THOUSANDS/ÂΜL (ref 1.85–7.62)
NEUTROPHILS # BLD AUTO: 7.42 THOUSANDS/ÂΜL (ref 1.85–7.62)
NEUTS SEG NFR BLD AUTO: 71 % (ref 43–75)
NEUTS SEG NFR BLD AUTO: 82 % (ref 43–75)
NRBC BLD AUTO-RTO: 0 /100 WBCS
NRBC BLD AUTO-RTO: 0 /100 WBCS
P AXIS: 69 DEGREES
PLATELET # BLD AUTO: 179 THOUSANDS/UL (ref 149–390)
PLATELET # BLD AUTO: 223 THOUSANDS/UL (ref 149–390)
PMV BLD AUTO: 9.4 FL (ref 8.9–12.7)
PMV BLD AUTO: 9.9 FL (ref 8.9–12.7)
POTASSIUM SERPL-SCNC: 3.7 MMOL/L (ref 3.5–5.3)
POTASSIUM SERPL-SCNC: 4 MMOL/L (ref 3.5–5.3)
POTASSIUM SERPL-SCNC: 4 MMOL/L (ref 3.5–5.3)
PR INTERVAL: 156 MS
PROT SERPL-MCNC: 6.5 G/DL (ref 6.4–8.4)
PROTHROMBIN TIME: 12.9 SECONDS (ref 11.6–14.5)
PTH-INTACT SERPL-MCNC: 46 PG/ML (ref 12–88)
QRS AXIS: -39 DEGREES
QRSD INTERVAL: 78 MS
QT INTERVAL: 414 MS
QTC INTERVAL: 434 MS
RBC # BLD AUTO: 3.27 MILLION/UL (ref 3.81–5.12)
RBC # BLD AUTO: 3.49 MILLION/UL (ref 3.81–5.12)
SODIUM SERPL-SCNC: 136 MMOL/L (ref 135–147)
SODIUM SERPL-SCNC: 138 MMOL/L (ref 135–147)
SODIUM SERPL-SCNC: 138 MMOL/L (ref 135–147)
T WAVE AXIS: 46 DEGREES
TSH SERPL DL<=0.05 MIU/L-ACNC: 2.6 UIU/ML (ref 0.45–4.5)
TSH SERPL DL<=0.05 MIU/L-ACNC: 3.43 UIU/ML (ref 0.45–4.5)
VENTRICULAR RATE: 66 BPM
VIT B12 SERPL-MCNC: 443 PG/ML (ref 180–914)
WBC # BLD AUTO: 7.8 THOUSAND/UL (ref 4.31–10.16)
WBC # BLD AUTO: 9.12 THOUSAND/UL (ref 4.31–10.16)

## 2023-12-21 PROCEDURE — 36415 COLL VENOUS BLD VENIPUNCTURE: CPT

## 2023-12-21 PROCEDURE — 85652 RBC SED RATE AUTOMATED: CPT

## 2023-12-21 PROCEDURE — 72125 CT NECK SPINE W/O DYE: CPT

## 2023-12-21 PROCEDURE — 96374 THER/PROPH/DIAG INJ IV PUSH: CPT

## 2023-12-21 PROCEDURE — 73552 X-RAY EXAM OF FEMUR 2/>: CPT

## 2023-12-21 PROCEDURE — 82607 VITAMIN B-12: CPT | Performed by: NURSE PRACTITIONER

## 2023-12-21 PROCEDURE — 99285 EMERGENCY DEPT VISIT HI MDM: CPT | Performed by: EMERGENCY MEDICINE

## 2023-12-21 PROCEDURE — 82306 VITAMIN D 25 HYDROXY: CPT

## 2023-12-21 PROCEDURE — 84443 ASSAY THYROID STIM HORMONE: CPT | Performed by: INTERNAL MEDICINE

## 2023-12-21 PROCEDURE — 80053 COMPREHEN METABOLIC PANEL: CPT

## 2023-12-21 PROCEDURE — 85025 COMPLETE CBC W/AUTO DIFF WBC: CPT

## 2023-12-21 PROCEDURE — 93005 ELECTROCARDIOGRAM TRACING: CPT

## 2023-12-21 PROCEDURE — 72131 CT LUMBAR SPINE W/O DYE: CPT

## 2023-12-21 PROCEDURE — 85730 THROMBOPLASTIN TIME PARTIAL: CPT

## 2023-12-21 PROCEDURE — 97163 PT EVAL HIGH COMPLEX 45 MIN: CPT

## 2023-12-21 PROCEDURE — 99285 EMERGENCY DEPT VISIT HI MDM: CPT

## 2023-12-21 PROCEDURE — 83970 ASSAY OF PARATHORMONE: CPT

## 2023-12-21 PROCEDURE — 80048 BASIC METABOLIC PNL TOTAL CA: CPT

## 2023-12-21 PROCEDURE — 73060 X-RAY EXAM OF HUMERUS: CPT

## 2023-12-21 PROCEDURE — 84443 ASSAY THYROID STIM HORMONE: CPT

## 2023-12-21 PROCEDURE — 99285 EMERGENCY DEPT VISIT HI MDM: CPT | Performed by: INTERNAL MEDICINE

## 2023-12-21 PROCEDURE — NC001 PR NO CHARGE: Performed by: ORTHOPAEDIC SURGERY

## 2023-12-21 PROCEDURE — G1004 CDSM NDSC: HCPCS

## 2023-12-21 PROCEDURE — 72170 X-RAY EXAM OF PELVIS: CPT

## 2023-12-21 PROCEDURE — 85610 PROTHROMBIN TIME: CPT

## 2023-12-21 PROCEDURE — 99222 1ST HOSP IP/OBS MODERATE 55: CPT | Performed by: INTERNAL MEDICINE

## 2023-12-21 PROCEDURE — 70450 CT HEAD/BRAIN W/O DYE: CPT

## 2023-12-21 RX ORDER — ASPIRIN 81 MG/1
81 TABLET, CHEWABLE ORAL DAILY
Status: DISCONTINUED | OUTPATIENT
Start: 2023-12-21 | End: 2023-12-27 | Stop reason: HOSPADM

## 2023-12-21 RX ORDER — TRIAMTERENE AND HYDROCHLOROTHIAZIDE 37.5; 25 MG/1; MG/1
1 TABLET ORAL DAILY
Status: DISCONTINUED | OUTPATIENT
Start: 2023-12-21 | End: 2023-12-27 | Stop reason: HOSPADM

## 2023-12-21 RX ORDER — TRIAMTERENE AND HYDROCHLOROTHIAZIDE 37.5; 25 MG/1; MG/1
1 CAPSULE ORAL DAILY PRN
COMMUNITY

## 2023-12-21 RX ORDER — LACTULOSE 20 G/30ML
10 SOLUTION ORAL DAILY PRN
COMMUNITY

## 2023-12-21 RX ORDER — OXYCODONE HYDROCHLORIDE 5 MG/1
5 TABLET ORAL EVERY 4 HOURS PRN
Status: DISCONTINUED | OUTPATIENT
Start: 2023-12-21 | End: 2023-12-27 | Stop reason: HOSPADM

## 2023-12-21 RX ORDER — FENTANYL CITRATE 50 UG/ML
25 INJECTION, SOLUTION INTRAMUSCULAR; INTRAVENOUS ONCE
Status: COMPLETED | OUTPATIENT
Start: 2023-12-21 | End: 2023-12-21

## 2023-12-21 RX ORDER — HYDROMORPHONE HCL IN WATER/PF 6 MG/30 ML
0.2 PATIENT CONTROLLED ANALGESIA SYRINGE INTRAVENOUS EVERY 2 HOUR PRN
Status: DISCONTINUED | OUTPATIENT
Start: 2023-12-21 | End: 2023-12-27

## 2023-12-21 RX ORDER — GABAPENTIN 100 MG/1
100 CAPSULE ORAL
Status: DISCONTINUED | OUTPATIENT
Start: 2023-12-21 | End: 2023-12-27 | Stop reason: HOSPADM

## 2023-12-21 RX ORDER — LIDOCAINE 50 MG/G
1 PATCH TOPICAL DAILY
Status: DISCONTINUED | OUTPATIENT
Start: 2023-12-21 | End: 2023-12-27 | Stop reason: HOSPADM

## 2023-12-21 RX ORDER — METHOCARBAMOL 500 MG/1
250 TABLET, FILM COATED ORAL EVERY 6 HOURS PRN
Status: DISCONTINUED | OUTPATIENT
Start: 2023-12-21 | End: 2023-12-27 | Stop reason: HOSPADM

## 2023-12-21 RX ORDER — ACETAMINOPHEN 325 MG/1
650 TABLET ORAL EVERY 4 HOURS PRN
Status: DISCONTINUED | OUTPATIENT
Start: 2023-12-21 | End: 2023-12-21

## 2023-12-21 RX ORDER — ONDANSETRON 2 MG/ML
4 INJECTION INTRAMUSCULAR; INTRAVENOUS EVERY 4 HOURS PRN
Status: DISCONTINUED | OUTPATIENT
Start: 2023-12-21 | End: 2023-12-27 | Stop reason: HOSPADM

## 2023-12-21 RX ORDER — LORAZEPAM 0.5 MG/1
0.5 TABLET ORAL ONCE
Status: COMPLETED | OUTPATIENT
Start: 2023-12-21 | End: 2023-12-21

## 2023-12-21 RX ORDER — HYDRALAZINE HYDROCHLORIDE 20 MG/ML
5 INJECTION INTRAMUSCULAR; INTRAVENOUS EVERY 6 HOURS PRN
Status: DISCONTINUED | OUTPATIENT
Start: 2023-12-21 | End: 2023-12-27 | Stop reason: HOSPADM

## 2023-12-21 RX ORDER — OXYBUTYNIN CHLORIDE 10 MG/1
10 TABLET, EXTENDED RELEASE ORAL DAILY
Status: DISCONTINUED | OUTPATIENT
Start: 2023-12-21 | End: 2023-12-27 | Stop reason: HOSPADM

## 2023-12-21 RX ORDER — ACETAMINOPHEN 325 MG/1
975 TABLET ORAL EVERY 8 HOURS SCHEDULED
Status: DISCONTINUED | OUTPATIENT
Start: 2023-12-21 | End: 2023-12-27 | Stop reason: HOSPADM

## 2023-12-21 RX ORDER — LEVOTHYROXINE SODIUM 0.07 MG/1
75 TABLET ORAL
Status: DISCONTINUED | OUTPATIENT
Start: 2023-12-21 | End: 2023-12-27 | Stop reason: HOSPADM

## 2023-12-21 RX ORDER — ATORVASTATIN CALCIUM 20 MG/1
20 TABLET, FILM COATED ORAL
Status: DISCONTINUED | OUTPATIENT
Start: 2023-12-21 | End: 2023-12-27 | Stop reason: HOSPADM

## 2023-12-21 RX ORDER — ENOXAPARIN SODIUM 100 MG/ML
30 INJECTION SUBCUTANEOUS EVERY 12 HOURS
Status: DISCONTINUED | OUTPATIENT
Start: 2023-12-21 | End: 2023-12-27 | Stop reason: HOSPADM

## 2023-12-21 RX ORDER — POLYETHYLENE GLYCOL 3350 17 G/17G
17 POWDER, FOR SOLUTION ORAL DAILY
Status: DISCONTINUED | OUTPATIENT
Start: 2023-12-21 | End: 2023-12-27 | Stop reason: HOSPADM

## 2023-12-21 RX ADMIN — GABAPENTIN 100 MG: 100 CAPSULE ORAL at 21:26

## 2023-12-21 RX ADMIN — ENOXAPARIN SODIUM 30 MG: 30 INJECTION SUBCUTANEOUS at 17:25

## 2023-12-21 RX ADMIN — LORAZEPAM 0.5 MG: 0.5 TABLET ORAL at 21:55

## 2023-12-21 RX ADMIN — ACETAMINOPHEN 975 MG: 325 TABLET, FILM COATED ORAL at 14:42

## 2023-12-21 RX ADMIN — OXYCODONE HYDROCHLORIDE 5 MG: 5 TABLET ORAL at 18:15

## 2023-12-21 RX ADMIN — HYDROMORPHONE HYDROCHLORIDE 0.2 MG: 0.2 INJECTION, SOLUTION INTRAMUSCULAR; INTRAVENOUS; SUBCUTANEOUS at 16:17

## 2023-12-21 RX ADMIN — ENOXAPARIN SODIUM 30 MG: 30 INJECTION SUBCUTANEOUS at 05:11

## 2023-12-21 RX ADMIN — ACETAMINOPHEN 975 MG: 325 TABLET, FILM COATED ORAL at 21:26

## 2023-12-21 RX ADMIN — ASPIRIN 81 MG CHEWABLE TABLET 81 MG: 81 TABLET CHEWABLE at 10:05

## 2023-12-21 RX ADMIN — OXYCODONE HYDROCHLORIDE 5 MG: 5 TABLET ORAL at 10:04

## 2023-12-21 RX ADMIN — ATORVASTATIN CALCIUM 20 MG: 20 TABLET, FILM COATED ORAL at 16:17

## 2023-12-21 RX ADMIN — ACETAMINOPHEN 975 MG: 325 TABLET, FILM COATED ORAL at 10:05

## 2023-12-21 RX ADMIN — HYDROMORPHONE HYDROCHLORIDE 0.2 MG: 0.2 INJECTION, SOLUTION INTRAMUSCULAR; INTRAVENOUS; SUBCUTANEOUS at 05:10

## 2023-12-21 RX ADMIN — OXYCODONE HYDROCHLORIDE 5 MG: 5 TABLET ORAL at 14:27

## 2023-12-21 RX ADMIN — FENTANYL CITRATE 25 MCG: 50 INJECTION INTRAMUSCULAR; INTRAVENOUS at 01:24

## 2023-12-21 NOTE — ED NOTES
Pt sleeping in bed comfortably at this time.  No distress noted     Iman Echevarria RN  12/21/23 1111

## 2023-12-21 NOTE — ED ATTENDING ATTESTATION
12/21/2023  I, Nba Mccoy MD, saw and evaluated the patient. I have discussed the patient with the resident/non-physician practitioner and agree with the resident's/non-physician practitioner's findings, Plan of Care, and MDM as documented in the resident's/non-physician practitioner's note, except where noted. All available labs and Radiology studies were reviewed.  I was present for key portions of any procedure(s) performed by the resident/non-physician practitioner and I was immediately available to provide assistance.       At this point I agree with the current assessment done in the Emergency Department.  I have conducted an independent evaluation of this patient a history and physical is as follows:    89-year-old female presents from nursing facility for evaluation after unwitnessed fall tonight.  Patient is unsure whether or not she hit her head.  Denies any loss of consciousness.  She does take daily aspirin.  She states she lost her footing causing her to fall forward.  Her primary complaint is right hip pain that is made worse with movement.  Denies any neck pain.  Does have some mild lower back pain.  No numbness or tingling.  No chest pain, shortness of breath, palpitations, or lightheadedness.    On exam, patient uncomfortable secondary to pain, but in no acute distress, head is normocephalic, airways patent, she has bilateral breath sounds and intact distal pulses.  Heart is regular rate and rhythm with intact distal pulses, no increased work of breathing, respiratory distress, or stridor.  Abdomen is soft, nontender nondistended without rebound or guarding.  Patient does have lumbar spinal tenderness, no midline neck or back tenderness, no step-offs or deformities.  Pelvis is stable.  She does have tenderness to palpation of the right hip.  The right lower extremity is shortened compared to the left.    Will get CT scan of the head, cervical spine, and lumbar spine to evaluate for possible  intracranial hemorrhage, fracture, or dislocation.  Will also get x-rays of the pelvis and right femur to evaluate for fracture or dislocation.  Will check basic labs.    Labs grossly unremarkable.  Patient found to have pelvic and sacral fractures.  Will be admitted to trauma for further management and evaluation.      ED Course         Critical Care Time  Procedures

## 2023-12-21 NOTE — CONSULTS
Orthopedics   Neha Molina 89 y.o. female MRN: [id number]  Unit/Bed#: X ray      Chief Complaint:   Back Pain    HPI:   89 y.o.female baseline cane ambulator complaining of Sacroiliac back pain. PMH of prior CVA and Vitamin B12 deficiency. Takes aspirin 81 mg daily. Patient was at home in her SNF last night when she had a mechanical fall from standing height. She landed on her right side and had immediate pain to the right pelvis and was unable to get back up or bear weight. She was taken to the [facility] ED, where she was found to have a right sacral fracture. Orthopedics was consulted. She denies any numbness or tingling to the right lower extremity. She also had positive headstrike but negative LOC. Patient also complaining of mild pain at the middle of her right humerus.    Review Of Systems:   Skin: Normal  Neuro: See HPI  Musculoskeletal: See HPI  14 point review of systems negative except as stated above     Past Medical History:   History reviewed. No pertinent past medical history.    Past Surgical History:   History reviewed. No pertinent surgical history.    Family History:  Family history reviewed and non-contributory  History reviewed. No pertinent family history.    Social History:  Social History     Socioeconomic History    Marital status: Single     Spouse name: None    Number of children: None    Years of education: None    Highest education level: None   Occupational History    None   Tobacco Use    Smoking status: Former     Types: Cigarettes     Passive exposure: Past    Smokeless tobacco: Never   Vaping Use    Vaping status: Never Used   Substance and Sexual Activity    Alcohol use: None    Drug use: None    Sexual activity: None   Other Topics Concern    None   Social History Narrative    None     Social Determinants of Health     Financial Resource Strain: Low Risk  (10/9/2023)    Received from [facility]    Overall Financial Resource Strain (CARDIA)     Difficulty of Paying  Living Expenses: Not very hard   Food Insecurity: No Food Insecurity (10/9/2023)    Received from [facility]    Hunger Vital Sign     Worried About Running Out of Food in the Last Year: Never true     Ran Out of Food in the Last Year: Never true   Transportation Needs: No Transportation Needs (10/9/2023)    Received from [facility]    PRAPARE - Transportation     Lack of Transportation (Medical): No     Lack of Transportation (Non-Medical): No   Physical Activity: Not on file   Stress: Not on file   Social Connections: Not on file   Intimate Partner Violence: Not At Risk (10/9/2023)    Received from [facility]    Humiliation, Afraid, Rape, and Kick questionnaire     Fear of Current or Ex-Partner: No     Emotionally Abused: No     Physically Abused: No     Sexually Abused: No   Housing Stability: Low Risk  (10/9/2023)    Received from [facility]    Housing Stability Vital Sign     Unable to Pay for Housing in the Last Year: No     Number of Places Lived in the Last Year: 2     Unstable Housing in the Last Year: No       Allergies:   No Known Allergies        Labs:  0   Lab Value Date/Time    HCT 34.4 (L) 12/21/2023 0140    HGB 11.1 (L) 12/21/2023 0140    INR 0.98 12/21/2023 0140    WBC 7.80 12/21/2023 0140       Meds:    Current Facility-Administered Medications:     acetaminophen (TYLENOL) tablet 650 mg, 650 mg, Oral, Q4H PRN, Kenyon Rosenthal DO    atorvastatin (LIPITOR) tablet 20 mg, 20 mg, Oral, Daily With Dinner, Kenyon Rosenthal DO    enoxaparin (LOVENOX) subcutaneous injection 30 mg, 30 mg, Subcutaneous, Q12H, Kenyon Rosenthal DO, 30 mg at 12/21/23 0511    gabapentin (NEURONTIN) capsule 100 mg, 100 mg, Oral, HS, Kenyon Rosenthal DO    HYDROmorphone HCl (DILAUDID) injection 0.2 mg, 0.2 mg, Intravenous, Q2H PRN, Kenyon Rosenthal DO, 0.2 mg at 12/21/23 0510    levothyroxine tablet 75 mcg, 75 mcg, Oral, Daily, Kenyon  "Robert Rosenthal,     lidocaine (LIDODERM) 5 % patch 1 patch, 1 patch, Topical, Daily, Kenyon Rosenthal,     naloxone (NARCAN) 0.04 mg/mL syringe 0.04 mg, 0.04 mg, Intravenous, Q1MIN PRN, Kenyon Rosenthal,     ondansetron (ZOFRAN) injection 4 mg, 4 mg, Intravenous, Q4H PRN, Kenyon Rosenthal,     oxybutynin (DITROPAN-XL) 24 hr tablet 10 mg, 10 mg, Oral, Daily, Kenyon Rosenthal,     oxyCODONE (ROXICODONE) split tablet 2.5 mg, 2.5 mg, Oral, Q4H PRN **OR** oxyCODONE (ROXICODONE) IR tablet 5 mg, 5 mg, Oral, Q4H PRN, Kenyon Rosenthal,     polyethylene glycol (MIRALAX) packet 17 g, 17 g, Oral, Daily, Kenyon Rosenthal,     triamterene-hydrochlorothiazide (MAXZIDE-25) 37.5-25 mg per tablet 1 tablet, 1 tablet, Oral, Daily, Kenyon Rosenthal,     Current Outpatient Medications:     acetaminophen (TYLENOL) 500 mg tablet, Take 500 mg by mouth every 6 (six) hours as needed, Disp: , Rfl:     aspirin 81 mg chewable tablet, Chew 81 mg daily, Disp: , Rfl:     ibuprofen (MOTRIN) 600 mg tablet, Take 1 tablet (600 mg total) by mouth 2 (two) times a day as needed for moderate pain or headaches Take with food, Disp: 60 tablet, Rfl: 1    lactulose 20 g/30 mL, Take 10 g by mouth daily as needed (Constipation), Disp: , Rfl:     levothyroxine 75 mcg tablet, Take 75 mcg by mouth daily, Disp: , Rfl:     LORazepam (ATIVAN) 1 mg tablet, Take 0.5 mg by mouth daily, Disp: , Rfl:     lovastatin (ALTOPREV) 20 MG 24 hr tablet, Take 20 mg by mouth, Disp: , Rfl:     meclizine (ANTIVERT) 25 mg tablet, Take 25 mg by mouth daily as needed, Disp: , Rfl:     oxybutynin (DITROPAN-XL) 10 MG 24 hr tablet, Take 10 mg by mouth daily, Disp: , Rfl:     triamterene-hydrochlorothiazide (DYAZIDE) 37.5-25 mg per capsule, Take 1 capsule by mouth daily as needed, Disp: , Rfl:     Blood Culture:   No results found for: \"BLOODCX\"    Wound Culture:   No results found for: \"WOUNDCULT\"    Ins and Outs:  No intake/output data " recorded.          Physical Exam:   /70 (BP Location: Right arm)   Pulse 68   Temp 98.3 °F (36.8 °C) (Oral)   Resp 20   SpO2 92%   Gen: No acute distress, resting comfortably in bed  HEENT: Eyes clear, moist mucus membranes, hearing intact  Respiratory: No audible wheezing or stridor  Cardiovascular: Well Perfused peripherally, 2+ distal pulse  Abdomen: nondistended, no peritoneal signs  Musculoskeletal: BACK  Skin intact, no open lesions  Tenderness to palpation over Sacroiliac spine  Motor intact to hip flexion/extension/abduction, knee flexion/extension, ankle dorsi/plantar flexion, EHL/FHL bilateral lower extremities  Sensation intact L2-S1 bilateral lower extremities  Able to perform straight leg raise  Mechanically stable to gentle posteriorly directed rotational force applied to pelvis bilaterally  Leg Lengths equal    Radiology:   I personally reviewed the films.  X-ray of pelvis and CT of lumbar spine demonstrate right superior pubic ramus fracture with ipsilateral right anterior sacral fracture.    _*_*_*_*_*_*_*_*_*_*_*_*_*_*_*_*_*_*_*_*_*_*_*_*_*_*_*_*_*_*_*_*_*_*_*_*_*_*_*_*_*    Assessment:  89 y.o.female with right sided superior pubic ramus fracture with ipsilateral right anterior sacral fracture. Patient can be managed nonoperatively for these injuries.     Plan:   WBAT RLE  Continue aspirin for DVT ppx  Follow up with Dr. Gonzalez in 4 weeks  PT/OT  Multimodal pain treatment  Dispo: Ok for discharge from ortho perspective      Jorge Ballesteros MD

## 2023-12-21 NOTE — PLAN OF CARE
Problem: PHYSICAL THERAPY ADULT  Goal: Performs mobility at highest level of function for planned discharge setting.  See evaluation for individualized goals.  Description: Treatment/Interventions: Functional transfer training, LE strengthening/ROM, Therapeutic exercise, Endurance training, Gait training, Bed mobility, Equipment eval/education          See flowsheet documentation for full assessment, interventions and recommendations.  Outcome: Progressing  Note: Prognosis: Fair     Assessment: Pt is 89 y.o. female seen for PT evaluation s/p admit to [facility] on 12/21/2023 w/ sacral fracture. PT consulted to assess pt's functional mobility and d/c needs. Order placed for PT eval and tx, w/ WBAT RLE order. Pt agreeable to PT  session upon arrival, pt found supine on stretcher.  PTA, pt was independent w/ all functional mobility w/ cane, ambulates household distances, and resident of [facility].  Pt to benefit from continued PT tx to address deficits and maximize level of functional independent mobility and consistency. Upon conclusion pt  supine on stretcher. Complexity: Comorbidities affecting pt's physical performance at time of assessment include: CVA, decreased vision, anxiety, and recent fall . Personal factors affecting pt at time of IE include: advanced age, history of falls, inability to ambulate household distances, anxiety, and visual impairments. Please find objective findings from PT assessment regarding body systems outlined above with impairments and limitations including impaired balance, decreased endurance, pain, decreased activity tolerance, decreased functional mobility tolerance, impaired judgement, fall risk, and orthopedic restrictions.  Pt's clinical presentation is currently unstable/unpredictable seen in pt's presentation of hypertension, abnormal H&H, pain, polypharmacy, and recent fall with fracture . The patient's AM-PAC Basic Mobility Inpatient Short Form Raw Score is 7.  Based on  patient presentations and impairments, pt would most appropriately benefit from Level I resource intensity upon discharge. Please also refer to the recommendation of the Physical Therapist for safe discharge planning. RN verbalized pt appropriate for PT session.  Barriers to Discharge:  (poor mobility status)     Rehab Resource Intensity Level, PT: I (Maximum Resource Intensity)    See flowsheet documentation for full assessment.

## 2023-12-21 NOTE — H&P
[facility]  H&P  Name: Neha Molina 89 y.o. female I MRN: [id number]  Unit/Bed#: ED 15 I Date of Admission: 12/21/2023   Date of Service: 12/21/2023 I Hospital Day: 0      Assessment/Plan   Fracture of ramus of right pubis (HCC)  Assessment & Plan  -Pending official xray read  -See sacral fracture for management    Acute pain due to trauma  Assessment & Plan  -Multimodal pain control    Fall  Assessment & Plan  -Mechanical in nature  -PT/OT  -Geriatrics consult    Sacral fracture, closed (HCC)  Assessment & Plan  -Acute nondisplaced fracture involving the right sacral wing.  -Ortho consulted, appreciate recommendations  -PT/OT  -WBAT  -Multimodal pain control  -DVT prophylaxis    Other hyperlipidemia  Assessment & Plan  -Continue statin    Other specified hypothyroidism  Assessment & Plan  -Continue levothyroxine           Trauma Alert: Evaluation; trauma team notified at 2:20am via in person   Model of Arrival: Ambulance    Trauma Team: Attending Sylwia, Residents Galo, and Fellow Gallo  Consultants:     Orthopedics: routine consult; Epic consult order placed;     History of Present Illness     Chief Complaint: right hip pain  Mechanism:Fall     HPI:    Neha Molina is a 89 y.o. female who presents following a mechanical fall from standing height.  She reportedly struck her head without losing consciousness.  She is taking aspirin.  She also experiencing right-sided hip pain.  She was found to have a sacral wing fracture on imaging in the emergency department.    Review of Systems   Musculoskeletal:  Positive for arthralgias (right hip) and back pain.   Neurological:  Negative for weakness, numbness and headaches.     12-point, complete review of systems was reviewed and negative except as stated above.     Historical Information     History reviewed. No pertinent past medical history.  History reviewed. No pertinent surgical history.     Social History      Tobacco Use    Smoking status: Former     Types: Cigarettes     Passive exposure: Past    Smokeless tobacco: Never   Vaping Use    Vaping status: Never Used     Immunization History   Administered Date(s) Administered    COVID-19 Moderna mRNA Vaccine 12 Yr+ 50 mcg/0.5 mL (Spikevax) 10/23/2023    COVID-19 PFIZER VACCINE 0.3 ML IM 01/23/2021, 02/12/2021    INFLUENZA 10/23/2023     Last Tetanus: unknown  Family History: Non-contributory    1. Before the illness or injury that brought you to the Emergency, did you need someone to help you on a regular basis? 1=Yes   2. Since the illness or injury that brought you to the Emergency, have you needed more help than usual to take care of yourself? 1=Yes   3. Have you been hospitalized for one or more nights during the past 6 months (excluding a stay in the Emergency Department)? 1=Yes   4. In general, do you see well? 0=Yes   5. In general, do you have serious problems with your memory? 0=No   6. Do you take more than three different medications everyday? 1=Yes   TOTAL   4     Did you order a geriatric consult if the score was 2 or greater?: yes     Meds/Allergies   all current active meds have been reviewed   No Known Allergies    Objective   Initial Vitals:   Temperature: 98.3 °F (36.8 °C) (12/21/23 0104)  Pulse: 68 (12/21/23 0102)  Respirations: 20 (12/21/23 0102)  Blood Pressure: (!) 218/90 (12/21/23 0107)    Primary Survey:   Airway:        Status: patent;        Pre-hospital Interventions: none        Hospital Interventions: none  Breathing:        Pre-hospital Interventions: none       Effort: normal       Right breath sounds: normal       Left breath sounds: normal  Circulation:        Rhythm: regular       Rate: regular   Right Pulses Left Pulses    R radial: 2+    R pedal: 2+     L radial: 2+    L pedal: 2+       Disability:        GCS: Eye: 4; Verbal: 5 Motor: 6 Total: 15       Right Pupil:       Left Pupil:     R Motor Strength L Motor Strength    R :  5/5  R dorsiflex: 5/5  R plantarflex: 5/5 L : 5/5  L dorsiflex: 5/5  L plantarflex: 5/5        Sensory:  No sensory deficit  Exposure:       Completed: Yes      Secondary Survey:  Physical Exam  Constitutional:       Appearance: Normal appearance.   HENT:      Head: Normocephalic.      Right Ear: External ear normal.      Left Ear: External ear normal.      Nose: Nose normal.      Mouth/Throat:      Mouth: Mucous membranes are moist.   Eyes:      Extraocular Movements: Extraocular movements intact.      Pupils: Pupils are equal, round, and reactive to light.   Neck:      Comments: Cervical collar in place  Cardiovascular:      Rate and Rhythm: Normal rate and regular rhythm.      Pulses: Normal pulses.      Heart sounds: Normal heart sounds.   Pulmonary:      Effort: Pulmonary effort is normal.      Breath sounds: Normal breath sounds.   Abdominal:      General: Abdomen is flat.      Palpations: Abdomen is soft.      Tenderness: There is no abdominal tenderness.   Musculoskeletal:      Comments: Tenderness to palpation over the right hip/groin.  The right leg is shortened without rotation.  Neurovascular intact as per routine.  DP pulses 2+/4.  Compartments soft.  Remainder of the leg nontender.   Skin:     General: Skin is warm and dry.   Neurological:      General: No focal deficit present.      Mental Status: She is alert.         Invasive Devices       Peripheral Intravenous Line  Duration             Peripheral IV 12/21/23 Left;Ventral (anterior) Forearm <1 day                  Lab Results: I have personally reviewed all pertinent laboratory/test results from 12/21/23, including the preceding 24 hours.  Recent Labs     12/21/23  0140   WBC 7.80   HGB 11.1*   HCT 34.4*      SODIUM 138   K 4.0      CO2 24   BUN 25   CREATININE 0.62   GLUC 106   PTT 30   INR 0.98       Imaging Results: I have personally reviewed pertinent images saved in PACS. CT scan findings (and other pertinent positive  findings on images) were discussed with radiology. My interpretation of the images/reports are as follows:  Chest Xray(s): N/A   FAST exam(s): N/A   CT Scan(s): positive for acute findings: sacral wing fracture   Additional Xray(s): pending     Other Studies: none    Code Status: Level 1 - Full Code  Advance Directive and Living Will:      Power of :    POLST:    I have spent 35 minutes with Patient  today in which greater than 50% of this time was spent in counseling/coordination of care regarding Diagnostic results, Impressions, Documenting in the medical record, Reviewing / ordering tests, medicine, procedures  , and Obtaining or reviewing history  .

## 2023-12-21 NOTE — CONSULTS
Consultation - Geriatric Medicine   Neha Molina 89 y.o. female MRN: [id number]  Unit/Bed#: ED 15 Encounter: [id number]      Assessment/Plan     Ambulatory dysfunction with fall  -reportedly mechanical fall on 12/21/23  -pt uncertain if struck head or lost consciousness   -injuries as outlined below  -Requires use of cane for ambulation at baseline  -hx recurrent falls at least one additional in past month   -remains high risk future falls due to age, hx recurrent falls, deconditioning/debility and unfamiliar environment   -encourage good body mechanics and assist with all transfers  -keep personal items and call bell close to prevent reaching  -maintain environment free of fall hazards  -encourage appropriate footwear and adequate lighting at all times when out of bed  -Continue use of personal fall alert system and recommended home fall risk assessment  -PT and OT pending    Right superior pubic ramus and right anterior sacral fractures  -S/p fall as outlined above  -Noted on CT lumbar spine obtained on admission  -Neurovascular checks per protocol  -Acute pain control  -Ortho on consult -recommend nonoperative management    Acute pain due to trauma  -Recommend pain control per Geriatric pain protocol:  Tylenol 975mg Q8H scheduled  Roxicodone 2.5mg Q4H PRN moderate pain  Roxicodone 5mg Q4H PRN severe pain  Dilaudid 0.2mg Q4H PRN  -Consider adjuncts such as lidocaine patch topically to appropriate areas  -encourage addition of non-pharmacologic pain treatment including ice and frequent repositioning  -recommend  bowel regimen to prevent and treat constipation due to increased risk with acute pain and opiate pain medications    Hypertensive urgency  -/90 on initial presentation  -Recently taken off triamterene-HCTZ as outpatient by PCP, if persistently high may need to consider resumption  -Consider addition of labetalol PRN  -Continue optimization of hemodynamics and avoid rapid and drastic  fluctuations    Hypothyroidism  -No recent TSH on file, recommend checking with free T4 with routine labs  -Continue home levothyroxine dosing for now, dose adjustment based on updated TSH level  -Continue close outpatient follow-up with PCP for ongoing dose titration and medication management    Overactive bladder  -Maintained on oxybutynin chronically as outpatient with some symptom improvement, monitor closely due to increased risk confusion and falls in older adult population  -Avoid bladder irritating agents as possible and consider timed voiding to reduce risk of accidents  -Limit fluid intake within 2 hours of bed to reduce number of overnight awakening to void    Anxiety  -Symptoms reportedly well-controlled with home sertraline regimen since being bridged with low-dose lorazepam which is not a chronic daily medication per PCP documentation and PDMP, if utilized use sparingly due to increased risk confusion and falls in older adults  -Continue home sertraline regimen and monitor electrolytes with use  -Encourage calm quiet environment to reduce risk overstimulation  -Consider outpatient referral to counseling/support group as part of comprehensive multimodal treatment approach    Hx CVA  -hx right PCA territory CVA  -Patient is at high risk recurrence, continue secondary risk factor modifications and healthy lifestyle modifications  -Continue outpatient follow-up with Neurology    Cognitive screening  -Alert and oriented, denies memory or cognitive concerns  -Reportedly independent with most ADLs and IADLs at baseline, independent living facility has reportedly been supervising medications since relocating to facility recently  -No prior cognitive testing on record for review  -CTH obtained admission personally viewed, at least moderate diffuse chronic microangiopathic changes appreciated  -No recent TSH, recommend checking as above, no recent B12, recommend checking with routine labs  -Encourage patient  remain physically, socially, and cognitively active and engaged to maintain cognitive acuity    Impaired Vision  -recommend use of corrective lenses at all appropriate times  -encourage adequate lighting and encourage use of assistance with ambulation  -keep personal belongings close to person to avoid reaching  -encourage appropriate footwear at all times  -Consider large font for printed materials provided to patient    Impaired mastication  -Requires use of dentures -encourage use at all appropriate times  -ensure meal consistency appropriate for abilities  -continue aspiration precautions    Deconditioning/debility/frailty  -Clinical frailty scale stage V, mildly frail  -Multifactorial due to age, and with history of recurrent falls, history of CVA and multiple additional chronic medical comorbidities now with fall and acute medic injury superimposed in elderly individual with limited physiologic and metabolic reserves increasing sensitivity to eating seemingly mild metabolic derangements  -Encourage well-balanced addition, consider nutritional supplements such as boost or Ensure between meals if oral intake is poor  -Continue optimization of chronic medical conditions and address acute metabolic derangements as arise  -Continue psychosocial supports    Delirium precautions  -Patient is high risk of delirium due to age, fall, traumatic injuries, acute pain, hospitalization and polypharmacy  -Initiate delirium precautions  -maintain normal sleep/wake cycle  -minimize overnight interruptions, group overnight vitals/labs/nursing checks as possible  -dim lights, close blinds and turn off tv to minimize stimulation and encourage sleep environment in evenings  -ensure that pain is well controlled  -monitor for fecal and urinary retention which may precipitate delirium  -encourage early mobilization and ambulation with assistance once cleared to safely do so  -provide frequent reorientation and redirection as indicated  and appropriate  -Minimize use of medications which may precipitate or worsen delirium such as tramadol, benzodiazepine, anticholinergics, and benadryl whenever possible  -encourage hydration and nutrition   -redirect unwanted behaviors as first line    Home medication review    ASA 81mg daily  Levothyroxine 75mcg daily   Lorazepam 0.5mg daily PRN - (PDMP checked - one time Rx filled for 15 tabs 10/11/23)  Lovastatin 20mg daily   Meclizine 25mg daily PRN  Oxybutynin 10mg daily   Sertraline 50mg daily     Triamterene HCTZ intentionally discontinued by PCP on 11/2/23    Care coordination: Rounded with Thad (RN) in ED    History of Present Illness   Physician Requesting Consult: Domingo Dao,*  Reason for Consult / Principal Problem: Fall  Hx and PE limited by: N/A  Additional history obtained from: Chart review and patient evaluation    HPI: Neha Molina is a 89 y.o. year old female with anxiety, hyperlipidemia, hypothyroidism, B12 deficiency, history of CVA, and amatory function with recurrent falls who is admitted to trauma service with ambulatory dysfunction and fall found to have right-sided pelvic fractures on admission imaging, she is being seen in consultation by Geriatrics for high risk developing delirium during hospitalization.  She seen examined at bedside in the ED where she is lying resting, she explains that last night in her assisted living apartment she sustained a mechanical fall when she tripped over something on the ground.  She fell landing on her right side resulting in immediate severe right hip and pelvic pain and inability to get up, she utilized her fall alert button for assistance presented to the ED for further evaluation.  This morning she reports severe pain in her right pelvic and hip region which is markedly worse with any movement.  She denies other associated symptoms.    Neha reports that prior to admission she was residing at [facility] where she  moved just a few weeks prior.  She reports at baseline that she is independent with ADLs and IADLs and denies memory or cognitive concerns however does note that since moving to the independent living facility they are now managing her medications which she does not feel necessary.  She utilizes a cane for ambulation at baseline and has history of recurrent falls most recently last month at which time she was evaluated in the ED and found to have a rib and sternal fractures which are no longer causing her pain.  She utilizes glasses for watching television, she is moderately hard of hearing but denies use of hearing aids, she utilizes full upper and partial lower denture.    Inpatient consult to Gerontology  Consult performed by: Bettye Singh DO  Consult ordered by: Kenyon Rosenthal DO      Review of Systems   Constitutional: Negative.  Negative for chills and fever.   HENT:  Positive for dental problem (denture).    Eyes: Negative.  Visual disturbance: wears glasses.   Respiratory: Negative.  Negative for shortness of breath.    Cardiovascular: Negative.    Gastrointestinal: Negative.    Genitourinary:  Positive for frequency (Due to overactive bladder).   Musculoskeletal:  Positive for gait problem.        Right hip/pelvis pain   Skin: Negative.    Neurological:  Negative for dizziness, weakness, light-headedness, numbness and headaches.   Hematological: Negative.    Psychiatric/Behavioral: Negative.  Negative for sleep disturbance.    All other systems reviewed and are negative.    Historical Information   Past Medical History:   Diagnosis Date    CAD (coronary artery disease)      Past Surgical History:   Procedure Laterality Date    BREAST BIOPSY       Social History   Social History     Substance and Sexual Activity   Alcohol Use Never     Social History     Substance and Sexual Activity   Drug Use Never     Social History     Tobacco Use   Smoking Status Former    Types: Cigarettes    Passive exposure:  Past   Smokeless Tobacco Never     Family History:   Family History   Problem Relation Age of Onset    Stroke Sister      Meds/Allergies   all current active meds have been reviewed    No Known Allergies    Objective   No intake or output data in the 24 hours ending 12/21/23 0729  Invasive Devices       Peripheral Intravenous Line  Duration             Peripheral IV 12/21/23 Left;Ventral (anterior) Forearm <1 day                  Physical Exam  Vitals and nursing note reviewed.   Constitutional:       General: She is not in acute distress.     Appearance: She is not toxic-appearing.   HENT:      Head: Normocephalic.      Nose: Nose normal.      Mouth/Throat:      Mouth: Mucous membranes are dry.      Comments: Upper denture in place  Eyes:      General:         Right eye: No discharge.         Left eye: No discharge.      Conjunctiva/sclera: Conjunctivae normal.   Neck:      Comments: Voice hoarse, phonation otherwise normal  Cardiovascular:      Rate and Rhythm: Normal rate.      Pulses: Normal pulses.   Pulmonary:      Effort: Pulmonary effort is normal. No respiratory distress.      Breath sounds: No wheezing.      Comments: Saturating well on room air  Abdominal:      General: There is no distension.      Palpations: Abdomen is soft.      Tenderness: There is no abdominal tenderness.   Musculoskeletal:      Cervical back: Neck supple.      Right lower leg: No edema.      Left lower leg: No edema.      Comments: Reduced overall muscle mass   Skin:     General: Skin is dry.   Neurological:      Mental Status: She is alert.      Comments: Awake and alert, answers questions appropriately   Psychiatric:      Comments: Wound affect appropriate for circumstances       Lab Results:     I have personally reviewed pertinent lab results including the following:    Results from last 7 days   Lab Units 12/21/23  0556 12/21/23  0140   WBC Thousand/uL 9.12 7.80   HEMOGLOBIN g/dL 10.4* 11.1*   HEMATOCRIT % 31.8* 34.4*    PLATELETS Thousands/uL 179 223   NEUTROS PCT % 82* 71   MONOS PCT % 7 9   EOS PCT % 1 2     Results from last 7 days   Lab Units 12/21/23  0511 12/21/23  0140   POTASSIUM mmol/L 3.7 4.0   CHLORIDE mmol/L 105 106   CO2 mmol/L 24 24   BUN mg/dL 22 25   CREATININE mg/dL 0.56* 0.62   CALCIUM mg/dL 8.8 9.0     I have personally reviewed the following imaging study reports in PACS:    12/21/23-CT head without contrast, CT C-spine without contrast, CT lumbar spine without contrast    Therapies:   PT: Pending  OT: Pending    VTE Prophylaxis: Enoxaparin (Lovenox)    Code Status: Level 1 - Full Code  Advance Directive and Living Will:      Power of :    POLST:      Family and Social Support: friends and senior living facility staff    Goals of Care: Acute pain control

## 2023-12-21 NOTE — PHYSICAL THERAPY NOTE
PHYSICAL THERAPY EVALUATION  NAME:  Neha Molina  DATE: 12/21/23    AGE:   89 y.o.  Mrn:   [id number]  ADMIT DX:  Sacral fracture, closed ([facility]) [S32.10XA]  Closed nondisplaced zone I fracture of sacrum, initial encounter ([facility]) [S32.110A]  Fall, initial encounter [W19.XXXA]  Closed fracture of ramus of right pubis, initial encounter ([facility]) [S32.591A]  Unspecified multiple injuries, initial encounter [T07.XXXA]  Problem List:   Patient Active Problem List   Diagnosis    Lactose intolerance    Allergies    Other specified hypothyroidism    Other hyperlipidemia    History of CVA (cerebrovascular accident)    Anxiety    Urinary frequency    Pneumonia    Rib fracture    Vitamin B 12 deficiency    Other specified glaucoma    Accidental fall    Sacral fracture, closed ([facility])    Fall    Acute pain due to trauma    Fracture of ramus of right pubis ([facility])       Past Medical History  Past Medical History:   Diagnosis Date    CAD (coronary artery disease)        Past Surgical History  Past Surgical History:   Procedure Laterality Date    BREAST BIOPSY         Length Of Stay: 0  Performed at least 2 patient identifiers during session: Name and Birthday         12/21/23 1200   PT Last Visit   PT Visit Date 12/21/23   Note Type   Note type Evaluation   Pain Assessment   Pain Assessment Tool 0-10   Pain Score 9   Pain Location/Orientation Orientation: Right;Location: Arm;Location: Buttocks;Location: Neck   Hospital Pain Intervention(s) Repositioned   Restrictions/Precautions   Weight Bearing Precautions Per Order Yes   RLE Weight Bearing Per Order WBAT   Other Precautions Fall Risk;Cognitive;Pain   Home Living   Type of Home Assisted living  ([facility])   Home Equipment Cane   Additional Comments pt is amb in facility MI with SPC, pt performs MI sponge bathing and dressing; pt receives assist with meals. meds and showering   Prior Function   Level of [place] Independent with ADLs;Independent with functional mobility   Lives  With Facility staff   Receives Help From Personal care attendant   IADLs Family/Friend/Other provides transportation;Family/Friend/Other provides meals;Family/Friend/Other provides medication management   Falls in the last 6 months 1 to 4   Vocational Retired   General   Family/Caregiver Present No   Cognition   Overall Cognitive Status Impaired   Arousal/Participation Responsive   Orientation Level Oriented to person;Oriented to situation;Disoriented to place;Disoriented to time   Following Commands Follows one step commands with increased time or repetition   RLE Assessment   RLE Assessment   (NT due to pain)   Vision-Basic Assessment   Current Vision   (pt wears glasses for TV watching)   Visual History   (decreased vision noted in chart)   Coordination   Sensation WFL   Bed Mobility   Supine to Sit 2  Maximal assistance   Additional items Assist x 1;HOB elevated;Increased time required;Verbal cues;LE management   Sit to Supine 2  Maximal assistance   Additional items Assist x 1;HOB elevated;Increased time required;Verbal cues;LE management   Additional Comments pt unable to attain fully erect sitting position; pt having difficulty following cues and presented with poor trunk support   Balance   Static Sitting Poor   Endurance Deficit   Endurance Deficit Yes   Endurance Deficit Description unable to tolerate further mobility   Activity Tolerance   Activity Tolerance Patient limited by fatigue;Patient limited by pain   Medical Staff Made Aware physician made aware of recommendations   Nurse Made Aware RN made aware of session outcomes   Assessment   Prognosis Fair   Assessment Pt is 89 y.o. female seen for PT evaluation s/p admit to [facility] on 12/21/2023 w/ sacral fracture. PT consulted to assess pt's functional mobility and d/c needs. Order placed for PT eval and tx, w/ WBAT RLE order. Pt agreeable to PT  session upon arrival, pt found supine on stretcher.  PTA, pt was independent w/ all functional  mobility w/ cane, ambulates household distances, and resident of [facility].  Pt to benefit from continued PT tx to address deficits and maximize level of functional independent mobility and consistency. Upon conclusion pt  supine on stretcher. Complexity: Comorbidities affecting pt's physical performance at time of assessment include: CVA, decreased vision, anxiety, and recent fall . Personal factors affecting pt at time of IE include: advanced age, history of falls, inability to ambulate household distances, anxiety, and visual impairments. Please find objective findings from PT assessment regarding body systems outlined above with impairments and limitations including impaired balance, decreased endurance, pain, decreased activity tolerance, decreased functional mobility tolerance, impaired judgement, fall risk, and orthopedic restrictions.  Pt's clinical presentation is currently unstable/unpredictable seen in pt's presentation of hypertension, abnormal H&H, pain, polypharmacy, and recent fall with fracture . The patient's AM-PAC Basic Mobility Inpatient Short Form Raw Score is 7.  Based on patient presentations and impairments, pt would most appropriately benefit from Level I resource intensity upon discharge. Please also refer to the recommendation of the Physical Therapist for safe discharge planning. RN verbalized pt appropriate for PT session.   Barriers to Discharge   (poor mobility status)   Goals   Patient Goals to find her white jacket   LTG Expiration Date 12/31/23   Long Term Goal #1 Pt will: Perform bed mobility tasks to consistent min A of 1 to improve ease of bed mobility. Perform transfers to consistent min A of 1 to improve ease of transfers. Perform ambulation with Min A and RW for 25 ft to  decrease burden of care. Increase dynamic standing balance to F- to decrease fall risk.  .  Increase OOB activity tolerance to 10 minutes without s/s of exertion to decrease fall risk.   Plan    Treatment/Interventions Functional transfer training;LE strengthening/ROM;Therapeutic exercise;Endurance training;Gait training;Bed mobility;Equipment eval/education   PT Frequency 3-5x/wk   Discharge Recommendation   Rehab Resource Intensity Level, PT I (Maximum Resource Intensity)   AM-PAC Basic Mobility Inpatient   Turning in Flat Bed Without Bedrails 2   Lying on Back to Sitting on Edge of Flat Bed Without Bedrails 1   Moving Bed to Chair 1   Standing Up From Chair Using Arms 1   Walk in Room 1   Climb 3-5 Stairs With Railing 1   Basic Mobility Inpatient Raw Score 7   Turning Head Towards Sound 3   Follow Simple Instructions 3   Low Function Basic Mobility Raw Score  13   Low Function Basic Mobility Standardized Score  20.14   Highest Level Of Mobility   JH-HLM Goal 2: Bed activities/Dependent transfer   JH-HLM Achieved 3: Sit at edge of bed       Time In: 1142  Time Out: 1200  Total Evaluation Minutes: 18    Myla Murray, PT

## 2023-12-21 NOTE — CASE MANAGEMENT
Case Management Assessment & Discharge Planning Note    Patient name Neha Molina  Location [facility]- MRN [id number]  : 1932 Date 2023       Current Admission Date: 2023  Current Admission Diagnosis:Sacral fracture, closed (HCC)   Patient Active Problem List    Diagnosis Date Noted    Sacral fracture, closed (HCC) 2023    Fall 2023    Acute pain due to trauma 2023    Fracture of ramus of right pubis (HCC) 2023    Other specified glaucoma 2023    Accidental fall 2023    Vitamin B 12 deficiency 2023    Lactose intolerance 10/24/2023    Allergies 10/24/2023    Other specified hypothyroidism 10/24/2023    Other hyperlipidemia 10/24/2023    History of CVA (cerebrovascular accident) 10/24/2023    Anxiety 10/24/2023    Urinary frequency 10/24/2023    Pneumonia 10/24/2023    Rib fracture 10/24/2023      LOS (days): 0  Geometric Mean LOS (GMLOS) (days): 2.9  Days to GMLOS:2.4     OBJECTIVE:    Risk of Unplanned Readmission Score: 6.87         Current admission status: Inpatient       Preferred Pharmacy:   UNKNOWN - FOLLOW UP PRIOR TO DISCHARGE TO E-PRESCRIBE  No address on file      Primary Care Provider: Dinesh Johnston MD    Primary Insurance: MEDICARE  Secondary Insurance: AARP    ASSESSMENT:  Active Health Care Proxies    There are no active Health Care Proxies on file.       Advance Directives  Does patient have a Health Care POA?: Yes  Does patient have Advance Directives?: Yes  Advance Directives: Living will  Primary Contact: Layne Vega (Relative) [phone]    Readmission Root Cause  30 Day Readmission: No    Patient Information  Admitted from:: Facility ([facility])  Mental Status: Alert  During Assessment patient was accompanied by: Not accompanied during assessment  Assessment information provided by:: Patient  Primary Caregiver: Self  Support Systems: Self, Family members  County of Residence: [facility] city do  you live in?: [place]  Home entry access options. Select all that apply.: No steps to enter home  Type of Current Residence: Facility  Upon entering residence, is there a bedroom on the main floor (no further steps)?: Yes  Upon entering residence, is there a bathroom on the main floor (no further steps)?: Yes  Living Arrangements: Lives Alone  Is patient a ?: No    Activities of Daily Living Prior to Admission  Functional Status: Assistance  Completes ADLs independently?: No  Level of ADL dependence: Assistance  Ambulates independently?: Yes  Does patient use assisted devices?: Yes  Assisted Devices (DME) used: Straight Cane  Does patient currently own DME?: Yes  What DME does the patient currently own?: Straight Cane  Does patient have a history of Outpatient Therapy (PT/OT)?: Yes  Does the patient have a history of Short-Term Rehab?: Yes  Does patient have a history of HHC?: Yes  Does patient currently have HHC?: No    Patient Information Continued  Income Source: Pension/half-way  Does patient have prescription coverage?: Yes  Does patient receive dialysis treatments?: No  Does patient have a history of substance abuse?: No  Does patient have a history of Mental Health Diagnosis?: No    Means of Transportation  Means of Transport to Appts:: Family transport    Housing Stability: Low Risk  (12/21/2023)    Housing Stability Vital Sign     Unable to Pay for Housing in the Last Year: No     Number of Places Lived in the Last Year: 1     Unstable Housing in the Last Year: No   Food Insecurity: No Food Insecurity (12/21/2023)    Hunger Vital Sign     Worried About Running Out of Food in the Last Year: Never true     Ran Out of Food in the Last Year: Never true   Transportation Needs: No Transportation Needs (12/21/2023)    PRAPARE - Transportation     Lack of Transportation (Medical): No     Lack of Transportation (Non-Medical): No   Utilities: Not At Risk (12/21/2023)    [facility] Utilities     Threatened with  loss of utilities: No     DISCHARGE DETAILS:    Discharge planning discussed with:: patient  Freedom of Choice: Yes     CM contacted family/caregiver?: Yes  Were Treatment Team discharge recommendations reviewed with patient/caregiver?: Yes  Did patient/caregiver verbalize understanding of patient care needs?: N/A- going to facility  Were patient/caregiver advised of the risks associated with not following Treatment Team discharge recommendations?: Yes    Contacts  Patient Contacts: Layne Vega (Relative) [phone]  Relationship to Patient:: Family  Contact Method: Phone  Phone Number: [phone]  Reason/Outcome: Emergency Contact, Discharge Planning, Continuity of Care      CM met with pt to discuss the role of CM.  Pt lives at [facility].   Pt reports she is independent with some ADLs but the staff assists with bathing and medications.  Pt ambulates with an SPC.  Pt's had 1-2 falls.   Pt would like to return to [facility] [phone] but will need therapy recommendations.     CM reviewed d/c planning process including the following: identifying help at home, patient preference for d/c planning needs, Discharge Lounge, [facility] Meds to Bed program, availability of treatment team to discuss questions or concerns patient and/or family may have regarding understanding medications and recognizing signs and symptoms once discharged.  CM also encouraged patient to follow up with all recommended appointments after discharge. Patient advised of importance for patient and family to participate in managing patient’s medical well being.

## 2023-12-21 NOTE — ASSESSMENT & PLAN NOTE
-Acute nondisplaced fracture involving the right sacral wing.  -Ortho consulted, appreciate recommendations  -PT/OT  -WBAT  -Multimodal pain control  -DVT prophylaxis

## 2023-12-21 NOTE — ED PROVIDER NOTES
History  Chief Complaint   Patient presents with    Fall     See trauma narrator for more info.     HPI    Patient is a 89 y.o. female with PMHx HLD, hypothyroidism who presents to the ED via EMS for evaluation after a fall that occurred PTA. Patient is a resident at [facility] and sustained an unwitnessed fall. States she lost her footing and fell forward. She is unsure if she struck her head. Denies LOC. Does take ASA, denies any anticoagulants. She currently endorses right hip pain. Denies history of hip fracture.     Prior to Admission Medications   Prescriptions Last Dose Informant Patient Reported? Taking?   LORazepam (ATIVAN) 1 mg tablet Unknown Outside Facility (Specify) Yes No   Sig: Take 0.5 mg by mouth daily   acetaminophen (TYLENOL) 500 mg tablet Unknown Outside Facility (Specify) Yes No   Sig: Take 500 mg by mouth every 6 (six) hours as needed   aspirin 81 mg chewable tablet Unknown Outside Facility (Specify) Yes No   Sig: Chew 81 mg daily   ibuprofen (MOTRIN) 600 mg tablet Unknown  No No   Sig: Take 1 tablet (600 mg total) by mouth 2 (two) times a day as needed for moderate pain or headaches Take with food   lactulose 20 g/30 mL Unknown  Yes No   Sig: Take 10 g by mouth daily as needed (Constipation)   levothyroxine 75 mcg tablet Unknown Outside Facility (Specify) Yes No   Sig: Take 75 mcg by mouth daily   lovastatin (ALTOPREV) 20 MG 24 hr tablet Unknown Outside Facility (Specify) Yes No   Sig: Take 20 mg by mouth   meclizine (ANTIVERT) 25 mg tablet Unknown Outside Facility (Specify) Yes No   Sig: Take 25 mg by mouth daily as needed   oxybutynin (DITROPAN-XL) 10 MG 24 hr tablet Unknown Outside Facility (Specify) Yes No   Sig: Take 10 mg by mouth daily   triamterene-hydrochlorothiazide (DYAZIDE) 37.5-25 mg per capsule Unknown  Yes No   Sig: Take 1 capsule by mouth daily as needed      Facility-Administered Medications: None       Past Medical History:   Diagnosis Date    CAD (coronary artery disease)         Past Surgical History:   Procedure Laterality Date    BREAST BIOPSY         Family History   Problem Relation Age of Onset    Stroke Sister      I have reviewed and agree with the history as documented.    E-Cigarette/Vaping    E-Cigarette Use Never User      E-Cigarette/Vaping Substances    Nicotine No     THC No     CBD No     Flavoring No     Other No     Unknown No      Social History     Tobacco Use    Smoking status: Former     Types: Cigarettes     Passive exposure: Past    Smokeless tobacco: Never   Vaping Use    Vaping status: Never Used   Substance Use Topics    Alcohol use: Never    Drug use: Never        Review of Systems   Musculoskeletal:         Right hip pain       Physical Exam  ED Triage Vitals   Temperature Pulse Respirations Blood Pressure SpO2   12/21/23 0104 12/21/23 0102 12/21/23 0102 12/21/23 0107 12/21/23 0102   98.3 °F (36.8 °C) 68 20 (!) 218/90 98 %      Temp Source Heart Rate Source Patient Position - Orthostatic VS BP Location FiO2 (%)   12/21/23 0104 12/21/23 0102 12/21/23 0102 12/21/23 0200 --   Oral Monitor Lying Right arm       Pain Score       12/21/23 0124       10 - Worst Possible Pain             Orthostatic Vital Signs  Vitals:    12/22/23 0736 12/22/23 1008 12/22/23 1136 12/22/23 1136   BP: 115/51 126/58 142/64 142/64   Pulse: 77 73 69 68   Patient Position - Orthostatic VS:           Physical Exam  Vitals and nursing note reviewed.   Constitutional:       General: She is not in acute distress.     Appearance: Normal appearance. She is well-developed. She is not ill-appearing, toxic-appearing or diaphoretic.   HENT:      Head: Normocephalic and atraumatic.      Nose: Nose normal.      Mouth/Throat:      Mouth: Mucous membranes are dry.   Eyes:      Extraocular Movements: Extraocular movements intact.      Conjunctiva/sclera: Conjunctivae normal.      Pupils: Pupils are equal, round, and reactive to light.   Neck:      Comments: C collar in place   Cardiovascular:       Rate and Rhythm: Normal rate and regular rhythm.      Pulses: Normal pulses.      Heart sounds: Normal heart sounds. No murmur heard.  Pulmonary:      Effort: Pulmonary effort is normal. No respiratory distress.      Breath sounds: Normal breath sounds. No stridor. No wheezing, rhonchi or rales.   Chest:      Chest wall: No tenderness.   Abdominal:      Palpations: Abdomen is soft.      Tenderness: There is no abdominal tenderness.   Musculoskeletal:         General: No swelling.      Cervical back: Neck supple.      Comments: No midline cervical or thoracic tenderness. Midline lumbar tenderness to palpation, no step offs noted.     Moderate-severe TTP right hip. No limb length discrepancy noted. RLE does not appear shortened. 2+ DP pulses. Normal sensation to light touch. Limited ROM RLE 2/2 pain   Skin:     General: Skin is warm and dry.      Capillary Refill: Capillary refill takes less than 2 seconds.   Neurological:      General: No focal deficit present.      Mental Status: She is alert and oriented to person, place, and time.   Psychiatric:         Mood and Affect: Mood normal.         ED Medications  Medications   enoxaparin (LOVENOX) subcutaneous injection 30 mg (30 mg Subcutaneous Given 12/22/23 0544)   polyethylene glycol (MIRALAX) packet 17 g (17 g Oral Given 12/22/23 0836)   gabapentin (NEURONTIN) capsule 100 mg (100 mg Oral Given 12/21/23 2126)   oxyCODONE (ROXICODONE) split tablet 2.5 mg ( Oral See Alternative 12/22/23 1409)     Or   oxyCODONE (ROXICODONE) IR tablet 5 mg (5 mg Oral Given 12/22/23 1409)   HYDROmorphone HCl (DILAUDID) injection 0.2 mg (0.2 mg Intravenous Given 12/22/23 1531)   naloxone (NARCAN) 0.04 mg/mL syringe 0.04 mg (has no administration in time range)   ondansetron (ZOFRAN) injection 4 mg (has no administration in time range)   lidocaine (LIDODERM) 5 % patch 1 patch (1 patch Topical Medication Applied 12/22/23 0836)   levothyroxine tablet 75 mcg (75 mcg Oral Given 12/22/23 0543)    atorvastatin (LIPITOR) tablet 20 mg (20 mg Oral Given 12/21/23 1617)   oxybutynin (DITROPAN-XL) 24 hr tablet 10 mg (10 mg Oral Given 12/22/23 0836)   triamterene-hydrochlorothiazide (MAXZIDE-25) 37.5-25 mg per tablet 1 tablet (1 tablet Oral Given 12/22/23 0850)   aspirin chewable tablet 81 mg (81 mg Oral Given 12/22/23 0836)   hydrALAZINE (APRESOLINE) injection 5 mg (5 mg Intravenous Given 12/22/23 0255)   acetaminophen (TYLENOL) tablet 975 mg (975 mg Oral Given 12/22/23 1409)   methocarbamol (ROBAXIN) tablet 250 mg (has no administration in time range)   fentanyl citrate (PF) 100 MCG/2ML 25 mcg (25 mcg Intravenous Given 12/21/23 0124)   LORazepam (ATIVAN) tablet 0.5 mg (0.5 mg Oral Given 12/21/23 2155)       Diagnostic Studies  Results Reviewed       Procedure Component Value Units Date/Time    Vitamin D 25 hydroxy [[id number]]  (Normal) Collected: 12/21/23 1449    Lab Status: Final result Specimen: Blood from Arm, Left Updated: 12/21/23 1559     Vit D, 25-Hydroxy 32.9 ng/mL     Comprehensive metabolic panel [[id number]]  (Abnormal) Collected: 12/21/23 1449    Lab Status: Final result Specimen: Blood from Arm, Left Updated: 12/21/23 1553     Sodium 136 mmol/L      Potassium 4.0 mmol/L      Chloride 104 mmol/L      CO2 18 mmol/L      ANION GAP 14 mmol/L      BUN 21 mg/dL      Creatinine 0.58 mg/dL      Glucose 93 mg/dL      Calcium 8.6 mg/dL      AST 22 U/L      ALT 13 U/L      Alkaline Phosphatase 56 U/L      Total Protein 6.5 g/dL      Albumin 3.7 g/dL      Total Bilirubin 0.44 mg/dL      eGFR 80 ml/min/1.73sq m     Narrative:      National Kidney Disease Foundation guidelines for Chronic Kidney Disease (CKD):     Stage 1 with normal or high GFR (GFR > 90 mL/min/1.73 square meters)    Stage 2 Mild CKD (GFR = 60-89 mL/min/1.73 square meters)    Stage 3A Moderate CKD (GFR = 45-59 mL/min/1.73 square meters)    Stage 3B Moderate CKD (GFR = 30-44 mL/min/1.73 square meters)    Stage 4 Severe CKD (GFR = 15-29 mL/min/1.73  square meters)    Stage 5 End Stage CKD (GFR <15 mL/min/1.73 square meters)  Note: GFR calculation is accurate only with a steady state creatinine    PTH, intact [[id number]]  (Normal) Collected: 12/21/23 1449    Lab Status: Final result Specimen: Blood from Arm, Left Updated: 12/21/23 1541     PTH 46.0 pg/mL     TSH, 3rd generation [[id number]]  (Normal) Collected: 12/21/23 1449    Lab Status: Final result Specimen: Blood from Arm, Left Updated: 12/21/23 1537     TSH 3RD GENERATON 3.432 uIU/mL     Sedimentation rate, automated [[id number]]  (Normal) Collected: 12/21/23 1449    Lab Status: Final result Specimen: Blood from Arm, Left Updated: 12/21/23 1505     Sed Rate 19 mm/hour     Vitamin B12 [[id number]]  (Normal) Collected: 12/21/23 0511    Lab Status: Final result Specimen: Blood from Arm, Left Updated: 12/21/23 1425     Vitamin B-12 443 pg/mL     TSH, 3rd generation with Free T4 reflex [[id number]]  (Normal) Collected: 12/21/23 0511    Lab Status: Final result Specimen: Blood from Arm, Left Updated: 12/21/23 1425     TSH 3RD GENERATON 2.595 uIU/mL     CBC and differential [[id number]]  (Abnormal) Collected: 12/21/23 0556    Lab Status: Final result Specimen: Blood from Arm, Left Updated: 12/21/23 0612     WBC 9.12 Thousand/uL      RBC 3.27 Million/uL      Hemoglobin 10.4 g/dL      Hematocrit 31.8 %      MCV 97 fL      MCH 31.8 pg      MCHC 32.7 g/dL      RDW 12.8 %      MPV 9.4 fL      Platelets 179 Thousands/uL      nRBC 0 /100 WBCs      Neutrophils Relative 82 %      Immat GRANS % 1 %      Lymphocytes Relative 9 %      Monocytes Relative 7 %      Eosinophils Relative 1 %      Basophils Relative 0 %      Neutrophils Absolute 7.42 Thousands/µL      Immature Grans Absolute 0.08 Thousand/uL      Lymphocytes Absolute 0.86 Thousands/µL      Monocytes Absolute 0.64 Thousand/µL      Eosinophils Absolute 0.10 Thousand/µL      Basophils Absolute 0.02 Thousands/µL     Basic metabolic panel [[id number]]  (Abnormal) Collected:  12/21/23 0511    Lab Status: Final result Specimen: Blood from Arm, Left Updated: 12/21/23 0550     Sodium 138 mmol/L      Potassium 3.7 mmol/L      Chloride 105 mmol/L      CO2 24 mmol/L      ANION GAP 9 mmol/L      BUN 22 mg/dL      Creatinine 0.56 mg/dL      Glucose 112 mg/dL      Calcium 8.8 mg/dL      eGFR 81 ml/min/1.73sq m     Narrative:      National Kidney Disease Foundation guidelines for Chronic Kidney Disease (CKD):     Stage 1 with normal or high GFR (GFR > 90 mL/min/1.73 square meters)    Stage 2 Mild CKD (GFR = 60-89 mL/min/1.73 square meters)    Stage 3A Moderate CKD (GFR = 45-59 mL/min/1.73 square meters)    Stage 3B Moderate CKD (GFR = 30-44 mL/min/1.73 square meters)    Stage 4 Severe CKD (GFR = 15-29 mL/min/1.73 square meters)    Stage 5 End Stage CKD (GFR <15 mL/min/1.73 square meters)  Note: GFR calculation is accurate only with a steady state creatinine    Protime-INR [[id number]]  (Normal) Collected: 12/21/23 0140    Lab Status: Final result Specimen: Blood from Arm, Left Updated: 12/21/23 0219     Protime 12.9 seconds      INR 0.98    APTT [[id number]]  (Normal) Collected: 12/21/23 0140    Lab Status: Final result Specimen: Blood from Arm, Left Updated: 12/21/23 0219     PTT 30 seconds     Basic metabolic panel [[id number]] Collected: 12/21/23 0140    Lab Status: Final result Specimen: Blood from Arm, Left Updated: 12/21/23 0212     Sodium 138 mmol/L      Potassium 4.0 mmol/L      Chloride 106 mmol/L      CO2 24 mmol/L      ANION GAP 8 mmol/L      BUN 25 mg/dL      Creatinine 0.62 mg/dL      Glucose 106 mg/dL      Calcium 9.0 mg/dL      eGFR 79 ml/min/1.73sq m     Narrative:      National Kidney Disease Foundation guidelines for Chronic Kidney Disease (CKD):     Stage 1 with normal or high GFR (GFR > 90 mL/min/1.73 square meters)    Stage 2 Mild CKD (GFR = 60-89 mL/min/1.73 square meters)    Stage 3A Moderate CKD (GFR = 45-59 mL/min/1.73 square meters)    Stage 3B Moderate CKD (GFR = 30-44  mL/min/1.73 square meters)    Stage 4 Severe CKD (GFR = 15-29 mL/min/1.73 square meters)    Stage 5 End Stage CKD (GFR <15 mL/min/1.73 square meters)  Note: GFR calculation is accurate only with a steady state creatinine    CBC and differential [[id number]]  (Abnormal) Collected: 12/21/23 0140    Lab Status: Final result Specimen: Blood from Arm, Left Updated: 12/21/23 0152     WBC 7.80 Thousand/uL      RBC 3.49 Million/uL      Hemoglobin 11.1 g/dL      Hematocrit 34.4 %      MCV 99 fL      MCH 31.8 pg      MCHC 32.3 g/dL      RDW 13.0 %      MPV 9.9 fL      Platelets 223 Thousands/uL      nRBC 0 /100 WBCs      Neutrophils Relative 71 %      Immat GRANS % 2 %      Lymphocytes Relative 16 %      Monocytes Relative 9 %      Eosinophils Relative 2 %      Basophils Relative 0 %      Neutrophils Absolute 5.51 Thousands/µL      Immature Grans Absolute 0.15 Thousand/uL      Lymphocytes Absolute 1.27 Thousands/µL      Monocytes Absolute 0.69 Thousand/µL      Eosinophils Absolute 0.15 Thousand/µL      Basophils Absolute 0.03 Thousands/µL                    XR humerus right   Final Result by Stanley Caba MD (12/21 0957)      No acute osseous abnormality.      Workstation performed: [id number]         XR femur 2 views RIGHT   ED Interpretation by Kenyon Rosenthal DO (12/21 0255)   Abnormal   Pelvic rami fracture. No femoral fracture.      Final Result by Stanley Caba MD (12/21 0951)      Comminuted displaced fracture of the right inferior and superior pubic rami.      Subtle right sacral fracture was also noted on the prior CT.      The right femur appears to be intact.      Pelvic rami fracture was noted on the ER result.            Workstation performed: [id number]         XR pelvis ap only 1 or 2 vw   ED Interpretation by Kenyon Rosenthal DO (12/21 0255)   Abnormal   Pelvic rami fracture      Final Result by Stanley Caba MD (12/21 0951)      Comminuted displaced fracture of the right inferior and  superior pubic rami.      Subtle right sacral fracture was also noted on the prior CT.      The right femur appears to be intact.      Pelvic rami fracture was noted on the ER result.            Workstation performed: [id number]         CT head wo contrast   Final Result by Cirilo Medina MD (12/21 0141)      No acute intracranial abnormality.   Mild chronic small vessel ischemic changes.   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               Workstation performed: [id number]         CT spine cervical without contrast   Final Result by Cirilo Medina MD (12/21 0157)      No cervical spine fracture or traumatic malalignment.                  Workstation performed: [id number]         CT spine lumbar without contrast   Final Result by Cirilo Medina MD (12/21 0203)      No acute lumbar spine fracture or subluxation.      Acute nondisplaced fracture involving the right sacral wing.      Within the partially visualized right pelvis is mild inflammatory stranding, likely posttraumatic in nature. An enhanced CT pelvis is recommended for further evaluation.      Workstation performed: [id number]               Procedures  Procedures      ED Course  ED Course as of 12/22/23 1535   Thu Dec 21, 2023   0146 CT head wo contrast  No acute intracranial abnormality.  Mild chronic small vessel ischemic changes.  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   0204 CT spine cervical without contrast  No cervical spine fracture or traumatic malalignment.   0237 CT spine lumbar without contrast  No acute lumbar spine fracture or subluxation.     Acute nondisplaced fracture involving the right sacral wing.     Within the partially visualized right pelvis is mild inflammatory stranding, likely posttraumatic in nature. An enhanced CT pelvis is recommended for further evaluation.   0238 Admitted to trauma for right sacral wing and pubic rami fx                                        Medical Decision Making  Amount and/or  Complexity of Data Reviewed  Labs: ordered.  Radiology: ordered and independent interpretation performed. Decision-making details documented in ED Course.    Risk  Prescription drug management.  Decision regarding hospitalization.        ASSESSMENT: Patient is a 89 y.o. female who presents with right hip pain after unwitnessed mechanical fall with headstrike, no LOC. On ASA.    DDX includes but not limited to: fracture vs strain/sprain.   PLAN: XR right hip and femur, CT head and cervical spine. Treated with fentanyl.    See ED course for details.         Disposition  Final diagnoses:   Fall, initial encounter   Sacral fracture, closed (HCC)   Closed nondisplaced zone I fracture of sacrum, initial encounter (HCC)     Time reflects when diagnosis was documented in both MDM as applicable and the Disposition within this note       Time User Action Codes Description Comment    12/21/2023  2:10 AM Nba Mccoy [W19.XXXA] Fall, initial encounter     12/21/2023  2:10 AM Nba Mccoy [S32.10XA] Sacral fracture, closed (HCC)     12/21/2023  2:39 AM Kenyon Rosenthal Add [S32.110A] Closed nondisplaced zone I fracture of sacrum, initial encounter ([facility])     12/21/2023 10:46 AM Shreyas Crespo Add [S32.591A] Closed fracture of ramus of right pubis, initial encounter ([facility])           ED Disposition       ED Disposition   Admit    Condition   Stable    Date/Time   u Dec 21, 2023  2:36 AM    Comment   Case was discussed with TRAUMA and the patient's admission status was agreed to be Admission Status: inpatient status to the service of Dr. Dao .               Follow-up Information       Follow up With Specialties Details Why Contact Info    Rajiv Gonzalez MD Orthopedic Surgery Schedule an appointment as soon as possible for a visit in 4 week(s)  [address] [facility] [address]  [facility] [address] [phone]              Current Discharge Medication List        CONTINUE these medications  which have NOT CHANGED    Details   acetaminophen (TYLENOL) 500 mg tablet Take 500 mg by mouth every 6 (six) hours as needed      aspirin 81 mg chewable tablet Chew 81 mg daily      ibuprofen (MOTRIN) 600 mg tablet Take 1 tablet (600 mg total) by mouth 2 (two) times a day as needed for moderate pain or headaches Take with food  Qty: 60 tablet, Refills: 1    Associated Diagnoses: Pain      lactulose 20 g/30 mL Take 10 g by mouth daily as needed (Constipation)      levothyroxine 75 mcg tablet Take 75 mcg by mouth daily      LORazepam (ATIVAN) 1 mg tablet Take 0.5 mg by mouth daily      lovastatin (ALTOPREV) 20 MG 24 hr tablet Take 20 mg by mouth      meclizine (ANTIVERT) 25 mg tablet Take 25 mg by mouth daily as needed      oxybutynin (DITROPAN-XL) 10 MG 24 hr tablet Take 10 mg by mouth daily      triamterene-hydrochlorothiazide (DYAZIDE) 37.5-25 mg per capsule Take 1 capsule by mouth daily as needed           No discharge procedures on file.    PDMP Review       None             ED Provider  Attending physically available and evaluated Neha Molina. I managed the patient along with the ED Attending.    Electronically Signed by           Mayela Ruiz MD  12/22/23 0355

## 2023-12-21 NOTE — CONSULTS
Endocrinology Consult Note   Neha Molina 89 y.o. Female MRN: [id number]  Unit/Bed#: ED 15/ED 15  Encounter: [id number]    CC: Osteoporosis evaluation    Assessment & Plan   Assessment:  Neha Molina is a 89-year-old female with a past medical history significant for hypothyroidism, CVA, vitamin B12 deficiency, and hyperlipidemia who is admitted for a right pubic ramus fracture following a mechanical fall.     Plan:  1.  Osteoporosis  - Patient with reportedly a history of diagnosed osteoporosis in the past possibly treated with Boniva   - As evidenced by right sacral fracture after fall from standing height and CT lumbar spine (11/22/23) revealing moderate age-indeterminate compression deformities of T7 and T11, and a rib x-ray (10/10/23) revealing chronic-appearing fracture of the posterolateral aspect of left sixth rib and a sternal fracture   - Follow-up PTH, TSH, and 25-OH vitamin D to exclude secondary causes of osteoporosis   - Recommend a daily calcium intake of 1200 mg and vitamin D 1000 iu daily, with further supplementation pending above results   - Risk prevention with PT/OT evaluation while inpatient  - Follow up with endocrinology for DXA scan as an outpatient and to discuss medications for osteoporosis in 6-8 weeks once healed    2.  Hypothyroidism  - Continue home levothyroxine 75 mcg daily   - Obtain TSH and will adjust dose of levothyroxine if necessary       Subjective   HPI:   Neha Molina is a 89-year-old female with a past medical history significant for hypothyroidism on levothyroxine 75 mcg daily, CVA, vitamin B12 deficiency, and hyperlipidemia who sustained a mechanical fall from a standing height at her SNF last night and was discovered to have a right sacral fracture. Endocrinology is consulted for evaluation of possible underlying osteoporosis.     Patient states she sustained a mechanical fall after tripping yesterday and fell onto her right side. She vaguely recalls being  "diagnosed with osteoporosis in the past and treatment with Boniva, but is unable to elaborate further. She recalls multiple mechanical falls over the past year including in spring when she fell onto her back and around 5 weeks ago when she fell onto her chest. She claims that both times she lost her footing but she did not seek medical attention until later. Imaging did reveal multiple fractures at that time. She uses a cane to ambulate outside of her apartment but not while inside. She takes multivitamins and Tums occasionally but cannot recall taking calcium supplements regularly. Dietary calcium intake includes milk, cheese, and yogurt. She is unaware of any family history of osteoporosis or hip fractures. She endorses a 1 ppd tobacco history \"for a long time\" but denies alcohol or drug use.       Inpatient consult to Endocrinology  Consult performed by: Lety Galvez DO  Consult ordered by: Shreyas Crespo MD        Review of Systems   Constitutional:  Negative for chills, fever and unexpected weight change.   HENT:  Negative for ear pain, sore throat, trouble swallowing and voice change.    Eyes:  Negative for pain and visual disturbance.   Respiratory:  Negative for cough and shortness of breath.    Cardiovascular:  Negative for chest pain and palpitations.   Gastrointestinal:  Negative for abdominal pain and vomiting.   Endocrine: Negative for cold intolerance and heat intolerance.   Genitourinary:  Negative for dysuria and hematuria.   Musculoskeletal:  Negative for back pain.        R hip pain   Skin:  Negative for color change and rash.   Neurological:  Negative for seizures, syncope and numbness.   All other systems reviewed and are negative.       Historical Information   Past Medical History:   Diagnosis Date    CAD (coronary artery disease)      Past Surgical History:   Procedure Laterality Date    BREAST BIOPSY       Social History   Social History     Substance and Sexual Activity   Alcohol Use Never "     Social History     Substance and Sexual Activity   Drug Use Never     Social History     Tobacco Use   Smoking Status Former    Types: Cigarettes    Passive exposure: Past   Smokeless Tobacco Never     Family History:   Family History   Problem Relation Age of Onset    Stroke Sister        Meds/Allergies   Current Facility-Administered Medications   Medication Dose Route Frequency Provider Last Rate Last Admin    acetaminophen (TYLENOL) tablet 975 mg  975 mg Oral Q8H [facility] DIGNA Hawkins   975 mg at 12/21/23 1005    aspirin chewable tablet 81 mg  81 mg Oral Daily Kenyon Rosenthal, DO   81 mg at 12/21/23 1005    atorvastatin (LIPITOR) tablet 20 mg  20 mg Oral Daily With Dinner Kenyon Rosenthal, DO        enoxaparin (LOVENOX) subcutaneous injection 30 mg  30 mg Subcutaneous Q12H Kenyon Rosenthal, DO   30 mg at 12/21/23 0511    gabapentin (NEURONTIN) capsule 100 mg  100 mg Oral HS Kenyon Rosenthal, DO        hydrALAZINE (APRESOLINE) injection 5 mg  5 mg Intravenous Q6H PRN Kenyon Rosenthal, DO        HYDROmorphone HCl (DILAUDID) injection 0.2 mg  0.2 mg Intravenous Q2H PRN Kenyon Rosenthal, DO   0.2 mg at 12/21/23 0510    levothyroxine tablet 75 mcg  75 mcg Oral Early Morning Kenyon Rosenthal DO        lidocaine (LIDODERM) 5 % patch 1 patch  1 patch Topical Daily Kenyon Rosenthal DO        methocarbamol (ROBAXIN) tablet 250 mg  250 mg Oral Q6H PRN DIGNA Hawkins        naloxone (NARCAN) 0.04 mg/mL syringe 0.04 mg  0.04 mg Intravenous Q1MIN PRN Kenyon Rosenthal,         ondansetron (ZOFRAN) injection 4 mg  4 mg Intravenous Q4H PRN Kenyon Rosenthal, DO        oxybutynin (DITROPAN-XL) 24 hr tablet 10 mg  10 mg Oral Daily Kenyon Rosenthal, DO        oxyCODONE (ROXICODONE) split tablet 2.5 mg  2.5 mg Oral Q4H PRN Kenyon Rosenthal,         Or    oxyCODONE (ROXICODONE) IR tablet 5 mg  5 mg Oral Q4H PRN Kenyon Rosenthal, DO   5 mg at 12/21/23  1004    polyethylene glycol (MIRALAX) packet 17 g  17 g Oral Daily Kenyon Rosenthal,         triamterene-hydrochlorothiazide (MAXZIDE-25) 37.5-25 mg per tablet 1 tablet  1 tablet Oral Daily Kenyon Rosenthal DO         Current Outpatient Medications   Medication Sig Dispense Refill    acetaminophen (TYLENOL) 500 mg tablet Take 500 mg by mouth every 6 (six) hours as needed      aspirin 81 mg chewable tablet Chew 81 mg daily      ibuprofen (MOTRIN) 600 mg tablet Take 1 tablet (600 mg total) by mouth 2 (two) times a day as needed for moderate pain or headaches Take with food 60 tablet 1    lactulose 20 g/30 mL Take 10 g by mouth daily as needed (Constipation)      levothyroxine 75 mcg tablet Take 75 mcg by mouth daily      LORazepam (ATIVAN) 1 mg tablet Take 0.5 mg by mouth daily      lovastatin (ALTOPREV) 20 MG 24 hr tablet Take 20 mg by mouth      meclizine (ANTIVERT) 25 mg tablet Take 25 mg by mouth daily as needed      oxybutynin (DITROPAN-XL) 10 MG 24 hr tablet Take 10 mg by mouth daily      triamterene-hydrochlorothiazide (DYAZIDE) 37.5-25 mg per capsule Take 1 capsule by mouth daily as needed       No Known Allergies      Objective     Vitals:   Blood pressure 160/78, pulse 60, temperature 98.3 °F (36.8 °C), temperature source Oral, resp. rate 15, SpO2 97%.  No intake or output data in the 24 hours ending 12/21/23 1212  Invasive Devices       Peripheral Intravenous Line  Duration             Peripheral IV 12/21/23 Left;Ventral (anterior) Forearm <1 day                    Physical Exam  Constitutional:       Appearance: Normal appearance.   HENT:      Head: Normocephalic and atraumatic.      Mouth/Throat:      Mouth: Mucous membranes are moist.      Pharynx: Oropharynx is clear.   Eyes:      Extraocular Movements: Extraocular movements intact.      Pupils: Pupils are equal, round, and reactive to light.   Cardiovascular:      Rate and Rhythm: Normal rate and regular rhythm.      Pulses: Normal pulses.  "     Heart sounds: Normal heart sounds.   Pulmonary:      Effort: Pulmonary effort is normal. No respiratory distress.      Breath sounds: Normal breath sounds. No wheezing, rhonchi or rales.   Abdominal:      General: There is no distension.      Tenderness: There is no abdominal tenderness. There is no guarding or rebound.   Musculoskeletal:         General: Tenderness (R hip) present. No swelling.      Cervical back: Normal range of motion and neck supple. No tenderness.      Right lower leg: No edema.      Left lower leg: No edema.      Comments: Diminished ROM of lower extremities 2/2 pain   Skin:     General: Skin is warm and dry.      Findings: No abrasion or wound.   Neurological:      General: No focal deficit present.      Mental Status: She is alert and oriented to person, place, and time.   Psychiatric:         Mood and Affect: Mood normal.         Behavior: Behavior normal.         The history was obtained from the review of the chart, patient.    Lab Results:       Lab Results   Component Value Date    WBC 9.12 12/21/2023    HGB 10.4 (L) 12/21/2023    HCT 31.8 (L) 12/21/2023    MCV 97 12/21/2023     12/21/2023     Lab Results   Component Value Date/Time    BUN 22 12/21/2023 05:11 AM    K 3.7 12/21/2023 05:11 AM     12/21/2023 05:11 AM    CO2 24 12/21/2023 05:11 AM    CREATININE 0.56 (L) 12/21/2023 05:11 AM     No results for input(s): \"CHOL\", \"HDL\", \"LDL\", \"TRIG\", \"VLDL\" in the last 72 hours.  No results found for: \"MICROALBUR\", \"ONPB96WPJ\"  No results found for: \"POCGLU\"    Imaging Studies: I have personally reviewed pertinent reports.      Portions of the record may have been created with voice recognition software.     Please Tigertext questions to the clinician covering the \"[id number]\" Role. Thank you.  "

## 2023-12-21 NOTE — DISCHARGE INSTR - AVS FIRST PAGE
Discharge Instructions - Orthopedics  Neha Molina 89 y.o. female MRN: [id number]  Unit/Bed#: X ray    Weight Bearing Status:                                           Weight bearing as tolerated to the right lower extremity    DVT prophylaxis:  Continue aspirin 81 mg daily for 4 weeks    Pain:  Continue analgesics as directed    Dressing Instructions:   Please keep clean, dry and intact until follow up     Appt Instructions:   If you do not have your appointment, please call the clinic at [phone]  Otherwise follow up as scheduled.    Contact the office sooner if you experience any increased numbness/tingling in the extremities.      Miscellaneous:  Please follow up with Dr. Gonzalez in 4 weeks

## 2023-12-21 NOTE — QUICK NOTE
Cervical Collar Clearance:    The patient had a CT scan of the cervical spine demonstrating no acute injury. On exam, the patient had no midline point tenderness or paresthesias/numbness/weakness in the extremities. The patient had full range of motion (was then able to flex, extend, and rotate head laterally) without pain. There were no distracting injuries and the patient was not intoxicated.      The patient's cervical spine was cleared radiologically and clinically. Cervical collar removed at this time.     Galo Prescott DO  12/21/2023 3:01 AM

## 2023-12-22 PROCEDURE — 97116 GAIT TRAINING THERAPY: CPT

## 2023-12-22 PROCEDURE — 99232 SBSQ HOSP IP/OBS MODERATE 35: CPT | Performed by: SURGERY

## 2023-12-22 PROCEDURE — 97530 THERAPEUTIC ACTIVITIES: CPT

## 2023-12-22 PROCEDURE — 97167 OT EVAL HIGH COMPLEX 60 MIN: CPT

## 2023-12-22 RX ORDER — LORAZEPAM 0.5 MG/1
0.5 TABLET ORAL ONCE
Status: COMPLETED | OUTPATIENT
Start: 2023-12-22 | End: 2023-12-22

## 2023-12-22 RX ADMIN — LEVOTHYROXINE SODIUM 75 MCG: 75 TABLET ORAL at 05:43

## 2023-12-22 RX ADMIN — ACETAMINOPHEN 975 MG: 325 TABLET, FILM COATED ORAL at 21:21

## 2023-12-22 RX ADMIN — HYDROMORPHONE HYDROCHLORIDE 0.2 MG: 0.2 INJECTION, SOLUTION INTRAMUSCULAR; INTRAVENOUS; SUBCUTANEOUS at 21:21

## 2023-12-22 RX ADMIN — HYDROMORPHONE HYDROCHLORIDE 0.2 MG: 0.2 INJECTION, SOLUTION INTRAMUSCULAR; INTRAVENOUS; SUBCUTANEOUS at 08:36

## 2023-12-22 RX ADMIN — GABAPENTIN 100 MG: 100 CAPSULE ORAL at 21:20

## 2023-12-22 RX ADMIN — ACETAMINOPHEN 975 MG: 325 TABLET, FILM COATED ORAL at 05:43

## 2023-12-22 RX ADMIN — LIDOCAINE 5% 1 PATCH: 700 PATCH TOPICAL at 08:36

## 2023-12-22 RX ADMIN — METHOCARBAMOL 250 MG: 500 TABLET ORAL at 21:28

## 2023-12-22 RX ADMIN — OXYCODONE HYDROCHLORIDE 5 MG: 5 TABLET ORAL at 09:50

## 2023-12-22 RX ADMIN — HYDRALAZINE HYDROCHLORIDE 5 MG: 20 INJECTION, SOLUTION INTRAMUSCULAR; INTRAVENOUS at 02:55

## 2023-12-22 RX ADMIN — LORAZEPAM 0.5 MG: 0.5 TABLET ORAL at 23:02

## 2023-12-22 RX ADMIN — ACETAMINOPHEN 975 MG: 325 TABLET, FILM COATED ORAL at 14:09

## 2023-12-22 RX ADMIN — OXYCODONE HYDROCHLORIDE 5 MG: 5 TABLET ORAL at 14:09

## 2023-12-22 RX ADMIN — HYDROMORPHONE HYDROCHLORIDE 0.2 MG: 0.2 INJECTION, SOLUTION INTRAMUSCULAR; INTRAVENOUS; SUBCUTANEOUS at 15:31

## 2023-12-22 RX ADMIN — TRIAMTERENE AND HYDROCHLOROTHIAZIDE 1 TABLET: 37.5; 25 TABLET ORAL at 08:50

## 2023-12-22 RX ADMIN — OXYBUTYNIN 10 MG: 10 TABLET, FILM COATED, EXTENDED RELEASE ORAL at 08:36

## 2023-12-22 RX ADMIN — ATORVASTATIN CALCIUM 20 MG: 20 TABLET, FILM COATED ORAL at 18:28

## 2023-12-22 RX ADMIN — ENOXAPARIN SODIUM 30 MG: 30 INJECTION SUBCUTANEOUS at 05:44

## 2023-12-22 RX ADMIN — OXYCODONE HYDROCHLORIDE 5 MG: 5 TABLET ORAL at 05:51

## 2023-12-22 RX ADMIN — ASPIRIN 81 MG CHEWABLE TABLET 81 MG: 81 TABLET CHEWABLE at 08:36

## 2023-12-22 RX ADMIN — ENOXAPARIN SODIUM 30 MG: 30 INJECTION SUBCUTANEOUS at 18:28

## 2023-12-22 RX ADMIN — POLYETHYLENE GLYCOL 3350 17 G: 17 POWDER, FOR SOLUTION ORAL at 08:36

## 2023-12-22 RX ADMIN — OXYCODONE HYDROCHLORIDE 5 MG: 5 TABLET ORAL at 18:39

## 2023-12-22 NOTE — OCCUPATIONAL THERAPY NOTE
Occupational Therapy Evaluation     Patient Name: Neha Molina  Today's Date: 12/22/2023  Problem List  Active Problems:    Other specified hypothyroidism    Other hyperlipidemia    Sacral fracture, closed (HCC)    Fall    Acute pain due to trauma    Fracture of ramus of right pubis (HCC)    Past Medical History  Past Medical History:   Diagnosis Date    CAD (coronary artery disease)      Past Surgical History  Past Surgical History:   Procedure Laterality Date    BREAST BIOPSY           12/22/23 1420   OT Last Visit   OT Visit Date 12/22/23   Note Type   Note type Evaluation   Pain Assessment   Pain Assessment Tool 0-10   Pain Score 7   Pain Location/Orientation Location: Pelvis   Hospital Pain Intervention(s) Repositioned;Ambulation/increased activity;Emotional support;Relaxation technique   Restrictions/Precautions   Weight Bearing Precautions Per Order Yes   RUE Weight Bearing Per Order WBAT   LUE Weight Bearing Per Order WBAT   RLE Weight Bearing Per Order WBAT   LLE Weight Bearing Per Order WBAT   Other Precautions Fall Risk;Pain;Cognitive;Chair Alarm;Bed Alarm   Home Living   Type of Home Assisted living  ([facility])   Home Layout Two level;Elevator   Bathroom Shower/Tub Walk-in shower   Bathroom Toilet Raised   Bathroom Equipment Grab bars in shower;Grab bars around toilet   Home Equipment Cane   Prior Function   Level of [place] Independent with ADLs;Independent with functional mobility;Needs assistance with IADLS   Lives With Facility staff   Receives Help From Family;Personal care attendant   IADLs Family/Friend/Other provides transportation;Family/Friend/Other provides meals;Family/Friend/Other provides medication management   Falls in the last 6 months 1 to 4   Vocational Retired   Lifestyle   Autonomy I adls and mobility with SPC - facility manages iadls   Reciprocal Relationships supportive family and facility staff   Service to Others retired   Intrinsic Gratification mostly sedentary -  "attends activities at facility   Subjective   Subjective \"I don't know if I can do this\"   ADL   Eating Assistance 5  Supervision/Setup   Grooming Assistance 5  Supervision/Setup   UB Bathing Assistance 4  Minimal Assistance   LB Bathing Assistance 2  Maximal Assistance   UB Dressing Assistance 4  Minimal Assistance   LB Dressing Assistance 2  Maximal Assistance   Toileting Assistance  2  Maximal Assistance   Bed Mobility   Supine to Sit 3  Moderate assistance   Additional items Assist x 2   Transfers   Sit to Stand 3  Moderate assistance   Additional items Assist x 2   Stand to Sit 3  Moderate assistance   Additional items Assist x 2   Stand pivot 3  Moderate assistance   Additional items Assist x 2   Functional Mobility   Functional Mobility 3  Moderate assistance   Additional Comments x2   Additional items Hand hold assistance  (to take a few steps around to chair)   Balance   Static Sitting Fair   Dynamic Sitting Fair -   Static Standing Poor +   Dynamic Standing Poor +   Ambulatory Poor   Activity Tolerance   Activity Tolerance Patient limited by fatigue;Patient limited by pain   Medical Staff Made Aware KASEY Eason present for mobility 2* complexity, comorbidities and poor overall tolerance to activity/movement   RUE Assessment   RUE Assessment WFL   LUE Assessment   LUE Assessment WFL   Cognition   Arousal/Participation Arousable;Cooperative   Attention Attends with cues to redirect   Orientation Level Oriented X4   Memory Decreased short term memory;Decreased recall of recent events;Decreased recall of precautions   Following Commands Follows one step commands without difficulty   Assessment   Limitation Decreased ADL status;Decreased endurance;Decreased self-care trans   Prognosis Good   Assessment Pt is a 89 y.o. female who was admitted to [facility] on 12/21/2023 with R superior pubic rami fx and R sacral fx s/p fall  . Patient  has a past medical history of CAD (coronary artery disease).   At " baseline pt was completing adls and mobility independently with SPC -meals/homemaking provided by facility. Pt lives at [facility]  (recently moved there from [place]). Currently pt requires mod to max assist for overall ADLS and mod a x 2 for functional mobility/transfers. Pt currently presents with impairments in the following categories -limited home support, difficulty performing ADLS, and environment activity tolerance, endurance, standing balance/tolerance, and sitting balance/tolerance. These impairments, as well as pt's fatigue, pain, orthopedic restricitions , WBS , decreased caregiver support, risk for falls, and home environment  limit pt's ability to safely engage in all baseline areas of occupation, includingbathing, dressing, toileting, functional mobility/transfers, community mobility, social participation , and leisure activities  From OT standpoint, recommend Level II resources upon D/C. OT will continue to follow to address the below stated goals.   Goals   Patient Goals have less pain and be able to walk again   LTG Time Frame 10-14   Long Term Goal #1 refer to established goals below   Plan   Treatment Interventions ADL retraining;Functional transfer training;Endurance training;Patient/family training;Equipment evaluation/education;Compensatory technique education;Activityengagement   Goal Expiration Date 01/05/24   OT Frequency 2-3x/wk   Discharge Recommendation   Rehab Resource Intensity Level, OT II (Moderate Resource Intensity)   AM-PAC Daily Activity Inpatient   Lower Body Dressing 2   Bathing 2   Toileting 2   Upper Body Dressing 2   Grooming 3   Eating 4   Daily Activity Raw Score 15   Daily Activity Standardized Score (Calc for Raw Score >=11) 34.69   AM-PAC Applied Cognition Inpatient   Following a Speech/Presentation 3   Understanding Ordinary Conversation 4   Taking Medications 3   Remembering Where Things Are Placed or Put Away 3   Remembering List of 4-5 Errands 2   Taking Care of  Complicated Tasks 2   Applied Cognition Raw Score 17   Applied Cognition Standardized Score 36.52   End of Consult   Education Provided Yes   Patient Position at End of Consult Bedside chair;Bed/Chair alarm activated;All needs within reach   Nurse Communication Nurse aware of consult       OCCUPATIONAL THERAPY GOALS:      *S feeding/grooming after setup  *Min a adls after setup with min cues to initiate, sequence and complete tasks PRN  *Min a toileting and clothing management  *Min a bed mobility with fair to fair+ sitting balance on EOB to engage in light grooming/self care and enjoyable activities  *Min a transfers on/off all surfaces with fair to fair+ balance/safety  *Demonstrate fair to fair+ carryover with safe use of RW during functional tasks   *Increase dynamic balance to fair for improved safety during participation in adls and iadl tasks   *Increase activity tolerance to 25-30 min for participation in adls and enjoyable activities  *Assess DME needs  *Assist with safe d/c recommendations          The patient's raw score on the AM-PAC Daily Activity Inpatient Short Form is 15. A raw score of less than 19 suggests the patient may benefit from discharge to post-acute rehabilitation services. Please refer to the recommendation of the Occupational Therapist for safe discharge planning      Documentation Completed By:    SILVIA Mai/L  [employer] Certified - [id number]

## 2023-12-22 NOTE — CASE MANAGEMENT
Case Management Discharge Planning Note    Patient name Neha Molina  Location [facility] 617/[facility] 617-01 MRN [id number]  : 1932 Date 2023       Current Admission Date: 2023  Current Admission Diagnosis:Sacral fracture, closed (HCC)   Patient Active Problem List    Diagnosis Date Noted    Sacral fracture, closed (HCC) 2023    Fall 2023    Acute pain due to trauma 2023    Fracture of ramus of right pubis (HCC) 2023    Other specified glaucoma 2023    Accidental fall 2023    Vitamin B 12 deficiency 2023    Lactose intolerance 10/24/2023    Allergies 10/24/2023    Other specified hypothyroidism 10/24/2023    Other hyperlipidemia 10/24/2023    History of CVA (cerebrovascular accident) 10/24/2023    Anxiety 10/24/2023    Urinary frequency 10/24/2023    Pneumonia 10/24/2023    Rib fracture 10/24/2023      LOS (days): 1  Geometric Mean LOS (GMLOS) (days): 2.9  Days to GMLOS:1.6     OBJECTIVE:  Risk of Unplanned Readmission Score: 6.97         Current admission status: Inpatient   Preferred Pharmacy:   UNKNOWN - FOLLOW UP PRIOR TO DISCHARGE TO E-PRESCRIBE  No address on file      Primary Care Provider: Dinesh Johnston MD    Primary Insurance: MEDICARE  Secondary Insurance: [facility]    DISCHARGE DETAILS:    CM spoke to Heather Jackson [phone], of [facility], to discuss the pt's therapy recommendations.   Pt is technically independent in their care, but has assistance when needed. They want the pt to return, but will the pt being able to walk   150'. If pt can't then will need IP rehab. Cm will discuss with patient.

## 2023-12-22 NOTE — ASSESSMENT & PLAN NOTE
-Comminuted displaced fracture of the right inferior and superior pubic rami.   -See sacral fracture for management

## 2023-12-22 NOTE — ASSESSMENT & PLAN NOTE
-Acute nondisplaced fracture involving the right sacral wing.  -Ortho consulted, appreciate recommendations  -Ortho recommends outpatient follow-up in 4 weeks   -PT/OT  -WBAT  -Multimodal pain control  -DVT prophylaxis

## 2023-12-22 NOTE — CASE MANAGEMENT
Case Management Discharge Planning Note    Patient name Neha Molina  Location [facility] 617/[facility] 617-01 MRN [id number]  : 1932 Date 2023       Current Admission Date: 2023  Current Admission Diagnosis:Sacral fracture, closed (HCC)   Patient Active Problem List    Diagnosis Date Noted    Sacral fracture, closed (HCC) 2023    Fall 2023    Acute pain due to trauma 2023    Fracture of ramus of right pubis (HCC) 2023    Other specified glaucoma 2023    Accidental fall 2023    Vitamin B 12 deficiency 2023    Lactose intolerance 10/24/2023    Allergies 10/24/2023    Other specified hypothyroidism 10/24/2023    Other hyperlipidemia 10/24/2023    History of CVA (cerebrovascular accident) 10/24/2023    Anxiety 10/24/2023    Urinary frequency 10/24/2023    Pneumonia 10/24/2023    Rib fracture 10/24/2023      LOS (days): 1  Geometric Mean LOS (GMLOS) (days): 2.9  Days to GMLOS:1.5     OBJECTIVE:  Risk of Unplanned Readmission Score: 6.98         Current admission status: Inpatient   Preferred Pharmacy:   UNKNOWN - FOLLOW UP PRIOR TO DISCHARGE TO E-PRESCRIBE  No address on file      Primary Care Provider: Dinesh Johnston MD    Primary Insurance: MEDICARE  Secondary Insurance: [facility]    DISCHARGE DETAILS:    Pt requested that CM place referral to [facility], [facility] and [facility]

## 2023-12-22 NOTE — PROGRESS NOTES
[facility]  Progress Note  Name: Neha Molina I  MRN: [id number]  Unit/Bed#: [facility] 617-01 I Date of Admission: 12/21/2023   Date of Service: 12/22/2023 I Hospital Day: 1    Assessment/Plan   Fracture of ramus of right pubis (HCC)  Assessment & Plan  -Comminuted displaced fracture of the right inferior and superior pubic rami.   -See sacral fracture for management    Acute pain due to trauma  Assessment & Plan  -Multimodal pain control    Fall  Assessment & Plan  -Mechanical in nature  -PT/OT  -Geriatrics consult    Sacral fracture, closed (HCC)  Assessment & Plan  -Acute nondisplaced fracture involving the right sacral wing.  -Ortho consulted, appreciate recommendations  -Ortho recommends outpatient follow-up in 4 weeks   -PT/OT  -WBAT  -Multimodal pain control  -DVT prophylaxis    Other hyperlipidemia  Assessment & Plan  -Continue statin    Other specified hypothyroidism  Assessment & Plan  -Continue levothyroxine             Bowel Regimen: Miralax  VTE Prophylaxis:Enoxaparin (Lovenox)     Disposition: Acute rehab, pending placement     Subjective   Chief Complaint: Right hip pain     Subjective: Patient is tired and pain is controlled. She would like to go back to sleep.      Objective   Vitals:   Temp:  [97.9 °F (36.6 °C)-98.3 °F (36.8 °C)] 98.3 °F (36.8 °C)  HR:  [55-71] 70  Resp:  [15-22] 15  BP: (144-200)/(60-81) 155/61    I/O         12/20 0701  12/21 0700 12/21 0701  12/22 0700    Urine  900    Total Output  900    Net  -900                   Physical Exam:   GENERAL APPEARANCE: NAD  NEURO: Alert, no focal deficits  HEENT: EOMI, MMM  CV: RRR, no murmurs, rubs, or gallops  LUNGS: CTAB, normal work of breathing  GI: Soft, non-tender, non-distended  MSK: Tender to palpation over right hip, compartments soft, DP and PT pulses +2   SKIN: Warm, dry     Invasive Devices       Peripheral Intravenous Line  Duration             Peripheral IV 12/21/23 Left;Ventral (anterior)  Forearm 1 day              Drain  Duration             External Urinary Catheter <1 day                          Lab Results: Results: I have personally reviewed all pertinent laboratory/tests results  Imaging: I have personally reviewed pertinent reports.     Other Studies: None

## 2023-12-22 NOTE — PHYSICAL THERAPY NOTE
PHYSICAL THERAPY NOTE          Patient Name: Neha Molina  Today's Date: 12/22/2023 12/22/23 1430   Note Type   Note Type Treatment   Pain Assessment   Pain Assessment Tool 0-10   Pain Score 7   Pain Location/Orientation Location: Pelvis   Pain Onset/Description Onset: Ongoing;Frequency: Constant/Continuous;Descriptor: Aching   Effect of Pain on Daily Activities increased pain with activity   Patient's Stated Pain Goal No pain   Hospital Pain Intervention(s) Ambulation/increased activity;Repositioned   Restrictions/Precautions   Weight Bearing Precautions Per Order Yes   RLE Weight Bearing Per Order WBAT   Other Precautions Fall Risk;Pain;WBS;Multiple lines   General   Chart Reviewed Yes   Response to Previous Treatment Patient with no complaints from previous session.   Family/Caregiver Present No   Cognition   Overall Cognitive Status WFL   Arousal/Participation Alert   Attention Within functional limits   Orientation Level Oriented X4   Following Commands Follows one step commands with increased time or repetition   Subjective   Subjective Pt willing to participate in PT treatment session   Bed Mobility   Supine to Sit 3  Moderate assistance   Additional items Assist x 2;Increased time required;Verbal cues   Transfers   Sit to Stand 3  Moderate assistance   Additional items Assist x 2;Increased time required;Verbal cues   Stand to Sit 3  Moderate assistance   Additional items Assist x 2;Increased time required;Verbal cues   Additional Comments cues needed for hand placement during transfers   Ambulation/Elevation   Gait pattern Excessively slow;Short stride;Foward flexed;Inconsistent kasia   Gait Assistance 3  Moderate assist   Additional items Assist x 2   Assistive Device Other (Comment)  (b/l HHA)   Distance 3ft  (limited by pain)   Balance   Static Sitting Poor +   Static Standing Poor   Ambulatory Poor   Endurance  "Deficit   Endurance Deficit Yes   Endurance Deficit Description fatigue, pain   Activity Tolerance   Activity Tolerance Patient limited by fatigue;Patient limited by pain   Medical Staff Made Aware Piotr, OT; OT present during PT treatment session due to pts current medical presentation   Nurse Made Aware Pt approopriate to be seen and mobilize per nsg   Assessment   Prognosis Fair   Problem List Decreased strength;Decreased range of motion;Decreased endurance;Impaired balance;Decreased mobility;Pain;Orthopedic restrictions   Assessment Pt resting in bed at time of PT treatment session. Pt continues to report L pelvis pain however is willing to participate in PT treatment session. Pt was able to perform all bed mobility with mod A x 2 which is improved compared to previous session however increased time still required to complete. Pt was able to perform all transfers with mod Ax2 which is also improved however cues needed for hand placement during transfers as well as mod A to facilitate proper weight shifts during sit to stand transfers. Pt was able to tolerate ambulation with mod A x2 and the use of b/l HHA, but distances remain limited by pain. During ambulation, Pt was noted to have step to gait pattern with decreased stance time on R as well as decreased step length. At conclusion of PT treatment session, pt was assisted back into chair with all needs within reach and chair alarm engaged. PT will continue to follow during hospital stay. D/c recommendation when medically cleared is rehab.   Goals   Patient Goals \" to get better\"   LT Expiration Date 12/31/23   Plan   Treatment/Interventions Functional transfer training;LE strengthening/ROM;Therapeutic exercise;Endurance training;Patient/family training;Equipment eval/education;Bed mobility;Gait training;Spoke to nursing;OT   Progress Progressing toward goals   PT Frequency 3-5x/wk   Discharge Recommendation   Rehab Resource Intensity Level, PT I (Maximum " Resource Intensity)   AM-PAC Basic Mobility Inpatient   Turning in Flat Bed Without Bedrails 2   Lying on Back to Sitting on Edge of Flat Bed Without Bedrails 1   Moving Bed to Chair 1   Standing Up From Chair Using Arms 1   Walk in Room 1   Climb 3-5 Stairs With Railing 1   Basic Mobility Inpatient Raw Score 7   Turning Head Towards Sound 4   Follow Simple Instructions 4   Low Function Basic Mobility Raw Score  15   Low Function Basic Mobility Standardized Score  23.9   Highest Level Of Mobility   JH-HLM Goal 2: Bed activities/Dependent transfer   JH-HLM Achieved 4: Move to chair/commode   Portions of the documentation may have been created using voice recognition software.Occasional wrong word or sound alike substitutions may have occurred due to the inherent limitations of the voice recognition software. Read the chart carefully and recognize, using context, where substitutions have occurred.    Jenaro Beltran, PT, DPT

## 2023-12-22 NOTE — PLAN OF CARE
Problem: OCCUPATIONAL THERAPY ADULT  Goal: Performs self-care activities at highest level of function for planned discharge setting.  See evaluation for individualized goals.  Description: Treatment Interventions: ADL retraining, Functional transfer training, Endurance training, Patient/family training, Equipment evaluation/education, Compensatory technique education, Activityengagement          See flowsheet documentation for full assessment, interventions and recommendations.   Note: Limitation: Decreased ADL status, Decreased endurance, Decreased self-care trans  Prognosis: Good  Assessment: Pt is a 89 y.o. female who was admitted to [facility] on 12/21/2023 with R superior pubic rami fx and R sacral fx s/p fall  . Patient  has a past medical history of CAD (coronary artery disease).   At baseline pt was completing adls and mobility independently with SPC -meals/homemaking provided by facility. Pt lives at [facility]  (recently moved there from [place]). Currently pt requires mod to max assist for overall ADLS and mod a x 2 for functional mobility/transfers. Pt currently presents with impairments in the following categories -limited home support, difficulty performing ADLS, and environment activity tolerance, endurance, standing balance/tolerance, and sitting balance/tolerance. These impairments, as well as pt's fatigue, pain, orthopedic restricitions , WBS , decreased caregiver support, risk for falls, and home environment  limit pt's ability to safely engage in all baseline areas of occupation, includingbathing, dressing, toileting, functional mobility/transfers, community mobility, social participation , and leisure activities  From OT standpoint, recommend Level II resources upon D/C. OT will continue to follow to address the below stated goals.     Rehab Resource Intensity Level, OT: II (Moderate Resource Intensity)

## 2023-12-22 NOTE — PLAN OF CARE
Problem: PHYSICAL THERAPY ADULT  Goal: Performs mobility at highest level of function for planned discharge setting.  See evaluation for individualized goals.  Description: Treatment/Interventions: Functional transfer training, LE strengthening/ROM, Therapeutic exercise, Endurance training, Gait training, Bed mobility, Equipment eval/education          See flowsheet documentation for full assessment, interventions and recommendations.  Outcome: Progressing  Note: Prognosis: Fair  Problem List: Decreased strength, Decreased range of motion, Decreased endurance, Impaired balance, Decreased mobility, Pain, Orthopedic restrictions  Assessment: Pt resting in bed at time of PT treatment session. Pt continues to report L pelvis pain however is willing to participate in PT treatment session. Pt was able to perform all bed mobility with mod A x 2 which is improved compared to previous session however increased time still required to complete. Pt was able to perform all transfers with mod Ax2 which is also improved however cues needed for hand placement during transfers as well as mod A to facilitate proper weight shifts during sit to stand transfers. Pt was able to tolerate ambulation with mod A x2 and the use of b/l HHA, but distances remain limited by pain. During ambulation, Pt was noted to have step to gait pattern with decreased stance time on R as well as decreased step length. At conclusion of PT treatment session, pt was assisted back into chair with all needs within reach and chair alarm engaged. PT will continue to follow during hospital stay. D/c recommendation when medically cleared is rehab.  Barriers to Discharge:  (poor mobility status)     Rehab Resource Intensity Level, PT: I (Maximum Resource Intensity)    See flowsheet documentation for full assessment.

## 2023-12-22 NOTE — PLAN OF CARE
Problem: Prexisting or High Potential for Compromised Skin Integrity  Goal: Skin integrity is maintained or improved  Description: INTERVENTIONS:  - Identify patients at risk for skin breakdown  - Assess and monitor skin integrity  - Assess and monitor nutrition and hydration status  - Monitor labs   - Assess for incontinence   - Turn and reposition patient  - Assist with mobility/ambulation  - Relieve pressure over bony prominences  - Avoid friction and shearing  - Provide appropriate hygiene as needed including keeping skin clean and dry  - Evaluate need for skin moisturizer/barrier cream  - Collaborate with interdisciplinary team   - Patient/family teaching  - Consider wound care consult   Outcome: Progressing     Problem: Nutrition/Hydration-ADULT  Goal: Nutrient/Hydration intake appropriate for improving, restoring or maintaining nutritional needs  Description: Monitor and assess patient's nutrition/hydration status for malnutrition. Collaborate with interdisciplinary team and initiate plan and interventions as ordered.  Monitor patient's weight and dietary intake as ordered or per policy. Utilize nutrition screening tool and intervene as necessary. Determine patient's food preferences and provide high-protein, high-caloric foods as appropriate.     INTERVENTIONS:  - Monitor oral intake, urinary output, labs, and treatment plans  - Assess nutrition and hydration status and recommend course of action  - Evaluate amount of meals eaten  - Assist patient with eating if necessary   - Allow adequate time for meals  - Recommend/ encourage appropriate diets, oral nutritional supplements, and vitamin/mineral supplements  - Order, calculate, and assess calorie counts as needed  - Recommend, monitor, and adjust tube feedings and TPN/PPN based on assessed needs  - Assess need for intravenous fluids  - Provide specific nutrition/hydration education as appropriate  - Include patient/family/caregiver in decisions related to  nutrition  Outcome: Progressing

## 2023-12-22 NOTE — QUICK NOTE
"Lab work reviewed, and reveals PTH, 25-OH vitamin D, and TSH are within appropriate ranges.     Recommend daily calcium intake of 1200 mg with supplementation if necessary. Recommend daily vitamin D supplementation with 1000 iu daily. Continue levothyroxine. DXA scan to be performed outpatient and treatment options discussed further at outpatient follow-up.     Please Tigertext questions to the clinician covering the \"[id number]\" Role. Thank you.    "

## 2023-12-23 PROBLEM — J18.9 PNEUMONIA: Status: RESOLVED | Noted: 2023-10-24 | Resolved: 2023-12-23

## 2023-12-23 PROCEDURE — 99232 SBSQ HOSP IP/OBS MODERATE 35: CPT | Performed by: SURGERY

## 2023-12-23 RX ADMIN — HYDROMORPHONE HYDROCHLORIDE 0.2 MG: 0.2 INJECTION, SOLUTION INTRAMUSCULAR; INTRAVENOUS; SUBCUTANEOUS at 21:17

## 2023-12-23 RX ADMIN — ACETAMINOPHEN 975 MG: 325 TABLET, FILM COATED ORAL at 21:17

## 2023-12-23 RX ADMIN — OXYBUTYNIN 10 MG: 10 TABLET, FILM COATED, EXTENDED RELEASE ORAL at 09:52

## 2023-12-23 RX ADMIN — ATORVASTATIN CALCIUM 20 MG: 20 TABLET, FILM COATED ORAL at 16:58

## 2023-12-23 RX ADMIN — OXYCODONE HYDROCHLORIDE 5 MG: 5 TABLET ORAL at 23:13

## 2023-12-23 RX ADMIN — POLYETHYLENE GLYCOL 3350 17 G: 17 POWDER, FOR SOLUTION ORAL at 09:52

## 2023-12-23 RX ADMIN — ASPIRIN 81 MG CHEWABLE TABLET 81 MG: 81 TABLET CHEWABLE at 09:52

## 2023-12-23 RX ADMIN — ACETAMINOPHEN 975 MG: 325 TABLET, FILM COATED ORAL at 05:46

## 2023-12-23 RX ADMIN — GABAPENTIN 100 MG: 100 CAPSULE ORAL at 21:17

## 2023-12-23 RX ADMIN — ENOXAPARIN SODIUM 30 MG: 30 INJECTION SUBCUTANEOUS at 16:58

## 2023-12-23 RX ADMIN — ENOXAPARIN SODIUM 30 MG: 30 INJECTION SUBCUTANEOUS at 05:46

## 2023-12-23 RX ADMIN — LIDOCAINE 5% 1 PATCH: 700 PATCH TOPICAL at 09:52

## 2023-12-23 RX ADMIN — ACETAMINOPHEN 975 MG: 325 TABLET, FILM COATED ORAL at 13:28

## 2023-12-23 RX ADMIN — OXYCODONE HYDROCHLORIDE 5 MG: 5 TABLET ORAL at 19:10

## 2023-12-23 RX ADMIN — OXYCODONE HYDROCHLORIDE 5 MG: 5 TABLET ORAL at 06:04

## 2023-12-23 RX ADMIN — LEVOTHYROXINE SODIUM 75 MCG: 75 TABLET ORAL at 05:46

## 2023-12-23 RX ADMIN — TRIAMTERENE AND HYDROCHLOROTHIAZIDE 1 TABLET: 37.5; 25 TABLET ORAL at 09:52

## 2023-12-23 NOTE — PROGRESS NOTES
"[facility]  Progress Note  Name: Neha Molina I  MRN: [id number]  Unit/Bed#: [facility] 617-01 I Date of Admission: 12/21/2023   Date of Service: 12/23/2023 I Hospital Day: 2    Assessment/Plan   Fracture of ramus of right pubis (HCC)  Assessment & Plan  -Comminuted displaced fracture of the right inferior and superior pubic rami.   -See sacral fracture for management    Acute pain due to trauma  Assessment & Plan  -Multimodal pain control    Sacral fracture, closed (HCC)  Assessment & Plan  -Acute nondisplaced fracture involving the right sacral wing.  -Ortho consulted, appreciate recommendations  -Ortho recommends outpatient follow-up in 4 weeks   -PT/OT  -WBAT  -Multimodal pain control  -DVT prophylaxis    Other hyperlipidemia  Assessment & Plan  -Continue statin             Bowel Regimen: Miralax  VTE Prophylaxis:Enoxaparin (Lovenox)     Disposition: rehab    Subjective   Chief Complaint: Sacral pain    Subjective: \" [place] sore\"     Objective   Vitals:   Temp:  [97.8 °F (36.6 °C)-98.3 °F (36.8 °C)] 98.1 °F (36.7 °C)  HR:  [67-71] 67  Resp:  [16-18] 18  BP: (110-149)/(51-67) 127/53    I/O         12/21 0701  12/22 0700 12/22 0701  12/23 0700 12/23 0701  12/24 0700    P.O.   180    Total Intake   180    Urine 1550 700     Total Output 1550 700     Net -1550 -700 +180           Unmeasured Urine Occurrence  1 x              Physical Exam:   GENERAL APPEARANCE: Resting in bed  NEURO: GCS 15  HEENT: EOm's intact  CV: RRR< no complaints of chest pain  LUNGS: CTA buialterally  GI: tolerating a diet  : voiding  MSK: moving extremities  SKIN: warm and dry    Invasive Devices       Peripheral Intravenous Line  Duration             Peripheral IV 12/21/23 Left;Ventral (anterior) Forearm 2 days              Drain  Duration             External Urinary Catheter 1 day                          Lab Results: none  Imaging: none   Other Studies: none       "

## 2023-12-23 NOTE — PLAN OF CARE
Problem: Prexisting or High Potential for Compromised Skin Integrity  Goal: Skin integrity is maintained or improved  Description: INTERVENTIONS:  - Identify patients at risk for skin breakdown  - Assess and monitor skin integrity  - Assess and monitor nutrition and hydration status  - Monitor labs   - Assess for incontinence   - Turn and reposition patient  - Assist with mobility/ambulation  - Relieve pressure over bony prominences  - Avoid friction and shearing  - Provide appropriate hygiene as needed including keeping skin clean and dry  - Evaluate need for skin moisturizer/barrier cream  - Collaborate with interdisciplinary team   - Patient/family teaching  - Consider wound care consult   12/23/2023 1017 by Saira Barber RN  Outcome: Progressing  12/23/2023 1017 by Saira Barber RN  Outcome: Progressing     Problem: Nutrition/Hydration-ADULT  Goal: Nutrient/Hydration intake appropriate for improving, restoring or maintaining nutritional needs  Description: Monitor and assess patient's nutrition/hydration status for malnutrition. Collaborate with interdisciplinary team and initiate plan and interventions as ordered.  Monitor patient's weight and dietary intake as ordered or per policy. Utilize nutrition screening tool and intervene as necessary. Determine patient's food preferences and provide high-protein, high-caloric foods as appropriate.     INTERVENTIONS:  - Monitor oral intake, urinary output, labs, and treatment plans  - Assess nutrition and hydration status and recommend course of action  - Evaluate amount of meals eaten  - Assist patient with eating if necessary   - Allow adequate time for meals  - Recommend/ encourage appropriate diets, oral nutritional supplements, and vitamin/mineral supplements  - Order, calculate, and assess calorie counts as needed  - Recommend, monitor, and adjust tube feedings and TPN/PPN based on assessed needs  - Assess need for intravenous fluids  - Provide specific  nutrition/hydration education as appropriate  - Include patient/family/caregiver in decisions related to nutrition  12/23/2023 1017 by Saira Barber RN  Outcome: Progressing  12/23/2023 1017 by Saira Barber RN  Outcome: Progressing     Problem: PAIN - ADULT  Goal: Verbalizes/displays adequate comfort level or baseline comfort level  Description: Interventions:  - Encourage patient to monitor pain and request assistance  - Assess pain using appropriate pain scale  - Administer analgesics based on type and severity of pain and evaluate response  - Implement non-pharmacological measures as appropriate and evaluate response  - Consider cultural and social influences on pain and pain management  - Notify physician/advanced practitioner if interventions unsuccessful or patient reports new pain  Outcome: Progressing     Problem: INFECTION - ADULT  Goal: Absence or prevention of progression during hospitalization  Description: INTERVENTIONS:  - Assess and monitor for signs and symptoms of infection  - Monitor lab/diagnostic results  - Monitor all insertion sites, i.e. indwelling lines, tubes, and drains  - Monitor endotracheal if appropriate and nasal secretions for changes in amount and color  - Eugene appropriate cooling/warming therapies per order  - Administer medications as ordered  - Instruct and encourage patient and family to use good hand hygiene technique  - Identify and instruct in appropriate isolation precautions for identified infection/condition  Outcome: Progressing     Problem: SAFETY ADULT  Goal: Patient will remain free of falls  Description: INTERVENTIONS:  - Educate patient/family on patient safety including physical limitations  - Instruct patient to call for assistance with activity   - Consult OT/PT to assist with strengthening/mobility   - Keep Call bell within reach  - Keep bed low and locked with side rails adjusted as appropriate  - Keep care items and personal belongings within reach  -  Initiate and maintain comfort rounds  - Make Fall Risk Sign visible to staff  - Offer Toileting every  Hours, in advance of need  - Initiate/Maintain alarm  - Obtain necessary fall risk management equipment:   - Apply yellow socks and bracelet for high fall risk patients  - Consider moving patient to room near nurses station  Outcome: Progressing  Goal: Maintain or return to baseline ADL function  Description: INTERVENTIONS:  -  Assess patient's ability to carry out ADLs; assess patient's baseline for ADL function and identify physical deficits which impact ability to perform ADLs (bathing, care of mouth/teeth, toileting, grooming, dressing, etc.)  - Assess/evaluate cause of self-care deficits   - Assess range of motion  - Assess patient's mobility; develop plan if impaired  - Assess patient's need for assistive devices and provide as appropriate  - Encourage maximum independence but intervene and supervise when necessary  - Involve family in performance of ADLs  - Assess for home care needs following discharge   - Consider OT consult to assist with ADL evaluation and planning for discharge  - Provide patient education as appropriate  Outcome: Progressing  Goal: Maintains/Returns to pre admission functional level  Description: INTERVENTIONS:  - Perform AM-PAC 6 Click Basic Mobility/ Daily Activity assessment daily.  - Set and communicate daily mobility goal to care team and patient/family/caregiver.   - Collaborate with rehabilitation services on mobility goals if consulted  - Perform Range of Motion  times a day.  - Reposition patient every  hours.  - Dangle patient  times a day  - Stand patient  times a day  - Ambulate patient  times a day  - Out of bed to chair  times a day   - Out of bed for meal times a day  - Out of bed for toileting  - Record patient progress and toleration of activity level   Outcome: Progressing     Problem: DISCHARGE PLANNING  Goal: Discharge to home or other facility with appropriate  resources  Description: INTERVENTIONS:  - Identify barriers to discharge w/patient and caregiver  - Arrange for needed discharge resources and transportation as appropriate  - Identify discharge learning needs (meds, wound care, etc.)  - Arrange for interpretive services to assist at discharge as needed  - Refer to Case Management Department for coordinating discharge planning if the patient needs post-hospital services based on physician/advanced practitioner order or complex needs related to functional status, cognitive ability, or social support system  Outcome: Progressing     Problem: Knowledge Deficit  Goal: Patient/family/caregiver demonstrates understanding of disease process, treatment plan, medications, and discharge instructions  Description: Complete learning assessment and assess knowledge base.  Interventions:  - Provide teaching at level of understanding  - Provide teaching via preferred learning methods  Outcome: Progressing

## 2023-12-24 LAB
ANION GAP SERPL CALCULATED.3IONS-SCNC: 9 MMOL/L
BASOPHILS # BLD AUTO: 0.03 THOUSANDS/ÂΜL (ref 0–0.1)
BASOPHILS NFR BLD AUTO: 0 % (ref 0–1)
BUN SERPL-MCNC: 20 MG/DL (ref 5–25)
CALCIUM SERPL-MCNC: 9.4 MG/DL (ref 8.4–10.2)
CHLORIDE SERPL-SCNC: 101 MMOL/L (ref 96–108)
CO2 SERPL-SCNC: 25 MMOL/L (ref 21–32)
CREAT SERPL-MCNC: 0.55 MG/DL (ref 0.6–1.3)
EOSINOPHIL # BLD AUTO: 0.18 THOUSAND/ÂΜL (ref 0–0.61)
EOSINOPHIL NFR BLD AUTO: 2 % (ref 0–6)
ERYTHROCYTE [DISTWIDTH] IN BLOOD BY AUTOMATED COUNT: 13.2 % (ref 11.6–15.1)
GFR SERPL CREATININE-BSD FRML MDRD: 82 ML/MIN/1.73SQ M
GLUCOSE SERPL-MCNC: 109 MG/DL (ref 65–140)
HCT VFR BLD AUTO: 30.6 % (ref 34.8–46.1)
HGB BLD-MCNC: 10.3 G/DL (ref 11.5–15.4)
IMM GRANULOCYTES # BLD AUTO: 0.07 THOUSAND/UL (ref 0–0.2)
IMM GRANULOCYTES NFR BLD AUTO: 1 % (ref 0–2)
LYMPHOCYTES # BLD AUTO: 1.27 THOUSANDS/ÂΜL (ref 0.6–4.47)
LYMPHOCYTES NFR BLD AUTO: 15 % (ref 14–44)
MCH RBC QN AUTO: 32 PG (ref 26.8–34.3)
MCHC RBC AUTO-ENTMCNC: 33.7 G/DL (ref 31.4–37.4)
MCV RBC AUTO: 95 FL (ref 82–98)
MONOCYTES # BLD AUTO: 0.98 THOUSAND/ÂΜL (ref 0.17–1.22)
MONOCYTES NFR BLD AUTO: 12 % (ref 4–12)
NEUTROPHILS # BLD AUTO: 5.73 THOUSANDS/ÂΜL (ref 1.85–7.62)
NEUTS SEG NFR BLD AUTO: 70 % (ref 43–75)
NRBC BLD AUTO-RTO: 0 /100 WBCS
PLATELET # BLD AUTO: 199 THOUSANDS/UL (ref 149–390)
PMV BLD AUTO: 10.1 FL (ref 8.9–12.7)
POTASSIUM SERPL-SCNC: 4 MMOL/L (ref 3.5–5.3)
RBC # BLD AUTO: 3.22 MILLION/UL (ref 3.81–5.12)
SODIUM SERPL-SCNC: 135 MMOL/L (ref 135–147)
WBC # BLD AUTO: 8.26 THOUSAND/UL (ref 4.31–10.16)

## 2023-12-24 PROCEDURE — 85025 COMPLETE CBC W/AUTO DIFF WBC: CPT | Performed by: NURSE PRACTITIONER

## 2023-12-24 PROCEDURE — 99232 SBSQ HOSP IP/OBS MODERATE 35: CPT | Performed by: SURGERY

## 2023-12-24 PROCEDURE — 80048 BASIC METABOLIC PNL TOTAL CA: CPT | Performed by: NURSE PRACTITIONER

## 2023-12-24 RX ADMIN — LIDOCAINE 5% 1 PATCH: 700 PATCH TOPICAL at 08:52

## 2023-12-24 RX ADMIN — ATORVASTATIN CALCIUM 20 MG: 20 TABLET, FILM COATED ORAL at 17:39

## 2023-12-24 RX ADMIN — ACETAMINOPHEN 975 MG: 325 TABLET, FILM COATED ORAL at 21:33

## 2023-12-24 RX ADMIN — OXYBUTYNIN 10 MG: 10 TABLET, FILM COATED, EXTENDED RELEASE ORAL at 08:51

## 2023-12-24 RX ADMIN — ASPIRIN 81 MG CHEWABLE TABLET 81 MG: 81 TABLET CHEWABLE at 08:51

## 2023-12-24 RX ADMIN — POLYETHYLENE GLYCOL 3350 17 G: 17 POWDER, FOR SOLUTION ORAL at 08:52

## 2023-12-24 RX ADMIN — TRIAMTERENE AND HYDROCHLOROTHIAZIDE 1 TABLET: 37.5; 25 TABLET ORAL at 08:51

## 2023-12-24 RX ADMIN — ACETAMINOPHEN 975 MG: 325 TABLET, FILM COATED ORAL at 06:49

## 2023-12-24 RX ADMIN — LEVOTHYROXINE SODIUM 75 MCG: 75 TABLET ORAL at 06:49

## 2023-12-24 RX ADMIN — GABAPENTIN 100 MG: 100 CAPSULE ORAL at 21:33

## 2023-12-24 RX ADMIN — ENOXAPARIN SODIUM 30 MG: 30 INJECTION SUBCUTANEOUS at 06:49

## 2023-12-24 RX ADMIN — ENOXAPARIN SODIUM 30 MG: 30 INJECTION SUBCUTANEOUS at 17:39

## 2023-12-24 RX ADMIN — OXYCODONE HYDROCHLORIDE 5 MG: 5 TABLET ORAL at 21:33

## 2023-12-24 NOTE — PROGRESS NOTES
[facility]  Progress Note  Name: Neha Molina I  MRN: [id number]  [facility] Date of Admission: 12/21/2023   Date of Service: 12/24/2023 I Hospital Day: 3    Assessment/Plan   Acute pain due to trauma  Assessment & Plan  -Multimodal pain control    Fall  Assessment & Plan  -Mechanical in nature  -PT/OT  -Geriatrics consult    Sacral fracture, closed (HCC)  Assessment & Plan  -Acute nondisplaced fracture involving the right sacral wing.  -Ortho consulted, appreciate recommendations  -Ortho recommends outpatient follow-up in 4 weeks   -PT/OT  -WBAT  -Multimodal pain control  -DVT prophylaxis    Other hyperlipidemia  Assessment & Plan  -Continue statin    Other specified hypothyroidism  Assessment & Plan  -Continue levothyroxine             Bowel Regimen: Miralax  DVT :  Lovenox    Subjective   Chief Complaint: lower back discomfort    Subjective: I am doing okay, hungry for breakfast     Objective   Vitals:   Temp:  [98 °F (36.7 °C)-99.1 °F (37.3 °C)] 98 °F (36.7 °C)  HR:  [73-81] 76  Resp:  [14-20] 20  BP: (140-146)/(51-62) 140/62    I/O         12/22 0701  12/23 0700 12/23 0701  12/24 0700 12/24 0701  12/25 0700    P.O.  180 320    Total Intake  180 320    Urine 700 550 900    Stool  1 0    Total Output 700 551 900    Net -448 -308 -644           Unmeasured Urine Occurrence 1 x 1 x 0 x    Unmeasured Stool Occurrence   1 x             Physical Exam:   GENERAL APPEARANCE: comfortable  NEURO: GCS 14-15  HEENT: EOm's intact  CV: RRR< no complaints of chest pain  LUNGS: CTA bilaterally, encourage IS  GI: Appetite good  : voiding  MSK: moves extremities  SKIN: warm and dry    Invasive Devices       Peripheral Intravenous Line  Duration             Peripheral IV 12/21/23 Left;Ventral (anterior) Forearm 3 days              Drain  Duration             External Urinary Catheter 2 days                          Lab Results:    Latest Reference Range & Units 12/24/23  05:02   Sodium 135 - 147 mmol/L 135   Potassium 3.5 - 5.3 mmol/L 4.0   Chloride 96 - 108 mmol/L 101   CO2 21 - 32 mmol/L 25   Anion Gap mmol/L 9   BUN 5 - 25 mg/dL 20   Creatinine 0.60 - 1.30 mg/dL 0.55 (L)   Glucose, Random 65 - 140 mg/dL 109   Calcium 8.4 - 10.2 mg/dL 9.4   eGFR ml/min/1.73sq m 82   WBC 4.31 - 10.16 Thousand/uL 8.26   Red Blood Cell Count 3.81 - 5.12 Million/uL 3.22 (L)   Hemoglobin 11.5 - 15.4 g/dL 10.3 (L)   HCT 34.8 - 46.1 % 30.6 (L)   MCV 82 - 98 fL 95   MCH 26.8 - 34.3 pg 32.0   MCHC 31.4 - 37.4 g/dL 33.7   RDW 11.6 - 15.1 % 13.2   Platelet Count 149 - 390 Thousands/uL 199   MPV 8.9 - 12.7 fL 10.1   nRBC /100 WBCs 0   (L): Data is abnormally low  Imaging: none   Other Studies: none

## 2023-12-25 LAB
ANION GAP SERPL CALCULATED.3IONS-SCNC: 11 MMOL/L
ANION GAP SERPL CALCULATED.3IONS-SCNC: 6 MMOL/L
BASOPHILS # BLD AUTO: 0.03 THOUSANDS/ÂΜL (ref 0–0.1)
BASOPHILS NFR BLD AUTO: 0 % (ref 0–1)
BUN SERPL-MCNC: 26 MG/DL (ref 5–25)
BUN SERPL-MCNC: 30 MG/DL (ref 5–25)
CALCIUM SERPL-MCNC: 9.2 MG/DL (ref 8.4–10.2)
CALCIUM SERPL-MCNC: 9.2 MG/DL (ref 8.4–10.2)
CHLORIDE SERPL-SCNC: 102 MMOL/L (ref 96–108)
CHLORIDE SERPL-SCNC: 98 MMOL/L (ref 96–108)
CO2 SERPL-SCNC: 24 MMOL/L (ref 21–32)
CO2 SERPL-SCNC: 26 MMOL/L (ref 21–32)
CREAT SERPL-MCNC: 0.69 MG/DL (ref 0.6–1.3)
CREAT SERPL-MCNC: 0.75 MG/DL (ref 0.6–1.3)
EOSINOPHIL # BLD AUTO: 0.21 THOUSAND/ÂΜL (ref 0–0.61)
EOSINOPHIL NFR BLD AUTO: 3 % (ref 0–6)
ERYTHROCYTE [DISTWIDTH] IN BLOOD BY AUTOMATED COUNT: 13.3 % (ref 11.6–15.1)
GFR SERPL CREATININE-BSD FRML MDRD: 69 ML/MIN/1.73SQ M
GFR SERPL CREATININE-BSD FRML MDRD: 76 ML/MIN/1.73SQ M
GLUCOSE SERPL-MCNC: 111 MG/DL (ref 65–140)
GLUCOSE SERPL-MCNC: 116 MG/DL (ref 65–140)
HCT VFR BLD AUTO: 32.1 % (ref 34.8–46.1)
HGB BLD-MCNC: 10.7 G/DL (ref 11.5–15.4)
IMM GRANULOCYTES # BLD AUTO: 0.09 THOUSAND/UL (ref 0–0.2)
IMM GRANULOCYTES NFR BLD AUTO: 1 % (ref 0–2)
LYMPHOCYTES # BLD AUTO: 1.45 THOUSANDS/ÂΜL (ref 0.6–4.47)
LYMPHOCYTES NFR BLD AUTO: 19 % (ref 14–44)
MCH RBC QN AUTO: 31.8 PG (ref 26.8–34.3)
MCHC RBC AUTO-ENTMCNC: 33.3 G/DL (ref 31.4–37.4)
MCV RBC AUTO: 96 FL (ref 82–98)
MONOCYTES # BLD AUTO: 1.05 THOUSAND/ÂΜL (ref 0.17–1.22)
MONOCYTES NFR BLD AUTO: 14 % (ref 4–12)
NEUTROPHILS # BLD AUTO: 4.9 THOUSANDS/ÂΜL (ref 1.85–7.62)
NEUTS SEG NFR BLD AUTO: 63 % (ref 43–75)
NRBC BLD AUTO-RTO: 0 /100 WBCS
PLATELET # BLD AUTO: 218 THOUSANDS/UL (ref 149–390)
PMV BLD AUTO: 9.7 FL (ref 8.9–12.7)
POTASSIUM SERPL-SCNC: 4.3 MMOL/L (ref 3.5–5.3)
POTASSIUM SERPL-SCNC: 4.6 MMOL/L (ref 3.5–5.3)
RBC # BLD AUTO: 3.36 MILLION/UL (ref 3.81–5.12)
SODIUM SERPL-SCNC: 130 MMOL/L (ref 135–147)
SODIUM SERPL-SCNC: 137 MMOL/L (ref 135–147)
WBC # BLD AUTO: 7.73 THOUSAND/UL (ref 4.31–10.16)

## 2023-12-25 PROCEDURE — 80048 BASIC METABOLIC PNL TOTAL CA: CPT | Performed by: NURSE PRACTITIONER

## 2023-12-25 PROCEDURE — 99232 SBSQ HOSP IP/OBS MODERATE 35: CPT | Performed by: SURGERY

## 2023-12-25 PROCEDURE — 85025 COMPLETE CBC W/AUTO DIFF WBC: CPT | Performed by: NURSE PRACTITIONER

## 2023-12-25 RX ORDER — MELATONIN
1000 DAILY
Status: DISCONTINUED | OUTPATIENT
Start: 2023-12-25 | End: 2023-12-27 | Stop reason: HOSPADM

## 2023-12-25 RX ADMIN — ACETAMINOPHEN 975 MG: 325 TABLET, FILM COATED ORAL at 21:57

## 2023-12-25 RX ADMIN — TRIAMTERENE AND HYDROCHLOROTHIAZIDE 1 TABLET: 37.5; 25 TABLET ORAL at 09:25

## 2023-12-25 RX ADMIN — Medication 1000 UNITS: at 09:16

## 2023-12-25 RX ADMIN — OXYBUTYNIN 10 MG: 10 TABLET, FILM COATED, EXTENDED RELEASE ORAL at 09:17

## 2023-12-25 RX ADMIN — ENOXAPARIN SODIUM 30 MG: 30 INJECTION SUBCUTANEOUS at 05:10

## 2023-12-25 RX ADMIN — OXYCODONE HYDROCHLORIDE 5 MG: 5 TABLET ORAL at 21:57

## 2023-12-25 RX ADMIN — OXYCODONE HYDROCHLORIDE 5 MG: 5 TABLET ORAL at 16:32

## 2023-12-25 RX ADMIN — ATORVASTATIN CALCIUM 20 MG: 20 TABLET, FILM COATED ORAL at 16:32

## 2023-12-25 RX ADMIN — GABAPENTIN 100 MG: 100 CAPSULE ORAL at 21:57

## 2023-12-25 RX ADMIN — LEVOTHYROXINE SODIUM 75 MCG: 75 TABLET ORAL at 05:10

## 2023-12-25 RX ADMIN — LIDOCAINE 5% 1 PATCH: 700 PATCH TOPICAL at 09:45

## 2023-12-25 RX ADMIN — ACETAMINOPHEN 975 MG: 325 TABLET, FILM COATED ORAL at 05:09

## 2023-12-25 RX ADMIN — POLYETHYLENE GLYCOL 3350 17 G: 17 POWDER, FOR SOLUTION ORAL at 09:16

## 2023-12-25 RX ADMIN — ACETAMINOPHEN 975 MG: 325 TABLET, FILM COATED ORAL at 16:18

## 2023-12-25 RX ADMIN — ASPIRIN 81 MG CHEWABLE TABLET 81 MG: 81 TABLET CHEWABLE at 09:16

## 2023-12-25 RX ADMIN — ENOXAPARIN SODIUM 30 MG: 30 INJECTION SUBCUTANEOUS at 18:34

## 2023-12-25 NOTE — PLAN OF CARE
Problem: Prexisting or High Potential for Compromised Skin Integrity  Goal: Skin integrity is maintained or improved  Description: INTERVENTIONS:  - Identify patients at risk for skin breakdown  - Assess and monitor skin integrity  - Assess and monitor nutrition and hydration status  - Monitor labs   - Assess for incontinence   - Turn and reposition patient  - Assist with mobility/ambulation  - Relieve pressure over bony prominences  - Avoid friction and shearing  - Provide appropriate hygiene as needed including keeping skin clean and dry  - Evaluate need for skin moisturizer/barrier cream  - Collaborate with interdisciplinary team   - Patient/family teaching  - Consider wound care consult   Outcome: Progressing     Problem: Nutrition/Hydration-ADULT  Goal: Nutrient/Hydration intake appropriate for improving, restoring or maintaining nutritional needs  Description: Monitor and assess patient's nutrition/hydration status for malnutrition. Collaborate with interdisciplinary team and initiate plan and interventions as ordered.  Monitor patient's weight and dietary intake as ordered or per policy. Utilize nutrition screening tool and intervene as necessary. Determine patient's food preferences and provide high-protein, high-caloric foods as appropriate.     INTERVENTIONS:  - Monitor oral intake, urinary output, labs, and treatment plans  - Assess nutrition and hydration status and recommend course of action  - Evaluate amount of meals eaten  - Assist patient with eating if necessary   - Allow adequate time for meals  - Recommend/ encourage appropriate diets, oral nutritional supplements, and vitamin/mineral supplements  - Order, calculate, and assess calorie counts as needed  - Recommend, monitor, and adjust tube feedings and TPN/PPN based on assessed needs  - Assess need for intravenous fluids  - Provide specific nutrition/hydration education as appropriate  - Include patient/family/caregiver in decisions related to  nutrition  Outcome: Progressing     Problem: PAIN - ADULT  Goal: Verbalizes/displays adequate comfort level or baseline comfort level  Description: Interventions:  - Encourage patient to monitor pain and request assistance  - Assess pain using appropriate pain scale  - Administer analgesics based on type and severity of pain and evaluate response  - Implement non-pharmacological measures as appropriate and evaluate response  - Consider cultural and social influences on pain and pain management  - Notify physician/advanced practitioner if interventions unsuccessful or patient reports new pain  Outcome: Progressing     Problem: INFECTION - ADULT  Goal: Absence or prevention of progression during hospitalization  Description: INTERVENTIONS:  - Assess and monitor for signs and symptoms of infection  - Monitor lab/diagnostic results  - Monitor all insertion sites, i.e. indwelling lines, tubes, and drains  - Monitor endotracheal if appropriate and nasal secretions for changes in amount and color  - [place] appropriate cooling/warming therapies per order  - Administer medications as ordered  - Instruct and encourage patient and family to use good hand hygiene technique  - Identify and instruct in appropriate isolation precautions for identified infection/condition  Outcome: Progressing     Problem: SAFETY ADULT  Goal: Patient will remain free of falls  Description: INTERVENTIONS:  - Educate patient/family on patient safety including physical limitations  - Instruct patient to call for assistance with activity   - Consult OT/PT to assist with strengthening/mobility   - Keep Call bell within reach  - Keep bed low and locked with side rails adjusted as appropriate  - Keep care items and personal belongings within reach  - Initiate and maintain comfort rounds  - Make Fall Risk Sign visible to staff  - Offer Toileting every 2 Hours, in advance of need  - Initiate/Maintain bed alarm  - Obtain necessary fall risk management  equipment:   - Apply yellow socks and bracelet for high fall risk patients  - Consider moving patient to room near nurses station  Outcome: Progressing  Goal: Maintain or return to baseline ADL function  Description: INTERVENTIONS:  -  Assess patient's ability to carry out ADLs; assess patient's baseline for ADL function and identify physical deficits which impact ability to perform ADLs (bathing, care of mouth/teeth, toileting, grooming, dressing, etc.)  - Assess/evaluate cause of self-care deficits   - Assess range of motion  - Assess patient's mobility; develop plan if impaired  - Assess patient's need for assistive devices and provide as appropriate  - Encourage maximum independence but intervene and supervise when necessary  - Involve family in performance of ADLs  - Assess for home care needs following discharge   - Consider OT consult to assist with ADL evaluation and planning for discharge  - Provide patient education as appropriate  Outcome: Progressing  Goal: Maintains/Returns to pre admission functional level  Description: INTERVENTIONS:  - Perform AM-PAC 6 Click Basic Mobility/ Daily Activity assessment daily.  - Set and communicate daily mobility goal to care team and patient/family/caregiver.   - Collaborate with rehabilitation services on mobility goals if consulted  - Perform Range of Motion 3 times a day.  - Reposition patient every 2 hours.  - Dangle patient 3 times a day  - Stand patient 2 times a day  - Ambulate patient 3 times a day  - Out of bed to chair 3 times a day   - Out of bed for meals 3 times a day  - Out of bed for toileting  - Record patient progress and toleration of activity level   Outcome: Progressing     Problem: DISCHARGE PLANNING  Goal: Discharge to home or other facility with appropriate resources  Description: INTERVENTIONS:  - Identify barriers to discharge w/patient and caregiver  - Arrange for needed discharge resources and transportation as appropriate  - Identify  discharge learning needs (meds, wound care, etc.)  - Arrange for interpretive services to assist at discharge as needed  - Refer to Case Management Department for coordinating discharge planning if the patient needs post-hospital services based on physician/advanced practitioner order or complex needs related to functional status, cognitive ability, or social support system  Outcome: Progressing     Problem: Knowledge Deficit  Goal: Patient/family/caregiver demonstrates understanding of disease process, treatment plan, medications, and discharge instructions  Description: Complete learning assessment and assess knowledge base.  Interventions:  - Provide teaching at level of understanding  - Provide teaching via preferred learning methods  Outcome: Progressing

## 2023-12-25 NOTE — PROGRESS NOTES
[facility]  Progress Note  Name: Neha Molina I  MRN: [id number]  Unit/Bed#: [facility] 617-01 I Date of Admission: 12/21/2023   Date of Service: 12/25/2023 I Hospital Day: 4    Assessment/Plan   Fracture of ramus of right pubis (HCC)  Assessment & Plan  -Comminuted displaced fracture of the right inferior and superior pubic rami.   -See sacral fracture for management    Acute pain due to trauma  Assessment & Plan  -Multimodal pain control    Fall  Assessment & Plan  -Mechanical in nature  -PT/OT  -Geriatrics consult    Sacral fracture, closed (HCC)  Assessment & Plan  -Acute nondisplaced fracture involving the right sacral wing.  -Ortho consulted, appreciate recommendations  -Ortho recommends outpatient follow-up in 4 weeks   -PT/OT  -WBAT  -Multimodal pain control  -DVT prophylaxis    Other hyperlipidemia  Assessment & Plan  -Continue statin    Other specified hypothyroidism  Assessment & Plan  -Continue levothyroxine             Bowel Regimen: Miralax  VTE Prophylaxis:Enoxaparin (Lovenox)     Disposition: rehab    Subjective   Chief Complaint: none, waiting to go to Rehab    Subjective: I'm a little tired today     Objective   Vitals:   Temp:  [97 °F (36.1 °C)-97.9 °F (36.6 °C)] 97.1 °F (36.2 °C)  HR:  [72-83] 73  Resp:  [17-18] 17  BP: (130-147)/() 131/102    I/O         12/23 0701  12/24 0700 12/24 0701  12/25 0700 12/25 0701  12/26 0700    P.O. 180 320     Total Intake 180 320     Urine 550 2150     Stool 1 0     Total Output 551 2150     Net -371 -1830            Unmeasured Urine Occurrence 1 x 0 x     Unmeasured Stool Occurrence  1 x              Physical Exam:   GENERAL APPEARANCE: resting in bed  NEURO: GCS 14-15  HEENT: EOm's intact  CV: RRR< no complaints of chest pain  LUNGS: CTA bilaterally  GI: tolerating a diet, good appetite  : voiding  MSK: moving extremities  SKIN: warm and dry  Invasive Devices       Peripheral Intravenous Line  Duration              Peripheral IV 12/25/23 Right Antecubital <1 day              Drain  Duration             External Urinary Catheter 3 days                          Lab Results:    Latest Reference Range & Units 12/25/23 05:02   Sodium 135 - 147 mmol/L 130 (L)   Potassium 3.5 - 5.3 mmol/L 4.3   Chloride 96 - 108 mmol/L 98   CO2 21 - 32 mmol/L 26   Anion Gap mmol/L 6   BUN 5 - 25 mg/dL 26 (H)   Creatinine 0.60 - 1.30 mg/dL 0.69   Glucose, Random 65 - 140 mg/dL 116   Calcium 8.4 - 10.2 mg/dL 9.2   eGFR ml/min/1.73sq m 76   WBC 4.31 - 10.16 Thousand/uL 7.73   Red Blood Cell Count 3.81 - 5.12 Million/uL 3.36 (L)   Hemoglobin 11.5 - 15.4 g/dL 10.7 (L)   HCT 34.8 - 46.1 % 32.1 (L)   MCV 82 - 98 fL 96   MCH 26.8 - 34.3 pg 31.8   MCHC 31.4 - 37.4 g/dL 33.3   RDW 11.6 - 15.1 % 13.3   Platelet Count 149 - 390 Thousands/uL 218   MPV 8.9 - 12.7 fL 9.7   nRBC /100 WBCs 0   Neutrophils % 43 - 75 % 63   (L): Data is abnormally low  (H): Data is abnormally high  Imaging: none   Other Studies: none

## 2023-12-26 LAB
ANION GAP SERPL CALCULATED.3IONS-SCNC: 8 MMOL/L
BASOPHILS # BLD AUTO: 0.03 THOUSANDS/ÂΜL (ref 0–0.1)
BASOPHILS NFR BLD AUTO: 0 % (ref 0–1)
BUN SERPL-MCNC: 30 MG/DL (ref 5–25)
CALCIUM SERPL-MCNC: 9.4 MG/DL (ref 8.4–10.2)
CHLORIDE SERPL-SCNC: 104 MMOL/L (ref 96–108)
CO2 SERPL-SCNC: 26 MMOL/L (ref 21–32)
CREAT SERPL-MCNC: 0.75 MG/DL (ref 0.6–1.3)
EOSINOPHIL # BLD AUTO: 0.22 THOUSAND/ÂΜL (ref 0–0.61)
EOSINOPHIL NFR BLD AUTO: 3 % (ref 0–6)
ERYTHROCYTE [DISTWIDTH] IN BLOOD BY AUTOMATED COUNT: 13.2 % (ref 11.6–15.1)
GFR SERPL CREATININE-BSD FRML MDRD: 69 ML/MIN/1.73SQ M
GLUCOSE SERPL-MCNC: 118 MG/DL (ref 65–140)
HCT VFR BLD AUTO: 33.2 % (ref 34.8–46.1)
HGB BLD-MCNC: 11.2 G/DL (ref 11.5–15.4)
IMM GRANULOCYTES # BLD AUTO: 0.14 THOUSAND/UL (ref 0–0.2)
IMM GRANULOCYTES NFR BLD AUTO: 2 % (ref 0–2)
LYMPHOCYTES # BLD AUTO: 1.21 THOUSANDS/ÂΜL (ref 0.6–4.47)
LYMPHOCYTES NFR BLD AUTO: 15 % (ref 14–44)
MCH RBC QN AUTO: 32.2 PG (ref 26.8–34.3)
MCHC RBC AUTO-ENTMCNC: 33.7 G/DL (ref 31.4–37.4)
MCV RBC AUTO: 95 FL (ref 82–98)
MONOCYTES # BLD AUTO: 1.18 THOUSAND/ÂΜL (ref 0.17–1.22)
MONOCYTES NFR BLD AUTO: 15 % (ref 4–12)
NEUTROPHILS # BLD AUTO: 5.13 THOUSANDS/ÂΜL (ref 1.85–7.62)
NEUTS SEG NFR BLD AUTO: 65 % (ref 43–75)
NRBC BLD AUTO-RTO: 0 /100 WBCS
PLATELET # BLD AUTO: 231 THOUSANDS/UL (ref 149–390)
PMV BLD AUTO: 9.2 FL (ref 8.9–12.7)
POTASSIUM SERPL-SCNC: 4.5 MMOL/L (ref 3.5–5.3)
RBC # BLD AUTO: 3.48 MILLION/UL (ref 3.81–5.12)
SODIUM SERPL-SCNC: 138 MMOL/L (ref 135–147)
WBC # BLD AUTO: 7.91 THOUSAND/UL (ref 4.31–10.16)

## 2023-12-26 PROCEDURE — 87081 CULTURE SCREEN ONLY: CPT | Performed by: SURGERY

## 2023-12-26 PROCEDURE — 85025 COMPLETE CBC W/AUTO DIFF WBC: CPT | Performed by: NURSE PRACTITIONER

## 2023-12-26 PROCEDURE — 80048 BASIC METABOLIC PNL TOTAL CA: CPT | Performed by: NURSE PRACTITIONER

## 2023-12-26 PROCEDURE — 99232 SBSQ HOSP IP/OBS MODERATE 35: CPT | Performed by: SURGERY

## 2023-12-26 RX ADMIN — GABAPENTIN 100 MG: 100 CAPSULE ORAL at 22:10

## 2023-12-26 RX ADMIN — METHOCARBAMOL 250 MG: 500 TABLET ORAL at 18:08

## 2023-12-26 RX ADMIN — ASPIRIN 81 MG CHEWABLE TABLET 81 MG: 81 TABLET CHEWABLE at 08:09

## 2023-12-26 RX ADMIN — ENOXAPARIN SODIUM 30 MG: 30 INJECTION SUBCUTANEOUS at 18:09

## 2023-12-26 RX ADMIN — ACETAMINOPHEN 975 MG: 325 TABLET, FILM COATED ORAL at 13:09

## 2023-12-26 RX ADMIN — OXYBUTYNIN 10 MG: 10 TABLET, FILM COATED, EXTENDED RELEASE ORAL at 08:09

## 2023-12-26 RX ADMIN — OXYCODONE HYDROCHLORIDE 5 MG: 5 TABLET ORAL at 19:57

## 2023-12-26 RX ADMIN — Medication 1000 UNITS: at 08:09

## 2023-12-26 RX ADMIN — OXYCODONE HYDROCHLORIDE 5 MG: 5 TABLET ORAL at 13:11

## 2023-12-26 RX ADMIN — ACETAMINOPHEN 975 MG: 325 TABLET, FILM COATED ORAL at 06:27

## 2023-12-26 RX ADMIN — ATORVASTATIN CALCIUM 20 MG: 20 TABLET, FILM COATED ORAL at 18:09

## 2023-12-26 RX ADMIN — LIDOCAINE 5% 1 PATCH: 700 PATCH TOPICAL at 08:09

## 2023-12-26 RX ADMIN — ACETAMINOPHEN 975 MG: 325 TABLET, FILM COATED ORAL at 22:10

## 2023-12-26 RX ADMIN — LEVOTHYROXINE SODIUM 75 MCG: 75 TABLET ORAL at 06:27

## 2023-12-26 RX ADMIN — POLYETHYLENE GLYCOL 3350 17 G: 17 POWDER, FOR SOLUTION ORAL at 08:10

## 2023-12-26 RX ADMIN — ENOXAPARIN SODIUM 30 MG: 30 INJECTION SUBCUTANEOUS at 06:27

## 2023-12-26 RX ADMIN — TRIAMTERENE AND HYDROCHLOROTHIAZIDE 1 TABLET: 37.5; 25 TABLET ORAL at 08:09

## 2023-12-26 NOTE — CASE MANAGEMENT
Case Management Discharge Planning Note    Patient name Neha Molina  Location [facility] 617/[facility] 617-01 MRN [id number]  : 1932 Date 2023       Current Admission Date: 2023  Current Admission Diagnosis:Sacral fracture, closed (HCC)   Patient Active Problem List    Diagnosis Date Noted    Sacral fracture, closed (HCC) 2023    Fall 2023    Acute pain due to trauma 2023    Fracture of ramus of right pubis (HCC) 2023    Other specified glaucoma 2023    Accidental fall 2023    Vitamin B 12 deficiency 2023    Lactose intolerance 10/24/2023    Allergies 10/24/2023    Other specified hypothyroidism 10/24/2023    Other hyperlipidemia 10/24/2023    History of CVA (cerebrovascular accident) 10/24/2023    Anxiety 10/24/2023    Urinary frequency 10/24/2023    Rib fracture 10/24/2023      LOS (days): 5  Geometric Mean LOS (GMLOS) (days): 2.9  Days to GMLOS:-2.7     OBJECTIVE:  Risk of Unplanned Readmission Score: 8.53         Current admission status: Inpatient   Preferred Pharmacy:   UNKNOWN - FOLLOW UP PRIOR TO DISCHARGE TO E-PRESCRIBE  No address on file      Primary Care Provider: Dinesh Johnston MD    Primary Insurance: MEDICARE  Secondary Insurance: [facility]    DISCHARGE DETAILS:    Other Referral/Resources/Interventions Provided:  Referral Comments: CM with with pt at bedside as requested. Pt would like to reconsider SNF options and request Patient choice list to review with her sister. Patient choice list printed and provided at bedside and reviewed options with pt. Only acepting options [facility] and [facility]. Pt will follow with CM tomorrow regarding final determination.

## 2023-12-26 NOTE — PLAN OF CARE
Problem: Prexisting or High Potential for Compromised Skin Integrity  Goal: Skin integrity is maintained or improved  Description: INTERVENTIONS:  - Identify patients at risk for skin breakdown  - Assess and monitor skin integrity  - Assess and monitor nutrition and hydration status  - Monitor labs   - Assess for incontinence   - Turn and reposition patient  - Assist with mobility/ambulation  - Relieve pressure over bony prominences  - Avoid friction and shearing  - Provide appropriate hygiene as needed including keeping skin clean and dry  - Evaluate need for skin moisturizer/barrier cream  - Collaborate with interdisciplinary team   - Patient/family teaching  - Consider wound care consult   12/26/2023 0758 by Fabrice Boudreaux RN  Outcome: Progressing  12/26/2023 0758 by Fabrice Boudreaux RN  Outcome: Progressing     Problem: Nutrition/Hydration-ADULT  Goal: Nutrient/Hydration intake appropriate for improving, restoring or maintaining nutritional needs  Description: Monitor and assess patient's nutrition/hydration status for malnutrition. Collaborate with interdisciplinary team and initiate plan and interventions as ordered.  Monitor patient's weight and dietary intake as ordered or per policy. Utilize nutrition screening tool and intervene as necessary. Determine patient's food preferences and provide high-protein, high-caloric foods as appropriate.     INTERVENTIONS:  - Monitor oral intake, urinary output, labs, and treatment plans  - Assess nutrition and hydration status and recommend course of action  - Evaluate amount of meals eaten  - Assist patient with eating if necessary   - Allow adequate time for meals  - Recommend/ encourage appropriate diets, oral nutritional supplements, and vitamin/mineral supplements  - Order, calculate, and assess calorie counts as needed  - Recommend, monitor, and adjust tube feedings and TPN/PPN based on assessed needs  - Assess need for intravenous fluids  - Provide  specific nutrition/hydration education as appropriate  - Include patient/family/caregiver in decisions related to nutrition  12/26/2023 0758 by Fabrice Boudreaux RN  Outcome: Progressing  12/26/2023 0758 by Fabrice Boudreaux RN  Outcome: Progressing     Problem: PAIN - ADULT  Goal: Verbalizes/displays adequate comfort level or baseline comfort level  Description: Interventions:  - Encourage patient to monitor pain and request assistance  - Assess pain using appropriate pain scale  - Administer analgesics based on type and severity of pain and evaluate response  - Implement non-pharmacological measures as appropriate and evaluate response  - Consider cultural and social influences on pain and pain management  - Notify physician/advanced practitioner if interventions unsuccessful or patient reports new pain  12/26/2023 0758 by Fabrice Boudreaux RN  Outcome: Progressing  12/26/2023 0758 by Fabrice Boudreaux RN  Outcome: Progressing     Problem: SAFETY ADULT  Goal: Patient will remain free of falls  Description: INTERVENTIONS:  - Educate patient/family on patient safety including physical limitations  - Instruct patient to call for assistance with activity   - Consult OT/PT to assist with strengthening/mobility   - Keep Call bell within reach  - Keep bed low and locked with side rails adjusted as appropriate  - Keep care items and personal belongings within reach  - Initiate and maintain comfort rounds  - Make Fall Risk Sign visible to staff  - Offer Toileting every  Hours, in advance of need  - Initiate/Maintain alarm  - Obtain necessary fall risk management equipment:   - Apply yellow socks and bracelet for high fall risk patients  - Consider moving patient to room near nurses station  12/26/2023 0758 by Fabrice Boudreaux RN  Outcome: Progressing  12/26/2023 0758 by Fabrice Boudreaux RN  Outcome: Progressing  Goal: Maintain or return to baseline ADL function  Description: INTERVENTIONS:  -  Assess patient's  ability to carry out ADLs; assess patient's baseline for ADL function and identify physical deficits which impact ability to perform ADLs (bathing, care of mouth/teeth, toileting, grooming, dressing, etc.)  - Assess/evaluate cause of self-care deficits   - Assess range of motion  - Assess patient's mobility; develop plan if impaired  - Assess patient's need for assistive devices and provide as appropriate  - Encourage maximum independence but intervene and supervise when necessary  - Involve family in performance of ADLs  - Assess for home care needs following discharge   - Consider OT consult to assist with ADL evaluation and planning for discharge  - Provide patient education as appropriate  12/26/2023 0758 by Fabrice Boudreaux RN  Outcome: Progressing  12/26/2023 0758 by Fabrice Boudreaux RN  Outcome: Progressing  Goal: Maintains/Returns to pre admission functional level  Description: INTERVENTIONS:  - Perform AM-PAC 6 Click Basic Mobility/ Daily Activity assessment daily.  - Set and communicate daily mobility goal to care team and patient/family/caregiver.   - Collaborate with rehabilitation services on mobility goals if consulted  - Perform Range of Motion times a day.  - Reposition patient every  hours.  - Dangle patient  times a day  - Stand patient  times a day  - Ambulate patient  times a day  - Out of bed to chair times a day   - Out of bed for meals times a day  - Out of bed for toileting  - Record patient progress and toleration of activity level   12/26/2023 0758 by Fabrice Boudreaux RN  Outcome: Progressing  12/26/2023 0758 by Fabrice Boudreaux RN  Outcome: Progressing     Problem: MUSCULOSKELETAL - ADULT  Goal: Maintain or return mobility to safest level of function  Description: INTERVENTIONS:  - Assess patient's ability to carry out ADLs; assess patient's baseline for ADL function and identify physical deficits which impact ability to perform ADLs (bathing, care of mouth/teeth, toileting,  grooming, dressing, etc.)  - Assess/evaluate cause of self-care deficits   - Assess range of motion  - Assess patient's mobility  - Assess patient's need for assistive devices and provide as appropriate  - Encourage maximum independence but intervene and supervise when necessary  - Involve family in performance of ADLs  - Assess for home care needs following discharge   - Consider OT consult to assist with ADL evaluation and planning for discharge  - Provide patient education as appropriate  Outcome: Progressing  Goal: Maintain proper alignment of affected body part  Description: INTERVENTIONS:  - Support, maintain and protect limb and body alignment  - Provide patient/ family with appropriate education  Outcome: Progressing

## 2023-12-26 NOTE — CASE MANAGEMENT
Case Management Discharge Planning Note    Patient name Neha Molina  Location [facility] 617/[facility] 617- MRN [id number]  : 1932 Date 2023       Current Admission Date: 2023  Current Admission Diagnosis:Sacral fracture, closed (HCC)   Patient Active Problem List    Diagnosis Date Noted    Sacral fracture, closed (HCC) 2023    Fall 2023    Acute pain due to trauma 2023    Fracture of ramus of right pubis (HCC) 2023    Other specified glaucoma 2023    Accidental fall 2023    Vitamin B 12 deficiency 2023    Lactose intolerance 10/24/2023    Allergies 10/24/2023    Other specified hypothyroidism 10/24/2023    Other hyperlipidemia 10/24/2023    History of CVA (cerebrovascular accident) 10/24/2023    Anxiety 10/24/2023    Urinary frequency 10/24/2023    Rib fracture 10/24/2023      LOS (days): 5  Geometric Mean LOS (GMLOS) (days): 2.9  Days to GMLOS:-2.6     OBJECTIVE:  Risk of Unplanned Readmission Score: 8.5         Current admission status: Inpatient   Preferred Pharmacy:   UNKNOWN - FOLLOW UP PRIOR TO DISCHARGE TO E-PRESCRIBE  No address on file      Primary Care Provider: Dinesh Johnston MD    Primary Insurance: MEDICARE  Secondary Insurance: [facility]    DISCHARGE DETAILS:    Contacts  Patient Contacts: Layne Vega (Relative) [phone]  Relationship to Patient:: Family  Contact Method: Phone  Phone Number: [phone]  Reason/Outcome: Discharge Planning, Continuity of Care    Other Referral/Resources/Interventions Provided:  Interventions: Short Term Rehab  Referral Comments: CM met with patient at bedside in order to discuss rehab options with patient. Pt requested to accept bed at [facility]. [facility] reserved via [facility]. CM team will continue to follow.    Treatment Team Recommendation: Short Term Rehab  Discharge Destination Plan:: Short Term Rehab

## 2023-12-26 NOTE — PLAN OF CARE
Problem: DISCHARGE PLANNING  Goal: Discharge to home or other facility with appropriate resources  Description: INTERVENTIONS:  - Identify barriers to discharge w/patient and caregiver  - Arrange for needed discharge resources and transportation as appropriate  - Identify discharge learning needs (meds, wound care, etc.)  - Arrange for interpretive services to assist at discharge as needed  - Refer to Case Management Department for coordinating discharge planning if the patient needs post-hospital services based on physician/advanced practitioner order or complex needs related to functional status, cognitive ability, or social support system  12/25/2023 2330 by Bettye Tijerina RN  Outcome: Progressing  12/25/2023 2329 by Bettye Tijerina RN  Outcome: Progressing     Problem: SAFETY ADULT  Goal: Maintains/Returns to pre admission functional level  Description: INTERVENTIONS:  - Perform AM-PAC 6 Click Basic Mobility/ Daily Activity assessment daily.  - Set and communicate daily mobility goal to care team and patient/family/caregiver.   - Collaborate with rehabilitation services on mobility goals if consulted    - Record patient progress and toleration of activity level   12/25/2023 2330 by Bettye Tijerina RN  Outcome: Progressing  12/25/2023 2329 by Bettye Tijerina RN  Outcome: Progressing    Problem: Prexisting or High Potential for Compromised Skin Integrity  Goal: Skin integrity is maintained or improved  Description: INTERVENTIONS:  - Identify patients at risk for skin breakdown  - Assess and monitor skin integrity  - Assess and monitor nutrition and hydration status  - Monitor labs   - Assess for incontinence   - Turn and reposition patient  - Assist with mobility/ambulation  - Relieve pressure over bony prominences  - Avoid friction and shearing  - Provide appropriate hygiene as needed including keeping skin clean and dry  - Evaluate need for skin moisturizer/barrier cream  - Collaborate with  interdisciplinary team   - Patient/family teaching  - Consider wound care consult   12/25/2023 2330 by Bettye Tijerina, RN  Outcome: Progressing  12/25/2023 2329 by Bettye Tijerina, RN  Outcome: Progressing

## 2023-12-26 NOTE — PLAN OF CARE
Problem: INFECTION - ADULT  Goal: Absence or prevention of progression during hospitalization  Description: INTERVENTIONS:  - Assess and monitor for signs and symptoms of infection  - Monitor lab/diagnostic results  - Monitor all insertion sites, i.e. indwelling lines, tubes, and drains  - Monitor endotracheal if appropriate and nasal secretions for changes in amount and color  - [place] appropriate cooling/warming therapies per order  - Administer medications as ordered  - Instruct and encourage patient and family to use good hand hygiene technique  - Identify and instruct in appropriate isolation precautions for identified infection/condition  Outcome: Progressing     Problem: PAIN - ADULT  Goal: Verbalizes/displays adequate comfort level or baseline comfort level  Description: Interventions:  - Encourage patient to monitor pain and request assistance  - Assess pain using appropriate pain scale  - Administer analgesics based on type and severity of pain and evaluate response  - Implement non-pharmacological measures as appropriate and evaluate response  - Consider cultural and social influences on pain and pain management  - Notify physician/advanced practitioner if interventions unsuccessful or patient reports new pain  Outcome: Progressing     Problem: Knowledge Deficit  Goal: Patient/family/caregiver demonstrates understanding of disease process, treatment plan, medications, and discharge instructions  Description: Complete learning assessment and assess knowledge base.  Interventions:  - Provide teaching at level of understanding  - Provide teaching via preferred learning methods  Outcome: Progressing     Problem: SAFETY ADULT  Goal: Maintains/Returns to pre admission functional level  Description: INTERVENTIONS:  - Perform AM-PAC 6 Click Basic Mobility/ Daily Activity assessment daily.  - Set and communicate daily mobility goal to care team and patient/family/caregiver.   - Collaborate with rehabilitation  services on mobility goals if consulted    - Out of bed for toileting  - Record patient progress and toleration of activity level   Outcome: Progressing

## 2023-12-27 ENCOUNTER — TRANSITIONAL CARE MANAGEMENT (OUTPATIENT)
Dept: INTERNAL MEDICINE CLINIC | Facility: CLINIC | Age: 88
End: 2023-12-27

## 2023-12-27 ENCOUNTER — TELEPHONE (OUTPATIENT)
Dept: OTHER | Facility: OTHER | Age: 88
End: 2023-12-27

## 2023-12-27 VITALS
OXYGEN SATURATION: 97 % | TEMPERATURE: 97.6 F | SYSTOLIC BLOOD PRESSURE: 132 MMHG | DIASTOLIC BLOOD PRESSURE: 66 MMHG | HEART RATE: 81 BPM | RESPIRATION RATE: 18 BRPM

## 2023-12-27 LAB
MRSA NOSE QL CULT: NORMAL
SARS-COV-2 RNA RESP QL NAA+PROBE: NEGATIVE

## 2023-12-27 PROCEDURE — 87635 SARS-COV-2 COVID-19 AMP PRB: CPT | Performed by: NURSE PRACTITIONER

## 2023-12-27 PROCEDURE — 99238 HOSP IP/OBS DSCHRG MGMT 30/<: CPT | Performed by: NURSE PRACTITIONER

## 2023-12-27 RX ORDER — GABAPENTIN 100 MG/1
100 CAPSULE ORAL
Start: 2023-12-27 | End: 2024-01-02

## 2023-12-27 RX ORDER — METHOCARBAMOL 500 MG/1
250 TABLET, FILM COATED ORAL EVERY 6 HOURS PRN
Start: 2023-12-27 | End: 2024-01-02

## 2023-12-27 RX ORDER — LIDOCAINE 50 MG/G
1 PATCH TOPICAL DAILY
Start: 2023-12-28

## 2023-12-27 RX ORDER — OXYCODONE HYDROCHLORIDE 5 MG/1
TABLET ORAL
Qty: 20 TABLET | Refills: 0 | Status: SHIPPED | OUTPATIENT
Start: 2023-12-27 | End: 2023-12-28

## 2023-12-27 RX ORDER — MELATONIN
1000 DAILY
Start: 2023-12-28

## 2023-12-27 RX ADMIN — OXYBUTYNIN 10 MG: 10 TABLET, FILM COATED, EXTENDED RELEASE ORAL at 09:20

## 2023-12-27 RX ADMIN — ASPIRIN 81 MG CHEWABLE TABLET 81 MG: 81 TABLET CHEWABLE at 09:20

## 2023-12-27 RX ADMIN — POLYETHYLENE GLYCOL 3350 17 G: 17 POWDER, FOR SOLUTION ORAL at 09:20

## 2023-12-27 RX ADMIN — Medication 1000 UNITS: at 09:20

## 2023-12-27 RX ADMIN — LIDOCAINE 5% 1 PATCH: 700 PATCH TOPICAL at 09:20

## 2023-12-27 RX ADMIN — ENOXAPARIN SODIUM 30 MG: 30 INJECTION SUBCUTANEOUS at 06:24

## 2023-12-27 RX ADMIN — ACETAMINOPHEN 975 MG: 325 TABLET, FILM COATED ORAL at 06:21

## 2023-12-27 RX ADMIN — ACETAMINOPHEN 975 MG: 325 TABLET, FILM COATED ORAL at 13:38

## 2023-12-27 RX ADMIN — TRIAMTERENE AND HYDROCHLOROTHIAZIDE 1 TABLET: 37.5; 25 TABLET ORAL at 09:21

## 2023-12-27 RX ADMIN — LEVOTHYROXINE SODIUM 75 MCG: 75 TABLET ORAL at 06:21

## 2023-12-27 NOTE — ASSESSMENT & PLAN NOTE
-Acute nondisplaced fracture involving the right sacral wing.  -Ortho consulted, appreciate recommendations  -Nonoperative management at this time.  Ortho recommends outpatient follow-up in 4 weeks   -PT/OT  -WBAT BLLE  -Multimodal pain control  -DVT prophylaxis--will transition to 81 mg BID for 28 days as an outpatient.

## 2023-12-27 NOTE — PROGRESS NOTES
Spiritual Care Progress Note    2023  Patient: Neha Molina : 1932  Admission Date & Time: 2023 005  MRN: [id number] CSN: [id number]      Fr Newell met with the pt and provided prayers and blessings. No further needs were expressed at this time.    Chaplains still remain available.       23 1000   Clinical Encounter Type   Visited With Patient   Evangelical Encounters   Evangelical Needs Prayer

## 2023-12-27 NOTE — ASSESSMENT & PLAN NOTE
-Acute nondisplaced fracture involving the right sacral wing.  -Ortho consulted, appreciate recommendations  -Nonoperative management at this time.  Ortho recommends outpatient follow-up in 4 weeks   -PT/OT  -WBAT BLLE  -Multimodal pain control  -DVT prophylaxis--continue with 81 mg aspirin daily.

## 2023-12-27 NOTE — CASE MANAGEMENT
Case Management Discharge Planning Note    Patient name Neha Molina  Location [facility]-01 MRN [id number]  : 1932 Date 2023       Current Admission Date: 2023  Current Admission Diagnosis:Sacral fracture, closed (HCC)   Patient Active Problem List    Diagnosis Date Noted    Sacral fracture, closed (HCC) 2023    Fall 2023    Acute pain due to trauma 2023    Fracture of ramus of right pubis (HCC) 2023    Other specified glaucoma 2023    Accidental fall 2023    Vitamin B 12 deficiency 2023    Lactose intolerance 10/24/2023    Allergies 10/24/2023    Other specified hypothyroidism 10/24/2023    Other hyperlipidemia 10/24/2023    History of CVA (cerebrovascular accident) 10/24/2023    Anxiety 10/24/2023    Urinary frequency 10/24/2023    Rib fracture 10/24/2023      LOS (days): 6  Geometric Mean LOS (GMLOS) (days): 2.9  Days to GMLOS:-3.4     OBJECTIVE:  Risk of Unplanned Readmission Score: 8.62         Current admission status: Inpatient   Preferred Pharmacy:   UNKNOWN - FOLLOW UP PRIOR TO DISCHARGE TO E-PRESCRIBE  No address on file      Primary Care Provider: Dinesh Johnston MD    Primary Insurance: MEDICARE  Secondary Insurance: [facility]    DISCHARGE DETAILS:    Pt accepted to [facility] and [facility].  CM discussed with pt and her preference is [facility].  CM reached out to location to determine if they will have a bed today.

## 2023-12-27 NOTE — TELEPHONE ENCOUNTER
Jasmin called from [facility] to verify orders with on-call provider.   TC to Dr. Betty Morris.

## 2023-12-27 NOTE — PROGRESS NOTES
Patient:    MRN:  [id number]    Selene Request ID:  [id number]    Level of care reserved:  Skilled Nursing Facility    Partner Reserved:  [facility], [place] [address] [phone]    Clinical needs requested:    Geography searched:  10 miles around [id number]    Start of Service:    Request sent:  12:45pm EST on 12/22/2023 by Maurice Yee    Partner reserved:  10:13am EST on 12/27/2023 by Maurice Yee    Choice list shared:  5:34pm EST on 12/26/2023 by Chapo Mendoza

## 2023-12-27 NOTE — PLAN OF CARE
Problem: Prexisting or High Potential for Compromised Skin Integrity  Goal: Skin integrity is maintained or improved  Description: INTERVENTIONS:  - Identify patients at risk for skin breakdown  - Assess and monitor skin integrity  - Assess and monitor nutrition and hydration status  - Monitor labs   - Assess for incontinence   - Turn and reposition patient  - Assist with mobility/ambulation  - Relieve pressure over bony prominences  - Avoid friction and shearing  - Provide appropriate hygiene as needed including keeping skin clean and dry  - Evaluate need for skin moisturizer/barrier cream  - Collaborate with interdisciplinary team   - Patient/family teaching  - Consider wound care consult   Outcome: Progressing     Problem: Nutrition/Hydration-ADULT  Goal: Nutrient/Hydration intake appropriate for improving, restoring or maintaining nutritional needs  Description: Monitor and assess patient's nutrition/hydration status for malnutrition. Collaborate with interdisciplinary team and initiate plan and interventions as ordered.  Monitor patient's weight and dietary intake as ordered or per policy. Utilize nutrition screening tool and intervene as necessary. Determine patient's food preferences and provide high-protein, high-caloric foods as appropriate.     INTERVENTIONS:  - Monitor oral intake, urinary output, labs, and treatment plans  - Assess nutrition and hydration status and recommend course of action  - Evaluate amount of meals eaten  - Assist patient with eating if necessary   - Allow adequate time for meals  - Recommend/ encourage appropriate diets, oral nutritional supplements, and vitamin/mineral supplements  - Order, calculate, and assess calorie counts as needed  - Recommend, monitor, and adjust tube feedings and TPN/PPN based on assessed needs  - Assess need for intravenous fluids  - Provide specific nutrition/hydration education as appropriate  - Include patient/family/caregiver in decisions related to  nutrition  Outcome: Progressing     Problem: PAIN - ADULT  Goal: Verbalizes/displays adequate comfort level or baseline comfort level  Description: Interventions:  - Encourage patient to monitor pain and request assistance  - Assess pain using appropriate pain scale  - Administer analgesics based on type and severity of pain and evaluate response  - Implement non-pharmacological measures as appropriate and evaluate response  - Consider cultural and social influences on pain and pain management  - Notify physician/advanced practitioner if interventions unsuccessful or patient reports new pain  Outcome: Progressing     Problem: SAFETY ADULT  Goal: Patient will remain free of falls  Description: INTERVENTIONS:  - Educate patient/family on patient safety including physical limitations  - Instruct patient to call for assistance with activity   - Consult OT/PT to assist with strengthening/mobility   - Keep Call bell within reach  - Keep bed low and locked with side rails adjusted as appropriate  - Keep care items and personal belongings within reach  - Initiate and maintain comfort rounds  - Make Fall Risk Sign visible to staff  - Offer Toileting every  Hours, in advance of need  - Initiate/Maintain alarm  - Obtain necessary fall risk management equipment:   - Apply yellow socks and bracelet for high fall risk patients  - Consider moving patient to room near nurses station  Outcome: Progressing  Goal: Maintain or return to baseline ADL function  Description: INTERVENTIONS:  -  Assess patient's ability to carry out ADLs; assess patient's baseline for ADL function and identify physical deficits which impact ability to perform ADLs (bathing, care of mouth/teeth, toileting, grooming, dressing, etc.)  - Assess/evaluate cause of self-care deficits   - Assess range of motion  - Assess patient's mobility; develop plan if impaired  - Assess patient's need for assistive devices and provide as appropriate  - Encourage maximum  independence but intervene and supervise when necessary  - Involve family in performance of ADLs  - Assess for home care needs following discharge   - Consider OT consult to assist with ADL evaluation and planning for discharge  - Provide patient education as appropriate  Outcome: Progressing  Goal: Maintains/Returns to pre admission functional level  Description: INTERVENTIONS:  - Perform AM-PAC 6 Click Basic Mobility/ Daily Activity assessment daily.  - Set and communicate daily mobility goal to care team and patient/family/caregiver.   - Collaborate with rehabilitation services on mobility goals if consulted  - Perform Range of Motion  times a day.  - Reposition patient every hours.  - Dangle patient  times a day  - Stand patient  times a day  - Ambulate patient  times a day  - Out of bed to chair times a day   - Out of bed for meals times a day  - Out of bed for toileting  - Record patient progress and toleration of activity level   Outcome: Progressing     Problem: MUSCULOSKELETAL - ADULT  Goal: Maintain or return mobility to safest level of function  Description: INTERVENTIONS:  - Assess patient's ability to carry out ADLs; assess patient's baseline for ADL function and identify physical deficits which impact ability to perform ADLs (bathing, care of mouth/teeth, toileting, grooming, dressing, etc.)  - Assess/evaluate cause of self-care deficits   - Assess range of motion  - Assess patient's mobility  - Assess patient's need for assistive devices and provide as appropriate  - Encourage maximum independence but intervene and supervise when necessary  - Involve family in performance of ADLs  - Assess for home care needs following discharge   - Consider OT consult to assist with ADL evaluation and planning for discharge  - Provide patient education as appropriate  Outcome: Progressing  Goal: Maintain proper alignment of affected body part  Description: INTERVENTIONS:  - Support, maintain and protect limb and body  alignment  - Provide patient/ family with appropriate education  Outcome: Progressing

## 2023-12-27 NOTE — DISCHARGE SUMMARY
"[facility]  Discharge- Neha Molina 2/14/1932, 89 y.o. female MRN: [id number]  Unit/Bed#: [facility] 617-01 Encounter: [id number]  Primary Care Provider: Dinesh Johnston MD   Date and time admitted to hospital: 12/21/2023 12:57 AM    Fall  Assessment & Plan  -Mechanical in nature  -PT/OT  -Geriatrics consulted and note appreciated      Sacral fracture, closed (HCC)  Assessment & Plan  -Acute nondisplaced fracture involving the right sacral wing.  -Ortho consulted, appreciate recommendations  -Nonoperative management at this time.  Ortho recommends outpatient follow-up in 4 weeks   -PT/OT  -WBAT BLLE  -Multimodal pain control  -DVT prophylaxis--continue with 81 mg aspirin daily.    Fracture of ramus of right pubis (HCC)  Assessment & Plan  -Comminuted displaced fracture of the right inferior and superior pubic rami.   -See sacral fracture for management    Acute pain due to trauma  Assessment & Plan  - Controlled on oral multimodal pain regimen.    Other hyperlipidemia  Assessment & Plan  -Continue statin    Other specified hypothyroidism  Assessment & Plan  -Continue levothyroxine              Medical Problems       Resolved Problems  Date Reviewed: 12/27/2023   None         Admission Date:   Admission Orders (From admission, onward)       Ordered        12/21/23 0241  Inpatient Admission  Once                            Admitting Diagnosis: Sacral fracture, closed ([facility]) [S32.10XA]  Closed nondisplaced zone I fracture of sacrum, initial encounter ([facility]) [S32.110A]  Closed fracture of ramus of right pubis, initial encounter ([facility]) [S32.591A]  Unspecified multiple injuries, initial encounter [T07.XXXA]    HPI: \"Neha Molina is a 89 y.o. female who presents following a mechanical fall from standing height.  She reportedly struck her head without losing consciousness.  She is taking aspirin.  She also experiencing right-sided hip pain.  She was found to have a sacral wing " "fracture on imaging in the emergency department.\" - Per Dr. Rosenthal    Procedures Performed: No orders of the defined types were placed in this encounter.    Subjective: \"I do not have any pain laying here\"  Patient denies having any pain or discomfort at rest.  She states that her pain escalates with movement of her lower extremities.  She confirms she can move her lower extremities as she was with the pain she has been limiting her activity.  We discussed the importance of mobility and getting up at least daily.  She denies any other complaints  Objective  General: No acute distress  Neuro: GCS is 15.  Light touch sensation intact throughout.  HEENT: Normocephalic, atraumatic.  Neck supple.  Cardiac: Regular rate and rhythm.  +2 radial and dorsalis pedis pulses, bilaterally.  Lungs: Clear to auscultation, bilaterally.  Abdomen: Soft, nontender.  Pelvis: Stable.  MSK: Limited range of motion of bilateral lower extremities.  Patient is able to range his left lower extremity more prior to experiencing any pain or discomfort.  Extremities are nontender on palpation.  Extremities display no deformities.  Skin: Warm, dry.    Summary of Hospital Course: This a 89-year-old female who suffered a mechanical fall resulting in right pubic rami fracture and sacral fracture.  She was admitted to the trauma service and started on multimodal pain regimen.  She was deemed nonoperative management at this time.  She is to follow-up with orthopedics as an outpatient in 4 weeks for reevaluation.  She may be weightbearing as tolerated on her right lower extremity.  Tertiary evaluation was completed that showed no worsening of pain or discomfort.  She will continue on aspirin therapy for DVT prophylaxis.  PT/OT evaluated patient and recommended her for rehab.  Nursing facility placement obtained.  Prior to discharge her laboratory and vital signs have been stable.  Her pain was controlled with multimodal oral pain regimen.    Significant " Findings, Care, Treatment and Services Provided:   XR humerus right    Result Date: 12/21/2023  Impression: No acute osseous abnormality. Workstation performed: [id number]     XR femur 2 views RIGHT    Result Date: 12/21/2023  Impression: Comminuted displaced fracture of the right inferior and superior pubic rami. Subtle right sacral fracture was also noted on the prior CT. The right femur appears to be intact. Pelvic rami fracture was noted on the ER result. Workstation performed: [id number]     XR pelvis ap only 1 or 2 vw    Result Date: 12/21/2023  Impression: Comminuted displaced fracture of the right inferior and superior pubic rami. Subtle right sacral fracture was also noted on the prior CT. The right femur appears to be intact. Pelvic rami fracture was noted on the ER result. Workstation performed: [id number]     CT spine lumbar without contrast    Result Date: 12/21/2023  Impression: No acute lumbar spine fracture or subluxation. Acute nondisplaced fracture involving the right sacral wing. Within the partially visualized right pelvis is mild inflammatory stranding, likely posttraumatic in nature. An enhanced CT pelvis is recommended for further evaluation. Workstation performed: [id number]     CT spine cervical without contrast    Result Date: 12/21/2023  Impression: No cervical spine fracture or traumatic malalignment. Workstation performed: [id number]     CT head wo contrast    Result Date: 12/21/2023  Impression: No acute intracranial abnormality. Mild chronic small vessel ischemic changes. 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 Workstation performed: [id number]        Complications: none    Condition at Discharge: good         Discharge instructions/Information to patient and family:   See after visit summary for information provided to patient and family.      Provisions for Follow-Up Care:  See after visit summary for information related to follow-up care and any pertinent home health orders.       PCP: Dinesh Johnston MD    Disposition: See After Visit Summary for discharge disposition information.    Planned Readmission: No    Discharge Statement   I spent 25 minutes discharging the patient. This time was spent on the day of discharge. I had direct contact with the patient on the day of discharge. Additional documentation is required if more than 30 minutes were spent on discharge.     Discharge Medications:  See after visit summary for reconciled discharge medications provided to patient and family.

## 2023-12-28 ENCOUNTER — NURSING HOME VISIT (OUTPATIENT)
Dept: GERIATRICS | Facility: OTHER | Age: 88
End: 2023-12-28
Payer: MEDICARE

## 2023-12-28 ENCOUNTER — TELEPHONE (OUTPATIENT)
Dept: OTHER | Facility: OTHER | Age: 88
End: 2023-12-28

## 2023-12-28 DIAGNOSIS — E03.9 HYPOTHYROIDISM, UNSPECIFIED TYPE: ICD-10-CM

## 2023-12-28 DIAGNOSIS — S32.591D CLOSED FRACTURE OF RAMUS OF RIGHT PUBIS WITH ROUTINE HEALING, SUBSEQUENT ENCOUNTER: ICD-10-CM

## 2023-12-28 DIAGNOSIS — S32.591A CLOSED FRACTURE OF RAMUS OF RIGHT PUBIS, INITIAL ENCOUNTER (HCC): Primary | ICD-10-CM

## 2023-12-28 DIAGNOSIS — Z86.73 HISTORY OF CVA (CEREBROVASCULAR ACCIDENT): ICD-10-CM

## 2023-12-28 DIAGNOSIS — D64.9 ANEMIA, UNSPECIFIED TYPE: ICD-10-CM

## 2023-12-28 DIAGNOSIS — G89.11 ACUTE PAIN DUE TO TRAUMA: ICD-10-CM

## 2023-12-28 DIAGNOSIS — S32.10XD CLOSED FRACTURE OF SACRUM WITH ROUTINE HEALING, UNSPECIFIED PORTION OF SACRUM, SUBSEQUENT ENCOUNTER: Primary | ICD-10-CM

## 2023-12-28 PROCEDURE — 99306 1ST NF CARE HIGH MDM 50: CPT | Performed by: INTERNAL MEDICINE

## 2023-12-28 RX ORDER — OXYCODONE HYDROCHLORIDE 5 MG/1
5 TABLET ORAL EVERY 6 HOURS PRN
Qty: 10 TABLET | Refills: 0 | Status: SHIPPED | OUTPATIENT
Start: 2023-12-28 | End: 2023-12-31

## 2023-12-28 RX ORDER — NALOXONE HYDROCHLORIDE 4 MG/.1ML
SPRAY NASAL
Qty: 1 EACH | Refills: 1 | Status: SHIPPED | OUTPATIENT
Start: 2023-12-28 | End: 2024-12-27

## 2023-12-29 ENCOUNTER — TELEPHONE (OUTPATIENT)
Dept: OTHER | Facility: OTHER | Age: 88
End: 2023-12-29

## 2023-12-29 DIAGNOSIS — F41.9 ANXIETY: Primary | ICD-10-CM

## 2023-12-29 RX ORDER — LORAZEPAM 0.5 MG/1
0.5 TABLET ORAL
Qty: 20 TABLET | Refills: 0 | Status: SHIPPED | OUTPATIENT
Start: 2023-12-29 | End: 2024-01-02 | Stop reason: SDUPTHER

## 2023-12-31 PROBLEM — E87.1 HYPONATREMIA: Status: ACTIVE | Noted: 2023-10-09

## 2023-12-31 NOTE — ASSESSMENT & PLAN NOTE
Pt s/p mechanical fall  Pt with hx of fall 02/2023  Pt at high risk of recurrent falls  Fall precaution  PT/OT to eval/tx

## 2023-12-31 NOTE — ASSESSMENT & PLAN NOTE
Stable  Cont acetaminophen 500 mg 1 tab po q6 hours prn pain  Cont gabapentin 100 mg 1 tab po qhs  Pt also on methocarbamol 250 mg 1 tab po q6 hours

## 2023-12-31 NOTE — ASSESSMENT & PLAN NOTE
Imaging studies consistent with comminuted displaced fracture of Rt inferior and superior pubic rami  Defer dvt prophylaxis to ortho  Ortho eval pt & advised nonoperative approach  F/u with [facility] in 4 weeks with repeat imaging  Pt can be WBAT RLE  PT/OT to eval/tx  Pt on aspirin 81 mg 1 tab po daily  Cont acetaminophen 500 mg 1 tab po q6 hours prn pain  Cont oxycodone 2.5 mg 1 tab po q4 hours prn severe pain

## 2023-12-31 NOTE — ASSESSMENT & PLAN NOTE
Stable  Cont oxybutynin chloride 10 mg 1 tab po daily  F/u outpatient with urology as needed/indicated

## 2023-12-31 NOTE — ASSESSMENT & PLAN NOTE
Pt s/p mechanical fall  12/21/2023 CT spine lumbar without contrast - acute nondisplaced fx involving the Rt sacral wing  Defer dvt prophylaxis to ortho  Ortho eval pt - nonop approach & f/u in 4 weeks  Pt WBAT B/l LE  Cont oxycodone 2.5 mg 1 tab po q4 hours prn severe pain  Cont acetaminophen 500 mg 1 tab po q6 hours prn pain  PT/OT to eval/tx  Cont aspirin 81 mg 1 tab po daily

## 2024-01-02 ENCOUNTER — NURSING HOME VISIT (OUTPATIENT)
Dept: GERIATRICS | Facility: OTHER | Age: 89
End: 2024-01-02
Payer: MEDICARE

## 2024-01-02 ENCOUNTER — TELEPHONE (OUTPATIENT)
Dept: OBGYN CLINIC | Facility: HOSPITAL | Age: 89
End: 2024-01-02
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
## 2024-01-02 DIAGNOSIS — S32.10XD CLOSED FRACTURE OF SACRUM WITH ROUTINE HEALING, UNSPECIFIED PORTION OF SACRUM, SUBSEQUENT ENCOUNTER: ICD-10-CM

## 2024-01-02 DIAGNOSIS — E03.8 OTHER SPECIFIED HYPOTHYROIDISM: ICD-10-CM

## 2024-01-02 DIAGNOSIS — M17.9 OSTEOARTHRITIS OF KNEE, UNSPECIFIED LATERALITY, UNSPECIFIED OSTEOARTHRITIS TYPE: ICD-10-CM

## 2024-01-02 DIAGNOSIS — F41.9 ANXIETY: ICD-10-CM

## 2024-01-02 DIAGNOSIS — R35.0 URINARY FREQUENCY: ICD-10-CM

## 2024-01-02 DIAGNOSIS — Z86.73 HISTORY OF CVA (CEREBROVASCULAR ACCIDENT): ICD-10-CM

## 2024-01-02 DIAGNOSIS — S32.591D CLOSED FRACTURE OF RAMUS OF RIGHT PUBIS WITH ROUTINE HEALING, SUBSEQUENT ENCOUNTER: ICD-10-CM

## 2024-01-02 DIAGNOSIS — E78.49 OTHER HYPERLIPIDEMIA: ICD-10-CM

## 2024-01-02 PROBLEM — D64.9 ANEMIA: Status: ACTIVE | Noted: 2024-01-02

## 2024-01-02 PROBLEM — E03.9 HYPOTHYROID: Status: ACTIVE | Noted: 2023-10-24

## 2024-01-02 PROCEDURE — 99310 SBSQ NF CARE HIGH MDM 45: CPT | Performed by: INTERNAL MEDICINE

## 2024-01-02 RX ORDER — OXYCODONE HYDROCHLORIDE 5 MG/1
5 TABLET ORAL DAILY
COMMUNITY
Start: 2023-12-28

## 2024-01-02 RX ORDER — LORAZEPAM 0.5 MG/1
0.5 TABLET ORAL
Qty: 8 TABLET | Refills: 0 | Status: SHIPPED | OUTPATIENT
Start: 2024-01-02

## 2024-01-02 RX ORDER — ACETAMINOPHEN 325 MG/1
975 TABLET ORAL EVERY 8 HOURS SCHEDULED
COMMUNITY
Start: 2023-12-28

## 2024-01-02 NOTE — TELEPHONE ENCOUNTER
Caller: Layne (realtive) on Chart    Doctor: Carlos    Reason for call: Calling stating you saw one of our residents in the hospital Jorge Ballesteros M.D., patient is scheduled with you for her follow up 01/23/24.   Relative is calling because they transferred her to [facility] and she states that facility is not taking good care of her and wants her moved.    I advised that we may not have anything to do with this she may need to call patients PCP to help with this. She will call them as well.    Either way if someone can call the relative back she would appreciate it.      Call back#: [phone]

## 2024-01-02 NOTE — ASSESSMENT & PLAN NOTE
Will add routine Tylenol 975 mg every 8 hours  In shared decision-making, we will order routine oxycodone 5 mg daily in the morning and 5 mg every 6 hours as needed for breakthrough pain  Will start a 4-week course of calcitonin nasal spray  Will continue with multimodal pain management  Will continue with monitoring for change in her condition

## 2024-01-02 NOTE — ASSESSMENT & PLAN NOTE
Will continue her prior to admission aspirin and lovastatin  She will follow-up with her PCP upon discharge

## 2024-01-02 NOTE — ASSESSMENT & PLAN NOTE
Sustained from standing height and admitted to the acute [facility] hospital from December 21, 2023 through December 27, 2023  Seen in consultation with orthopedic service during her hospitalization with recommendation for PT/OT, weightbearing as tolerated, and multimodal pain management  Secondary to her decreased level of functioning from baseline, she will be admitted to the subacute rehabilitation facility and seen in consultation by a multidisciplinary rehabilitation team for evaluation and treatment to assist her in returning to her prior level of functioning  Will continue her care in collaboration with her orthopedic service  We will continue with monitoring for change in condition  She will follow-up with her PCP and orthopedic services upon discharge

## 2024-01-02 NOTE — ASSESSMENT & PLAN NOTE
December 26, 2023: H/H/MCV: 11.2/33.2/95  We will continue with clinical and periodic laboratory monitoring for change in her condition  She will follow-up with her PCP upon discharge

## 2024-01-02 NOTE — PROGRESS NOTES
[facility]  [address] [address] [phone]  [facility]  POS [address]      NAME: Neha Molina  AGE: 89 y.o. SEX: female [phone]    DATE OF ENCOUNTER: 1/2/2024    Assessment and Plan     1. Fall, sequela        2. Closed fracture of ramus of right pubis with routine healing, subsequent encounter        3. Closed fracture of sacrum with routine healing, unspecified portion of sacrum, subsequent encounter        4. Other specified hypothyroidism        5. Other hyperlipidemia        6. Urinary frequency        7. Anxiety  LORazepam (Ativan) 0.5 mg tablet      8. History of CVA (cerebrovascular accident)        9. Osteoarthritis of knee, unspecified laterality, unspecified osteoarthritis type           Fall  Pt s/p mechanical fall  Pt with hx of fall 02/2023  Pt at high risk of recurrent falls  Fall precaution  PT/OT to eval/tx    Fracture of ramus of right pubis (HCC)  Imaging studies consistent with comminuted displaced fracture of Rt inferior and superior pubic rami  Defer dvt prophylaxis to ortho  Ortho eval pt & advised nonoperative approach  F/u with [facility] in 4 weeks with repeat imaging  Pt can be WBAT RLE  PT/OT to eval/tx  Pt on aspirin 81 mg 1 tab po daily  Cont acetaminophen 500 mg 1 tab po q6 hours prn pain  Cont oxycodone 2.5 mg 1 tab po q4 hours prn severe pain    Sacral fracture, closed (HCC)  Pt s/p mechanical fall  12/21/2023 CT spine lumbar without contrast - acute nondisplaced fx involving the Rt sacral wing  Defer dvt prophylaxis to ortho  Ortho eval pt - nonop approach & f/u in 4 weeks  Pt WBAT B/l LE  Cont oxycodone 2.5 mg 1 tab po q4 hours prn severe pain  Cont acetaminophen 500 mg 1 tab po q6 hours prn pain  PT/OT to eval/tx  Cont aspirin 81 mg 1 tab po daily      Hypothyroid  Stable  Cont levothyroxine 75 mcg 1 tab po qam    Other hyperlipidemia  Stable  Cont lovastatin 20 mg 1 tab po qhs    Urinary frequency  Stable  Cont oxybutynin chloride 10 mg 1  tab po daily  F/u outpatient with urology as needed/indicated    Anxiety  Stable  Cont lorazepam 0.5 mg 1 tab po daily    History of CVA (cerebrovascular accident)  Stable  Cont aspirin 81 mg 1 tab po daily  Cont lovastatin 20 mg 1 tab po daily    DJD (degenerative joint disease) of knee  Stable  Cont acetaminophen 500 mg 1 tab po q6 hours prn pain  Cont gabapentin 100 mg 1 tab po qhs  Pt also on methocarbamol 250 mg 1 tab po q6 hours     Code status: Full    Chief Complaint     STR follow-up visit.    History of Present Illness   Pt seen and examined. Pt has myraid of questions. She asked me to speak to her cousin who is her DPOA. Spoke to family for ten minutes. Pt appears medically stable at this time. Encouraged pt to work with physical therapy tomorrow when they get in. Pt has pain medication scheduled in am.    The following portions of the patient's history were reviewed and updated as appropriate: allergies, current medications, past family history, past medical history, past social history, past surgical history and problem list.    Review of Systems     Review of Systems   Musculoskeletal:  Positive for arthralgias.   All other systems reviewed and are negative.      Active Problem List     Patient Active Problem List   Diagnosis    Lactose intolerance    Allergies    Hypothyroid    Other hyperlipidemia    History of CVA (cerebrovascular accident)    Anxiety    Urinary frequency    Rib fracture    Vitamin B 12 deficiency    Other specified glaucoma    Accidental fall    Sacral fracture, closed (HCC)    Fall    Acute pain due to trauma    Fracture of ramus of right pubis (HCC)    DJD (degenerative joint disease) of knee    Hyponatremia    Anemia       Objective     Vitals: Reviewed in [employer] system. VSS    General: Awake, alert & oriented x 3  Head: atraumatic, normocephalic  Cardiovascular: RRR  Lungs: Clear to auscultation bilaterally  Abdomen: nontender/nondistended, +BS  Legs: no cyanosis,  clubbing or edema; Rt hip lidocaine patch in place  Feet: +2 pedal pulses  Skin: clean, dry  Psych: calm, cooperative    Pertinent Laboratory/Diagnostic Studies:  Refer to facility chart.    Current Medications   Medications reviewed and updated in facility chart. Spoke to Shimon ENG for ten minutes, spoke to staff for awhile as well in regards to pt' s questions for another 5 minutes.  Spent time documenting and looking at previous hospitalization records. Total time spent on pt 46 minutes.    Leia Tolentino MD  Internal Medicine  Senior Care Physician

## 2024-01-02 NOTE — ASSESSMENT & PLAN NOTE
Seen in consultation by the orthopedic service during her hospitalization with recommendation for PT/OT, weightbearing as tolerated, and multimodal pain management  She is to follow-up in 4 weeks with the orthopedic service as an outpatient  Will continue with monitoring for change in her condition

## 2024-01-02 NOTE — PROGRESS NOTES
[facility]  [address]  [place] [address]  Facility:  [place] [address]    HISTORY AND PHYSICAL    NAME: Neha Molina  AGE: 89 y.o. SEX: female    DATE OF ENCOUNTER: 12/28/23    Code status:  CPR    Assessment and Plan     1. Closed fracture of sacrum with routine healing, unspecified portion of sacrum, subsequent encounter  Assessment & Plan:  Sustained from standing height and admitted to the acute care hospital from December 21, 2023 through December 27, 2023  Seen in consultation with orthopedic service during her hospitalization with recommendation for PT/OT, weightbearing as tolerated, and multimodal pain management  Secondary to her decreased level of functioning from baseline, she will be admitted to the subacute rehabilitation facility and seen in consultation by a multidisciplinary rehabilitation team for evaluation and treatment to assist her in returning to her prior level of functioning  Will continue her care in collaboration with her orthopedic service  We will continue with monitoring for change in condition  She will follow-up with her PCP and orthopedic services upon discharge      2. Closed fracture of ramus of right pubis with routine healing, subsequent encounter  Assessment & Plan:  Seen in consultation by the orthopedic service during her hospitalization with recommendation for PT/OT, weightbearing as tolerated, and multimodal pain management  She is to follow-up in 4 weeks with the orthopedic service as an outpatient  Will continue with monitoring for change in her condition      3. Anemia, unspecified type  Assessment & Plan:  December 26, 2023: H/H/MCV: 11.2/33.2/95  We will continue with clinical and periodic laboratory monitoring for change in her condition  She will follow-up with her PCP upon discharge      4. Acute pain due to trauma  Assessment & Plan:  Will add routine Tylenol 975 mg every 8 hours  In shared decision-making, we will  order routine oxycodone 5 mg daily in the morning and 5 mg every 6 hours as needed for breakthrough pain  Will start a 4-week course of calcitonin nasal spray  Will continue with multimodal pain management  Will continue with monitoring for change in her condition      5. Hypothyroidism, unspecified type  Assessment & Plan:  December 21, 2023: TSH: 3.432  Will continue her prior to admission levothyroxine  She will follow-up with her PCP upon discharge      6. History of CVA (cerebrovascular accident)  Assessment & Plan:  Will continue her prior to admission aspirin and lovastatin  She will follow-up with her PCP upon discharge        See recent hospital discharge summary note and recent PCP office visit note for further information.    All medications and routine orders were reviewed and updated as needed.    Plan discussed with: Patient and nursing staff.    CC: PCP    Chief Complaint     She is seen for a visit to perform a history and physical exam to be admitted to the subacute rehabilitation facility.    History of Present Illness     She is a 89-year-old woman with the comorbidities of hypothyroidism, lactose intolerance, history of CVA, and anxiety who is seen for a visit to perform a history and physical exam to be admitted to the subacute rehabilitation facility.  She presented to the emergency room on December 21, 2023 after experiencing a fall from standing height.  Reportedly, she fell on her right side and was complaining of right-sided hip pain.  In the emergency room, she was diagnosed with comminuted, displaced fracture of the right inferior and right superior pubic rami and right sacral fracture.  She was admitted to the acute [facility] hospital from December 21, 2023 through December 27, 2023.  She was seen in consultation by the orthopedic, trauma, endocrinology, geriatric, and therapy services during her hospitalization.  Endocrinology service ordered secondary osteoporosis laboratory studies which  were normal.  Recommendation to follow-up with their service for further evaluation of fragility fracture as an outpatient.  Orthopedic service recommended nonsurgical treatment with PT/OT, weightbearing as tolerated, DVT prophylaxis, and multimodal pulm pain management.  Therapy service recommended maximal resource intensity therapy after discharge.  She was transferred to the subacute rehabilitation facility on December 27, 2023.    She reports severe right-sided hip and back pain with movement.  She reports some benefit from oxycodone 5 mg tablets.    HISTORY:  Past Medical History:   Diagnosis Date    CAD (coronary artery disease)      Past Surgical History:   Procedure Laterality Date    BREAST BIOPSY       Family History   Problem Relation Age of Onset    Stroke Sister      Social History     Socioeconomic History    Marital status: Single     Spouse name: None    Number of children: None    Years of education: None    Highest education level: None   Occupational History    None   Tobacco Use    Smoking status: Former     Types: Cigarettes     Passive exposure: Past    Smokeless tobacco: Never   Vaping Use    Vaping status: Never Used   Substance and Sexual Activity    Alcohol use: Never    Drug use: Never    Sexual activity: None   Other Topics Concern    None   Social History Narrative    None     Social Determinants of Health     Financial Resource Strain: Low Risk  (10/9/2023)    Received from [facility]    Overall Financial Resource Strain (CARDIA)     Difficulty of Paying Living Expenses: Not very hard   Food Insecurity: No Food Insecurity (12/21/2023)    Hunger Vital Sign     Worried About Running Out of Food in the Last Year: Never true     Ran Out of Food in the Last Year: Never true   Transportation Needs: No Transportation Needs (12/21/2023)    PRAPARE - Transportation     Lack of Transportation (Medical): No     Lack of Transportation (Non-Medical): No   Physical Activity: Not on  file   Stress: Not on file   Social Connections: Not on file   Intimate Partner Violence: Not At Risk (10/9/2023)    Received from [facility]    Humiliation, Afraid, Rape, and Kick questionnaire     Fear of Current or Ex-Partner: No     Emotionally Abused: No     Physically Abused: No     Sexually Abused: No   Housing Stability: Low Risk  (12/21/2023)    Housing Stability Vital Sign     Unable to Pay for Housing in the Last Year: No     Number of Places Lived in the Last Year: 1     Unstable Housing in the Last Year: No       Allergies:  No Known Allergies    Review of Systems     Review of Systems   Constitutional:  Positive for appetite change (Decreased).   Musculoskeletal:         See HPI       Medications and orders     All medications reviewed and updated in [employer] EMR.      Objective     Vitals: Weight 132 pounds, temperature 98.8 °F, pulse 71, blood pressure 141/65, pulse oximetry 98% on room air.    Physical Exam  Vitals reviewed.   Constitutional:       Appearance: Normal appearance. She is normal weight. She is not toxic-appearing or diaphoretic.      Comments: She appears comfortable lying in bed, her stated age, and frail.   HENT:      Head: Normocephalic.   Eyes:      General: No scleral icterus.  Cardiovascular:      Rate and Rhythm: Normal rate and regular rhythm.   Pulmonary:      Effort: No respiratory distress.      Breath sounds: Normal breath sounds. No stridor. No rhonchi or rales.   Abdominal:      General: Abdomen is flat.   Musculoskeletal:      Right lower leg: No edema.      Left lower leg: No edema.   Neurological:      Mental Status: She is alert.   Psychiatric:         Mood and Affect: Mood normal.         Behavior: Behavior normal.       Pertinent Laboratory/Diagnostic Studies:   The following labs/studies were reviewed please see chart or hospital paperwork for details.    Lab Results   Component Value Date    WBC 7.91 12/26/2023    HGB 11.2 (L) 12/26/2023     "HCT 33.2 (L) 12/26/2023    MCV 95 12/26/2023     12/26/2023     Lab Results   Component Value Date    SODIUM 138 12/26/2023    K 4.5 12/26/2023     12/26/2023    CO2 26 12/26/2023    BUN 30 (H) 12/26/2023    CREATININE 0.75 12/26/2023    GLUC 118 12/26/2023    CALCIUM 9.4 12/26/2023     Lab Results   Component Value Date    KKN5WNRGXXQL 3.432 12/21/2023 December 22, 2023:    Intact PTH: 46  Vitamin D 25-hydroxy level: 32.9    December 21, 2023:    X-ray of right femur:    IMPRESSION:     Comminuted displaced fracture of the right inferior and superior pubic rami.     Subtle right sacral fracture was also noted on the prior CT.     The right femur appears to be intact.     Pelvic rami fracture was noted on the ER result.    I personally reviewed the images of her right femur x-ray in the PACS system and agree with radiologist interpretation.    December 21, 2023:    Twelve-lead ECG:    Ventricular Rate  BPM 66   Atrial Rate  BPM 66   NY Interval  ms 156   QRSD Interval  ms 78   QT Interval  ms 414   QTC Interval  ms 434   P Sutherland  degrees 69   QRS Sutherland  degrees -39   T Wave Axis  degrees 46              Narrative & Impression    Normal sinus rhythm  Cannot rule out Inferior infarct Age indeterminate  Left axis deviation  Abnormal ECG             - Her order summary was reviewed and signed.    Portions of the record may have been created with voice recognition software.  Occasional wrong word or \"sound a like\" substitutions may have occurred due to the inherent limitations of voice recognition software.  Read the chart carefully and recognize, using context, where substitutions have occurred.    Maurice Schultz M.D.  1/2/2024 7:38 AM      "

## 2024-01-02 NOTE — ASSESSMENT & PLAN NOTE
December 21, 2023: TSH: 3.432  Will continue her prior to admission levothyroxine  She will follow-up with her PCP upon discharge

## 2024-01-03 ENCOUNTER — TELEPHONE (OUTPATIENT)
Dept: INTERNAL MEDICINE CLINIC | Facility: CLINIC | Age: 89
End: 2024-01-03

## 2024-01-03 NOTE — TELEPHONE ENCOUNTER
Pt is asking she speak to you ASAP.  She is at [facility] and she wants to leave.  They are not meeting her needs.  She wants you to discuss this with you ASAP.

## 2024-01-03 NOTE — TELEPHONE ENCOUNTER
[facility] to a [facility] facility indicated that she will have to talk to the social workers there to see what other facilities have beds available.  He is recuperating from a pelvic fracture that occurred at her assisted living facility.

## 2024-01-09 ENCOUNTER — DOCUMENTATION (OUTPATIENT)
Dept: GERIATRICS | Facility: OTHER | Age: 89
End: 2024-01-09

## 2024-01-09 ENCOUNTER — NURSING HOME VISIT (OUTPATIENT)
Dept: GERIATRICS | Facility: OTHER | Age: 89
End: 2024-01-09
Payer: MEDICARE

## 2024-01-09 VITALS
DIASTOLIC BLOOD PRESSURE: 70 MMHG | TEMPERATURE: 97.7 F | RESPIRATION RATE: 20 BRPM | OXYGEN SATURATION: 94 % | SYSTOLIC BLOOD PRESSURE: 115 MMHG | HEART RATE: 75 BPM

## 2024-01-09 DIAGNOSIS — Z86.73 HISTORY OF CVA (CEREBROVASCULAR ACCIDENT): ICD-10-CM

## 2024-01-09 DIAGNOSIS — R26.2 AMBULATORY DYSFUNCTION: ICD-10-CM

## 2024-01-09 DIAGNOSIS — S32.591D CLOSED FRACTURE OF RAMUS OF RIGHT PUBIS WITH ROUTINE HEALING, SUBSEQUENT ENCOUNTER: Primary | ICD-10-CM

## 2024-01-09 DIAGNOSIS — E03.8 OTHER SPECIFIED HYPOTHYROIDISM: ICD-10-CM

## 2024-01-09 DIAGNOSIS — F41.9 ANXIETY: ICD-10-CM

## 2024-01-09 DIAGNOSIS — S32.10XD CLOSED FRACTURE OF SACRUM WITH ROUTINE HEALING, UNSPECIFIED PORTION OF SACRUM, SUBSEQUENT ENCOUNTER: ICD-10-CM

## 2024-01-09 DIAGNOSIS — N32.81 OVERACTIVE BLADDER: ICD-10-CM

## 2024-01-09 PROBLEM — E87.1 HYPONATREMIA: Status: RESOLVED | Noted: 2023-10-09 | Resolved: 2024-01-09

## 2024-01-09 PROCEDURE — 99306 1ST NF CARE HIGH MDM 50: CPT | Performed by: FAMILY MEDICINE

## 2024-01-10 NOTE — ASSESSMENT & PLAN NOTE
S/p fall on 12/21/23, WBAT  Continue multimodal pain regimen  OT/PT  FU with Orthopedics as scheduled, 1/23/24

## 2024-01-10 NOTE — ASSESSMENT & PLAN NOTE
Maintain on sertraline 50 mg qd and lorazepam 0.5 mg daily. Unclear when sertraline was d/c. No record found in chart.  Now with oxycodone for acute pain related to pubic and sacral fracture.   Plan to wean down and off of oxycodone given risk and severity of adverse events such as sedation, dizziness, confusion, cognitive and psychomotor impairment.

## 2024-01-10 NOTE — PROGRESS NOTES
[facility]  [address]  [place] [address]  [facility] [address]  History and Physical    NAME: Neha Molina  AGE: 89 y.o. SEX: female [phone]    DATE OF ENCOUNTER: 1/9/2024    Pain: moderate pain in sacrum   Rehab Potential:fair  Patient Informed of Medical Condition: yes  Patient is Capable of Understanding Their Right: yes  Prognosis:poor  Discharge Plan: pending OT/PT. Goal is going back to [facility]  Surrogate Decision Maker: Layne martin  Advanced Directives: yes  Code status:full code  PCP: Dinesh Johnston MD     Assessment and Plan     Hypothyroid  Stable, TSH was WNL 3.4 (12/21/23)  Continue levothyroxine 75 mcg daily  FU with PCP    Fracture of ramus of right pubis (HCC)  S/p fall on 12/21/23, WBAT  Continue multimodal pain regimen  OT/PT  FU with Orthopedics as scheduled, 1/23/24    Sacral fracture, closed (HCC)  S/p fall on 12/21/23  WBAT, pain management  OT/PT  F/u with Ortho    Ambulatory dysfunction  Multifactorial, advanced age, deconditioning post hospitalization, comorbidities  High risk of recurrent fall due to Hx of 4 falls last year, age, deconditioning, and polypharmacy  Fall precautions  OT/PT    Anxiety  Maintain on sertraline 50 mg qd and lorazepam 0.5 mg daily. Unclear when sertraline was d/c. No record found in chart.  Now with oxycodone for acute pain related to pubic and sacral fracture.   Plan to wean down and off of oxycodone given risk and severity of adverse events such as sedation, dizziness, confusion, cognitive and psychomotor impairment.    History of CVA (cerebrovascular accident)  Continue ASA and statin    Overactive bladder  Maintained on oxybutynin as outpatient  Monitor closely due to increased risk of confusion and falls in older adult population  Plan to wean off when able    All medications and routine orders were reviewed and updated as needed.    Plan discussed with: Patient. Coordination of care with  nursing, OT/PT, SW.    Chief Complaint     Seen for admission at Nursing Facility    History of Present Illness     89 y.o. female who is seen today, following her hospitalization at [facility] from 12/21/23 to 12/27/23 after a fall sustaining fractures of sacrum and pubis. She was doing OT/PT at [facility], then transitioned to [facility] to continue her STR.  She is sitting in recliner today, c/o pain in her sacrum. Denies any CP, SOB, or abdominal pain.   Her friend is visiting at bed side.  No concerns from nursing report.    HISTORY:  Past Medical History:   Diagnosis Date    CAD (coronary artery disease)      Family History   Problem Relation Age of Onset    Stroke Sister      Social History     Socioeconomic History    Marital status: Single     Spouse name: None    Number of children: None    Years of education: None    Highest education level: None   Occupational History    None   Tobacco Use    Smoking status: Former     Types: Cigarettes     Passive exposure: Past    Smokeless tobacco: Never   Vaping Use    Vaping status: Never Used   Substance and Sexual Activity    Alcohol use: Never    Drug use: Never    Sexual activity: None   Other Topics Concern    None   Social History Narrative    None     Social Determinants of Health     Financial Resource Strain: Low Risk  (10/9/2023)    Received from [facility]    Overall Financial Resource Strain (CARDIA)     Difficulty of Paying Living Expenses: Not very hard   Food Insecurity: No Food Insecurity (12/21/2023)    Hunger Vital Sign     Worried About Running Out of Food in the Last Year: Never true     Ran Out of Food in the Last Year: Never true   Transportation Needs: No Transportation Needs (12/21/2023)    PRAPARE - Transportation     Lack of Transportation (Medical): No     Lack of Transportation (Non-Medical): No   Physical Activity: Not on file   Stress: Not on file   Social Connections: Not on file   Intimate Partner Violence: Not At Risk (10/9/2023)     Received from [facility]    Humiliation, Afraid, Rape, and Kick questionnaire     Fear of Current or Ex-Partner: No     Emotionally Abused: No     Physically Abused: No     Sexually Abused: No   Housing Stability: Low Risk  (12/21/2023)    Housing Stability Vital Sign     Unable to Pay for Housing in the Last Year: No     Number of Places Lived in the Last Year: 1     Unstable Housing in the Last Year: No       Allergies:  No Known Allergies    Review of Systems     Review of Systems   Musculoskeletal:  Positive for arthralgias and back pain.   All other systems reviewed and are negative.    Medications and orders     All medications reviewed and updated in detention EMR.    Objective     Vitals:    01/09/24 1551   BP: 115/70   Pulse: 75   Resp: 20   Temp: 97.7 °F (36.5 °C)   SpO2: 94%        Physical Exam  Vitals and nursing note reviewed.   Constitutional:       General: She is not in acute distress.  HENT:      Head: Normocephalic.      Nose: Nose normal.      Mouth/Throat:      Mouth: Mucous membranes are moist.   Eyes:      General:         Right eye: No discharge.         Left eye: No discharge.      Conjunctiva/sclera: Conjunctivae normal.   Cardiovascular:      Rate and Rhythm: Normal rate and regular rhythm.      Heart sounds: No murmur heard.  Pulmonary:      Effort: Pulmonary effort is normal.      Breath sounds: Normal breath sounds. No rales.   Abdominal:      General: Bowel sounds are normal. There is no distension.      Palpations: Abdomen is soft.      Tenderness: There is no abdominal tenderness.   Musculoskeletal:      Cervical back: Neck supple.      Right lower leg: Edema (trace) present.      Left lower leg: Edema (trace) present.   Skin:     General: Skin is warm.   Neurological:      Mental Status: She is alert and oriented to person, place, and time.   Psychiatric:         Behavior: Behavior normal.       Pertinent Laboratory/Diagnostic Studies:   The following  labs/studies were reviewed please see chart or hospital paperwork for details.    Labs & Imaging:  Lab Results   Component Value Date    WBC 7.91 12/26/2023    HGB 11.2 (L) 12/26/2023    HCT 33.2 (L) 12/26/2023    MCV 95 12/26/2023     12/26/2023     Lab Results   Component Value Date    SODIUM 138 12/26/2023    K 4.5 12/26/2023     12/26/2023    CO2 26 12/26/2023    AGAP 8 12/26/2023    BUN 30 (H) 12/26/2023    CREATININE 0.75 12/26/2023    GLUC 118 12/26/2023    CALCIUM 9.4 12/26/2023    AST 22 12/21/2023    ALT 13 12/21/2023    ALKPHOS 56 12/21/2023    TP 6.5 12/21/2023    TBILI 0.44 12/21/2023    EGFR 69 12/26/2023     Lab Results   Component Value Date    NFLAMJVK99 443 12/21/2023     Lab Results   Component Value Date    NNZ8RELSMBVB 3.432 12/21/2023     Lab Results   Component Value Date    OFCL95QOTTRD 32.9 12/21/2023      CT head wo contrast 12/21/2023  No acute intracranial abnormality.  Mild chronic small vessel ischemic changes.  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    I have spent 50 minutes with Patient  today including taking history from patient, review of records, charting, and counseling regarding diagnosis and management of conditions.    Marian Elmore MD  1/9/20248:18 PM

## 2024-01-10 NOTE — ASSESSMENT & PLAN NOTE
Multifactorial, advanced age, deconditioning post hospitalization, comorbidities  High risk of recurrent fall due to Hx of 4 falls last year, age, deconditioning, and polypharmacy  Fall precautions  OT/PT

## 2024-01-10 NOTE — ASSESSMENT & PLAN NOTE
Maintained on oxybutynin as outpatient  Monitor closely due to increased risk of confusion and falls in older adult population  Plan to wean off when able

## 2024-01-16 ENCOUNTER — TELEPHONE (OUTPATIENT)
Age: 89
End: 2024-01-16

## 2024-01-16 NOTE — TELEPHONE ENCOUNTER
Caller: Patient    Doctor: Carlos    Reason for call: Patient is currently in [facility]. Questioned if there was assistance for transportation? [facility] will charge $99 to transport and patient is looking for an alternative. Please call to discuss    Call back#: [phone]

## 2024-01-17 ENCOUNTER — NURSING HOME VISIT (OUTPATIENT)
Dept: GERIATRICS | Facility: OTHER | Age: 89
End: 2024-01-17
Payer: MEDICARE

## 2024-01-17 DIAGNOSIS — N32.81 OVERACTIVE BLADDER: ICD-10-CM

## 2024-01-17 DIAGNOSIS — R26.2 AMBULATORY DYSFUNCTION: ICD-10-CM

## 2024-01-17 DIAGNOSIS — F41.9 ANXIETY: ICD-10-CM

## 2024-01-17 DIAGNOSIS — S32.10XD CLOSED FRACTURE OF SACRUM WITH ROUTINE HEALING, UNSPECIFIED PORTION OF SACRUM, SUBSEQUENT ENCOUNTER: ICD-10-CM

## 2024-01-17 DIAGNOSIS — S32.591D CLOSED FRACTURE OF RAMUS OF RIGHT PUBIS WITH ROUTINE HEALING, SUBSEQUENT ENCOUNTER: Primary | ICD-10-CM

## 2024-01-17 PROCEDURE — 99309 SBSQ NF CARE MODERATE MDM 30: CPT | Performed by: NURSE PRACTITIONER

## 2024-01-17 NOTE — ASSESSMENT & PLAN NOTE
Multifactorial exacerbated by recent fall with fractures  With history of frequent falls  Patient is at high risk for recurrent falls due to advanced age, history of frequent falls, fractures, deconditioning, and polypharmacy  Continue PT/OT with weightbearing as tolerated  Continue safety/fall precautions

## 2024-01-17 NOTE — ASSESSMENT & PLAN NOTE
S/p fall 12/21/2023   Has pain with movement  Continue multimodal pain regimen with scheduled Tylenol, oxycodone as needed, Aspercreme lidocaine topical patch  Plan to wean patient off of oxycodone when able  Continue PT/OT  Follow-up with orthopedics as scheduled outpatient on 1/23/2024

## 2024-01-17 NOTE — ASSESSMENT & PLAN NOTE
Patient is currently on home dose oxybutynin  Consider discontinuing medication the risks outweigh the benefits in older adult population

## 2024-01-17 NOTE — PROGRESS NOTES
Facility: [facility]  POS: 31 (STR)  Progress Note    Chief Complaint/Reason for visit: STR follow up visit  Code status: Full code  History of Present Illness: 89-year-old female seen and examined for STR follow-up of acute and chronic medical conditions.  Received patient seated in chair and appeared in no distress.  Patient appeared somewhat anxious and worried about her future appointments.  Reassured patient that she does have follow-up appointments with orthopedics and will have  speak to her.  Patient states that she does not have pain if she does not move.  Admits to having constipation.  See A/P for additional information.  Past Medical History: unchanged from history and physical  Past Medical History:   Diagnosis Date    CAD (coronary artery disease)      Family History: unchanged from history and physical  Social History: unchanged from history and physical  Review of systems: As per review of medical illness, all other systems reviewed and negative.  Medications: All medication and routine orders were reviewed and updated  Allergies: NKDA  Consults reviewed:PT, OT, and Other  Labs/Diagnostics (reviewed by this provider): Copy in Chart  Imaging Reviewed: None today  Physical Exam  Weight: 139.4 pounds Temp: 98          BP: 142/78  pulse: 72      resp: 16       Orientation:Person, Place, and Day     Physical Exam  Vitals and nursing note reviewed.   Constitutional:       General: She is not in acute distress.     Appearance: She is not ill-appearing, toxic-appearing or diaphoretic.   HENT:      Head: Normocephalic.      Nose: No congestion.      Mouth/Throat:      Mouth: Mucous membranes are moist.      Pharynx: No oropharyngeal exudate.   Eyes:      Extraocular Movements: Extraocular movements intact.      Conjunctiva/sclera: Conjunctivae normal.   Cardiovascular:      Rate and Rhythm: Normal rate.   Pulmonary:      Effort: Pulmonary effort is normal. No respiratory distress.   Abdominal:       General: Bowel sounds are normal. There is no distension.      Palpations: Abdomen is soft.      Tenderness: There is no abdominal tenderness. There is no guarding.   Musculoskeletal:      Right lower leg: Edema (Trace) present.      Left lower leg: Edema (Trace) present.      Comments: Moves all 4 extremities.   Skin:     General: Skin is warm and dry.      Capillary Refill: Capillary refill takes less than 2 seconds.   Neurological:      Mental Status: She is alert. Mental status is at baseline.   Psychiatric:         Mood and Affect: Mood normal.         Behavior: Behavior normal.         Thought Content: Thought content normal.       Assessment/Plan:  89-year-old female with:    Fracture of ramus of right pubis (HCC)  S/p fall 12/21/2023   Has pain with movement  Continue multimodal pain regimen with scheduled Tylenol, oxycodone as needed, Aspercreme lidocaine topical patch  Plan to wean patient off of oxycodone when able  Continue PT/OT  Follow-up with orthopedics as scheduled outpatient on 1/23/2024    Sacral fracture, closed (HCC)  See above plan    Ambulatory dysfunction  Multifactorial exacerbated by recent fall with fractures  With history of frequent falls  Patient is at high risk for recurrent falls due to advanced age, history of frequent falls, fractures, deconditioning, and polypharmacy  Continue PT/OT with weightbearing as tolerated  Continue safety/fall precautions    Overactive bladder  Patient is currently on home dose oxybutynin  Consider discontinuing medication the risks outweigh the benefits in older adult population    Anxiety  Patient appears anxious  Currently on Ativan 0.5 mg daily  Continue psychosocial support    This note was completed in part utilizing [employer] direct voice recognition software.  Grammatical errors, random word insertion, spelling mistakes, and incomplete sentences may be an occasional consequence of the system secondary to software limitations, ambient  noise and hardware issues.  At the time of dictation, efforts were made to edit, clarify and/or correct errors.  Please read the chart carefully and recognize, using context, where substitutions have occurred.  If you have any questions or concerns about the context, text or information contained within the body of this dictation, please contact myself, the provider, for further clarification.    DIGNA Patton  1/17/20246:28 PM

## 2024-01-22 ENCOUNTER — NURSING HOME VISIT (OUTPATIENT)
Dept: GERIATRICS | Facility: OTHER | Age: 89
End: 2024-01-22
Payer: MEDICARE

## 2024-01-22 DIAGNOSIS — S32.10XD CLOSED FRACTURE OF SACRUM WITH ROUTINE HEALING, UNSPECIFIED PORTION OF SACRUM, SUBSEQUENT ENCOUNTER: ICD-10-CM

## 2024-01-22 DIAGNOSIS — R26.2 AMBULATORY DYSFUNCTION: ICD-10-CM

## 2024-01-22 DIAGNOSIS — D64.9 ANEMIA, UNSPECIFIED TYPE: ICD-10-CM

## 2024-01-22 DIAGNOSIS — S32.591D CLOSED FRACTURE OF RAMUS OF RIGHT PUBIS WITH ROUTINE HEALING, SUBSEQUENT ENCOUNTER: Primary | ICD-10-CM

## 2024-01-22 PROCEDURE — 99309 SBSQ NF CARE MODERATE MDM 30: CPT | Performed by: NURSE PRACTITIONER

## 2024-01-23 ENCOUNTER — OFFICE VISIT (OUTPATIENT)
Dept: OBGYN CLINIC | Facility: HOSPITAL | Age: 89
End: 2024-01-23
Payer: MEDICARE

## 2024-01-23 ENCOUNTER — HOSPITAL ENCOUNTER (OUTPATIENT)
Dept: RADIOLOGY | Facility: HOSPITAL | Age: 89
Discharge: HOME | End: 2024-01-23
Attending: ORTHOPAEDIC SURGERY
Payer: MEDICARE

## 2024-01-23 VITALS
DIASTOLIC BLOOD PRESSURE: 80 MMHG | HEART RATE: 86 BPM | SYSTOLIC BLOOD PRESSURE: 120 MMHG | HEIGHT: 60 IN | BODY MASS INDEX: 26.95 KG/M2

## 2024-01-23 DIAGNOSIS — S32.591A CLOSED FRACTURE OF RAMUS OF RIGHT PUBIS, INITIAL ENCOUNTER (HCC): ICD-10-CM

## 2024-01-23 DIAGNOSIS — S32.110A CLOSED NONDISPLACED ZONE I FRACTURE OF SACRUM, INITIAL ENCOUNTER (HCC): Primary | ICD-10-CM

## 2024-01-23 DIAGNOSIS — S32.110A CLOSED NONDISPLACED ZONE I FRACTURE OF SACRUM, INITIAL ENCOUNTER (HCC): ICD-10-CM

## 2024-01-23 PROCEDURE — 72190 X-RAY EXAM OF PELVIS: CPT

## 2024-01-23 PROCEDURE — 99213 OFFICE O/P EST LOW 20 MIN: CPT | Performed by: ORTHOPAEDIC SURGERY

## 2024-01-23 NOTE — ASSESSMENT & PLAN NOTE
Most recent hemoglobin 11.2    
S/p fall 12/21/2023  Patient admits to having pain only with movement  Continue scheduled Tylenol, oxycodone as needed, Aspercreme lidocaine topical patch  Wean off oxycodone when able continue  Continue senna S daily to prevent constipation from decreased mobility and pain medications  Continue PT/OT  Follow-up with orthopedics as scheduled outpatient on 1/23/2024  
See above plan  
With history of frequent falls  Patient is at risk for recurrent falls due to advanced age, history of falls, recent fractures, deconditioning, and polypharmacy  Continue supportive care at SNF  Continue PT/OT  Continue safety/fall precautions    
left upper arm

## 2024-01-23 NOTE — PROGRESS NOTES
Assessment:   Diagnosis ICD-10-CM Associated Orders   1. Closed nondisplaced zone I fracture of sacrum, initial encounter ([facility])  S32.110A XR pelvis complete 3+ vw     Ambulatory referral to Physical Therapy      2. Closed fracture of ramus of right pubis, initial encounter ([facility])  S32.591A Ambulatory referral to Physical Therapy          Plan:    89 year old female with right pubic ramus fracture, sacral fracture DOI 12/21/2023  Physical exam performed today, diagnostic imaging reviewed, and thorough subjective history obtained during today's visit.  Diagnosis, operative and non-operative treatment options, as well as expected outcomes and recoveries of each were discussed with the patient. The patient verbalized understanding of exam findings and treatment plan. The patient was given the opportunity to ask questions and all of their questions were addressed during today's visit.  May WBAT, using pain as a guide for when to modify, discontinue, or use assistive ambulatory device  Order placed today for Neha to continue Physical Therapy  May use ice or heat as needed for pain relief  May take NSAIDs/Tylenol as needed for pain control  Follow up in 6 weeks for re-evaluation      To do next visit:  Return in about 6 weeks (around 3/5/2024) for re-evaluation with x-rays.    The above stated was discussed in layman's terms and the patient expressed understanding.  All questions were answered to the patient's satisfaction.       Scribe Attestation      I,:  Frida Salazar am acting as a scribe while in the presence of the attending physician.:       I,:  Rajiv Gonzalez MD personally performed the services described in this documentation    as scribed in my presence.:               Subjective:   Neha Molina is a 89 y.o. female who presents today for evaluation and treatment of right pubic ramus and sacral fractures sustained 12/21/2023. She would like to discontinue narcotic pain medication due to altered mental  status. She has been completing physical therapy with benefit. She currently resides at an inpatient rehabilitation facility.           Review of Systems  Pertinent items are noted in HPI.  All other systems were reviewed and are negative.    Past Medical History:   Diagnosis Date    CAD (coronary artery disease)        Past Surgical History:   Procedure Laterality Date    BREAST BIOPSY         Family History   Problem Relation Age of Onset    Stroke Sister        Social History     Occupational History    Not on file   Tobacco Use    Smoking status: Former     Types: Cigarettes     Passive exposure: Past    Smokeless tobacco: Never   Vaping Use    Vaping status: Never Used   Substance and Sexual Activity    Alcohol use: Never    Drug use: Never    Sexual activity: Not on file         Current Outpatient Medications:     acetaminophen (TYLENOL) 325 mg tablet, Take 975 mg by mouth every 8 (eight) hours, Disp: , Rfl:     acetaminophen (TYLENOL) 500 mg tablet, Take 500 mg by mouth every 6 (six) hours as needed, Disp: , Rfl:     aspirin 81 mg chewable tablet, Chew 81 mg daily, Disp: , Rfl:     cholecalciferol (VITAMIN D3) 1,000 units tablet, Take 1 tablet (1,000 Units total) by mouth daily, Disp: , Rfl:     lactulose 20 g/30 mL, Take 10 g by mouth daily as needed (Constipation), Disp: , Rfl:     levothyroxine 75 mcg tablet, Take 75 mcg by mouth daily, Disp: , Rfl:     lidocaine (LIDODERM) 5 %, Apply 1 patch topically over 12 hours daily Remove & Discard patch within 12 hours or as directed by MD, Disp: , Rfl:     LORazepam (Ativan) 0.5 mg tablet, Take 1 tablet (0.5 mg total) by mouth daily at bedtime, Disp: 8 tablet, Rfl: 0    lovastatin (ALTOPREV) 20 MG 24 hr tablet, Take 20 mg by mouth, Disp: , Rfl:     meclizine (ANTIVERT) 25 mg tablet, Take 25 mg by mouth daily as needed, Disp: , Rfl:     naloxone (NARCAN) 4 mg/0.1 mL nasal spray, Administer 1 spray into a nostril. If no response after 2-3 minutes, give another dose  "in the other nostril using a new spray., Disp: 1 each, Rfl: 1    oxybutynin (DITROPAN-XL) 10 MG 24 hr tablet, Take 10 mg by mouth daily, Disp: , Rfl:     triamterene-hydrochlorothiazide (DYAZIDE) 37.5-25 mg per capsule, Take 1 capsule by mouth daily as needed, Disp: , Rfl:     oxyCODONE (ROXICODONE) 5 immediate release tablet, Take 5 mg by mouth in the morning And 5 mg every 6 hours as needed for breakthrough pain (Patient not taking: Reported on 1/23/2024), Disp: , Rfl:     No Known Allergies    Physical Exam  /80   Pulse 86   Ht 5' (1.524 m)   BMI 26.95 kg/m²   Cons: Appears well.  No apparent distress.  Psych: Alert. Oriented x3.  Mood and affect normal.  Eyes: PERRLA, EOMI  Resp: Normal effort.  No audible wheezing or stridor.  CV: Palpable pulse.  No discernable arrhythmia.  No LE edema.  Lymph:  No palpable cervical, axillary, or inguinal lymphadenopathy.  Skin: Warm.  No palpable masses.  No visible lesions.  Neuro: Normal muscle tone.  Normal and symmetric DTR's.    Objective:    Patient sits comfortably in her chair with her hip flexed at 90 degrees  No tenderness to palpation  Sensation intact in DP/SP/Vasquez/Sa/T nerve distributions  2+ DP & PT pulses  Brisk capillary refill in all toes      Diagnostics, reviewed and taken today if performed as documented:    The attending physician has personally reviewed the pertinent films in PACS and interpretation is as follows:  X-ray of pelvis obtained today reviewed and demonstrates: pubic rami and sacral fractures in maintained alignment with interval healing noted      Procedures, if performed today:    None performed      Portions of the record may have been created with voice recognition software.  Occasional wrong word or \"sound a like\" substitutions may have occurred due to the inherent limitations of voice recognition software.  Read the chart carefully and recognize, using context, where substitutions have occurred.  "

## 2024-01-23 NOTE — PROGRESS NOTES
Facility: [facility]  POS: 31 (STR)  Progress Note    Chief Complaint/Reason for visit: STR follow up visit  Code status: Full code  History of Present Illness: 89-year-old female seen and examined for STR follow-up of acute and chronic medical conditions.  Received patient seated in chair and appeared in no distress.  No complaints of pain at time of exam.  Denies shortness of breath, chest pain, headache, dizziness, abdominal pain, nausea, vomiting, constipation, or diarrhea.  Weight loss of 5 pounds noted from 1/10/2024 to 1/15/2024.  Past Medical History: unchanged from history and physical  Past Medical History:   Diagnosis Date    CAD (coronary artery disease)      Family History: Unchanged from history and physical  Social History: Unchanged from history and physical  Review of systems: As per review of medical illness, all other systems reviewed and negative.  Medications: All medication and routine orders were reviewed and updated  Allergies: NKDA  Consults reviewed:PT, OT, and Other  Labs/Diagnostics (reviewed by this provider): Copy in Chart paper chart  Imaging Reviewed: None today  Physical Exam  Weight: 139.4 pounds Temp: 97.2          BP: 135/74   pulse: 74   resp: 16         Orientation:Person, Place, and Day     Physical Exam  Vitals and nursing note reviewed.   Constitutional:       General: She is not in acute distress.     Appearance: She is not ill-appearing, toxic-appearing or diaphoretic.   HENT:      Head: Normocephalic.      Nose: No congestion.      Mouth/Throat:      Mouth: Mucous membranes are moist.      Pharynx: No oropharyngeal exudate.   Eyes:      Extraocular Movements: Extraocular movements intact.      Conjunctiva/sclera: Conjunctivae normal.   Cardiovascular:      Rate and Rhythm: Normal rate and regular rhythm.   Pulmonary:      Effort: Pulmonary effort is normal. No respiratory distress.      Comments: Decreased breath sounds bibasilar.  Abdominal:      General: Bowel  sounds are normal. There is no distension.      Palpations: Abdomen is soft.      Tenderness: There is no abdominal tenderness. There is no guarding.   Musculoskeletal:      Right lower leg: No edema.      Left lower leg: No edema.      Comments: Moves all 4 extremities.   Skin:     General: Skin is warm and dry.      Capillary Refill: Capillary refill takes less than 2 seconds.   Neurological:      Mental Status: She is alert and oriented to person, place, and time.      Motor: Weakness present.      Gait: Gait abnormal.   Psychiatric:         Mood and Affect: Mood normal.         Behavior: Behavior normal.         Thought Content: Thought content normal.       Assessment/Plan:  89-year-old female with:    Fracture of ramus of right pubis (HCC)  S/p fall 12/21/2023  Patient admits to having pain only with movement  Continue scheduled Tylenol, oxycodone as needed, Aspercreme lidocaine topical patch  Wean off oxycodone when able continue  Continue senna S daily to prevent constipation from decreased mobility and pain medications  Continue PT/OT  Follow-up with orthopedics as scheduled outpatient on 1/23/2024    Sacral fracture, closed ([facility])  See above plan    Anemia  Most recent hemoglobin 11.2    Ambulatory dysfunction  With history of frequent falls  Patient is at risk for recurrent falls due to advanced age, history of falls, recent fractures, deconditioning, and polypharmacy  Continue supportive care at SNF  Continue PT/OT  Continue safety/fall precautions    This note was completed in part utilizing [employer] direct voice recognition software.  Grammatical errors, random word insertion, spelling mistakes, and incomplete sentences may be an occasional consequence of the system secondary to software limitations, ambient noise and hardware issues.  At the time of dictation, efforts were made to edit, clarify and/or correct errors.  Please read the chart carefully and recognize, using context, where  substitutions have occurred.  If you have any questions or concerns about the context, text or information contained within the body of this dictation, please contact myself, the provider, for further clarification.    DIGNA Patton  1/22/20247:39 PM

## 2024-02-02 ENCOUNTER — NURSING HOME VISIT (OUTPATIENT)
Dept: GERIATRICS | Facility: OTHER | Age: 89
End: 2024-02-02
Payer: MEDICARE

## 2024-02-02 DIAGNOSIS — Z86.73 HISTORY OF CVA (CEREBROVASCULAR ACCIDENT): ICD-10-CM

## 2024-02-02 DIAGNOSIS — D64.9 ANEMIA, UNSPECIFIED TYPE: ICD-10-CM

## 2024-02-02 DIAGNOSIS — S32.110D CLOSED NONDISPLACED ZONE I FRACTURE OF SACRUM WITH ROUTINE HEALING, SUBSEQUENT ENCOUNTER: ICD-10-CM

## 2024-02-02 DIAGNOSIS — M17.0 OSTEOARTHRITIS OF BOTH KNEES, UNSPECIFIED OSTEOARTHRITIS TYPE: ICD-10-CM

## 2024-02-02 DIAGNOSIS — E53.8 VITAMIN B 12 DEFICIENCY: ICD-10-CM

## 2024-02-02 DIAGNOSIS — N32.81 OVERACTIVE BLADDER: ICD-10-CM

## 2024-02-02 DIAGNOSIS — R26.2 AMBULATORY DYSFUNCTION: ICD-10-CM

## 2024-02-02 DIAGNOSIS — E03.8 OTHER SPECIFIED HYPOTHYROIDISM: ICD-10-CM

## 2024-02-02 DIAGNOSIS — F41.9 ANXIETY: ICD-10-CM

## 2024-02-02 DIAGNOSIS — S32.591D CLOSED FRACTURE OF RAMUS OF RIGHT PUBIS WITH ROUTINE HEALING, SUBSEQUENT ENCOUNTER: Primary | ICD-10-CM

## 2024-02-02 PROCEDURE — 99316 NF DSCHRG MGMT 30 MIN+: CPT | Performed by: FAMILY MEDICINE

## 2024-02-02 NOTE — LETTER
February 2, 2024     Dinesh Johnston MD  [address] [place] [address]  [address] Floor, [address]  [facility] [address]    Patient: Neha Molina   YOB: 1932   Date of Visit: 2/2/2024       Dear Dr. Johnston:    Below is my discharge note for this SNF rehab course.    If you have questions, please do not hesitate to call me. I look forward to following your patient along with you.         Sincerely,        Marian Elmore MD        CC: No Recipients    Marian Elmore MD  2/4/2024  6:01 AM  Sign when Signing Visit  [facility]    DISCHARGE SUMMARY  POS: 31  Facility: [facility]    NAME: Neha Molina  AGE: 89 y.o. SEX: female  DATE OF ADMISSION: 1/9/2024 DATE OF DISCHARGE:02/05/2024 DISCHARGE DISPOSITION: [facility]    Reason for admission: Patient was admitted from [facility] for rehabilitation after hospitalization for     Admission Diagnoses: Fracture of sacrum, fracture right pubis, fall, hypothyroidism, anxiety disorder, sequelae of cerebral infarction, constipation, anemia, vitamin B12 deficiency  Additional Problems: Degenerative joint disease of knee, overactive bladder  Discharge Diagnoses: See problem list follow up recommendations below.    Ambulatory dysfunction  With history of frequent falls  Patient is at risk for recurrent falls due to advanced age, history of falls, recent fractures, deconditioning, and polypharmacy  Needs RW   Continue PT/OT  Continue safety/fall precautions    Fracture of ramus of right pubis (HCC)  S/p fall 12/21/2023  Pain has been controlled with scheduled Tylenol, oxycodone as needed, Aspercreme lidocaine topical patch  Senna S daily to prevent constipation from decreased mobility and pain medications  Continue PT/OT  Follow-up with orthopedics as scheduled outpatient    Sacral fracture, closed (HCC)  Sustained from standing height and admitted to the acute care hospital from December 21, 2023 through December 27, 2023  Seen in consultation with  orthopedic service during her hospitalization with recommendation for PT/OT, weightbearing as tolerated, and multimodal pain management  F/u with Orthopedics as scheduled    Hypothyroid  Stable, TSH was WNL 3.4 (12/21/23)  Continue levothyroxine 75 mcg daily  FU with PCP    DJD (degenerative joint disease) of knee  Continue acetaminophen prn for pain.  OT/PT    Overactive bladder  Patient is currently on home dose oxybutynin  Consider discontinuing medication the risks outweigh the benefits in older adult population    Anemia  Stable  Most recent hemoglobin 11.2 (12/26/23)    Anxiety  Patient appears anxious  Currently on home dose of Ativan 0.5 mg daily  Continue psychosocial support    History of CVA (cerebrovascular accident)  Continue ASA and statin    Vitamin B 12 deficiency  S/p B12 injection in [facility], no record found in chart  B12 level was ordered on 11/17/23, but no results found  Needs f/u with PCP to repeat bloodwork and treatment if indicated    Course of stay: Patient was admitted to [facility] in [place] for rehabilitation following hospitalization after a fall sustaining fractures of sacrum and pubis.  She was doing OT/PT at [facility], then transition to [facility] to continue her rehab.  During the resident's stay at [facility], she participates in OT/PT with significant improvement.  She had the follow-up visit with orthopedic surgery on January 23, 2024.  She was advised WBAT, continue to use assistive ambulatory device, PT, Tylenol prn for pain control.  She will follow-up with orthopedic surgery in 6 weeks for reevaluation with x-rays.    Labs and testing performed during stay: BMP    New Medications: no  Medication Changes: no  Discontinued Medications: oxycodone, methocarbamol   Discharge Medications: See discharge medication list which was reviewed in [facility] and compared to facility orders for accuracy.  Status at time of discharge exam: Stable    Today's Visit:  2/2/2024    Subjective: She is sitting in bed, denies pain in knee or back.  She states she like the small, compact rolling walker here. She c/o dry nose and dry lips due to dry air.     Review of systems: As per review of medical illness, all other systems reviewed and negative.    Vitals:   Vitals:    02/02/24 1555   BP: 128/80   Pulse: 64   Resp: 18   Temp: 97.8 °F (36.6 °C)   SpO2: 95%   Weight: 63.2 kg (139 lb 6.4 oz)     Exam: Physical Exam  Vitals and nursing note reviewed.   Constitutional:       General: She is not in acute distress.  HENT:      Head: Normocephalic and atraumatic.      Nose: Nose normal.      Mouth/Throat:      Mouth: Mucous membranes are moist.   Eyes:      General:         Right eye: No discharge.         Left eye: No discharge.      Conjunctiva/sclera: Conjunctivae normal.   Cardiovascular:      Rate and Rhythm: Normal rate and regular rhythm.      Heart sounds: No murmur heard.  Pulmonary:      Effort: Pulmonary effort is normal.      Breath sounds: Normal breath sounds. No rales.   Abdominal:      General: Bowel sounds are normal. There is no distension.      Palpations: Abdomen is soft.      Tenderness: There is no abdominal tenderness.   Musculoskeletal:         General: No deformity.      Cervical back: Neck supple.      Right lower leg: Edema (b/l ankles) present.      Left lower leg: Edema present.      Comments: Ambulates with rolling walker   Skin:     General: Skin is warm and dry.   Neurological:      Mental Status: She is alert and oriented to person, place, and time.   Psychiatric:         Behavior: Behavior normal.       Discussion with patient/family and further instructions:  -Fall precautions  -Aspiration precautions  -Bleeding precautions  -Monitor for signs/symptoms of infection  -Medication list was reviewed and signed  -DME form was completed    Follow-up Recommendations: Please follow-up with your primary care physician within 7-10 days of discharge to review  medication changes and current status.     Problem List Follow-up Recommendations:  Follow up with Orthopedic surgery 3/5/2024  Follow up with PCP    I have spent >30 minutes with patient today in which greater than 50% of this time was spent in counseling/coordination of care regarding Diagnostic results, Prognosis, Risks and benefits of tx options, Instructions for management, Patient and family education, Counseling / Coordination of care, Documenting in the medical record, and Reviewing / ordering tests, medicine, procedures  .    PCP made aware of discharge summary via epic communications.    Marian Elmore MD  2/2/2024

## 2024-02-02 NOTE — ASSESSMENT & PLAN NOTE
With history of frequent falls  Patient is at risk for recurrent falls due to advanced age, history of falls, recent fractures, deconditioning, and polypharmacy  Needs RW   Continue PT/OT  Continue safety/fall precautions

## 2024-02-02 NOTE — PROGRESS NOTES
[facility]    DISCHARGE SUMMARY  POS: 31  Facility: [facility]    NAME: Neha Molina  AGE: 89 y.o. SEX: female  DATE OF ADMISSION: 1/9/2024 DATE OF DISCHARGE:02/05/2024 DISCHARGE DISPOSITION: [facility]    Reason for admission: Patient was admitted from [facility] for rehabilitation after hospitalization for     Admission Diagnoses: Fracture of sacrum, fracture right pubis, fall, hypothyroidism, anxiety disorder, sequelae of cerebral infarction, constipation, anemia, vitamin B12 deficiency  Additional Problems: Degenerative joint disease of knee, overactive bladder  Discharge Diagnoses: See problem list follow up recommendations below.    Ambulatory dysfunction  With history of frequent falls  Patient is at risk for recurrent falls due to advanced age, history of falls, recent fractures, deconditioning, and polypharmacy  Needs RW   Continue PT/OT  Continue safety/fall precautions    Fracture of ramus of right pubis (HCC)  S/p fall 12/21/2023  Pain has been controlled with scheduled Tylenol, oxycodone as needed, Aspercreme lidocaine topical patch  Senna S daily to prevent constipation from decreased mobility and pain medications  Continue PT/OT  Follow-up with orthopedics as scheduled outpatient    Sacral fracture, closed (HCC)  Sustained from standing height and admitted to the acute care hospital from December 21, 2023 through December 27, 2023  Seen in consultation with orthopedic service during her hospitalization with recommendation for PT/OT, weightbearing as tolerated, and multimodal pain management  F/u with Orthopedics as scheduled    Hypothyroid  Stable, TSH was WNL 3.4 (12/21/23)  Continue levothyroxine 75 mcg daily  FU with PCP    DJD (degenerative joint disease) of knee  Continue acetaminophen prn for pain.  OT/PT    Overactive bladder  Patient is currently on home dose oxybutynin  Consider discontinuing medication the risks outweigh the benefits in older adult  population    Anemia  Stable  Most recent hemoglobin 11.2 (12/26/23)    Anxiety  Patient appears anxious  Currently on home dose of Ativan 0.5 mg daily  Continue psychosocial support    History of CVA (cerebrovascular accident)  Continue ASA and statin    Vitamin B 12 deficiency  S/p B12 injection in [facility], no record found in chart  B12 level was ordered on 11/17/23, but no results found  Needs f/u with PCP to repeat bloodwork and treatment if indicated    Course of stay: Patient was admitted to [facility] in [place] for rehabilitation following hospitalization after a fall sustaining fractures of sacrum and pubis.  She was doing OT/PT at [facility], then transition to [facility] to continue her rehab.  During the resident's stay at [facility], she participates in OT/PT with significant improvement.  She had the follow-up visit with orthopedic surgery on January 23, 2024.  She was advised WBAT, continue to use assistive ambulatory device, PT, Tylenol prn for pain control.  She will follow-up with orthopedic surgery in 6 weeks for reevaluation with x-rays.    Labs and testing performed during stay: BMP    New Medications: no  Medication Changes: no  Discontinued Medications: oxycodone, methocarbamol   Discharge Medications: See discharge medication list which was reviewed in [facility] and compared to facility orders for accuracy.  Status at time of discharge exam: Stable    Today's Visit: 2/2/2024    Subjective: She is sitting in bed, denies pain in knee or back.  She states she like the small, compact rolling walker here. She c/o dry nose and dry lips due to dry air.     Review of systems: As per review of medical illness, all other systems reviewed and negative.    Vitals:   Vitals:    02/02/24 1555   BP: 128/80   Pulse: 64   Resp: 18   Temp: 97.8 °F (36.6 °C)   SpO2: 95%   Weight: 63.2 kg (139 lb 6.4 oz)     Exam: Physical Exam  Vitals and nursing note reviewed.   Constitutional:        General: She is not in acute distress.  HENT:      Head: Normocephalic and atraumatic.      Nose: Nose normal.      Mouth/Throat:      Mouth: Mucous membranes are moist.   Eyes:      General:         Right eye: No discharge.         Left eye: No discharge.      Conjunctiva/sclera: Conjunctivae normal.   Cardiovascular:      Rate and Rhythm: Normal rate and regular rhythm.      Heart sounds: No murmur heard.  Pulmonary:      Effort: Pulmonary effort is normal.      Breath sounds: Normal breath sounds. No rales.   Abdominal:      General: Bowel sounds are normal. There is no distension.      Palpations: Abdomen is soft.      Tenderness: There is no abdominal tenderness.   Musculoskeletal:         General: No deformity.      Cervical back: Neck supple.      Right lower leg: Edema (b/l ankles) present.      Left lower leg: Edema present.      Comments: Ambulates with rolling walker   Skin:     General: Skin is warm and dry.   Neurological:      Mental Status: She is alert and oriented to person, place, and time.   Psychiatric:         Behavior: Behavior normal.       Discussion with patient/family and further instructions:  -Fall precautions  -Aspiration precautions  -Bleeding precautions  -Monitor for signs/symptoms of infection  -Medication list was reviewed and signed  -DME form was completed    Follow-up Recommendations: Please follow-up with your primary care physician within 7-10 days of discharge to review medication changes and current status.     Problem List Follow-up Recommendations:  Follow up with Orthopedic surgery 3/5/2024  Follow up with PCP    I have spent >30 minutes with patient today in which greater than 50% of this time was spent in counseling/coordination of care regarding Diagnostic results, Prognosis, Risks and benefits of tx options, Instructions for management, Patient and family education, Counseling / Coordination of care, Documenting in the medical record, and Reviewing / ordering tests,  medicine, procedures  .    PCP made aware of discharge summary via epic communications.    Marian Elmore MD  2/2/2024

## 2024-02-03 NOTE — ASSESSMENT & PLAN NOTE
S/p fall 12/21/2023  Pain has been controlled with scheduled Tylenol, oxycodone as needed, Aspercreme lidocaine topical patch  Senna S daily to prevent constipation from decreased mobility and pain medications  Continue PT/OT  Follow-up with orthopedics as scheduled outpatient

## 2024-02-03 NOTE — ASSESSMENT & PLAN NOTE
Sustained from standing height and admitted to the acute [facility] hospital from December 21, 2023 through December 27, 2023  Seen in consultation with orthopedic service during her hospitalization with recommendation for PT/OT, weightbearing as tolerated, and multimodal pain management  F/u with Orthopedics as scheduled

## 2024-02-04 VITALS
TEMPERATURE: 97.8 F | RESPIRATION RATE: 18 BRPM | DIASTOLIC BLOOD PRESSURE: 80 MMHG | SYSTOLIC BLOOD PRESSURE: 128 MMHG | WEIGHT: 139.4 LBS | BODY MASS INDEX: 27.22 KG/M2 | OXYGEN SATURATION: 95 % | HEART RATE: 64 BPM

## 2024-02-04 NOTE — ASSESSMENT & PLAN NOTE
Patient appears anxious  Currently on home dose of Ativan 0.5 mg daily  Continue psychosocial support

## 2024-02-04 NOTE — ASSESSMENT & PLAN NOTE
S/p B12 injection in [place], no record found in chart  B12 level was ordered on 11/17/23, but no results found  Needs f/u with PCP to repeat bloodwork and treatment if indicated

## 2024-02-05 ENCOUNTER — TRANSITIONAL CARE MANAGEMENT (OUTPATIENT)
Dept: INTERNAL MEDICINE CLINIC | Facility: CLINIC | Age: 89
End: 2024-02-05

## 2024-02-07 ENCOUNTER — APPOINTMENT (EMERGENCY)
Dept: RADIOLOGY | Facility: HOSPITAL | Age: 89
DRG: 149 | End: 2024-02-07
Payer: MEDICARE

## 2024-02-07 ENCOUNTER — HOSPITAL ENCOUNTER (INPATIENT)
Facility: HOSPITAL | Age: 89
LOS: 5 days | Discharge: NURSING, REHAB OR LONG-TERM CARE | DRG: 149 | End: 2024-02-12
Attending: EMERGENCY MEDICINE | Admitting: INTERNAL MEDICINE
Payer: MEDICARE

## 2024-02-07 DIAGNOSIS — S09.90XA CLOSED HEAD INJURY, INITIAL ENCOUNTER: ICD-10-CM

## 2024-02-07 DIAGNOSIS — S06.0XAA CONCUSSION: ICD-10-CM
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
## 2024-02-07 DIAGNOSIS — S09.90XA INJURY OF HEAD, INITIAL ENCOUNTER: ICD-10-CM

## 2024-02-07 DIAGNOSIS — E78.49 OTHER HYPERLIPIDEMIA: ICD-10-CM

## 2024-02-07 DIAGNOSIS — U07.1 COVID-19: ICD-10-CM

## 2024-02-07 DIAGNOSIS — R42 VERTIGO: ICD-10-CM

## 2024-02-07 DIAGNOSIS — F41.9 ANXIETY: ICD-10-CM

## 2024-02-07 LAB
ALBUMIN SERPL BCP-MCNC: 3.8 G/DL (ref 3.5–5)
ALP SERPL-CCNC: 105 U/L (ref 34–104)
ALT SERPL W P-5'-P-CCNC: 12 U/L (ref 7–52)
ANION GAP SERPL CALCULATED.3IONS-SCNC: 7 MMOL/L
AST SERPL W P-5'-P-CCNC: 20 U/L (ref 13–39)
ATRIAL RATE: 66 BPM
BASOPHILS # BLD AUTO: 0.01 THOUSANDS/ÂΜL (ref 0–0.1)
BASOPHILS NFR BLD AUTO: 0 % (ref 0–1)
BILIRUB SERPL-MCNC: 0.47 MG/DL (ref 0.2–1)
BUN SERPL-MCNC: 18 MG/DL (ref 5–25)
CALCIUM SERPL-MCNC: 8.9 MG/DL (ref 8.4–10.2)
CARDIAC TROPONIN I PNL SERPL HS: 7 NG/L
CHLORIDE SERPL-SCNC: 100 MMOL/L (ref 96–108)
CO2 SERPL-SCNC: 27 MMOL/L (ref 21–32)
CREAT SERPL-MCNC: 0.69 MG/DL (ref 0.6–1.3)
EOSINOPHIL # BLD AUTO: 0.05 THOUSAND/ÂΜL (ref 0–0.61)
EOSINOPHIL NFR BLD AUTO: 1 % (ref 0–6)
ERYTHROCYTE [DISTWIDTH] IN BLOOD BY AUTOMATED COUNT: 13.2 % (ref 11.6–15.1)
GFR SERPL CREATININE-BSD FRML MDRD: 76 ML/MIN/1.73SQ M
GLUCOSE SERPL-MCNC: 108 MG/DL (ref 65–140)
HCT VFR BLD AUTO: 33.8 % (ref 34.8–46.1)
HGB BLD-MCNC: 10.7 G/DL (ref 11.5–15.4)
IMM GRANULOCYTES # BLD AUTO: 0.05 THOUSAND/UL (ref 0–0.2)
IMM GRANULOCYTES NFR BLD AUTO: 1 % (ref 0–2)
LYMPHOCYTES # BLD AUTO: 0.53 THOUSANDS/ÂΜL (ref 0.6–4.47)
LYMPHOCYTES NFR BLD AUTO: 12 % (ref 14–44)
MCH RBC QN AUTO: 31.1 PG (ref 26.8–34.3)
MCHC RBC AUTO-ENTMCNC: 31.7 G/DL (ref 31.4–37.4)
MCV RBC AUTO: 98 FL (ref 82–98)
MONOCYTES # BLD AUTO: 0.75 THOUSAND/ÂΜL (ref 0.17–1.22)
MONOCYTES NFR BLD AUTO: 17 % (ref 4–12)
NEUTROPHILS # BLD AUTO: 2.95 THOUSANDS/ÂΜL (ref 1.85–7.62)
NEUTS SEG NFR BLD AUTO: 69 % (ref 43–75)
NRBC BLD AUTO-RTO: 0 /100 WBCS
P AXIS: 66 DEGREES
PLATELET # BLD AUTO: 212 THOUSANDS/UL (ref 149–390)
PMV BLD AUTO: 9.6 FL (ref 8.9–12.7)
POTASSIUM SERPL-SCNC: 4.3 MMOL/L (ref 3.5–5.3)
PR INTERVAL: 184 MS
PROT SERPL-MCNC: 7.2 G/DL (ref 6.4–8.4)
QRS AXIS: -48 DEGREES
QRSD INTERVAL: 76 MS
QT INTERVAL: 426 MS
QTC INTERVAL: 446 MS
RBC # BLD AUTO: 3.44 MILLION/UL (ref 3.81–5.12)
SODIUM SERPL-SCNC: 134 MMOL/L (ref 135–147)
T WAVE AXIS: 57 DEGREES
VENTRICULAR RATE: 66 BPM
WBC # BLD AUTO: 4.34 THOUSAND/UL (ref 4.31–10.16)

## 2024-02-07 PROCEDURE — 99284 EMERGENCY DEPT VISIT MOD MDM: CPT

## 2024-02-07 PROCEDURE — G1004 CDSM NDSC: HCPCS

## 2024-02-07 PROCEDURE — 93010 ELECTROCARDIOGRAM REPORT: CPT | Performed by: INTERNAL MEDICINE

## 2024-02-07 PROCEDURE — 96375 TX/PRO/DX INJ NEW DRUG ADDON: CPT

## 2024-02-07 PROCEDURE — 85025 COMPLETE CBC W/AUTO DIFF WBC: CPT | Performed by: EMERGENCY MEDICINE

## 2024-02-07 PROCEDURE — 70450 CT HEAD/BRAIN W/O DYE: CPT

## 2024-02-07 PROCEDURE — 36415 COLL VENOUS BLD VENIPUNCTURE: CPT | Performed by: EMERGENCY MEDICINE

## 2024-02-07 PROCEDURE — 99223 1ST HOSP IP/OBS HIGH 75: CPT | Performed by: INTERNAL MEDICINE

## 2024-02-07 PROCEDURE — 72125 CT NECK SPINE W/O DYE: CPT

## 2024-02-07 PROCEDURE — 80053 COMPREHEN METABOLIC PANEL: CPT | Performed by: EMERGENCY MEDICINE

## 2024-02-07 PROCEDURE — 84484 ASSAY OF TROPONIN QUANT: CPT | Performed by: EMERGENCY MEDICINE

## 2024-02-07 PROCEDURE — 93005 ELECTROCARDIOGRAM TRACING: CPT

## 2024-02-07 PROCEDURE — 96374 THER/PROPH/DIAG INJ IV PUSH: CPT

## 2024-02-07 PROCEDURE — 99285 EMERGENCY DEPT VISIT HI MDM: CPT | Performed by: EMERGENCY MEDICINE

## 2024-02-07 RX ORDER — ACETAMINOPHEN 325 MG/1
975 TABLET ORAL ONCE
Status: COMPLETED | OUTPATIENT
Start: 2024-02-07 | End: 2024-02-07

## 2024-02-07 RX ORDER — MECLIZINE HYDROCHLORIDE 25 MG/1
25 TABLET ORAL EVERY 8 HOURS SCHEDULED
Status: DISCONTINUED | OUTPATIENT
Start: 2024-02-07 | End: 2024-02-12 | Stop reason: HOSPADM

## 2024-02-07 RX ORDER — PRAVASTATIN SODIUM 20 MG
20 TABLET ORAL
Status: DISCONTINUED | OUTPATIENT
Start: 2024-02-08 | End: 2024-02-12 | Stop reason: HOSPADM

## 2024-02-07 RX ORDER — ACETAMINOPHEN 325 MG/1
650 TABLET ORAL EVERY 6 HOURS PRN
Status: DISCONTINUED | OUTPATIENT
Start: 2024-02-07 | End: 2024-02-12 | Stop reason: HOSPADM

## 2024-02-07 RX ORDER — MECLIZINE HCL 12.5 MG/1
12.5 TABLET ORAL ONCE
Status: COMPLETED | OUTPATIENT
Start: 2024-02-07 | End: 2024-02-07

## 2024-02-07 RX ORDER — FENTANYL CITRATE 50 UG/ML
25 INJECTION, SOLUTION INTRAMUSCULAR; INTRAVENOUS ONCE
Status: COMPLETED | OUTPATIENT
Start: 2024-02-07 | End: 2024-02-07

## 2024-02-07 RX ORDER — LEVOTHYROXINE SODIUM 0.07 MG/1
75 TABLET ORAL
Status: DISCONTINUED | OUTPATIENT
Start: 2024-02-08 | End: 2024-02-12 | Stop reason: HOSPADM

## 2024-02-07 RX ORDER — ONDANSETRON 2 MG/ML
4 INJECTION INTRAMUSCULAR; INTRAVENOUS EVERY 6 HOURS PRN
Status: DISCONTINUED | OUTPATIENT
Start: 2024-02-07 | End: 2024-02-12 | Stop reason: HOSPADM

## 2024-02-07 RX ORDER — ASPIRIN 81 MG/1
81 TABLET, CHEWABLE ORAL DAILY
Status: DISCONTINUED | OUTPATIENT
Start: 2024-02-08 | End: 2024-02-12 | Stop reason: HOSPADM

## 2024-02-07 RX ORDER — LORAZEPAM 0.5 MG/1
0.5 TABLET ORAL
Status: DISCONTINUED | OUTPATIENT
Start: 2024-02-07 | End: 2024-02-12 | Stop reason: HOSPADM

## 2024-02-07 RX ORDER — ONDANSETRON 2 MG/ML
4 INJECTION INTRAMUSCULAR; INTRAVENOUS ONCE
Status: COMPLETED | OUTPATIENT
Start: 2024-02-07 | End: 2024-02-07

## 2024-02-07 RX ORDER — ENOXAPARIN SODIUM 100 MG/ML
40 INJECTION SUBCUTANEOUS DAILY
Status: DISCONTINUED | OUTPATIENT
Start: 2024-02-08 | End: 2024-02-12 | Stop reason: HOSPADM

## 2024-02-07 RX ORDER — IBUPROFEN 400 MG/1
400 TABLET ORAL ONCE
Status: COMPLETED | OUTPATIENT
Start: 2024-02-07 | End: 2024-02-07

## 2024-02-07 RX ORDER — SODIUM CHLORIDE 9 MG/ML
50 INJECTION, SOLUTION INTRAVENOUS CONTINUOUS
Status: DISCONTINUED | OUTPATIENT
Start: 2024-02-07 | End: 2024-02-08

## 2024-02-07 RX ADMIN — MECLIZINE HCL 12.5 MG 12.5 MG: 12.5 TABLET ORAL at 18:35

## 2024-02-07 RX ADMIN — ONDANSETRON 4 MG: 2 INJECTION INTRAMUSCULAR; INTRAVENOUS at 14:56

## 2024-02-07 RX ADMIN — IBUPROFEN 400 MG: 400 TABLET, FILM COATED ORAL at 16:23

## 2024-02-07 RX ADMIN — FENTANYL CITRATE 25 MCG: 50 INJECTION INTRAMUSCULAR; INTRAVENOUS at 14:55

## 2024-02-07 RX ADMIN — FENTANYL CITRATE 25 MCG: 50 INJECTION INTRAMUSCULAR; INTRAVENOUS at 15:25

## 2024-02-07 RX ADMIN — ACETAMINOPHEN 975 MG: 325 TABLET, FILM COATED ORAL at 16:23

## 2024-02-07 RX ADMIN — SODIUM CHLORIDE 50 ML/HR: 0.9 INJECTION, SOLUTION INTRAVENOUS at 20:42

## 2024-02-07 NOTE — H&P
[facility]  H&P  Name: Neha Molina 89 y.o. female I MRN: [id number]  Unit/Bed#: ED 21 I Date of Admission: 2/7/2024   Date of Service: 2/7/2024 I Hospital Day: 0      Assessment/Plan   * Concussion  Assessment & Plan  Possible concussive vertigo.  Will treat supportively  IV fluids  Rx headache  Rx vertigo symptoms with meclizine.  PT/OT  Patient resides at assisted care facility.  May need short-term rehab?      Hyponatremia  Assessment & Plan  Mild.  Gentle hydration    Vitamin B 12 deficiency  Assessment & Plan  Check B12 level  Patient reports mechanical fall with her knee giving out when turning.  However question her ability to ambulate/balance.      Other hyperlipidemia  Assessment & Plan  Lovastatin at home    Hypothyroid  Assessment & Plan  Levothyroxine           VTE Prophylaxis: Enoxaparin (Lovenox)  / sequential compression device   Code Status: Full code  POLST: There is no POLST form on file for this patient (pre-hospital)      Anticipated Length of Stay:  Patient will be admitted on an Inpatient basis with an anticipated length of stay of greater then 2 midnights.   Justification for Hospital Stay: Need to monitor symptoms of vertigo and gait instability.  Will assess for rehab needs.    Total Time for Visit, including Counseling / Coordination of Care:  85 .  Greater than 50% of this total time spent on direct patient counseling and coordination of care.    Chief Complaint: Vertigo    History of Present Illness:    Neha Molina is a 89 y.o. female who presents with a known history of osteoarthritis, history of bilateral knee pain who presents emergency department after recent fall with head strike on aspirin therapy.  Patient presented to the hospital for further assessment.  She reports that she turned quickly because of her knee and lost her balance.  This caused her to fall backwards and strike her head.  Patient complains of head pain neck pain and  vertiginous symptoms.  She reports vertigo symptoms after the head strike.  Patient unable to ambulate hence recommended admission for further assessment and rehab needs.    Review of Systems:    Review of Systems   Constitutional:  Negative for chills.   Respiratory:  Negative for apnea and shortness of breath.    Musculoskeletal:  Negative for arthralgias and back pain.   Neurological:  Negative for dizziness and headaches.   All other systems reviewed and are negative.      Past Medical and Surgical History:     Past Medical History:   Diagnosis Date    CAD (coronary artery disease)        Past Surgical History:   Procedure Laterality Date    BREAST BIOPSY         Meds/Allergies:    Prior to Admission medications    Medication Sig Start Date End Date Taking? Authorizing Provider   acetaminophen (TYLENOL) 500 mg tablet Take 500 mg by mouth every 6 (six) hours as needed    Historical Provider, MD   aspirin 81 mg chewable tablet Chew 81 mg daily    Historical Provider, MD   cholecalciferol (VITAMIN D3) 1,000 units tablet Take 1 tablet (1,000 Units total) by mouth daily 12/28/23   DIGNA Hutchinson   lactulose 20 g/30 mL Take 10 g by mouth daily as needed (Constipation)    Historical Provider, MD   levothyroxine 75 mcg tablet Take 75 mcg by mouth daily    Historical Provider, MD   lidocaine (LIDODERM) 5 % Apply 1 patch topically over 12 hours daily Remove & Discard patch within 12 hours or as directed by MD 12/28/23   DIGNA Hutchinson   LORazepam (Ativan) 0.5 mg tablet Take 1 tablet (0.5 mg total) by mouth daily at bedtime 1/2/24   Leia Tolentino MD   lovastatin (ALTOPREV) 20 MG 24 hr tablet Take 20 mg by mouth    Historical Provider, MD   meclizine (ANTIVERT) 25 mg tablet Take 25 mg by mouth daily as needed    Historical Provider, MD   oxybutynin (DITROPAN-XL) 10 MG 24 hr tablet Take 10 mg by mouth daily    Historical Provider, MD   triamterene-hydrochlorothiazide (DYAZIDE) 37.5-25 mg per capsule Take 1  capsule by mouth daily as needed    Historical Provider, MD     I have reviewed home medications with patient personally.    Allergies: No Known Allergies    Social History:     Marital Status: Single   Occupation: Retired  Patient Pre-hospital Living Situation: Home  Patient Pre-hospital Level of Mobility: Ambulatory  Patient Pre-hospital Diet Restrictions: Denies  Substance Use History:   Social History     Substance and Sexual Activity   Alcohol Use Never     Social History     Tobacco Use   Smoking Status Former    Types: Cigarettes    Passive exposure: Past   Smokeless Tobacco Never     Social History     Substance and Sexual Activity   Drug Use Never       Family History:    Family History   Problem Relation Age of Onset    Stroke Sister        Physical Exam:     Vitals:   Blood Pressure: 153/68 (02/07/24 1720)  Pulse: 72 (02/07/24 1720)  Temperature: 97.8 °F (36.6 °C) (02/07/24 1445)  Temp Source: Oral (02/07/24 1445)  Respirations: 20 (02/07/24 1720)  SpO2: 93 % (02/07/24 1720)    Physical Exam  Constitutional:       Appearance: Normal appearance.   HENT:      Head: Normocephalic.   Cardiovascular:      Rate and Rhythm: Normal rate and regular rhythm.      Heart sounds: No murmur heard.  Pulmonary:      Effort: Pulmonary effort is normal.      Breath sounds: Normal breath sounds.   Abdominal:      General: Abdomen is flat.      Palpations: Abdomen is soft.   Skin:     General: Skin is warm and dry.   Neurological:      General: No focal deficit present.      Mental Status: She is alert and oriented to person, place, and time.   Psychiatric:         Mood and Affect: Mood normal.           Additional Data:     Lab Results: I have personally reviewed pertinent reports.      Results from last 7 days   Lab Units 02/07/24  1450   WBC Thousand/uL 4.34   HEMOGLOBIN g/dL 10.7*   HEMATOCRIT % 33.8*   PLATELETS Thousands/uL 212   NEUTROS PCT % 69   LYMPHS PCT % 12*   MONOS PCT % 17*   EOS PCT % 1     Results from last  7 days   Lab Units 02/07/24  1450   SODIUM mmol/L 134*   POTASSIUM mmol/L 4.3   CHLORIDE mmol/L 100   CO2 mmol/L 27   BUN mg/dL 18   CREATININE mg/dL 0.69   ANION GAP mmol/L 7   CALCIUM mg/dL 8.9   ALBUMIN g/dL 3.8   TOTAL BILIRUBIN mg/dL 0.47   ALK PHOS U/L 105*   ALT U/L 12   AST U/L 20   GLUCOSE RANDOM mg/dL 108                       Imaging: I have personally reviewed pertinent reports.      CT head without contrast   ED Interpretation by Gonzalo Garcia MD (02/07 1452)   No acute intracranial bleed.      Final Result by Kenney Rojas MD (02/07 1507)      Stable remote chronic ischemic and microangiopathic changes without acute intracranial abnormality.                  Workstation performed: [id number]         CT cervical spine without contrast   ED Interpretation by Gonzalo Garcia MD (02/07 1452)   No fracture.      Final Result by Kenney Rojas MD (02/07 3606)      No cervical spine fracture or traumatic malalignment.                  Workstation performed: [id number]                 ** Please Note: This note has been constructed using a voice recognition system. **

## 2024-02-07 NOTE — ED PROVIDER NOTES
"History  Chief Complaint   Patient presents with    Fall     Pt states her \"legs gave out\". + ASA. Pt c/o nausea, dizziness, SOB.      89-year-old female, history of osteoarthritis of her bilateral knees, presenting to the emergency department for evaluation of fall with head strike on aspirin.  Patient states that she had turned quickly, because of her knees, lost her balance, causing her to fall backwards and strike her head.  Negative LOC.  Patient complains of posterior head pain, neck pain, vertiginous dizziness.  Patient does state that she has a history of vertigo and states that the vertigo has only been after the head strike.  Patient denies any chest pain, cough.  She reports some mild shortness of breath.  No abdominal pain, nausea, vomiting, diarrhea.  Patient reports back pain.  No pain in arms or legs.        Prior to Admission Medications   Prescriptions Last Dose Informant Patient Reported? Taking?   LORazepam (Ativan) 0.5 mg tablet   No No   Sig: Take 1 tablet (0.5 mg total) by mouth daily at bedtime   acetaminophen (TYLENOL) 500 mg tablet  Outside Facility (Specify) Yes No   Sig: Take 500 mg by mouth every 6 (six) hours as needed   aspirin 81 mg chewable tablet  Outside Facility (Specify) Yes No   Sig: Chew 81 mg daily   cholecalciferol (VITAMIN D3) 1,000 units tablet   No No   Sig: Take 1 tablet (1,000 Units total) by mouth daily   lactulose 20 g/30 mL   Yes No   Sig: Take 10 g by mouth daily as needed (Constipation)   levothyroxine 75 mcg tablet  Outside Facility (Specify) Yes No   Sig: Take 75 mcg by mouth daily   lidocaine (LIDODERM) 5 %   No No   Sig: Apply 1 patch topically over 12 hours daily Remove & Discard patch within 12 hours or as directed by MD   lovastatin (ALTOPREV) 20 MG 24 hr tablet  Outside Facility (Specify) Yes No   Sig: Take 20 mg by mouth   meclizine (ANTIVERT) 25 mg tablet  Outside Facility (Specify) Yes No   Sig: Take 25 mg by mouth daily as needed   oxybutynin " (DITROPAN-XL) 10 MG 24 hr tablet  Outside Facility (Specify) Yes No   Sig: Take 10 mg by mouth daily   triamterene-hydrochlorothiazide (DYAZIDE) 37.5-25 mg per capsule   Yes No   Sig: Take 1 capsule by mouth daily as needed      Facility-Administered Medications: None       Past Medical History:   Diagnosis Date    CAD (coronary artery disease)        Past Surgical History:   Procedure Laterality Date    BREAST BIOPSY         Family History   Problem Relation Age of Onset    Stroke Sister      I have reviewed and agree with the history as documented.    E-Cigarette/Vaping    E-Cigarette Use Never User      E-Cigarette/Vaping Substances    Nicotine No     THC No     CBD No     Flavoring No     Other No     Unknown No      Social History     Tobacco Use    Smoking status: Former     Types: Cigarettes     Passive exposure: Past    Smokeless tobacco: Never   Vaping Use    Vaping status: Never Used   Substance Use Topics    Alcohol use: Never    Drug use: Never       Review of Systems   Musculoskeletal:  Positive for neck pain.   Neurological:  Positive for headaches.   All other systems reviewed and are negative.      Physical Exam  Physical Exam  Vitals reviewed.   Constitutional:       Appearance: Normal appearance.   HENT:      Head: Normocephalic and atraumatic.      Comments: No signs of posterior head trauma.     Right Ear: External ear normal.      Left Ear: External ear normal.      Nose: Nose normal.      Mouth/Throat:      Mouth: Mucous membranes are dry.   Eyes:      Extraocular Movements: Extraocular movements intact.      Conjunctiva/sclera: Conjunctivae normal.      Pupils: Pupils are equal, round, and reactive to light.      Comments: No nystagmus is noted.   Neck:      Comments: Diffuse midline cervical spine tenderness.  Cardiovascular:      Rate and Rhythm: Normal rate and regular rhythm.      Heart sounds: No murmur heard.     No friction rub. No gallop.   Pulmonary:      Effort: Pulmonary effort is  normal.      Breath sounds: No wheezing, rhonchi or rales.   Abdominal:      General: Abdomen is flat. There is no distension.      Tenderness: There is no abdominal tenderness.   Musculoskeletal:         General: No tenderness. Normal range of motion.      Cervical back: Normal range of motion and neck supple.   Skin:     General: Skin is warm and dry.      Capillary Refill: Capillary refill takes less than 2 seconds.   Neurological:      General: No focal deficit present.      Mental Status: She is alert and oriented to person, place, and time.      Cranial Nerves: No cranial nerve deficit.      Sensory: No sensory deficit.      Motor: No weakness.         Vital Signs  ED Triage Vitals   Temperature Pulse Respirations Blood Pressure SpO2   02/07/24 1445 02/07/24 1421 02/07/24 1420 02/07/24 1420 02/07/24 1420   97.8 °F (36.6 °C) 66 18 (!) 212/83 97 %      Temp Source Heart Rate Source Patient Position - Orthostatic VS BP Location FiO2 (%)   02/07/24 1445 02/07/24 1421 02/07/24 1420 -- --   Oral Monitor Lying        Pain Score       02/07/24 1435       10 - Worst Possible Pain           Vitals:    02/07/24 1520 02/07/24 1550 02/07/24 1620 02/07/24 1720   BP: 152/65 159/67 160/67 153/68   Pulse: 68 68 70 72   Patient Position - Orthostatic VS: Lying Lying Lying Lying         Visual Acuity      ED Medications  Medications   sodium chloride 0.9 % infusion (has no administration in time range)   fentanyl citrate (PF) 100 MCG/2ML 25 mcg (25 mcg Intravenous Given 2/7/24 1455)   ondansetron (ZOFRAN) injection 4 mg (4 mg Intravenous Given 2/7/24 1456)   fentanyl citrate (PF) 100 MCG/2ML 25 mcg (25 mcg Intravenous Given 2/7/24 1525)   acetaminophen (TYLENOL) tablet 975 mg (975 mg Oral Given 2/7/24 1623)   ibuprofen (MOTRIN) tablet 400 mg (400 mg Oral Given 2/7/24 1623)   meclizine (ANTIVERT) tablet 12.5 mg (12.5 mg Oral Given 2/7/24 1835)       Diagnostic Studies  Results Reviewed       Procedure Component Value Units  Date/Time    Vitamin B12 [[id number]]     Lab Status: No result Specimen: Blood     HS Troponin 0hr (reflex protocol) [[id number]]  (Normal) Collected: 02/07/24 1450    Lab Status: Final result Specimen: Blood from Line, Venous Updated: 02/07/24 1555     hs TnI 0hr 7 ng/L     Comprehensive metabolic panel [[id number]]  (Abnormal) Collected: 02/07/24 1450    Lab Status: Final result Specimen: Blood from Line, Venous Updated: 02/07/24 1549     Sodium 134 mmol/L      Potassium 4.3 mmol/L      Chloride 100 mmol/L      CO2 27 mmol/L      ANION GAP 7 mmol/L      BUN 18 mg/dL      Creatinine 0.69 mg/dL      Glucose 108 mg/dL      Calcium 8.9 mg/dL      AST 20 U/L      ALT 12 U/L      Alkaline Phosphatase 105 U/L      Total Protein 7.2 g/dL      Albumin 3.8 g/dL      Total Bilirubin 0.47 mg/dL      eGFR 76 ml/min/1.73sq m     Narrative:      National Kid[employer] Disease Foundation guidelines for Chronic Kidney Disease (CKD):     Stage 1 with normal or high GFR (GFR > 90 mL/min/1.73 square meters)    Stage 2 Mild CKD (GFR = 60-89 mL/min/1.73 square meters)    Stage 3A Moderate CKD (GFR = 45-59 mL/min/1.73 square meters)    Stage 3B Moderate CKD (GFR = 30-44 mL/min/1.73 square meters)    Stage 4 Severe CKD (GFR = 15-29 mL/min/1.73 square meters)    Stage 5 End Stage CKD (GFR <15 mL/min/1.73 square meters)  Note: GFR calculation is accurate only with a steady state creatinine    CBC and differential [[id number]]  (Abnormal) Collected: 02/07/24 1450    Lab Status: Final result Specimen: Blood from Line, Venous Updated: 02/07/24 1526     WBC 4.34 Thousand/uL      RBC 3.44 Million/uL      Hemoglobin 10.7 g/dL      Hematocrit 33.8 %      MCV 98 fL      MCH 31.1 pg      MCHC 31.7 g/dL      RDW 13.2 %      MPV 9.6 fL      Platelets 212 Thousands/uL      nRBC 0 /100 WBCs      Neutrophils Relative 69 %      Immat GRANS % 1 %      Lymphocytes Relative 12 %      Monocytes Relative 17 %      Eosinophils Relative 1 %      Basophils Relative 0 %       Neutrophils Absolute 2.95 Thousands/µL      Immature Grans Absolute 0.05 Thousand/uL      Lymphocytes Absolute 0.53 Thousands/µL      Monocytes Absolute 0.75 Thousand/µL      Eosinophils Absolute 0.05 Thousand/µL      Basophils Absolute 0.01 Thousands/µL                    CT head without contrast   ED Interpretation by Gonzalo Garcia MD (02/07 1452)   No acute intracranial bleed.      Final Result by Kenney Rojas MD (02/07 1507)      Stable remote chronic ischemic and microangiopathic changes without acute intracranial abnormality.                  Workstation performed: [id number]         CT cervical spine without contrast   ED Interpretation by Gonzalo Garcia MD (02/07 1452)   No fracture.      Final Result by Kenney Rojas MD (02/07 1503)      No cervical spine fracture or traumatic malalignment.                  Workstation performed: [id number]                    Procedures  ECG 12 Lead Documentation Only    Date/Time: 2/7/2024 2:41 PM    Performed by: Gonzalo Garcia MD  Authorized by: Gonzalo Garcia MD    Patient location:  ED  Previous ECG:     Previous ECG:  Compared to current    Similarity:  No change  Interpretation:     Interpretation: normal    Rate:     ECG rate assessment: normal    Rhythm:     Rhythm: sinus rhythm    Ectopy:     Ectopy: none    QRS:     QRS axis:  Normal  Conduction:     Conduction: normal    ST segments:     ST segments:  Normal  T waves:     T waves: normal             ED Course  ED Course as of 02/07/24 1950 Wed Feb 07, 2024   1506 Reviewed radiologist read on CT C-spine which was negative for fracture.   1542 CT head results reviewed.  Patient with microangiopathic changes.   1543 CBC and differential(!)  Hemoglobin 10.7 is close to last value of 11.   1606 Comprehensive metabolic panel(!)  Normal   1607 HS Troponin 0hr (reflex protocol)  Normal.  Patient does not have chest pain, therefore repeat troponin does not need to be performed.                                              Medical Decision Making  89-year-old female presenting to the emergency department for evaluation of fall with head strike on aspirin.  Patient will undergo head CT for intracranial bleed including epidural hemorrhage, subarachnoid hemorrhage, subdural hemorrhage, and intraparenchymal bleed.  Because of the neck tenderness, and the patient's age, I am unable to clear her cervical spine by Nexus criteria or [place] neck rule.  Patient will undergo CT C-spine for fracture.  Because I am not in, tiredly sure about her history of mechanical fall, patient will undergo medical evaluation for etiology of fall.  This will include a CBC for anemia, electrolytes for electrolyte disorder, EKG and troponin.    All lab work and imaging studies were personally reviewed by myself.  Patient was medicated for headache with fentanyl x 2, Tylenol, ibuprofen.  Multiple attempts to ambulate the patient were unsuccessful.  Plan is to admit to medicine for postconcussive vertigo.  Patient is treated with meclizine.  Potential for possible rehab placement.    Amount and/or Complexity of Data Reviewed  Labs: ordered. Decision-making details documented in ED Course.  Radiology: ordered and independent interpretation performed.    Risk  OTC drugs.  Prescription drug management.  Decision regarding hospitalization.             Disposition  Final diagnoses:   Fall, initial encounter   Injury of head, initial encounter   Closed head injury, initial encounter   Concussion   Vertigo     Time reflects when diagnosis was documented in both MDM as applicable and the Disposition within this note       Time User Action Codes Description Comment    2/7/2024  6:04 PM Gonzalo Garcia Add [W19.XXXA] Fall, initial encounter     2/7/2024  6:04 PM Gonzalo Garcia Add [S09.90XA] Injury of head, initial encounter     2/7/2024  6:04 PM Gonzalo Garcia Add [S09.90XA] Closed head injury, initial encounter      2/7/2024  6:04 PM Gonzalo Garcia [S06.0XAA] Concussion     2/7/2024  6:05 PM Gonzalo Garcia [R42] Vertigo           ED Disposition       ED Disposition   Admit    Condition   Stable    Date/Time   Wed Feb 7, 2024  6:13 PM    Comment   Case was discussed with Arsh Schumacher and the patient's admission status was agreed to be Admission Status: observation status to the service of Dr. Schumacher .               Follow-up Information    None         Patient's Medications   Discharge Prescriptions    No medications on file       No discharge procedures on file.    PDMP Review         Value Time User    PDMP Reviewed  Yes 1/2/2024 11:09 AM Leia Tolentino MD            ED Provider  Electronically Signed by             Gonzalo Garcia MD  02/07/24 1950

## 2024-02-08 PROBLEM — E53.8 VITAMIN B 12 DEFICIENCY: Status: RESOLVED | Noted: 2023-11-17 | Resolved: 2024-02-08

## 2024-02-08 PROBLEM — E87.1 HYPONATREMIA: Status: RESOLVED | Noted: 2023-10-09 | Resolved: 2024-02-08

## 2024-02-08 LAB
ANION GAP SERPL CALCULATED.3IONS-SCNC: 8 MMOL/L
BUN SERPL-MCNC: 17 MG/DL (ref 5–25)
CALCIUM SERPL-MCNC: 8.5 MG/DL (ref 8.4–10.2)
CHLORIDE SERPL-SCNC: 104 MMOL/L (ref 96–108)
CO2 SERPL-SCNC: 28 MMOL/L (ref 21–32)
CREAT SERPL-MCNC: 0.72 MG/DL (ref 0.6–1.3)
GFR SERPL CREATININE-BSD FRML MDRD: 73 ML/MIN/1.73SQ M
GLUCOSE SERPL-MCNC: 96 MG/DL (ref 65–140)
POTASSIUM SERPL-SCNC: 3.7 MMOL/L (ref 3.5–5.3)
SODIUM SERPL-SCNC: 140 MMOL/L (ref 135–147)
VIT B12 SERPL-MCNC: 727 PG/ML (ref 180–914)

## 2024-02-08 PROCEDURE — 80048 BASIC METABOLIC PNL TOTAL CA: CPT | Performed by: INTERNAL MEDICINE

## 2024-02-08 PROCEDURE — 97163 PT EVAL HIGH COMPLEX 45 MIN: CPT

## 2024-02-08 PROCEDURE — 36415 COLL VENOUS BLD VENIPUNCTURE: CPT | Performed by: INTERNAL MEDICINE

## 2024-02-08 PROCEDURE — 97167 OT EVAL HIGH COMPLEX 60 MIN: CPT

## 2024-02-08 PROCEDURE — 87081 CULTURE SCREEN ONLY: CPT | Performed by: INTERNAL MEDICINE

## 2024-02-08 PROCEDURE — 82607 VITAMIN B-12: CPT | Performed by: INTERNAL MEDICINE

## 2024-02-08 PROCEDURE — 99232 SBSQ HOSP IP/OBS MODERATE 35: CPT | Performed by: INTERNAL MEDICINE

## 2024-02-08 RX ADMIN — ACETAMINOPHEN 650 MG: 325 TABLET, FILM COATED ORAL at 01:18

## 2024-02-08 RX ADMIN — MECLIZINE HYDROCHLORIDE 25 MG: 25 TABLET ORAL at 21:15

## 2024-02-08 RX ADMIN — ASPIRIN 81 MG CHEWABLE TABLET 81 MG: 81 TABLET CHEWABLE at 08:09

## 2024-02-08 RX ADMIN — LORAZEPAM 0.5 MG: 0.5 TABLET ORAL at 01:53

## 2024-02-08 RX ADMIN — LEVOTHYROXINE SODIUM 75 MCG: 75 TABLET ORAL at 06:19

## 2024-02-08 RX ADMIN — ACETAMINOPHEN 650 MG: 325 TABLET, FILM COATED ORAL at 21:17

## 2024-02-08 RX ADMIN — ENOXAPARIN SODIUM 40 MG: 40 INJECTION SUBCUTANEOUS at 08:09

## 2024-02-08 RX ADMIN — LORAZEPAM 0.5 MG: 0.5 TABLET ORAL at 21:15

## 2024-02-08 RX ADMIN — PRAVASTATIN SODIUM 20 MG: 20 TABLET ORAL at 17:02

## 2024-02-08 RX ADMIN — ACETAMINOPHEN 650 MG: 325 TABLET, FILM COATED ORAL at 14:36

## 2024-02-08 RX ADMIN — MECLIZINE HYDROCHLORIDE 25 MG: 25 TABLET ORAL at 13:47

## 2024-02-08 NOTE — PLAN OF CARE
Problem: PHYSICAL THERAPY ADULT  Goal: Performs mobility at highest level of function for planned discharge setting.  See evaluation for individualized goals.  Description: Treatment/Interventions: ADL retraining, Functional transfer training, LE strengthening/ROM, Elevations, Therapeutic exercise, Endurance training, Bed mobility, Gait training, Spoke to nursing, OT          See flowsheet documentation for full assessment, interventions and recommendations.  Note: Prognosis: Fair  Problem List: Decreased strength, Decreased endurance, Decreased range of motion, Impaired balance, Decreased mobility, Pain  Assessment: PT orders received and acknowledged. Patient was seen today for high complexity PT evaluation. High complexity evaluation due to Ongoing medical management for primary dx, Increased reliance on more restrictive AD compared to baseline, Decreased activity tolerance compared to baseline, Fall risk, Continuous pulse oximetry monitoring  Patient is a 89 y.o. female  who was admitted to [facility] on 2/7/2024  with Concussion . Pt presents to [facility] following a fall with headstrike . Patient performed functional mobility as described above.  At baseline, pt resides alone in [place] and was requiring some assistance prior to hospital admission. Currently, upon initial examination, pt  is requiring mod assist x1 for bed mobility skills;  min assist x1 for functional transfers and  min assist x1 for ambulation with RW.  Patient currently presents below baseline with limitations in gait, balance, and transfers. Patient will benefit from continued PT services while in hospital in order to address remaining limitations. The patients AM-PAC Basic Mobility Inpatient Short From Raw Score is 13 . Based on AM-PAC scoring and patient presentation, PT currently recommending Level II (Moderate Resource Intensity). Please also refer to the recommendation of the Physical Therapist for safe discharge planning.         Rehab Resource Intensity Level, PT: II (Moderate Resource Intensity)    See flowsheet documentation for full assessment.

## 2024-02-08 NOTE — OCCUPATIONAL THERAPY NOTE
Occupational Therapy Evaluation     Patient Name: Nhea Molina  Today's Date: 2/8/2024  Problem List  Principal Problem:    Concussion  Active Problems:    Hypothyroid    Other hyperlipidemia    Ambulatory dysfunction    Past Medical History  Past Medical History:   Diagnosis Date    CAD (coronary artery disease)      Past Surgical History  Past Surgical History:   Procedure Laterality Date    BREAST BIOPSY           02/08/24 1347   OT Last Visit   OT Visit Date 02/08/24   Note Type   Note type Evaluation   Additional Comments Pt seen w/ PT to increase safety, decrease fall risk, and maximize functional/occupational performance 2* medical complexity which is a regression from pt's functional baseline.   Pain Assessment   Pain Assessment Tool 0-10   Pain Score 6   Pain Location/Orientation Location: Head;Location: Neck   Effect of Pain on Daily Activities Impacts ability to engage in valued occupations; reports increased dizziness with positional changes   Hospital Pain Intervention(s) Repositioned;Ambulation/increased activity;Emotional support   Restrictions/Precautions   Weight Bearing Precautions Per Order No   Other Precautions Bed Alarm;Multiple lines;Fall Risk;Pain   Home Living   Type of Home Assisted living  ([facility])   Home Layout One level;Performs ADLs on one level;Able to live on main level with bedroom/bathroom;Access;Elevator   Bathroom Shower/Tub Walk-in shower   Bathroom Toilet Raised   Bathroom Equipment Grab bars in shower;Grab bars around toilet   Bathroom Accessibility Accessible   Home Equipment Walker;Cane   Additional Comments Pt reports living at [facility]; reports that up until 2 days ago, she was not using any DME, now currently using RW; reports [facility]   Prior Function   Level of [place] Needs assistance with ADLs;Independent with functional mobility;Needs assistance with IADLS   Lives With Facility staff   Receives Help From  "Family;Other (Comment)  (Sister lives in apartment next door)   IADLs Family/Friend/Other provides transportation;Family/Friend/Other provides meals;Family/Friend/Other provides medication management   Falls in the last 6 months 1 to 4   Vocational Retired   Comments (-) driving   Lifestyle   Autonomy PTA, pt requires assistance w/ ADLs/IADLs and uses RW for functional mobility; (-) driving   Reciprocal Relationships Lives w/ facility staff @ [facility]; sister next door in apartment   Service to Others Retired   Intrinsic Gratification Enjoys being active and attending facility activities   Subjective   Subjective \"I used to do all that on my own, but have now been requiring assistance.\"   ADL   Where Assessed Edge of bed   Eating Assistance 5  Supervision/Setup   Grooming Assistance 5  Supervision/Setup   UB Bathing Assistance 4  Minimal Assistance   LB Bathing Assistance 3  Moderate Assistance   UB Dressing Assistance 4  Minimal Assistance   LB Dressing Assistance 3  Moderate Assistance   Toileting Assistance  3  Moderate Assistance   Functional Assistance 4  Minimal Assistance   Bed Mobility   Supine to Sit 3  Moderate assistance   Additional items Assist x 1;HOB elevated;Bedrails;Increased time required;Verbal cues;LE management   Sit to Supine 3  Moderate assistance   Additional items Assist x 1;HOB elevated;Bedrails;Increased time required;Verbal cues;LE management   Additional Comments Pt performed supine <> sit with increased time + Mod A for UB postural support/LE management 2* increased dizziness during positional changes; @ end of session, pt left lying supine in bed with all functional needs in reach with bed alarm activated   Transfers   Sit to Stand 4  Minimal assistance   Additional items Assist x 1;Increased time required;Verbal cues   Stand to Sit 4  Minimal assistance   Additional items Assist x 1;Increased time required;Verbal cues   Additional Comments w/ RW   Functional Mobility   Functional " Mobility 4  Minimal assistance   Additional Comments Pt completed short household functional mobility distances within room w/ Min A x1 w/ RW   Additional items Rolling walker   Balance   Static Sitting Fair   Dynamic Sitting Fair -   Static Standing Fair -   Dynamic Standing Poor +   Ambulatory Poor +   Activity Tolerance   Activity Tolerance Patient tolerated treatment well;Patient limited by fatigue;Patient limited by pain   Medical Staff Made Aware KASEY Mendoza   Nurse Made Aware RN cleared and updated after session   RUE Assessment   RUE Assessment WFL   LUE Assessment   LUE Assessment WFL   Hand Function   Gross Motor Coordination Functional   Fine Motor Coordination Functional   Cognition   Overall Cognitive Status WFL   Arousal/Participation Alert;Responsive;Arousable;Cooperative   Attention Within functional limits   Orientation Level Oriented X4   Memory Within functional limits   Following Commands Follows one step commands without difficulty   Comments Pt pleasant and cooperative   Assessment   Limitation Decreased ADL status;Decreased endurance;Decreased self-care trans;Decreased high-level ADLs   Prognosis Fair   Assessment Pt is a 89 yo female who presented to [facility] s/p fall with (+) headstrike in which imaging revealed (-) acute injuries. Pt dx w/ concussion. Pt  has a past medical history of CAD (coronary artery disease). Pt with active OT orders in which OT consulted to assess pt's functional status and occupational performance to determine safe d/c needs. Pt seen with PT to increase safety, decrease fall risk, and maximize functional/occupational performance 2* medical complexity which is a regression from pt's functional baseline. Pt lives with facility staff at [facility]. Pt has been requiring assistance w/ ADLs, has assistance for IADLs, and recently has been using RW for functional mobility. (-) driving. Currently, pt performing bed mobility w/ Mod A, functional transfers/mobility w/ Min A x 1  w/ RW, UB ADLs w/ Min A, and LB ADLs w/ Mod A. Pt demonstrates the following limitations/impairments which impact the pt's ability to engage in valued occupations: balance, endurance/activity tolerance, standing tolerance, functional reach, postural/trunk control, strength, safety awareness, and pain. From an OT standpoint, recommend discharge to post-acute rehab once medically stable. The patient's raw score on the AM-PAC Daily Activity Inpatient Short Form is 16. A raw score of less than 19 suggests the patient may benefit from discharge to post-acute rehabilitation services. Please refer to the recommendation of the Occupational Therapist for safe discharge planning.  Pt would benefit from skilled OT services 2-3x/wk to address acute care needs and underlying performance skills to promote safety, decrease fall risk, and enhance occupational performance to return to [facility]. Goals to be met within the next 10-14 days.   Goals   Patient Goals To get better   LTG Time Frame 10-14   Long Term Goal #1 See OT goals listed below   Plan   Treatment Interventions ADL retraining;Functional transfer training;Endurance training;UE strengthening/ROM;Patient/family training;Equipment evaluation/education;Compensatory technique education;Continued evaluation;Energy conservation;Activityengagement   Goal Expiration Date 02/22/24   OT Frequency 2-3x/wk   Discharge Recommendation   Rehab Resource Intensity Level, OT II (Moderate Resource Intensity)   AM-PAC Daily Activity Inpatient   Lower Body Dressing 2   Bathing 2   Toileting 2   Upper Body Dressing 3   Grooming 3   Eating 4   Daily Activity Raw Score 16   Daily Activity Standardized Score (Calc for Raw Score >=11) 35.96   AM-PAC Applied Cognition Inpatient   Following a Speech/Presentation 3   Understanding Ordinary Conversation 4   Taking Medications 3   Remembering Where Things Are Placed or Put Away 3   Remembering List of 4-5 Errands 3   Taking Care of Complicated Tasks 3    Applied Cognition Raw Score 19   Applied Cognition Standardized Score 39.77   End of Consult   Education Provided Yes   Patient Position at End of Consult Supine;Bed/Chair alarm activated;All needs within reach   Nurse Communication Nurse aware of consult     OT GOALS:    Pt will improve functional mobility during ADL/IADL/leisure tasks with Mod I using AE/DME prn.    Pt will improve activity tolerance/functional endurance during ADL/IADL/leisure tasks for at least 20 minutes to improve occupational performance and engagement in valued occupations using AE/DME prn.    Pt will be attentive 100% of the time during ongoing visual screens to rule out any further visual impairments or changes.    Pt will engage in ongoing functional/formal cognitive assessments to assist with safe d/c planning and increase safety during functional tasks.    Pt will improve dynamic standing balance for at least 15 minutes with Mod I during functional tasks to decrease fall risk and improve independence and engagement in ADL/IADL/leisure activities.    Pt will follow 100% of multi-step commands in ADL/IADL/leisure activities to improve functional cognition used in functional daily routines.    Pt will complete functional transfers on and off all surfaces used in daily routines with Mod I for safety to maximize functional/occupational performance.    Pt will complete all bed mobility tasks with Mod I to serve as a prerequisite for EOB/OOB ADL/IADL/leisure tasks, optimize positioning/comfort, and increase functional independence.    Pt will independently demonstrate good carryover of safety precautions and education/training during ADL/IADL/leisure tasks with energy conservation techniques s/p skilled instruction without verbal cues.    Pt will complete UB ADL tasks with Mod I using AE/DME prn to increase functional independence in ADL/IADL/leisure tasks.    Pt will complete LB ADL tasks with S using AE/DME prn to increase functional  independence in ADL/IADL/leisure tasks.     Pt will complete toileting tasks with S and good hygiene/thoroughness using AE/DME prn to increase functional independence.    Pt will independently identify and utilize 2-3 positive coping strategies to enhance overall wellbeing and engagement in valued occupations.    Megan Sanchez MS, OTR/L

## 2024-02-08 NOTE — ASSESSMENT & PLAN NOTE
Possible concussive vertigo vs BPPV  Will treat supportively  Will DC IV fluids. Encourage PO intake  Rx headache  Rx vertigo symptoms with meclizine.  PT/OT  Patient resides at assisted care facility.  May need short-term rehab

## 2024-02-08 NOTE — PLAN OF CARE
Problem: OCCUPATIONAL THERAPY ADULT  Goal: Performs self-care activities at highest level of function for planned discharge setting.  See evaluation for individualized goals.  Description: Treatment Interventions: ADL retraining, Functional transfer training, Endurance training, UE strengthening/ROM, Patient/family training, Equipment evaluation/education, Compensatory technique education, Continued evaluation, Energy conservation, Activityengagement          See flowsheet documentation for full assessment, interventions and recommendations.   Note: Limitation: Decreased ADL status, Decreased endurance, Decreased self-care trans, Decreased high-level ADLs  Prognosis: Fair  Assessment: Pt is a 89 yo female who presented to [facility] s/p fall with (+) headstrike in which imaging revealed (-) acute injuries. Pt dx w/ concussion. Pt  has a past medical history of CAD (coronary artery disease). Pt with active OT orders in which OT consulted to assess pt's functional status and occupational performance to determine safe d/c needs. Pt seen with PT to increase safety, decrease fall risk, and maximize functional/occupational performance 2* medical complexity which is a regression from pt's functional baseline. Pt lives with facility staff at [facility]. Pt has been requiring assistance w/ ADLs, has assistance for IADLs, and recently has been using RW for functional mobility. (-) driving. Currently, pt performing bed mobility w/ Mod A, functional transfers/mobility w/ Min A x 1 w/ RW, UB ADLs w/ Min A, and LB ADLs w/ Mod A. Pt demonstrates the following limitations/impairments which impact the pt's ability to engage in valued occupations: balance, endurance/activity tolerance, standing tolerance, functional reach, postural/trunk control, strength, safety awareness, and pain. From an OT standpoint, recommend discharge to post-acute rehab once medically stable. The patient's raw score on the AM-PAC Daily Activity Inpatient Short  Form is 16. A raw score of less than 19 suggests the patient may benefit from discharge to post-acute rehabilitation services. Please refer to the recommendation of the Occupational Therapist for safe discharge planning.  Pt would benefit from skilled OT services 2-3x/wk to address acute care needs and underlying performance skills to promote safety, decrease fall risk, and enhance occupational performance to return to [facility]. Goals to be met within the next 10-14 days.     Rehab Resource Intensity Level, OT: II (Moderate Resource Intensity)

## 2024-02-08 NOTE — PROGRESS NOTES
[facility]  Progress Note  Name: Neha Molina I  MRN: [id number]  Unit/Bed#: NW8 875-01 I Date of Admission: 2/7/2024   Date of Service: 2/8/2024 I Hospital Day: 1    Assessment/Plan   * Concussion  Assessment & Plan  Possible concussive vertigo vs BPPV  Will treat supportively  Will DC IV fluids. Encourage PO intake  Rx headache  Rx vertigo symptoms with meclizine.  PT/OT  Patient resides at assisted care facility.  May need short-term rehab      Ambulatory dysfunction  Assessment & Plan  Fall precautions   PT/OT    Other hyperlipidemia  Assessment & Plan  Lovastatin at home    Hypothyroid  Assessment & Plan  Levothyroxine    Hyponatremia-resolved as of 2/8/2024  Assessment & Plan  Mild.  Gentle hydration    Vitamin B 12 deficiency-resolved as of 2/8/2024  Assessment & Plan  Check B12 level  Patient reports mechanical fall with her knee giving out when turning.  However question her ability to ambulate/balance.               VTE Pharmacologic Prophylaxis:   Moderate Risk (Score 3-4) - Pharmacological DVT Prophylaxis Ordered: enoxaparin (Lovenox).    Mobility:      HLM Goal achieved. Continue to encourage appropriate mobility.    Patient Centered Rounds: I performed bedside rounds with nursing staff today.   Discussions with Specialists or Other Care Team Provider: None    Education and Discussions with Family / Patient: Patient declined call to .     Total Time Spent on Date of Encounter in care of patient: 35 mins. This time was spent on one or more of the following: performing physical exam; counseling and coordination of care; obtaining or reviewing history; documenting in the medical record; reviewing/ordering tests, medications or procedures; communicating with other healthcare professionals and discussing with patient's family/caregivers.    Current Length of Stay: 1 day(s)  Current Patient Status: Inpatient   Certification Statement: The patient will  continue to require additional inpatient hospital stay due to PT/OT  Discharge Plan: Anticipate discharge in 48 hrs to discharge location to be determined pending rehab evaluations.    Code Status: Level 1 - Full Code    Subjective:   Feels dizzy when turning to side.     Objective:     Vitals:   Temp (24hrs), Av.8 °F (36.6 °C), Min:97.8 °F (36.6 °C), Max:97.8 °F (36.6 °C)    Temp:  [97.8 °F (36.6 °C)] 97.8 °F (36.6 °C)  HR:  [60-72] 68  Resp:  [16-22] 22  BP: (140-212)/(58-83) 149/67  SpO2:  [93 %-100 %] 97 %  Body mass index is 28.57 kg/m².     Input and Output Summary (last 24 hours):   No intake or output data in the 24 hours ending 24 1358    Physical Exam:   Physical Exam  Vitals and nursing note reviewed.   Constitutional:       General: She is not in acute distress.     Appearance: She is well-developed.   HENT:      Head: Normocephalic and atraumatic.   Eyes:      Conjunctiva/sclera: Conjunctivae normal.   Cardiovascular:      Rate and Rhythm: Normal rate and regular rhythm.      Heart sounds: No murmur heard.  Pulmonary:      Effort: Pulmonary effort is normal. No respiratory distress.      Breath sounds: Normal breath sounds.   Abdominal:      Palpations: Abdomen is soft.      Tenderness: There is no abdominal tenderness.   Musculoskeletal:         General: No swelling.      Cervical back: Neck supple.   Skin:     General: Skin is warm and dry.      Capillary Refill: Capillary refill takes less than 2 seconds.   Neurological:      Mental Status: She is alert.   Psychiatric:         Mood and Affect: Mood normal.          Additional Data:     Labs:  Results from last 7 days   Lab Units 24  1450   WBC Thousand/uL 4.34   HEMOGLOBIN g/dL 10.7*   HEMATOCRIT % 33.8*   PLATELETS Thousands/uL 212   NEUTROS PCT % 69   LYMPHS PCT % 12*   MONOS PCT % 17*   EOS PCT % 1     Results from last 7 days   Lab Units 24  0601 24  1450   SODIUM mmol/L 140 134*   POTASSIUM mmol/L 3.7 4.3   CHLORIDE  mmol/L 104 100   CO2 mmol/L 28 27   BUN mg/dL 17 18   CREATININE mg/dL 0.72 0.69   ANION GAP mmol/L 8 7   CALCIUM mg/dL 8.5 8.9   ALBUMIN g/dL  --  3.8   TOTAL BILIRUBIN mg/dL  --  0.47   ALK PHOS U/L  --  105*   ALT U/L  --  12   AST U/L  --  20   GLUCOSE RANDOM mg/dL 96 108                       Lines/Drains:  Invasive Devices       Peripheral Intravenous Line  Duration             Peripheral IV 02/07/24 Right Antecubital <1 day              Drain  Duration             External Urinary Catheter 48 days                          Imaging: Reviewed radiology reports from this admission including: CT head    Recent Cultures (last 7 days):         Last 24 Hours Medication List:   Current Facility-Administered Medications   Medication Dose Route Frequency Provider Last Rate    acetaminophen  650 mg Oral Q6H PRN Hetul Schumacher, DO      aspirin  81 mg Oral Daily Hetul Schumacher, DO      enoxaparin  40 mg Subcutaneous Daily Hetul Schumacher, DO      levothyroxine  75 mcg Oral Early Morning Hetul Schumacher, DO      LORazepam  0.5 mg Oral HS Hetul Schumacher, DO      meclizine  25 mg Oral Q8H LOGAN Hetul Schumacher, DO      ondansetron  4 mg Intravenous Q6H PRN Hetul Schumacher, DO      pravastatin  20 mg Oral Daily With Dinner Hetul Schumacher, DO          Today, Patient Was Seen By: Andrew Stratton MD    **Please Note: This note may have been constructed using a voice recognition system.**

## 2024-02-08 NOTE — ASSESSMENT & PLAN NOTE
Check B12 level  Patient reports mechanical fall with her knee giving out when turning.  However question her ability to ambulate/balance.

## 2024-02-08 NOTE — ASSESSMENT & PLAN NOTE
Possible concussive vertigo.  Will treat supportively  IV fluids  Rx headache  Rx vertigo symptoms with meclizine.  PT/OT  Patient resides at assisted care facility.  May need short-term rehab?

## 2024-02-08 NOTE — PHYSICAL THERAPY NOTE
Physical Therapy Evaluation     Patient's Name: Neha Molina    Admitting Diagnosis  Vertigo [R42]  Concussion [S06.0XAA]  Closed head injury, initial encounter [S09.90XA]  Injury of head, initial encounter [S09.90XA]  Fall, initial encounter [W19.XXXA]    Problem List  Patient Active Problem List   Diagnosis    Lactose intolerance    Allergies    Hypothyroid    Other hyperlipidemia    History of CVA (cerebrovascular accident)    Anxiety    Urinary frequency    Rib fracture    Other specified glaucoma    Accidental fall    Sacral fracture, closed (HCC)    Fall    Acute pain due to trauma    Fracture of ramus of right pubis (HCC)    DJD (degenerative joint disease) of knee    Anemia    Ambulatory dysfunction    Overactive bladder    Concussion       Past Medical History  Past Medical History:   Diagnosis Date    CAD (coronary artery disease)        Past Surgical History  Past Surgical History:   Procedure Laterality Date    BREAST BIOPSY            02/08/24 1348   PT Last Visit   PT Visit Date 02/08/24   Note Type   Note type Evaluation   Pain Assessment   Pain Assessment Tool 0-10   Pain Score 6   Pain Location/Orientation Location: Head;Location: Neck   Patient's Stated Pain Goal No pain   Hospital Pain Intervention(s) Repositioned;Ambulation/increased activity   Restrictions/Precautions   Weight Bearing Precautions Per Order No   Other Precautions Cognitive;Chair Alarm;Bed Alarm;Multiple lines;Fall Risk;Pain   Home Living   Type of Home Assisted living  ([facility])   Home Layout One level;Performs ADLs on one level;Able to live on main level with bedroom/bathroom   Bathroom Shower/Tub Walk-in shower   Bathroom Toilet Raised   Bathroom Equipment Grab bars in shower;Grab bars around toilet   Bathroom Accessibility Accessible   Home Equipment Walker;Cane   Prior Function   Level of [place] Needs assistance with ADLs;Independent with functional mobility;Needs assistance with IADLS   Lives With Facility staff    Receives Help From Family;Other (Comment)  (sister lives close bye)   IADLs Family/Friend/Other provides transportation;Family/Friend/Other provides meals;Family/Friend/Other provides medication management   Falls in the last 6 months 1 to 4   Vocational Retired   General   Family/Caregiver Present No   Cognition   Overall Cognitive Status WFL   Arousal/Participation Alert   Attention Within functional limits   Orientation Level Oriented X4   Memory Within functional limits   Following Commands Follows one step commands without difficulty   Subjective   Subjective pt pleasant and cooperative throughout therapy session. pt received supine in bed   RLE Assessment   RLE Assessment X  (4-/5 grossly)   LLE Assessment   LLE Assessment X  (4-/5 grossly)   Bed Mobility   Supine to Sit 3  Moderate assistance   Additional items Assist x 1;Increased time required;Verbal cues;LE management   Sit to Supine 3  Moderate assistance   Additional items Assist x 1;Increased time required;Verbal cues;LE management   Transfers   Sit to Stand 4  Minimal assistance   Additional items Assist x 1;Increased time required;Verbal cues   Stand to Sit 4  Minimal assistance   Additional items Assist x 1;Increased time required;Verbal cues   Additional Comments transfers w RW   Ambulation/Elevation   Gait pattern Improper Weight shift;Narrow VIJAYA;Forward Flexion;Decreased foot clearance;Shuffling;Short stride;Excessively slow;Inconsistent kasia   Gait Assistance 4  Minimal assist   Additional items Assist x 1;Verbal cues;Tactile cues   Assistive Device Rolling walker   Distance 10'+10'   Stair Management Assistance Not tested   Balance   Static Sitting Fair   Dynamic Sitting Fair   Static Standing Fair -   Dynamic Standing Fair -   Ambulatory Poor +   Endurance Deficit   Endurance Deficit Yes   Endurance Deficit Description generalized weakness, decreased exercise tolerance   Activity Tolerance   Activity Tolerance Patient limited by  fatigue;Patient limited by pain;Treatment limited secondary to medical complications (Comment)  (dizziness)   Medical Staff Made Aware OT carson Guzman due to medical complexity and multiple comorbidities   Nurse Made Aware RN cleared and updated   Assessment   Prognosis Fair   Problem List Decreased strength;Decreased endurance;Decreased range of motion;Impaired balance;Decreased mobility;Pain   Assessment PT orders received and acknowledged. Patient was seen today for high complexity PT evaluation. High complexity evaluation due to Ongoing medical management for primary dx, Increased reliance on more restrictive AD compared to baseline, Decreased activity tolerance compared to baseline, Fall risk, Continuous pulse oximetry monitoring  Patient is a 89 y.o. female  who was admitted to [facility] on 2/7/2024  with Concussion . Pt presents to [facility] following a fall with headstrike . Patient performed functional mobility as described above.  At baseline, pt resides alone in [facility] and was requiring some assistance prior to hospital admission. Currently, upon initial examination, pt  is requiring mod assist x1 for bed mobility skills;  min assist x1 for functional transfers and  min assist x1 for ambulation with RW.  Patient currently presents below baseline with limitations in gait, balance, and transfers. Patient will benefit from continued PT services while in hospital in order to address remaining limitations. The patients AM-PAC Basic Mobility Inpatient Short From Raw Score is 13 . Based on AM-PAC scoring and patient presentation, PT currently recommending Level II (Moderate Resource Intensity). Please also refer to the recommendation of the Physical Therapist for safe discharge planning.   Goals   Patient Goals to feel better   STG Expiration Date 02/22/24   Short Term Goal #1 Patient will be able to perform bed mobility tasks with  supervision   in order to improve overall functional mobility and assist in  safe d/c. STG 2 Patient will sit EOB for at least 25 minutes at  supervision   level in order to strengthen abdominal musculature and assist in future transfers and ambulation. STG 3 Patient will be able to perform functional transfer with  supervision   in order to improve overall functional mobility and assist in safe discharge. STG 4 Patient will be able to ambulate at least 150 feet with least restrictive device with  supervision   assist in order to improve overall functional mobility and assist in safe discharge. STG 5 Patient will improve sitting/standing static/dynamic balance 1/2 grade in order to improve functional mobility and assist in safe discharge. STG 6 Patient will improve LE strength by 1/2 grade in order to improve functional mobility and assist in safe discharge.   PT Treatment Day 0   Plan   Treatment/Interventions ADL retraining;Functional transfer training;LE strengthening/ROM;Elevations;Therapeutic exercise;Endurance training;Bed mobility;Gait training;Spoke to nursing;OT   PT Frequency 2-3x/wk   Discharge Recommendation   Rehab Resource Intensity Level, PT II (Moderate Resource Intensity)   AM-PAC Basic Mobility Inpatient   Turning in Flat Bed Without Bedrails 3   Lying on Back to Sitting on Edge of Flat Bed Without Bedrails 2   Moving Bed to Chair 2   Standing Up From Chair Using Arms 2   Walk in Room 3   Climb 3-5 Stairs With Railing 1   Basic Mobility Inpatient Raw Score 13   Basic Mobility Standardized Score 33.99   Highest Level Of Mobility   JH-HLM Goal 4: Move to chair/commode   JH-HLM Achieved 6: Walk 10 steps or more   End of Consult   Patient Position at End of Consult Supine;Bed/Chair alarm activated;All needs within reach         Reji Howe, PT

## 2024-02-09 PROBLEM — R42 DIZZINESS: Status: ACTIVE | Noted: 2024-02-07

## 2024-02-09 LAB
MRSA NOSE QL CULT: NORMAL
SARS-COV-2 RNA RESP QL NAA+PROBE: POSITIVE

## 2024-02-09 PROCEDURE — 99232 SBSQ HOSP IP/OBS MODERATE 35: CPT | Performed by: INTERNAL MEDICINE

## 2024-02-09 PROCEDURE — 87635 SARS-COV-2 COVID-19 AMP PRB: CPT | Performed by: INTERNAL MEDICINE

## 2024-02-09 RX ORDER — AMLODIPINE BESYLATE 5 MG/1
5 TABLET ORAL DAILY
Status: DISCONTINUED | OUTPATIENT
Start: 2024-02-09 | End: 2024-02-12 | Stop reason: HOSPADM

## 2024-02-09 RX ORDER — AMOXICILLIN 250 MG
2 CAPSULE ORAL 2 TIMES DAILY
Status: DISCONTINUED | OUTPATIENT
Start: 2024-02-09 | End: 2024-02-12 | Stop reason: HOSPADM

## 2024-02-09 RX ORDER — TRIAMTERENE AND HYDROCHLOROTHIAZIDE 37.5; 25 MG/1; MG/1
1 TABLET ORAL DAILY
Status: DISCONTINUED | OUTPATIENT
Start: 2024-02-10 | End: 2024-02-12 | Stop reason: HOSPADM

## 2024-02-09 RX ORDER — POLYETHYLENE GLYCOL 3350 17 G/17G
17 POWDER, FOR SOLUTION ORAL DAILY PRN
Status: DISCONTINUED | OUTPATIENT
Start: 2024-02-09 | End: 2024-02-12 | Stop reason: HOSPADM

## 2024-02-09 RX ADMIN — SENNOSIDES, DOCUSATE SODIUM 2 TABLET: 8.6; 5 TABLET ORAL at 16:38

## 2024-02-09 RX ADMIN — ENOXAPARIN SODIUM 40 MG: 40 INJECTION SUBCUTANEOUS at 09:28

## 2024-02-09 RX ADMIN — LEVOTHYROXINE SODIUM 75 MCG: 75 TABLET ORAL at 05:41

## 2024-02-09 RX ADMIN — ASPIRIN 81 MG CHEWABLE TABLET 81 MG: 81 TABLET CHEWABLE at 09:28

## 2024-02-09 RX ADMIN — PRAVASTATIN SODIUM 20 MG: 20 TABLET ORAL at 16:37

## 2024-02-09 RX ADMIN — MECLIZINE HYDROCHLORIDE 25 MG: 25 TABLET ORAL at 12:51

## 2024-02-09 RX ADMIN — LORAZEPAM 0.5 MG: 0.5 TABLET ORAL at 21:13

## 2024-02-09 RX ADMIN — AMLODIPINE BESYLATE 5 MG: 5 TABLET ORAL at 16:37

## 2024-02-09 RX ADMIN — MECLIZINE HYDROCHLORIDE 25 MG: 25 TABLET ORAL at 21:13

## 2024-02-09 RX ADMIN — MECLIZINE HYDROCHLORIDE 25 MG: 25 TABLET ORAL at 05:41

## 2024-02-09 NOTE — CASE MANAGEMENT
Case Management Assessment & Discharge Planning Note    Patient name Neha Molina  Location NW8 875/NW8 875-01 MRN [id number]  : 1932 Date 2024       Current Admission Date: 2024  Current Admission Diagnosis:Dizziness   Patient Active Problem List    Diagnosis Date Noted    Dizziness 2024    Ambulatory dysfunction 2024    Overactive bladder 2024    Anemia 2024    Sacral fracture, closed (HCC) 2023    Fall 2023    Acute pain due to trauma 2023    Fracture of ramus of right pubis (HCC) 2023    Other specified glaucoma 2023    Accidental fall 2023    Lactose intolerance 10/24/2023    Allergies 10/24/2023    Hypothyroid 10/24/2023    Other hyperlipidemia 10/24/2023    History of CVA (cerebrovascular accident) 10/24/2023    Anxiety 10/24/2023    Urinary frequency 10/24/2023    Rib fracture 10/24/2023    DJD (degenerative joint disease) of knee 2010      LOS (days): 2  Geometric Mean LOS (GMLOS) (days): 2.5  Days to GMLOS:0.7     OBJECTIVE:    Risk of Unplanned Readmission Score: 11.31         Current admission status: Inpatient       Preferred Pharmacy:   UNKNOWN - FOLLOW UP PRIOR TO DISCHARGE TO E-PRESCRIBE  No address on file      [facility] - [address]  [address] [place] [address]  Phone: [phone] Fax: [phone]    Primary Care Provider: Dinesh Johnston MD    Primary Insurance: MEDICARE  Secondary Insurance: AARP    ASSESSMENT:  Active Health Care Proxies    There are no active Health Care Proxies on file.                 Readmission Root Cause  30 Day Readmission: No    Patient Information  Admitted from:: Home  Mental Status: Alert  During Assessment patient was accompanied by: Not accompanied during assessment  Assessment information provided by:: Patient  Primary Caregiver: Self  Support Systems: Family members  County of Residence:  [place]  What city do you live in?: [place]  Home entry access options. Select all that apply.: No steps to enter home  Type of Current Residence: Other (Comment) ([facility]  Living Arrangements: Lives Alone  Is patient a ?: No    Activities of Daily Living Prior to Admission  Functional Status: Assistance  Completes ADLs independently?: No  Level of ADL dependence: Assistance  Ambulates independently?: No  Level of ambulatory dependence: Assistance  Does patient use assisted devices?: Yes  Assisted Devices (DME) used: Walker  Does patient currently own DME?: Yes  What DME does the patient currently own?: Walker  Does patient have a history of Outpatient Therapy (PT/OT)?: Yes  Does the patient have a history of Short-Term Rehab?: Yes  Does patient have a history of HHC?: No  Does patient currently have HHC?: No         Patient Information Continued  Income Source: SSI/SSD  Does patient have prescription coverage?: Yes  Does patient receive dialysis treatments?: No  Does patient have a history of substance abuse?: No  Does patient have a history of Mental Health Diagnosis?: No         Means of Transportation  Means of Transport to Appts:: Family transport      Housing Stability: Low Risk  (2/9/2024)    Housing Stability Vital Sign     Unable to Pay for Housing in the Last Year: No     Number of Places Lived in the Last Year: 1     Unstable Housing in the Last Year: No   Food Insecurity: No Food Insecurity (2/9/2024)    Hunger Vital Sign     Worried About Running Out of Food in the Last Year: Never true     Ran Out of Food in the Last Year: Never true   Transportation Needs: No Transportation Needs (2/9/2024)    PRAPARE - Transportation     Lack of Transportation (Medical): No     Lack of Transportation (Non-Medical): No   Utilities: Not At Risk (2/9/2024)    [facility] Utilities     Threatened with loss of utilities: No       DISCHARGE DETAILS:    Discharge planning discussed with:: Pt at bedside  and pt's cousin. Layne, via phone call.  Freedom of Choice: Yes  Comments - Freedom of Choice: Pt would like to return to [facility]. Referral sent to [facility] in [place].  CM contacted family/caregiver?: Yes  Were Treatment Team discharge recommendations reviewed with patient/caregiver?: Yes  Did patient/caregiver verbalize understanding of patient care needs?: Yes  Were patient/caregiver advised of the risks associated with not following Treatment Team discharge recommendations?: Yes    Contacts  Patient Contacts: Layne Vega (Relative) [phone]  Relationship to Patient:: Family  Contact Method: Phone  Phone Number: [phone]  Reason/Outcome: Continuity of Care, Emergency Contact, Discharge Planning    Requested Home Health Care         Is the patient interested in C at discharge?: No    DME Referral Provided  Referral made for DME?: No    Other Referral/Resources/Interventions Provided:  Interventions: Short Term Rehab  Referral Comments: Referral sent to [facility] and pt was just discharged from there a few days ago. [facility] able to accept    Treatment Team Recommendation: Short Term Rehab  Discharge Destination Plan:: Short Term Rehab    Additional Comments: CM met with pt at bedside and introduced self and role. Pt reports she resides at [facility] for [facility]. Pt was just discharged from [facility] at [facility]. CM informed pt that therapy team is recommending [facility] again. Pt reported she would like to go back to [facility]. [facility] able to accept. CM spoke with pt's cousin, Layne and discussed the above information. Layne agreeable with discharge plan.

## 2024-02-09 NOTE — NURSING NOTE
"Patient rang call bell stating that she wanted to be taken to the \"breakfast buffet\". Patient reoriented to time and given snacks and drink by PCA. Patient then stated that she felt \"abandoned\" because she is alone in the room; patient also asking for her shoes that she states she had in the ED yesterday. Patient's shoes not listed on belongings checklist, reached out to ED charge RN to see if any shoes were found in the ED room where patient was treated. Patient denies other needs at this time.   "

## 2024-02-09 NOTE — PLAN OF CARE
Problem: Prexisting or High Potential for Compromised Skin Integrity  Goal: Skin integrity is maintained or improved  Description: INTERVENTIONS:  - Identify patients at risk for skin breakdown  - Assess and monitor skin integrity  - Assess and monitor nutrition and hydration status  - Monitor labs   - Assess for incontinence   - Turn and reposition patient  - Assist with mobility/ambulation  - Relieve pressure over bony prominences  - Avoid friction and shearing  - Provide appropriate hygiene as needed including keeping skin clean and dry  - Evaluate need for skin moisturizer/barrier cream  - Collaborate with interdisciplinary team   - Patient/family teaching  - Consider wound care consult   Outcome: Progressing     Problem: PAIN - ADULT  Goal: Verbalizes/displays adequate comfort level or baseline comfort level  Description: Interventions:  - Encourage patient to monitor pain and request assistance  - Assess pain using appropriate pain scale  - Administer analgesics based on type and severity of pain and evaluate response  - Implement non-pharmacological measures as appropriate and evaluate response  - Consider cultural and social influences on pain and pain management  - Notify physician/advanced practitioner if interventions unsuccessful or patient reports new pain  Outcome: Progressing     Problem: SAFETY ADULT  Goal: Maintain or return to baseline ADL function  Description: INTERVENTIONS:  -  Assess patient's ability to carry out ADLs; assess patient's baseline for ADL function and identify physical deficits which impact ability to perform ADLs (bathing, care of mouth/teeth, toileting, grooming, dressing, etc.)  - Assess/evaluate cause of self-care deficits   - Assess range of motion  - Assess patient's mobility; develop plan if impaired  - Assess patient's need for assistive devices and provide as appropriate  - Encourage maximum independence but intervene and supervise when necessary  - Involve family in  performance of ADLs  - Assess for home care needs following discharge   - Consider OT consult to assist with ADL evaluation and planning for discharge  - Provide patient education as appropriate  Outcome: Progressing  Goal: Patient will remain free of falls  Description: INTERVENTIONS:  - Educate patient/family on patient safety including physical limitations  - Instruct patient to call for assistance with activity   - Consult OT/PT to assist with strengthening/mobility   - Keep Call bell within reach  - Keep bed low and locked with side rails adjusted as appropriate  - Keep care items and personal belongings within reach  - Initiate and maintain comfort rounds  - Make Fall Risk Sign visible to staff  - Offer Toileting every 2 Hours, in advance of need  - Initiate/Maintain bed alarm  - Apply yellow socks and bracelet for high fall risk patients  - Consider moving patient to room near nurses station  Outcome: Progressing     Problem: DISCHARGE PLANNING  Goal: Discharge to home or other facility with appropriate resources  Description: INTERVENTIONS:  - Identify barriers to discharge w/patient and caregiver  - Arrange for needed discharge resources and transportation as appropriate  - Identify discharge learning needs (meds, wound care, etc.)  - Arrange for interpretive services to assist at discharge as needed  - Refer to Case Management Department for coordinating discharge planning if the patient needs post-hospital services based on physician/advanced practitioner order or complex needs related to functional status, cognitive ability, or social support system  Outcome: Progressing     Problem: NEUROSENSORY - ADULT  Goal: Achieves stable or improved neurological status  Description: INTERVENTIONS  - Monitor and report changes in neurological status  - Monitor vital signs such as temperature, blood pressure, glucose, and any other labs ordered   - Initiate measures to prevent increased intracranial pressure  -  Monitor for seizure activity and implement precautions if appropriate      Outcome: Progressing

## 2024-02-09 NOTE — PROGRESS NOTES
[facility]  Progress Note  Name: Neha Molina I  MRN: [id number]  Unit/Bed#: NW8 875-01 I Date of Admission: 2/7/2024   Date of Service: 2/9/2024 I Hospital Day: 2    Assessment/Plan   * Dizziness  Assessment & Plan  Possible concussive vertigo vs BPPV  MRI brain  Will treat supportively  Will DC IV fluids. Encourage PO intake  Rx headache  Rx vertigo symptoms with meclizine.  PT/OT  Patient resides at assisted care facility.  May need short-term rehab      Ambulatory dysfunction  Assessment & Plan  Fall precautions   PT/OT    Other hyperlipidemia  Assessment & Plan  Lovastatin at home    Hypothyroid  Assessment & Plan  Levothyroxine             VTE Pharmacologic Prophylaxis:   Moderate Risk (Score 3-4) - Pharmacological DVT Prophylaxis Ordered: enoxaparin (Lovenox).    Mobility:   Basic Mobility Inpatient Raw Score: 13  -HLM Goal: 4: Move to chair/commode  -HLM Achieved: 1: Laying in bed  HLM Goal NOT achieved. Continue with multidisciplinary rounding and encourage appropriate mobility to improve upon HLM goals.    Patient Centered Rounds: I performed bedside rounds with nursing staff today.   Discussions with Specialists or Other Care Team Provider: CM    Education and Discussions with Family / Patient: Updated  (cousin) at bedside.    Total Time Spent on Date of Encounter in care of patient: 35 mins. This time was spent on one or more of the following: performing physical exam; counseling and coordination of care; obtaining or reviewing history; documenting in the medical record; reviewing/ordering tests, medications or procedures; communicating with other healthcare professionals and discussing with patient's family/caregivers.    Current Length of Stay: 2 day(s)  Current Patient Status: Inpatient   Certification Statement: The patient will continue to require additional inpatient hospital stay due to MRI brain  Discharge Plan: Anticipate discharge in  48 hrs to rehab facility.    Code Status: Level 1 - Full Code    Subjective:   Still feels dizzy.     Objective:     Vitals:   Temp (24hrs), Av.9 °F (36.6 °C), Min:97.5 °F (36.4 °C), Max:98.1 °F (36.7 °C)    Temp:  [97.5 °F (36.4 °C)-98.1 °F (36.7 °C)] 97.5 °F (36.4 °C)  HR:  [63-70] 63  Resp:  [17-18] 17  BP: (141-196)/(63-83) 196/79  SpO2:  [95 %-96 %] 96 %  Body mass index is 28.57 kg/m².     Input and Output Summary (last 24 hours):   No intake or output data in the 24 hours ending 24 1255    Physical Exam:   Physical Exam  Vitals and nursing note reviewed.   Constitutional:       General: She is not in acute distress.     Appearance: She is well-developed.   HENT:      Head: Normocephalic and atraumatic.   Eyes:      Conjunctiva/sclera: Conjunctivae normal.   Cardiovascular:      Rate and Rhythm: Normal rate and regular rhythm.      Heart sounds: No murmur heard.  Pulmonary:      Effort: Pulmonary effort is normal. No respiratory distress.      Breath sounds: Normal breath sounds.   Abdominal:      Palpations: Abdomen is soft.      Tenderness: There is no abdominal tenderness.   Musculoskeletal:         General: No swelling.      Cervical back: Neck supple.   Skin:     General: Skin is warm and dry.      Capillary Refill: Capillary refill takes less than 2 seconds.   Neurological:      General: No focal deficit present.      Mental Status: She is alert and oriented to person, place, and time.   Psychiatric:         Mood and Affect: Mood normal.          Additional Data:     Labs:  Results from last 7 days   Lab Units 24  1450   WBC Thousand/uL 4.34   HEMOGLOBIN g/dL 10.7*   HEMATOCRIT % 33.8*   PLATELETS Thousands/uL 212   NEUTROS PCT % 69   LYMPHS PCT % 12*   MONOS PCT % 17*   EOS PCT % 1     Results from last 7 days   Lab Units 24  0601 24  1450   SODIUM mmol/L 140 134*   POTASSIUM mmol/L 3.7 4.3   CHLORIDE mmol/L 104 100   CO2 mmol/L 28 27   BUN mg/dL 17 18   CREATININE mg/dL 0.72  0.69   ANION GAP mmol/L 8 7   CALCIUM mg/dL 8.5 8.9   ALBUMIN g/dL  --  3.8   TOTAL BILIRUBIN mg/dL  --  0.47   ALK PHOS U/L  --  105*   ALT U/L  --  12   AST U/L  --  20   GLUCOSE RANDOM mg/dL 96 108                       Lines/Drains:  Invasive Devices       Peripheral Intravenous Line  Duration             Peripheral IV 02/07/24 Right Antecubital 1 day              Drain  Duration             External Urinary Catheter 49 days                          Imaging: Reviewed radiology reports from this admission including: CT head    Recent Cultures (last 7 days):         Last 24 Hours Medication List:   Current Facility-Administered Medications   Medication Dose Route Frequency Provider Last Rate    acetaminophen  650 mg Oral Q6H PRN Hetul Schumacher, DO      aspirin  81 mg Oral Daily Hetul Schumacher, DO      enoxaparin  40 mg Subcutaneous Daily Hetul Schumacher, DO      levothyroxine  75 mcg Oral Early Morning Hetul Schumacher, DO      LORazepam  0.5 mg Oral HS Hetul Schumacher, DO      meclizine  25 mg Oral Q8H LOGAN Hetul Schumacher, DO      ondansetron  4 mg Intravenous Q6H PRN Hetul Schumacher, DO      pravastatin  20 mg Oral Daily With Dinner Hetul Schumacher, DO          Today, Patient Was Seen By: Andrew Stratton MD    **Please Note: This note may have been constructed using a voice recognition system.**

## 2024-02-09 NOTE — CASE MANAGEMENT
Case Management Discharge Planning Note    Patient name Neha Molina  Location NW8 875/NW8 875-01 MRN [id number]  : 1932 Date 2024       Current Admission Date: 2024  Current Admission Diagnosis:Dizziness   Patient Active Problem List    Diagnosis Date Noted    Dizziness 2024    Ambulatory dysfunction 2024    Overactive bladder 2024    Anemia 2024    Sacral fracture, closed (HCC) 2023    Fall 2023    Acute pain due to trauma 2023    Fracture of ramus of right pubis (HCC) 2023    Other specified glaucoma 2023    Accidental fall 2023    Lactose intolerance 10/24/2023    Allergies 10/24/2023    Hypothyroid 10/24/2023    Other hyperlipidemia 10/24/2023    History of CVA (cerebrovascular accident) 10/24/2023    Anxiety 10/24/2023    Urinary frequency 10/24/2023    Rib fracture 10/24/2023    DJD (degenerative joint disease) of knee 2010      LOS (days): 2  Geometric Mean LOS (GMLOS) (days): 2.5  Days to GMLOS:0.7     OBJECTIVE:  Risk of Unplanned Readmission Score: 11.31         Current admission status: Inpatient   Preferred Pharmacy:   UNKNOWN - FOLLOW UP PRIOR TO DISCHARGE TO E-PRESCRIBE  No address on file      [facility] - [address]  [address] [place] [address]  [address] [place], [place] [address]  Phone: [phone] Fax: [phone]    Primary Care Provider: Dinesh Johnston MD    Primary Insurance: MEDICARE  Secondary Insurance: AARP    DISCHARGE DETAILS:    Discharge planning discussed with:: Pt at bedside and pt's cousinKale Layne, via phone call.  Freedom of Choice: Yes  Comments - Freedom of Choice: Pt would like to return to [facility]. Referral sent to [facility] in [place].  CM contacted family/caregiver?: Yes  Were Treatment Team discharge recommendations reviewed with patient/caregiver?: Yes  Did patient/caregiver verbalize understanding of patient care needs?: Yes  Were  patient/caregiver advised of the risks associated with not following Treatment Team discharge recommendations?: Yes    Contacts  Patient Contacts: Layne Vega (Relative) [phone]  Relationship to Patient:: Family  Contact Method: Phone  Phone Number: [phone]  Reason/Outcome: Continuity of Care, Emergency Contact, Discharge Planning    Requested Home Health Care         Is the patient interested in HHC at discharge?: No    DME Referral Provided  Referral made for DME?: No    Other Referral/Resources/Interventions Provided:  Interventions: Short Term Rehab  Referral Comments: Referral sent to [facility] and pt was just discharged from there a few days ago. [facility] able to accept    Treatment Team Recommendation: Short Term Rehab  Discharge Destination Plan:: Short Term Rehab  Transport at Discharge : Wheelchair van  Dispatcher Contacted: Yes  Number/Name of Dispatcher: Roundtrip  Transported by (Company and Unit #): TBD  ETA of Transport (Date): 02/09/24  ETA of Transport (Time): 1100     Additional Comments: CM met with pt at bedside and introduced self and role. Pt reports she resides at [facility] for [facility]. Pt was just discharged from STR at [facility]. CM informed pt that therapy team is recommending STR again. Pt reported she would like to go back to [facility]. [facility] able to accept. CM spoke with pt's cousin, Layne and discussed the above information. Layne agreeable with discharge plan.

## 2024-02-09 NOTE — CASE MANAGEMENT
Case Management Discharge Planning Note    Patient name Neha Molina  Location NW8 875/NW8 875-01 MRN [id number]  : 1932 Date 2024       Current Admission Date: 2024  Current Admission Diagnosis:Dizziness   Patient Active Problem List    Diagnosis Date Noted    Dizziness 2024    Ambulatory dysfunction 2024    Overactive bladder 2024    Anemia 2024    Sacral fracture, closed (HCC) 2023    Fall 2023    Acute pain due to trauma 2023    Fracture of ramus of right pubis (HCC) 2023    Other specified glaucoma 2023    Accidental fall 2023    Lactose intolerance 10/24/2023    Allergies 10/24/2023    Hypothyroid 10/24/2023    Other hyperlipidemia 10/24/2023    History of CVA (cerebrovascular accident) 10/24/2023    Anxiety 10/24/2023    Urinary frequency 10/24/2023    Rib fracture 10/24/2023    DJD (degenerative joint disease) of knee 2010      LOS (days): 2  Geometric Mean LOS (GMLOS) (days): 2.5  Days to GMLOS:0.6     OBJECTIVE:  Risk of Unplanned Readmission Score: 11.31         Current admission status: Inpatient   Preferred Pharmacy:   UNKNOWN - FOLLOW UP PRIOR TO DISCHARGE TO E-PRESCRIBE  No address on file      [facility] - [address]  [address] [place] [address]  [address] [place], [place] [address]  Phone: [phone] Fax: [phone]    Primary Care Provider: Dinesh Johnston MD    Primary Insurance: MEDICARE  Secondary Insurance: AARP    DISCHARGE DETAILS:    Discharge planning discussed with:: Pt at bedside and pt's cousinKale Layne, via phone call.  Freedom of Choice: Yes  Comments - Freedom of Choice: Pt would like to return to [facility]. Referral sent to [facility] in [place].  CM contacted family/caregiver?: Yes  Were Treatment Team discharge recommendations reviewed with patient/caregiver?: Yes  Did patient/caregiver verbalize understanding of patient care needs?: Yes  Were  patient/caregiver advised of the risks associated with not following Treatment Team discharge recommendations?: Yes    Contacts  Patient Contacts: Layne Vega (Relative) [phone]  Relationship to Patient:: Family  Contact Method: Phone  Phone Number: [phone]  Reason/Outcome: Continuity of Care, Emergency Contact, Discharge Planning    Requested Home Health Care         Is the patient interested in HHC at discharge?: No    DME Referral Provided  Referral made for DME?: No    Other Referral/Resources/Interventions Provided:  Interventions: Short Term Rehab  Referral Comments: Referral sent to [facility] and pt was just discharged from there a few days ago. [facility] able to accept    Treatment Team Recommendation: Short Term Rehab  Discharge Destination Plan:: Short Term Rehab  Transport at Discharge : Wheelchair van  Dispatcher Contacted: Yes  Number/Name of Dispatcher: Roundtrip  Transported by (Company and Unit #): TBD  ETA of Transport (Date): 02/10/24  ETA of Transport (Time): 1100     Additional Comments: CM met with pt at bedside and introduced self and role. Pt reports she resides at [facility] for [facility]. Pt was just discharged from STR at [facility]. CM informed pt that therapy team is recommending STR again. Pt reported she would like to go back to [facility]. [facility] able to accept. CM spoke with pt's cousin, Layne and discussed the above information. Layne agreeable with discharge plan.

## 2024-02-09 NOTE — ASSESSMENT & PLAN NOTE
Possible concussive vertigo vs BPPV  MRI brain  Will treat supportively  Will DC IV fluids. Encourage PO intake  Rx headache  Rx vertigo symptoms with meclizine.  PT/OT  Patient resides at assisted care facility.  May need short-term rehab

## 2024-02-10 PROBLEM — U07.1 COVID-19: Status: ACTIVE | Noted: 2024-02-10

## 2024-02-10 LAB
ANION GAP SERPL CALCULATED.3IONS-SCNC: 7 MMOL/L
BUN SERPL-MCNC: 13 MG/DL (ref 5–25)
CALCIUM SERPL-MCNC: 8.4 MG/DL (ref 8.4–10.2)
CHLORIDE SERPL-SCNC: 104 MMOL/L (ref 96–108)
CO2 SERPL-SCNC: 24 MMOL/L (ref 21–32)
CREAT SERPL-MCNC: 0.5 MG/DL (ref 0.6–1.3)
GFR SERPL CREATININE-BSD FRML MDRD: 84 ML/MIN/1.73SQ M
GLUCOSE SERPL-MCNC: 100 MG/DL (ref 65–140)
POTASSIUM SERPL-SCNC: 4.2 MMOL/L (ref 3.5–5.3)
SODIUM SERPL-SCNC: 135 MMOL/L (ref 135–147)

## 2024-02-10 PROCEDURE — 80048 BASIC METABOLIC PNL TOTAL CA: CPT | Performed by: INTERNAL MEDICINE

## 2024-02-10 PROCEDURE — 99232 SBSQ HOSP IP/OBS MODERATE 35: CPT | Performed by: INTERNAL MEDICINE

## 2024-02-10 RX ORDER — NIRMATRELVIR AND RITONAVIR 300-100 MG
3 KIT ORAL 2 TIMES DAILY
Qty: 30 TABLET | Refills: 0 | Status: SHIPPED | OUTPATIENT
Start: 2024-02-10 | End: 2024-02-12

## 2024-02-10 RX ORDER — LORAZEPAM 0.5 MG/1
0.5 TABLET ORAL ONCE
Status: COMPLETED | OUTPATIENT
Start: 2024-02-10 | End: 2024-02-11

## 2024-02-10 RX ADMIN — MECLIZINE HYDROCHLORIDE 25 MG: 25 TABLET ORAL at 14:25

## 2024-02-10 RX ADMIN — AMLODIPINE BESYLATE 5 MG: 5 TABLET ORAL at 11:03

## 2024-02-10 RX ADMIN — ENOXAPARIN SODIUM 40 MG: 40 INJECTION SUBCUTANEOUS at 11:02

## 2024-02-10 RX ADMIN — TRIAMTERENE AND HYDROCHLOROTHIAZIDE 1 TABLET: 37.5; 25 TABLET ORAL at 11:02

## 2024-02-10 RX ADMIN — MECLIZINE HYDROCHLORIDE 25 MG: 25 TABLET ORAL at 21:59

## 2024-02-10 RX ADMIN — MECLIZINE HYDROCHLORIDE 25 MG: 25 TABLET ORAL at 05:11

## 2024-02-10 RX ADMIN — SENNOSIDES, DOCUSATE SODIUM 2 TABLET: 8.6; 5 TABLET ORAL at 11:03

## 2024-02-10 RX ADMIN — ACETAMINOPHEN 650 MG: 325 TABLET, FILM COATED ORAL at 12:20

## 2024-02-10 RX ADMIN — LEVOTHYROXINE SODIUM 75 MCG: 75 TABLET ORAL at 05:10

## 2024-02-10 RX ADMIN — PRAVASTATIN SODIUM 20 MG: 20 TABLET ORAL at 17:50

## 2024-02-10 RX ADMIN — LORAZEPAM 0.5 MG: 0.5 TABLET ORAL at 21:59

## 2024-02-10 RX ADMIN — ASPIRIN 81 MG CHEWABLE TABLET 81 MG: 81 TABLET CHEWABLE at 11:02

## 2024-02-10 RX ADMIN — SENNOSIDES, DOCUSATE SODIUM 2 TABLET: 8.6; 5 TABLET ORAL at 17:50

## 2024-02-10 NOTE — CASE MANAGEMENT
Case Management Discharge Planning Note    Patient name Neha Molina  Location NW8 875/NW8 875-01 MRN [id number]  : 1932 Date 2/10/2024       Current Admission Date: 2024  Current Admission Diagnosis:Dizziness   Patient Active Problem List    Diagnosis Date Noted    COVID-19 02/10/2024    Dizziness 2024    Ambulatory dysfunction 2024    Overactive bladder 2024    Anemia 2024    Sacral fracture, closed (HCC) 2023    Fall 2023    Acute pain due to trauma 2023    Fracture of ramus of right pubis (HCC) 2023    Other specified glaucoma 2023    Accidental fall 2023    Lactose intolerance 10/24/2023    Allergies 10/24/2023    Hypothyroid 10/24/2023    Other hyperlipidemia 10/24/2023    History of CVA (cerebrovascular accident) 10/24/2023    Anxiety 10/24/2023    Urinary frequency 10/24/2023    Rib fracture 10/24/2023    DJD (degenerative joint disease) of knee 2010      LOS (days): 3  Geometric Mean LOS (GMLOS) (days): 2.5  Days to GMLOS:-0.2     OBJECTIVE:  Risk of Unplanned Readmission Score: 12         Current admission status: Inpatient   Preferred Pharmacy:   UNKNOWN - FOLLOW UP PRIOR TO DISCHARGE TO E-PRESCRIBE  No address on file      [facility] [place] - [address]  [address] [facility] [address]  [address]  [facility] [address]  Phone: [phone] Fax: [phone]    [facility] [place] [place] - [address]  [address]  [place] [address]  Phone: [phone] Fax: [phone]    Primary Care Provider: Dinesh Johnston MD    Primary Insurance: MEDICARE  Secondary Insurance: AARP    DISCHARGE DETAILS:            Additional Comments: CM spoke to Keena from [facility] via phone. CM stated pt is not symptomatic an Per attending no treatment for COVID at this time. Keena verbalized understanding and stated pt can d/c to facility tomorrow or Monday. CM  notified Attending. CM will continue to follow as needed

## 2024-02-10 NOTE — QUICK NOTE
Contacted by nursing since the patient wanted to talk to her doctor.  Patient complaining of dizziness.  MRI pending.  Patient worried about concussion and treatment.  Patient reported that meclizine does not work for her vertigo  Wanted to see if she can get an extra dose of Ativan 1 hour before her MRI.  Will order one-time Ativan for the MRI and she takes 0.5 mg at bedtime.  Discussed with nursing.  Her MRI will be the later today since she is COVID-positive.  Patient appears very anxious.

## 2024-02-10 NOTE — PROGRESS NOTES
[facility]  Progress Note  Name: Neha Molina I  MRN: [id number]  Unit/Bed#: NW8 875-01 I Date of Admission: 2/7/2024   Date of Service: 2/10/2024 I Hospital Day: 3    Assessment/Plan   * Dizziness  Assessment & Plan  Possible concussive vertigo vs BPPV  MRI brain  Will treat supportively  Encourage PO intake  Rx vertigo symptoms with meclizine.  PT/OT      COVID-19  Assessment & Plan  Supportive care  On room air      Ambulatory dysfunction  Assessment & Plan  Fall precautions   PT/OT > rehab    Other hyperlipidemia  Assessment & Plan  Lovastatin at home    Hypothyroid  Assessment & Plan  Levothyroxine         HTN > Maxizide and add amlodipine    VTE Pharmacologic Prophylaxis: VTE Score: 3 Moderate Risk (Score 3-4) - Pharmacological DVT Prophylaxis Ordered: enoxaparin (Lovenox).    Mobility:   Basic Mobility Inpatient Raw Score: 13  -HLM Goal: 4: Move to chair/commode  JH-HLM Achieved: 1: Laying in bed  HLM Goal NOT achieved. Continue with multidisciplinary rounding and encourage appropriate mobility to improve upon HLM goals.    Patient Centered Rounds: I performed bedside rounds with nursing staff today.   Discussions with Specialists or Other Care Team Provider: RN    Education and Discussions with Family / Patient: Updated  (cousin) via phone.    Total Time Spent on Date of Encounter in care of patient: 35 mins. This time was spent on one or more of the following: performing physical exam; counseling and coordination of care; obtaining or reviewing history; documenting in the medical record; reviewing/ordering tests, medications or procedures; communicating with other healthcare professionals and discussing with patient's family/caregivers.    Current Length of Stay: 3 day(s)  Current Patient Status: Inpatient   Certification Statement: The patient will continue to require additional inpatient hospital stay due to above  Discharge Plan: Anticipate  discharge in 24-48 hrs to rehab facility.    Code Status: Level 1 - Full Code    Subjective:   Wants to walk. No respiratory symptoms    Objective:     Vitals:   Temp (24hrs), Av.1 °F (36.7 °C), Min:98 °F (36.7 °C), Max:98.3 °F (36.8 °C)    Temp:  [98 °F (36.7 °C)-98.3 °F (36.8 °C)] 98.3 °F (36.8 °C)  HR:  [69-77] 71  Resp:  [17-18] 17  BP: (147-171)/(60-74) 171/74  SpO2:  [93 %-96 %] 93 %  Body mass index is 28.57 kg/m².     Input and Output Summary (last 24 hours):     Intake/Output Summary (Last 24 hours) at 2/10/2024 1232  Last data filed at 2/10/2024 0501  Gross per 24 hour   Intake --   Output 2200 ml   Net -2200 ml       Physical Exam:   Physical Exam  Vitals and nursing note reviewed.   Constitutional:       General: She is not in acute distress.     Appearance: She is well-developed.   HENT:      Head: Normocephalic and atraumatic.   Eyes:      Conjunctiva/sclera: Conjunctivae normal.   Cardiovascular:      Rate and Rhythm: Normal rate and regular rhythm.      Heart sounds: No murmur heard.  Pulmonary:      Effort: Pulmonary effort is normal. No respiratory distress.      Breath sounds: Normal breath sounds.   Abdominal:      Palpations: Abdomen is soft.      Tenderness: There is no abdominal tenderness.   Musculoskeletal:         General: No swelling.      Cervical back: Neck supple.   Skin:     General: Skin is warm and dry.      Capillary Refill: Capillary refill takes less than 2 seconds.   Neurological:      Mental Status: She is alert.   Psychiatric:         Mood and Affect: Mood normal.          Additional Data:     Labs:  Results from last 7 days   Lab Units 24  1450   WBC Thousand/uL 4.34   HEMOGLOBIN g/dL 10.7*   HEMATOCRIT % 33.8*   PLATELETS Thousands/uL 212   NEUTROS PCT % 69   LYMPHS PCT % 12*   MONOS PCT % 17*   EOS PCT % 1     Results from last 7 days   Lab Units 02/10/24  0624 24  0601 24  1450   SODIUM mmol/L 135   < > 134*   POTASSIUM mmol/L 4.2   < > 4.3   CHLORIDE  mmol/L 104   < > 100   CO2 mmol/L 24   < > 27   BUN mg/dL 13   < > 18   CREATININE mg/dL 0.50*   < > 0.69   ANION GAP mmol/L 7   < > 7   CALCIUM mg/dL 8.4   < > 8.9   ALBUMIN g/dL  --   --  3.8   TOTAL BILIRUBIN mg/dL  --   --  0.47   ALK PHOS U/L  --   --  105*   ALT U/L  --   --  12   AST U/L  --   --  20   GLUCOSE RANDOM mg/dL 100   < > 108    < > = values in this interval not displayed.                       Lines/Drains:  Invasive Devices       Peripheral Intravenous Line  Duration             Peripheral IV 02/07/24 Right Antecubital 2 days              Drain  Duration             External Urinary Catheter 50 days                          Imaging: Reviewed radiology reports from this admission including: CT head    Recent Cultures (last 7 days):         Last 24 Hours Medication List:   Current Facility-Administered Medications   Medication Dose Route Frequency Provider Last Rate    acetaminophen  650 mg Oral Q6H PRN Hetul Schumacher, DO      amLODIPine  5 mg Oral Daily Andrew Stratton MD      aspirin  81 mg Oral Daily Hetul Schumacher, DO      enoxaparin  40 mg Subcutaneous Daily Hetul Schumacher, DO      levothyroxine  75 mcg Oral Early Morning Hetul Schumacher, DO      LORazepam  0.5 mg Oral HS Hetul Schuamcher, DO      meclizine  25 mg Oral Q8H LOGAN Hetul Schumacher, DO      ondansetron  4 mg Intravenous Q6H PRN Hetul Schumacher, DO      polyethylene glycol  17 g Oral Daily PRN Andrew Stratton MD      pravastatin  20 mg Oral Daily With Dinner Arsh Schumacher, DO      senna-docusate sodium  2 tablet Oral BID Andrew Stratton MD      triamterene-hydrochlorothiazide  1 tablet Oral Daily Andrew Stratton MD          Today, Patient Was Seen By: Andrew Stratton MD    **Please Note: This note may have been constructed using a voice recognition system.**

## 2024-02-10 NOTE — PLAN OF CARE
Problem: Prexisting or High Potential for Compromised Skin Integrity  Goal: Skin integrity is maintained or improved  Description: INTERVENTIONS:  - Identify patients at risk for skin breakdown  - Assess and monitor skin integrity  - Assess and monitor nutrition and hydration status  - Monitor labs   - Assess for incontinence   - Turn and reposition patient  - Assist with mobility/ambulation  - Relieve pressure over bony prominences  - Avoid friction and shearing  - Provide appropriate hygiene as needed including keeping skin clean and dry  - Evaluate need for skin moisturizer/barrier cream  - Collaborate with interdisciplinary team   - Patient/family teaching  - Consider wound care consult   Outcome: Progressing     Problem: PAIN - ADULT  Goal: Verbalizes/displays adequate comfort level or baseline comfort level  Description: Interventions:  - Encourage patient to monitor pain and request assistance  - Assess pain using appropriate pain scale  - Administer analgesics based on type and severity of pain and evaluate response  - Implement non-pharmacological measures as appropriate and evaluate response  - Consider cultural and social influences on pain and pain management  - Notify physician/advanced practitioner if interventions unsuccessful or patient reports new pain  Outcome: Progressing     Problem: SAFETY ADULT  Goal: Maintain or return to baseline ADL function  Description: INTERVENTIONS:  -  Assess patient's ability to carry out ADLs; assess patient's baseline for ADL function and identify physical deficits which impact ability to perform ADLs (bathing, care of mouth/teeth, toileting, grooming, dressing, etc.)  - Assess/evaluate cause of self-care deficits   - Assess range of motion  - Assess patient's mobility; develop plan if impaired  - Assess patient's need for assistive devices and provide as appropriate  - Encourage maximum independence but intervene and supervise when necessary  - Involve family in  performance of ADLs  - Assess for home care needs following discharge   - Consider OT consult to assist with ADL evaluation and planning for discharge  - Provide patient education as appropriate  Outcome: Progressing  Goal: Patient will remain free of falls  Description: INTERVENTIONS:  - Educate patient/family on patient safety including physical limitations  - Instruct patient to call for assistance with activity   - Consult OT/PT to assist with strengthening/mobility   - Keep Call bell within reach  - Keep bed low and locked with side rails adjusted as appropriate  - Keep care items and personal belongings within reach  - Initiate and maintain comfort rounds  - Make Fall Risk Sign visible to staff  - Offer Toileting every 4 Hours, in advance of need  - Initiate/Maintain 4alarm  - Obtain necessary fall risk management equipment: 4  - Apply yellow socks and bracelet for high fall risk patients  - Consider moving patient to room near nurses station  Outcome: Progressing     Problem: DISCHARGE PLANNING  Goal: Discharge to home or other facility with appropriate resources  Description: INTERVENTIONS:  - Identify barriers to discharge w/patient and caregiver  - Arrange for needed discharge resources and transportation as appropriate  - Identify discharge learning needs (meds, wound care, etc.)  - Arrange for interpretive servi  Problem: Prexisting or High Potential for Compromised Skin Integrity  Goal: Skin integrity is maintained or improved  Description: INTERVENTIONS:  - Identify patients at risk for skin breakdown  - Assess and monitor skin integrity  - Assess and monitor nutrition and hydration status  - Monitor labs   - Assess for incontinence   - Turn and reposition patient  - Assist with mobility/ambulation  - Relieve pressure over bony prominences  - Avoid friction and shearing  - Provide appropriate hygiene as needed including keeping skin clean and dry  - Evaluate need for skin moisturizer/barrier cream  -  Collaborate with interdisciplinary team   - Patient/family teaching  - Consider wound care consult   Outcome: Progressing     Problem: PAIN - ADULT  Goal: Verbalizes/displays adequate comfort level or baseline comfort level  Description: Interventions:  - Encourage patient to monitor pain and request assistance  - Assess pain using appropriate pain scale  - Administer analgesics based on type and severity of pain and evaluate response  - Implement non-pharmacological measures as appropriate and evaluate response  - Consider cultural and social influences on pain and pain management  - Notify physician/advanced practitioner if interventions unsuccessful or patient reports new pain  Outcome: Progressing     Problem: SAFETY ADULT  Goal: Maintain or return to baseline ADL function  Description: INTERVENTIONS:  -  Assess patient's ability to carry out ADLs; assess patient's baseline for ADL function and identify physical deficits which impact ability to perform ADLs (bathing, care of mouth/teeth, toileting, grooming, dressing, etc.)  - Assess/evaluate cause of self-care deficits   - Assess range of motion  - Assess patient's mobility; develop plan if impaired  - Assess patient's need for assistive devices and provide as appropriate  - Encourage maximum independence but intervene and supervise when necessary  - Involve family in performance of ADLs  - Assess for home care needs following discharge   - Consider OT consult to assist with ADL evaluation and planning for discharge  - Provide patient education as appropriate  Outcome: Progressing  Goal: Patient will remain free of falls  Description: INTERVENTIONS:  - Educate patient/family on patient safety including physical limitations  - Instruct patient to call for assistance with activity   - Consult OT/PT to assist with strengthening/mobility   - Keep Call bell within reach  - Keep bed low and locked with side rails adjusted as appropriate  - Keep care items and  personal belongings within reach  - Initiate and maintain comfort rounds  - Make Fall Risk Sign visible to staff  - Offer Toileting every 4 Hours, in advance of need  - Initiate/Maintain 4alarm  - Obtain necessary fall risk management equipment: 4  - Apply yellow socks and bracelet for high fall risk patients  - Consider moving patient to room near nurses station  Outcome: Progressing     Problem: DISCHARGE PLANNING  Goal: Discharge to home or other facility with appropriate resources  Description: INTERVENTIONS:  - Identify barriers to discharge w/patient and caregiver  - Arrange for needed discharge resources and transportation as appropriate  - Identify discharge learning needs (meds, wound care, etc.)  - Arrange for interpretive services to assist at discharge as needed  - Refer to Case Management Department for coordinating discharge planning if the patient needs post-hospital services based on physician/advanced practitioner order or complex needs related to functional status, cognitive ability, or social support system  Outcome: Progressing     Problem: NEUROSENSORY - ADULT  Goal: Achieves stable or improved neurological status  Description: INTERVENTIONS  - Monitor and report changes in neurological status  - Monitor vital signs such as temperature, blood pressure, glucose, and any other labs ordered   - Initiate measures to prevent increased intracranial pressure  - Monitor for seizure activity and implement precautions if appropriate      Outcome: Progressing   elías to assist at discharge as needed  - Refer to Case Management Department for coordinating discharge planning if the patient needs post-hospital services based on physician/advanced practitioner order or complex needs related to functional status, cognitive ability, or social support system  Outcome: Progressing     Problem: NEUROSENSORY - ADULT  Goal: Achieves stable or improved neurological status  Description: INTERVENTIONS  - Monitor and  report changes in neurological status  - Monitor vital signs such as temperature, blood pressure, glucose, and any other labs ordered   - Initiate measures to prevent increased intracranial pressure  - Monitor for seizure activity and implement precautions if appropriate      Outcome: Progressing

## 2024-02-10 NOTE — ASSESSMENT & PLAN NOTE
Possible concussive vertigo vs BPPV  MRI brain  Will treat supportively  Encourage PO intake  Rx vertigo symptoms with meclizine.  PT/OT

## 2024-02-10 NOTE — CASE MANAGEMENT
Case Management Discharge Planning Note    Patient name Neha Molina  Location NW8 875/NW8 875-01 MRN [id number]  : 1932 Date 2/10/2024       Current Admission Date: 2024  Current Admission Diagnosis:Dizziness   Patient Active Problem List    Diagnosis Date Noted    Dizziness 2024    Ambulatory dysfunction 2024    Overactive bladder 2024    Anemia 2024    Sacral fracture, closed (HCC) 2023    Fall 2023    Acute pain due to trauma 2023    Fracture of ramus of right pubis (HCC) 2023    Other specified glaucoma 2023    Accidental fall 2023    Lactose intolerance 10/24/2023    Allergies 10/24/2023    Hypothyroid 10/24/2023    Other hyperlipidemia 10/24/2023    History of CVA (cerebrovascular accident) 10/24/2023    Anxiety 10/24/2023    Urinary frequency 10/24/2023    Rib fracture 10/24/2023    DJD (degenerative joint disease) of knee 2010      LOS (days): 3  Geometric Mean LOS (GMLOS) (days): 2.5  Days to GMLOS:-0.1     OBJECTIVE:  Risk of Unplanned Readmission Score: 11.97         Current admission status: Inpatient   Preferred Pharmacy:   UNKNOWN - FOLLOW UP PRIOR TO DISCHARGE TO E-PRESCRIBE  No address on file      [facility] - [address]  [address] [place] [address]  Phone: [phone] Fax: [phone]    Primary Care Provider: Dinesh Johnston MD    Primary Insurance: MEDICARE  Secondary Insurance: [facility]    DISCHARGE DETAILS:                      Additional Comments: Pt tested positive for COVID-19. CM canceled 11:00 am transport to [facility] via [facility]. CM notified [facility] via [facility]. CM notified pt relative Layne via phone. CM will continue to follow as needed.

## 2024-02-11 ENCOUNTER — APPOINTMENT (OUTPATIENT)
Dept: RADIOLOGY | Facility: HOSPITAL | Age: 89
DRG: 149 | End: 2024-02-11
Payer: MEDICARE

## 2024-02-11 PROCEDURE — 70551 MRI BRAIN STEM W/O DYE: CPT

## 2024-02-11 PROCEDURE — 99232 SBSQ HOSP IP/OBS MODERATE 35: CPT | Performed by: INTERNAL MEDICINE

## 2024-02-11 RX ADMIN — AMLODIPINE BESYLATE 5 MG: 5 TABLET ORAL at 09:26

## 2024-02-11 RX ADMIN — LORAZEPAM 0.5 MG: 0.5 TABLET ORAL at 21:46

## 2024-02-11 RX ADMIN — PRAVASTATIN SODIUM 20 MG: 20 TABLET ORAL at 16:29

## 2024-02-11 RX ADMIN — MECLIZINE HYDROCHLORIDE 25 MG: 25 TABLET ORAL at 21:46

## 2024-02-11 RX ADMIN — ENOXAPARIN SODIUM 40 MG: 40 INJECTION SUBCUTANEOUS at 09:26

## 2024-02-11 RX ADMIN — ACETAMINOPHEN 650 MG: 325 TABLET, FILM COATED ORAL at 12:31

## 2024-02-11 RX ADMIN — SENNOSIDES, DOCUSATE SODIUM 2 TABLET: 8.6; 5 TABLET ORAL at 09:26

## 2024-02-11 RX ADMIN — TRIAMTERENE AND HYDROCHLOROTHIAZIDE 1 TABLET: 37.5; 25 TABLET ORAL at 09:26

## 2024-02-11 RX ADMIN — MECLIZINE HYDROCHLORIDE 25 MG: 25 TABLET ORAL at 13:49

## 2024-02-11 RX ADMIN — LEVOTHYROXINE SODIUM 75 MCG: 75 TABLET ORAL at 06:55

## 2024-02-11 RX ADMIN — LORAZEPAM 0.5 MG: 0.5 TABLET ORAL at 13:49

## 2024-02-11 RX ADMIN — SENNOSIDES, DOCUSATE SODIUM 2 TABLET: 8.6; 5 TABLET ORAL at 16:30

## 2024-02-11 RX ADMIN — MECLIZINE HYDROCHLORIDE 25 MG: 25 TABLET ORAL at 06:55

## 2024-02-11 RX ADMIN — ASPIRIN 81 MG CHEWABLE TABLET 81 MG: 81 TABLET CHEWABLE at 09:26

## 2024-02-11 NOTE — QUICK NOTE
Discussed MRI results with patient and her relative Layne.   Cause of dizziness/vertigo is BPPV.   Plan for discharge tomorrow to [facility]

## 2024-02-11 NOTE — PROGRESS NOTES
[facility]  Progress Note  Name: Neha Molina I  MRN: [id number]  Unit/Bed#: NW8 875-01 I Date of Admission: 2/7/2024   Date of Service: 2/11/2024 I Hospital Day: 4    Assessment/Plan   * Dizziness  Assessment & Plan  Possible concussive vertigo vs BPPV  MRI brain  Will treat supportively  Encourage PO intake  Rx vertigo symptoms with meclizine.  PT/OT > rehab      COVID-19  Assessment & Plan  Supportive care  On room air      Ambulatory dysfunction  Assessment & Plan  Fall precautions   PT/OT > rehab    Other hyperlipidemia  Assessment & Plan  Lovastatin at home    Hypothyroid  Assessment & Plan  Levothyroxine             VTE Pharmacologic Prophylaxis: VTE Score: 3 Moderate Risk (Score 3-4) - Pharmacological DVT Prophylaxis Ordered: enoxaparin (Lovenox).    Mobility:   Basic Mobility Inpatient Raw Score: 13  -HL Goal: 4: Move to chair/commode  JH-HLM Achieved: 2: Bed activities/Dependent transfer  HLM Goal achieved. Continue to encourage appropriate mobility.    Patient Centered Rounds: I performed bedside rounds with nursing staff today.   Discussions with Specialists or Other Care Team Provider: RN, MRI department     Education and Discussions with Family / Patient:  discussed with cousin yesterday and aware about plan for MRI. Will call once MRI resulted. .     Total Time Spent on Date of Encounter in care of patient: 35 mins. This time was spent on one or more of the following: performing physical exam; counseling and coordination of care; obtaining or reviewing history; documenting in the medical record; reviewing/ordering tests, medications or procedures; communicating with other healthcare professionals and discussing with patient's family/caregivers.    Current Length of Stay: 4 day(s)  Current Patient Status: Inpatient   Certification Statement: The patient will continue to require additional inpatient hospital stay due to MRI brain  Discharge Plan: Anticipate  discharge in 24-48 hrs to rehab facility.    Code Status: Level 1 - Full Code    Subjective:   Still with intermittent dizziness     Objective:     Vitals:   Temp (24hrs), Av.7 °F (36.5 °C), Min:97.4 °F (36.3 °C), Max:98 °F (36.7 °C)    Temp:  [97.4 °F (36.3 °C)-98 °F (36.7 °C)] 98 °F (36.7 °C)  HR:  [63-73] 73  Resp:  [16-18] 16  BP: (125-138)/(62-66) 125/62  SpO2:  [91 %-99 %] 91 %  Body mass index is 28.57 kg/m².     Input and Output Summary (last 24 hours):     Intake/Output Summary (Last 24 hours) at 2024 1313  Last data filed at 2024 0645  Gross per 24 hour   Intake 955 ml   Output 1000 ml   Net -45 ml       Physical Exam:   Physical Exam  Vitals and nursing note reviewed.   Constitutional:       General: She is not in acute distress.     Appearance: She is well-developed.   HENT:      Head: Normocephalic and atraumatic.   Eyes:      Conjunctiva/sclera: Conjunctivae normal.   Cardiovascular:      Rate and Rhythm: Normal rate and regular rhythm.      Heart sounds: No murmur heard.  Pulmonary:      Effort: Pulmonary effort is normal. No respiratory distress.      Breath sounds: Normal breath sounds.   Abdominal:      Palpations: Abdomen is soft.      Tenderness: There is no abdominal tenderness.   Musculoskeletal:         General: No swelling.      Cervical back: Neck supple.   Skin:     General: Skin is warm and dry.      Capillary Refill: Capillary refill takes less than 2 seconds.   Neurological:      Mental Status: She is alert.   Psychiatric:         Mood and Affect: Mood normal.          Additional Data:     Labs:  Results from last 7 days   Lab Units 24  1450   WBC Thousand/uL 4.34   HEMOGLOBIN g/dL 10.7*   HEMATOCRIT % 33.8*   PLATELETS Thousands/uL 212   NEUTROS PCT % 69   LYMPHS PCT % 12*   MONOS PCT % 17*   EOS PCT % 1     Results from last 7 days   Lab Units 02/10/24  0624 24  0601 24  1450   SODIUM mmol/L 135   < > 134*   POTASSIUM mmol/L 4.2   < > 4.3   CHLORIDE  mmol/L 104   < > 100   CO2 mmol/L 24   < > 27   BUN mg/dL 13   < > 18   CREATININE mg/dL 0.50*   < > 0.69   ANION GAP mmol/L 7   < > 7   CALCIUM mg/dL 8.4   < > 8.9   ALBUMIN g/dL  --   --  3.8   TOTAL BILIRUBIN mg/dL  --   --  0.47   ALK PHOS U/L  --   --  105*   ALT U/L  --   --  12   AST U/L  --   --  20   GLUCOSE RANDOM mg/dL 100   < > 108    < > = values in this interval not displayed.                       Lines/Drains:  Invasive Devices       Peripheral Intravenous Line  Duration             Peripheral IV 02/07/24 Right Antecubital 3 days              Drain  Duration             External Urinary Catheter 51 days                          Imaging: Reviewed radiology reports from this admission including: will review MRI brain once done and resulted     Recent Cultures (last 7 days):         Last 24 Hours Medication List:   Current Facility-Administered Medications   Medication Dose Route Frequency Provider Last Rate    acetaminophen  650 mg Oral Q6H PRN Hetul Schumacher, DO      amLODIPine  5 mg Oral Daily Andrew Stratton MD      aspirin  81 mg Oral Daily Hetul Schumacher, DO      enoxaparin  40 mg Subcutaneous Daily Hetul Schumacher, DO      levothyroxine  75 mcg Oral Early Morning Hetul Schumacher, DO      LORazepam  0.5 mg Oral HS Hetul Schumacher, DO      LORazepam  0.5 mg Oral Once Viviana Craven MD      meclizine  25 mg Oral Q8H [facility] Hetul Schumacher, DO      ondansetron  4 mg Intravenous Q6H PRN Hetul Schumacher, DO      polyethylene glycol  17 g Oral Daily PRN Andrew Stratton MD      pravastatin  20 mg Oral Daily With Dinner Evinul Schumacher, DO      senna-docusate sodium  2 tablet Oral BID Andrew Stratton MD      triamterene-hydrochlorothiazide  1 tablet Oral Daily Andrew Stratton MD          Today, Patient Was Seen By: Andrew Stratton MD    **Please Note: This note may have been constructed using a voice recognition system.**

## 2024-02-11 NOTE — PLAN OF CARE
Problem: Prexisting or High Potential for Compromised Skin Integrity  Goal: Skin integrity is maintained or improved  Description: INTERVENTIONS:  - Identify patients at risk for skin breakdown  - Assess and monitor skin integrity  - Assess and monitor nutrition and hydration status  - Monitor labs   - Assess for incontinence   - Turn and reposition patient  - Assist with mobility/ambulation  - Relieve pressure over bony prominences  - Avoid friction and shearing  - Provide appropriate hygiene as needed including keeping skin clean and dry  - Evaluate need for skin moisturizer/barrier cream  - Collaborate with interdisciplinary team   - Patient/family teaching  - Consider wound care consult   Outcome: Progressing     Problem: PAIN - ADULT  Goal: Verbalizes/displays adequate comfort level or baseline comfort level  Description: Interventions:  - Encourage patient to monitor pain and request assistance  - Assess pain using appropriate pain scale  - Administer analgesics based on type and severity of pain and evaluate response  - Implement non-pharmacological measures as appropriate and evaluate response  - Consider cultural and social influences on pain and pain management  - Notify physician/advanced practitioner if interventions unsuccessful or patient reports new pain  Outcome: Progressing     Problem: SAFETY ADULT  Goal: Maintain or return to baseline ADL function  Description: INTERVENTIONS:  -  Assess patient's ability to carry out ADLs; assess patient's baseline for ADL function and identify physical deficits which impact ability to perform ADLs (bathing, care of mouth/teeth, toileting, grooming, dressing, etc.)  - Assess/evaluate cause of self-care deficits   - Assess range of motion  - Assess patient's mobility; develop plan if impaired  - Assess patient's need for assistive devices and provide as appropriate  - Encourage maximum independence but intervene and supervise when necessary  - Involve family in  performance of ADLs  - Assess for home care needs following discharge   - Consider OT consult to assist with ADL evaluation and planning for discharge  - Provide patient education as appropriate  Outcome: Progressing  Goal: Patient will remain free of falls  Description: INTERVENTIONS:  - Educate patient/family on patient safety including physical limitations  - Instruct patient to call for assistance with activity   - Consult OT/PT to assist with strengthening/mobility   - Keep Call bell within reach  - Keep bed low and locked with side rails adjusted as appropriate  - Keep care items and personal belongings within reach  - Initiate and maintain comfort rounds  - Make Fall Risk Sign visible to staff  - Offer Toileting every 4 Hours, in advance of need  - Initiate/Maintain 4alarm  - Obtain necessary fall risk management equipment: 4  - Apply yellow socks and bracelet for high fall risk patients  - Consider moving patient to room near nurses station  Outcome: Progressing     Problem: DISCHARGE PLANNING  Goal: Discharge to home or other facility with appropriate resources  Description: INTERVENTIONS:  - Identify barriers to discharge w/patient and caregiver  - Arrange for needed discharge resources and transportation as appropriate  - Identify discharge learning needs (meds, wound care, etc.)  - Arrange for interpretive services to assist at discharge as needed  - Refer to Case Management Department for coordinating discharge planning if the patient needs post-hospital services based on physician/advanced practitioner order or complex needs related to functional status, cognitive ability, or social support system  Outcome: Progressing     Problem: NEUROSENSORY - ADULT  Goal: Achieves stable or improved neurological status  Description: INTERVENTIONS  - Monitor and report changes in neurological status  - Monitor vital signs such as temperature, blood pressure, glucose, and any other labs ordered   - Initiate measures  to prevent increased intracranial pressure  - Monitor for seizure activity and implement precautions if appropriate      Outcome: Progressing

## 2024-02-11 NOTE — CASE MANAGEMENT
Case Management Discharge Planning Note    Patient name Neha Molina  Location NW8 875/NW8 875-01 MRN [id number]  : 1932 Date 2024       Current Admission Date: 2024  Current Admission Diagnosis:Dizziness   Patient Active Problem List    Diagnosis Date Noted    COVID-19 02/10/2024    Dizziness 2024    Ambulatory dysfunction 2024    Overactive bladder 2024    Anemia 2024    Sacral fracture, closed (HCC) 2023    Fall 2023    Acute pain due to trauma 2023    Fracture of ramus of right pubis (HCC) 2023    Other specified glaucoma 2023    Accidental fall 2023    Lactose intolerance 10/24/2023    Allergies 10/24/2023    Hypothyroid 10/24/2023    Other hyperlipidemia 10/24/2023    History of CVA (cerebrovascular accident) 10/24/2023    Anxiety 10/24/2023    Urinary frequency 10/24/2023    Rib fracture 10/24/2023    DJD (degenerative joint disease) of knee 2010      LOS (days): 4  Geometric Mean LOS (GMLOS) (days): 2.5  Days to GMLOS:-1.3     OBJECTIVE:  Risk of Unplanned Readmission Score: 11.32         Current admission status: Inpatient   Preferred Pharmacy:   UNKNOWN - FOLLOW UP PRIOR TO DISCHARGE TO E-PRESCRIBE  No address on file      [facility] - [place], [place] - [address]  [address]  [address]  [facility] [address]  Phone: [phone] Fax: [phone]    [facility] [place] - [address]  [address]  [place] [address]  Phone: [phone] Fax: [phone]    Primary Care Provider: Dinesh Johnston MD    Primary Insurance: MEDICARE  Secondary Insurance: [facility]    DISCHARGE DETAILS:    Additional Comments: Per chart review, MRI brain still pending and pt will likely require an additional 24-48 hours inpatient. Plan remains for discharge to [facility] once stable, CM sent message via [employer] to inform them of projected discharge timeline.  CM department to follow.

## 2024-02-12 ENCOUNTER — NURSING HOME VISIT (OUTPATIENT)
Dept: GERIATRICS | Facility: OTHER | Age: 89
End: 2024-02-12
Payer: MEDICARE

## 2024-02-12 VITALS
SYSTOLIC BLOOD PRESSURE: 126 MMHG | HEART RATE: 76 BPM | RESPIRATION RATE: 21 BRPM | DIASTOLIC BLOOD PRESSURE: 62 MMHG | BODY MASS INDEX: 28.57 KG/M2 | OXYGEN SATURATION: 97 % | TEMPERATURE: 98.5 F | WEIGHT: 146.3 LBS

## 2024-02-12 DIAGNOSIS — S32.110D CLOSED NONDISPLACED ZONE I FRACTURE OF SACRUM WITH ROUTINE HEALING, SUBSEQUENT ENCOUNTER: ICD-10-CM

## 2024-02-12 DIAGNOSIS — N32.81 OVERACTIVE BLADDER: ICD-10-CM

## 2024-02-12 DIAGNOSIS — R42 DIZZINESS: ICD-10-CM

## 2024-02-12 DIAGNOSIS — U07.1 COVID-19: Primary | ICD-10-CM

## 2024-02-12 DIAGNOSIS — R26.2 AMBULATORY DYSFUNCTION: ICD-10-CM

## 2024-02-12 DIAGNOSIS — F41.9 ANXIETY: ICD-10-CM

## 2024-02-12 DIAGNOSIS — M17.0 OSTEOARTHRITIS OF BOTH KNEES, UNSPECIFIED OSTEOARTHRITIS TYPE: ICD-10-CM

## 2024-02-12 DIAGNOSIS — Z86.73 HISTORY OF CVA (CEREBROVASCULAR ACCIDENT): ICD-10-CM

## 2024-02-12 DIAGNOSIS — E03.8 OTHER SPECIFIED HYPOTHYROIDISM: ICD-10-CM

## 2024-02-12 PROCEDURE — 99239 HOSP IP/OBS DSCHRG MGMT >30: CPT | Performed by: INTERNAL MEDICINE

## 2024-02-12 PROCEDURE — 97530 THERAPEUTIC ACTIVITIES: CPT

## 2024-02-12 PROCEDURE — 99306 1ST NF CARE HIGH MDM 50: CPT | Performed by: FAMILY MEDICINE

## 2024-02-12 RX ORDER — LORAZEPAM 0.5 MG/1
0.5 TABLET ORAL
Qty: 7 TABLET | Refills: 0 | Status: SHIPPED | OUTPATIENT
Start: 2024-02-12 | End: 2024-02-12

## 2024-02-12 RX ORDER — LORAZEPAM 0.5 MG/1
0.5 TABLET ORAL
Qty: 7 TABLET | Refills: 0 | Status: SHIPPED | OUTPATIENT
Start: 2024-02-12 | End: 2024-02-19

## 2024-02-12 RX ORDER — NIRMATRELVIR AND RITONAVIR 300-100 MG
3 KIT ORAL 2 TIMES DAILY
Start: 2024-02-12 | End: 2024-02-14

## 2024-02-12 RX ADMIN — LEVOTHYROXINE SODIUM 75 MCG: 75 TABLET ORAL at 06:09

## 2024-02-12 RX ADMIN — AMLODIPINE BESYLATE 5 MG: 5 TABLET ORAL at 09:55

## 2024-02-12 RX ADMIN — MECLIZINE HYDROCHLORIDE 25 MG: 25 TABLET ORAL at 14:00

## 2024-02-12 RX ADMIN — SENNOSIDES, DOCUSATE SODIUM 2 TABLET: 8.6; 5 TABLET ORAL at 09:55

## 2024-02-12 RX ADMIN — MECLIZINE HYDROCHLORIDE 25 MG: 25 TABLET ORAL at 06:09

## 2024-02-12 RX ADMIN — ENOXAPARIN SODIUM 40 MG: 40 INJECTION SUBCUTANEOUS at 09:55

## 2024-02-12 RX ADMIN — ASPIRIN 81 MG CHEWABLE TABLET 81 MG: 81 TABLET CHEWABLE at 09:55

## 2024-02-12 RX ADMIN — ACETAMINOPHEN 650 MG: 325 TABLET, FILM COATED ORAL at 14:00

## 2024-02-12 RX ADMIN — TRIAMTERENE AND HYDROCHLOROTHIAZIDE 1 TABLET: 37.5; 25 TABLET ORAL at 09:55

## 2024-02-12 NOTE — PROGRESS NOTES
[facility]  [address]  [place] [address]  [facility]  History and Physical    NAME: Neha Molina  AGE: 89 y.o. SEX: female [phone]    DATE OF ENCOUNTER: 2/12/2024    Pain: none reported  Rehab Potential: fair  Patient Informed of Medical Condition: yes  Patient is Capable of Understanding Their Right: yes  Prognosis: fair  Discharge Plan: back to [facility]   Surrogate Decision Maker: cousinLayne  Advanced Directives: yes  Code status:Full code  PCP: Dinesh Johnston MD    Assessment and Plan     COVID-19  Covid test positive 2/9/2024  No respiratory distress, no oxygen required  Continue Paxlovid and supportive care.   Encourage hydration  Monitor vitals, respiratory status closely    Dizziness  Likely in the setting of BPPV worsening with Covid infection  MRI brain 2/11/2024 negative for acute CVA  Was instructed Epley maneuver while in the hospital  OT/PT    Ambulatory dysfunction  Ambulates with walker at baseline  High risk of recurrent fall due to age, Hx of fall, comorbidities  OT/PT  Fall precautions    Hypothyroid  Stable, TSH was WNL 3.4 (12/21/23)  Continue levothyroxine 75 mcg daily  FU with PCP    DJD (degenerative joint disease) of knee  Continue multimodal pain regimen with acetaminophen prn, Lidocaine patch, diclofenac gel  OT/PT    Sacral fracture, closed (HCC)  S/p fall requiring hospitalization from December 21, 2023 through December 27, 2023  PT/OT, weightbearing as tolerated, and multimodal pain management  F/u with Orthopedics as scheduled    Overactive bladder  Patient is currently on home dose oxybutynin  Consider discontinuing medication, the risks outweigh the benefits in older adult population    Anxiety  Continue home dose of Ativan 0.5 mg daily  Continue psychosocial support    History of CVA (cerebrovascular accident)  Stable  Sequela of chronic right PCA territory infarct noted in MRI brain 2/11/24  Continue  ASA and statin    All medications and routine orders were reviewed and updated as needed.    Plan discussed with: Patient. Coordination of care with nursing, OT/PT, SW.    Chief Complaint     Seen for admission at Nursing Facility    History of Present Illness     89 y.o. female who is seen today, following hospitalization at [facility] from 2/7/2024 to 2/12/2024 for dizziness and fall. Per hospital record, she presented to ED after a mechanical fall with head strike on ASA therapy.  CT cervical spine 2/7 showed no cervical spine fracture or traumatic malalignment.  CT head and MRI brain with no acute abnormalities. She was given IVF, meclizine while in the hospital.  She was discharged to [facility] for rehab.  She is laying in bed, c/o tired and hungry from the transportation from the hospital.  She requests hot vegetable soup for lunch. Denies nausea, vomiting, dizziness, CP, SOB, or palpitations.    HISTORY:  Past Medical History:   Diagnosis Date    CAD (coronary artery disease)      Family History   Problem Relation Age of Onset    Stroke Sister      Social History     Socioeconomic History    Marital status: Single     Spouse name: None    Number of children: None    Years of education: None    Highest education level: None   Occupational History    None   Tobacco Use    Smoking status: Former     Types: Cigarettes     Passive exposure: Past    Smokeless tobacco: Never   Vaping Use    Vaping status: Never Used   Substance and Sexual Activity    Alcohol use: Never    Drug use: Never    Sexual activity: Not Currently   Other Topics Concern    None   Social History Narrative    None     Social Determinants of Health     Financial Resource Strain: Low Risk  (10/9/2023)    Received from [facility]    Overall Financial Resource Strain (CARDIA)     Difficulty of Paying Living Expenses: Not very hard   Food Insecurity: No Food Insecurity (2/9/2024)    Hunger Vital Sign     Worried  About Running Out of Food in the Last Year: Never true     Ran Out of Food in the Last Year: Never true   Transportation Needs: No Transportation Needs (2/9/2024)    PRAPARE - Transportation     Lack of Transportation (Medical): No     Lack of Transportation (Non-Medical): No   Physical Activity: Not on file   Stress: Not on file   Social Connections: Not on file   Intimate Partner Violence: Not At Risk (10/9/2023)    Received from [facility]    Humiliation, Afraid, Rape, and Kick questionnaire     Fear of Current or Ex-Partner: No     Emotionally Abused: No     Physically Abused: No     Sexually Abused: No   Housing Stability: Low Risk  (2/9/2024)    Housing Stability Vital Sign     Unable to Pay for Housing in the Last Year: No     Number of Places Lived in the Last Year: 1     Unstable Housing in the Last Year: No       Allergies:  No Known Allergies    Review of Systems     Review of Systems   Constitutional:  Positive for fatigue.   Cardiovascular:  Negative for chest pain and palpitations.   Neurological:  Negative for dizziness and headaches.   All other systems reviewed and are negative.    Medications and orders     All medications reviewed and updated in [facility] EMR.    Objective     /65   Pulse 78   Temp 97.8 °F (36.6 °C)   Resp 18   Wt 66.2 kg (146 lb)   SpO2 96%   BMI 28.51 kg/m²     Physical Exam  Vitals and nursing note reviewed.   Constitutional:       General: She is not in acute distress.  HENT:      Head: Normocephalic.      Nose: Nose normal.      Mouth/Throat:      Mouth: Mucous membranes are moist.   Eyes:      General:         Right eye: No discharge.         Left eye: No discharge.      Conjunctiva/sclera: Conjunctivae normal.   Cardiovascular:      Rate and Rhythm: Normal rate and regular rhythm.      Heart sounds: No murmur heard.  Pulmonary:      Effort: Pulmonary effort is normal.      Breath sounds: Normal breath sounds.   Abdominal:      General: Bowel  sounds are normal. There is no distension.      Palpations: Abdomen is soft.      Tenderness: There is no abdominal tenderness.   Musculoskeletal:      Cervical back: Neck supple.      Right lower leg: No edema.      Left lower leg: No edema.   Skin:     General: Skin is warm.   Neurological:      Mental Status: She is alert and oriented to person, place, and time.   Psychiatric:         Behavior: Behavior normal.       Pertinent Laboratory/Diagnostic Studies:   The following labs/studies were reviewed please see chart or hospital paperwork for details.    Labs & Imaging:  Lab Results   Component Value Date    WBC 4.34 02/07/2024    HGB 10.7 (L) 02/07/2024    HCT 33.8 (L) 02/07/2024    MCV 98 02/07/2024     02/07/2024     Lab Results   Component Value Date    SODIUM 135 02/10/2024    K 4.2 02/10/2024     02/10/2024    CO2 24 02/10/2024    AGAP 7 02/10/2024    BUN 13 02/10/2024    CREATININE 0.50 (L) 02/10/2024    GLUC 100 02/10/2024    CALCIUM 8.4 02/10/2024    AST 20 02/07/2024    ALT 12 02/07/2024    ALKPHOS 105 (H) 02/07/2024    TP 7.2 02/07/2024    TBILI 0.47 02/07/2024    EGFR 84 02/10/2024     Lab Results   Component Value Date    SKSNNNDQ62 727 02/08/2024     Lab Results   Component Value Date    QPY4ZOIUYYJX 3.432 12/21/2023     Lab Results   Component Value Date    ECKJ79KWVBUY 32.9 12/21/2023      CT head without contrast 2/7/24  encephalomalacia in the posteromedial right temporal and medial right occipital lobes consistent with remote right posterior cerebral artery territory infarct, unchanged from the prior study. Stable remote chronic ischemic and microangiopathic changes without acute intracranial abnormality.    MRI brain wo contrast 2/11/24    1.  No acute infarct.  2.  Sequela of chronic right PCA territory infarct.  3.  Mild microangiopathic change.    I have spent 60 minutes with Patient  today including taking history from patient, review of records, charting, and counseling  regarding diagnosis and management of conditions.    Marian Elmore MD

## 2024-02-12 NOTE — ASSESSMENT & PLAN NOTE
MRI brain is negative for acute stroke.  Dizziness is secondary to BPPV.  Epley maneuver exercises discussed with the patient  Meclizine as needed

## 2024-02-12 NOTE — DISCHARGE SUMMARY
[facility]  Discharge- Neha Molina 2/14/1932, 89 y.o. female MRN: [id number]  Unit/Bed#: NW8 875-01 Encounter: [id number]  Primary Care Provider: Dinesh Johnston MD   Date and time admitted to hospital: 2/7/2024  2:12 PM    * Dizziness  Assessment & Plan  MRI brain is negative for acute stroke.  Dizziness is secondary to BPPV.  Epley maneuver exercises discussed with the patient  Meclizine as needed      COVID-19  Assessment & Plan  Supportive care  On room air  Discharged with Paxlovid.      Ambulatory dysfunction  Assessment & Plan  Fall precautions   PT/OT > rehab    Other hyperlipidemia  Assessment & Plan  Hold lovastatin while on Paxlovid.  Resume after last dose.    Hypothyroid  Assessment & Plan  Levothyroxine            Medical Problems       Resolved Problems  Date Reviewed: 2/12/2024            Resolved    Vitamin B 12 deficiency 2/8/2024     Resolved by  Andrew Stratton MD    Hyponatremia 2/8/2024     Resolved by  Andrew Stratton MD          Admission Date:   Admission Orders (From admission, onward)       Ordered        02/07/24 1857  Inpatient Admission  Once            02/07/24 1832  Place in Observation  Once                            Admitting Diagnosis: Vertigo [R42]  Concussion [S06.0XAA]  Closed head injury, initial encounter [S09.90XA]  Injury of head, initial encounter [S09.90XA]  Fall, initial encounter [W19.XXXA]        Procedures Performed:   Orders Placed This Encounter   Procedures    ED ECG Documentation Only       Summary of Hospital Course: Patient presented with mechanical fall and dizziness.  MRI brain was negative for acute stroke.  Suspect dizziness secondary to BPPV.  Will be discharged to rehab    Complicationsnone    Condition at Discharge: good         Discharge instructions/Information to patient and family:   See after visit summary for information provided to patient and family.      Provisions for Follow-Up Care:  See after visit  summary for information related to follow-up care and any pertinent home health orders.      PCP: Dinesh Johnston MD    Disposition:  Rehab    Planned Readmission: No    Discharge Statement   I spent 32 minutes discharging the patient. This time was spent on the day of discharge. I had direct contact with the patient on the day of discharge. Additional documentation is required if more than 30 minutes were spent on discharge.     Discharge Medications:  See after visit summary for reconciled discharge medications provided to patient and family.

## 2024-02-12 NOTE — PLAN OF CARE
Problem: Prexisting or High Potential for Compromised Skin Integrity  Goal: Skin integrity is maintained or improved  Description: INTERVENTIONS:  - Identify patients at risk for skin breakdown  - Assess and monitor skin integrity  - Assess and monitor nutrition and hydration status  - Monitor labs   - Assess for incontinence   - Turn and reposition patient  - Assist with mobility/ambulation  - Relieve pressure over bony prominences  - Avoid friction and shearing  - Provide appropriate hygiene as needed including keeping skin clean and dry  - Evaluate need for skin moisturizer/barrier cream  - Collaborate with interdisciplinary team   - Patient/family teaching  - Consider wound care consult   Outcome: Progressing     Problem: PAIN - ADULT  Goal: Verbalizes/displays adequate comfort level or baseline comfort level  Description: Interventions:  - Encourage patient to monitor pain and request assistance  - Assess pain using appropriate pain scale  - Administer analgesics based on type and severity of pain and evaluate response  - Implement non-pharmacological measures as appropriate and evaluate response  - Consider cultural and social influences on pain and pain management  - Notify physician/advanced practitioner if interventions unsuccessful or patient reports new pain  Outcome: Progressing     Problem: SAFETY ADULT  Goal: Maintain or return to baseline ADL function  Description: INTERVENTIONS:  -  Assess patient's ability to carry out ADLs; assess patient's baseline for ADL function and identify physical deficits which impact ability to perform ADLs (bathing, care of mouth/teeth, toileting, grooming, dressing, etc.)  - Assess/evaluate cause of self-care deficits   - Assess range of motion  - Assess patient's mobility; develop plan if impaired  - Assess patient's need for assistive devices and provide as appropriate  - Encourage maximum independence but intervene and supervise when necessary  - Involve family in  performance of ADLs  - Assess for home care needs following discharge   - Consider OT consult to assist with ADL evaluation and planning for discharge  - Provide patient education as appropriate  Outcome: Progressing     Problem: SAFETY ADULT  Goal: Patient will remain free of falls  Description: INTERVENTIONS:  - Educate patient/family on patient safety including physical limitations  - Instruct patient to call for assistance with activity   - Consult OT/PT to assist with strengthening/mobility   - Keep Call bell within reach  - Keep bed low and locked with side rails adjusted as appropriate  - Keep care items and personal belongings within reach  - Initiate and maintain comfort rounds  - Make Fall Risk Sign visible to staff  - Offer Toileting every 4 Hours, in advance of need  - Initiate/Maintain bed alarm  - Apply yellow socks and bracelet for high fall risk patients  - Consider moving patient to room near nurses station  Outcome: Progressing

## 2024-02-12 NOTE — CASE MANAGEMENT
Case Management Discharge Planning Note    Patient name Neha Molina  Location NW8 875/NW8 875-01 MRN [id number]  : 1932 Date 2024       Current Admission Date: 2024  Current Admission Diagnosis:Dizziness   Patient Active Problem List    Diagnosis Date Noted    COVID-19 02/10/2024    Dizziness 2024    Ambulatory dysfunction 2024    Overactive bladder 2024    Anemia 2024    Sacral fracture, closed (HCC) 2023    Fall 2023    Acute pain due to trauma 2023    Fracture of ramus of right pubis (HCC) 2023    Other specified glaucoma 2023    Accidental fall 2023    Lactose intolerance 10/24/2023    Allergies 10/24/2023    Hypothyroid 10/24/2023    Other hyperlipidemia 10/24/2023    History of CVA (cerebrovascular accident) 10/24/2023    Anxiety 10/24/2023    Urinary frequency 10/24/2023    Rib fracture 10/24/2023    DJD (degenerative joint disease) of knee 2010      LOS (days): 5  Geometric Mean LOS (GMLOS) (days): 2.5  Days to GMLOS:-2.3     OBJECTIVE:  Risk of Unplanned Readmission Score: 11.5         Current admission status: Inpatient   Preferred Pharmacy:   UNKNOWN - FOLLOW UP PRIOR TO DISCHARGE TO E-PRESCRIBE  No address on file      [facility] - [place], [place] - [address]  [address]  [address]  [facility] [address]  Phone: [phone] Fax: [phone]    [facility] [place] [place] - [address]  [address]  [place] [address]  Phone: [phone] Fax: [phone]    Primary Care Provider: Dinesh Johnston MD    Primary Insurance: MEDICARE  Secondary Insurance: AARP    DISCHARGE DETAILS:    Discharge planning discussed with:: pt & pt's emergency contact Layne  Freedom of Choice: Yes    Contacts  Patient Contacts: Layne Vega (Relative) [phone]  Relationship to Patient:: Family  Contact Method: Phone  Phone Number:  [phone]  Reason/Outcome: Discharge Planning    Other Referral/Resources/Interventions Provided:  Referral Comments: Return call received from [facility] admission's, pt accepted for admission today. Current morning vitals reviewed with [facility] as requested. [facility] requesting hard scripts for any controlled substances ordered. [facility]IM informed of same. SLIM requesting if facility can accomodate Paxlovid. CM spoke with [facility], AVS faxed for review. [facility] able to accomodate Paxlovid at facility.Transport requested and confirmed for 2pm. Care team, [facility], and Pt dottiesin Layne informed of transport time. No further CM needs indentified at this time.    Treatment Team Recommendation: Short Term Rehab  Discharge Destination Plan:: Short Term Rehab ([facility])  Transport at Discharge : [facility]  Dispatcher Contacted: Yes     Transported by (Company and Unit #): [facility]    IMM Given (Date):: 02/12/24  IMM Given to:: Family

## 2024-02-12 NOTE — PLAN OF CARE
Problem: Prexisting or High Potential for Compromised Skin Integrity  Goal: Skin integrity is maintained or improved  Description: INTERVENTIONS:  - Identify patients at risk for skin breakdown  - Assess and monitor skin integrity  - Assess and monitor nutrition and hydration status  - Monitor labs   - Assess for incontinence   - Turn and reposition patient  - Assist with mobility/ambulation  - Relieve pressure over bony prominences  - Avoid friction and shearing  - Provide appropriate hygiene as needed including keeping skin clean and dry  - Evaluate need for skin moisturizer/barrier cream  - Collaborate with interdisciplinary team   - Patient/family teaching  - Consider wound care consult   Outcome: Adequate for Discharge

## 2024-02-12 NOTE — CASE MANAGEMENT
Case Management Discharge Planning Note    Patient name Neha Molina  Location NW8 875/NW8 875-01 MRN [id number]  : 1932 Date 2024       Current Admission Date: 2024  Current Admission Diagnosis:Dizziness   Patient Active Problem List    Diagnosis Date Noted    COVID-19 02/10/2024    Dizziness 2024    Ambulatory dysfunction 2024    Overactive bladder 2024    Anemia 2024    Sacral fracture, closed (HCC) 2023    Fall 2023    Acute pain due to trauma 2023    Fracture of ramus of right pubis (HCC) 2023    Other specified glaucoma 2023    Accidental fall 2023    Lactose intolerance 10/24/2023    Allergies 10/24/2023    Hypothyroid 10/24/2023    Other hyperlipidemia 10/24/2023    History of CVA (cerebrovascular accident) 10/24/2023    Anxiety 10/24/2023    Urinary frequency 10/24/2023    Rib fracture 10/24/2023    DJD (degenerative joint disease) of knee 2010      LOS (days): 5  Geometric Mean LOS (GMLOS) (days): 2.5  Days to GMLOS:-2.1     OBJECTIVE:  Risk of Unplanned Readmission Score: 11.47         Current admission status: Inpatient   Preferred Pharmacy:   UNKNOWN - FOLLOW UP PRIOR TO DISCHARGE TO E-PRESCRIBE  No address on file      [facility] - [place] - [address]  [address]  [address]  [facility] [address]  Phone: [phone] Fax: [phone]    [facility] [place] - [address]  [address]  [place] [address]  Phone: [phone] Fax: [phone]    Primary Care Provider: Dinesh Johnston MD    Primary Insurance: MEDICARE  Secondary Insurance: AARP    DISCHARGE DETAILS:    Other Referral/Resources/Interventions Provided:  Interventions: Short Term Rehab  Referral Comments: Per communication w/ [facility], pt cleared for d/c to [facility] today. Message and TC call placed P: [phone] to  Admissions director Keena mejia  of medical readiness and to inquire if pt can admit today. Currently awaiting a response at this time. CM will follow.    Treatment Team Recommendation: Short Term Rehab  Discharge Destination Plan:: Short Term Rehab  Transport at Discharge : [facility]

## 2024-02-12 NOTE — PHYSICAL THERAPY NOTE
PT Treatment Note    NAME:  Neha Molina  DATE: 02/12/24    AGE:   89 y.o.  Mrn:   [id number]  ADMIT DX:  Vertigo [R42]  Concussion [S06.0XAA]  Closed head injury, initial encounter [S09.90XA]  Injury of head, initial encounter [S09.90XA]  Fall, initial encounter [W19.XXXA]  Performed at least 2 patient identifiers during session: Name and ID bracelet       02/12/24 0822   PT Last Visit   PT Visit Date 02/12/24   Note Type   Note Type Treatment   Pain Assessment   Pain Assessment Tool 0-10   Pain Score No Pain   Restrictions/Precautions   Weight Bearing Precautions Per Order No   Other Precautions Contact/isolation;Airborne/isolation;Cognitive;Bed Alarm   General   Chart Reviewed Yes   Family/Caregiver Present No   Cognition   Overall Cognitive Status Impaired   Arousal/Participation Arousable   Attention Attends with cues to redirect   Orientation Level Oriented to person;Disoriented to place;Disoriented to time;Disoriented to situation   Memory Decreased recall of recent events   Following Commands Follows one step commands inconsistently   Bed Mobility   Rolling R 2  Maximal assistance   Additional items Assist x 1;Bedrails;Increased time required;Verbal cues   Rolling L 2  Maximal assistance   Additional items Assist x 1;Bedrails;Increased time required;Verbal cues   Supine to Sit 2  Maximal assistance   Additional items Assist x 1;Verbal cues;Increased time required;LE management   Sit to Supine 2  Maximal assistance   Additional items Assist x 1;Verbal cues;Increased time required;LE management   Additional Comments pt required Max encourgement to participate in session; attempted to sit EOB x 3 to eat breakfast however never acheived fully sitting position; required extra motivation to perform mobility   Balance   Static Sitting Poor -   Endurance Deficit   Endurance Deficit Yes   Endurance Deficit Description fatigue   Activity Tolerance   Activity Tolerance Patient limited by fatigue  (and cognition)    Assessment   Prognosis Fair   Assessment Pt seen for PT treatment session this date with interventions consisting of therapeutic activity to improve transfers and increase activity tolerance with functional mobility to decrease fall risk. Pt agreeable to PT treatment session upon arrival, pt found supine in bed, responsive. Since previous session, pt has made no progress as evidenced by  inabiltiy to sit EOB   Barriers during this session include confusion and fatigue.  Pt continues to be functioning below baseline level, and remains limited 2* factors listed above and including impaired activity tolerance, impaired cognition, decreased endurance, and decreased mobility.  Pt prognosis for achieving goals is fair, pending pt progress with hospitalization/medical status improvements, and indicated by static poor cognition and continued required assistance. PT will continue to see pt during current hospitalization in order to address the deficits listed above and provide interventions consistent w/ POC in effort to achieve goals. Current goals and POC remain appropriate, pt continues to have rehab potential  Upon conclusion pt supine in bed. The patient's -[facility] Basic Mobility Inpatient Short Form Raw Score is 7.  Based on patient presentations and impairments, pt would most appropriately benefit from Level 2 resource intensity upon discharge.  Please also refer to the recommendation of the Physical Therapist for safe discharge planning. RN verbalized pt appropriate for PT session.   Goals   Patient Goals to rest   STG Expiration Date 02/22/24   PT Treatment Day 1   Plan   Treatment/Interventions Bed mobility   Progress Slow progress, cognitive deficits   PT Frequency 2-3x/wk   Discharge Recommendation   Rehab Resource Intensity Level, PT II (Moderate Resource Intensity)   AM-PAC Basic Mobility Inpatient   Turning in Flat Bed Without Bedrails 2   Lying on Back to Sitting on Edge of Flat Bed Without Bedrails 1    Moving Bed to Chair 1   Standing Up From Chair Using Arms 1   Walk in Room 1   Climb 3-5 Stairs With Railing 1   Basic Mobility Inpatient Raw Score 7   Turning Head Towards Sound 4   Follow Simple Instructions 3   Low Function Basic Mobility Raw Score  14   Low Function Basic Mobility Standardized Score  22.01   Highest Level Of Mobility   -HLM Goal 2: Bed activities/Dependent transfer   -HLM Achieved 2: Bed activities/Dependent transfer         Time In: 0759  Time Out: 0822  Total Treatment Minutes: 23    Myla Murray, PT

## 2024-02-12 NOTE — PROGRESS NOTES
Script for ativan was printed prior to discharge but wasn't sent with patient. Called [facility] pharmacy in [place] and talked to [facility] and verbal order is given for Lorazepam 0.5 mg daily at bedtime.

## 2024-02-12 NOTE — LETTER
February 13, 2024     Flakita Sharan    Patient: Neha Molina   YOB: 1932   Date of Visit: 2/12/2024     Below is my H&P note for this admission.         Sincerely,        Marian Elmore MD        CC: No Recipients    Marian Elmore MD  2/13/2024  6:22 PM  Sign when Signing Visit  [facility]  [address] [place] [address] [address]  [address]  History and Physical    NAME: Neha Molina  AGE: 89 y.o. SEX: female [id number]    DATE OF ENCOUNTER: 2/12/2024    Pain: none reported  Rehab Potential: fair  Patient Informed of Medical Condition: yes  Patient is Capable of Understanding Their Right: yes  Prognosis: fair  Discharge Plan: back to [facility]   Surrogate Decision Maker: Layne martin  Advanced Directives: yes  Code status:Full code  PCP: Dinesh Johnston MD    Assessment and Plan     COVID-19  Covid test positive 2/9/2024  No respiratory distress, no oxygen required  Continue Paxlovid and supportive care.   Encourage hydration  Monitor vitals, respiratory status closely    Dizziness  Likely in the setting of BPPV worsening with Covid infection  MRI brain 2/11/2024 negative for acute CVA  Was instructed Epley maneuver while in the hospital  OT/PT    Ambulatory dysfunction  Ambulates with walker at baseline  High risk of recurrent fall due to age, Hx of fall, comorbidities  OT/PT  Fall precautions    Hypothyroid  Stable, TSH was WNL 3.4 (12/21/23)  Continue levothyroxine 75 mcg daily  FU with PCP    DJD (degenerative joint disease) of knee  Continue multimodal pain regimen with acetaminophen prn, Lidocaine patch, diclofenac gel  OT/PT    Sacral fracture, closed (HCC)  S/p fall requiring hospitalization from December 21, 2023 through December 27, 2023  PT/OT, weightbearing as tolerated, and multimodal pain management  F/u with Orthopedics as scheduled    Overactive bladder  Patient is currently on home dose oxybutynin  Consider  discontinuing medication, the risks outweigh the benefits in older adult population    Anxiety  Continue home dose of Ativan 0.5 mg daily  Continue psychosocial support    History of CVA (cerebrovascular accident)  Stable  Sequela of chronic right PCA territory infarct noted in MRI brain 2/11/24  Continue ASA and statin    All medications and routine orders were reviewed and updated as needed.    Plan discussed with: Patient. Coordination of care with nursing, OT/PT, SW.    Chief Complaint     Seen for admission at Nursing Facility    History of Present Illness     89 y.o. female who is seen today, following hospitalization at [facility] from 2/7/2024 to 2/12/2024 for dizziness and fall. Per hospital record, she presented to ED after a mechanical fall with head strike on ASA therapy.  CT cervical spine 2/7 showed no cervical spine fracture or traumatic malalignment.  CT head and MRI brain with no acute abnormalities. She was given IVF, meclizine while in the hospital.  She was discharged to [facility] for rehab.  She is laying in bed, c/o tired and hungry from the transportation from the hospital.  She requests hot vegetable soup for lunch. Denies nausea, vomiting, dizziness, CP, SOB, or palpitations.    HISTORY:  Past Medical History:   Diagnosis Date   • CAD (coronary artery disease)      Family History   Problem Relation Age of Onset   • Stroke Sister      Social History     Socioeconomic History   • Marital status: Single     Spouse name: None   • Number of children: None   • Years of education: None   • Highest education level: None   Occupational History   • None   Tobacco Use   • Smoking status: Former     Types: Cigarettes     Passive exposure: Past   • Smokeless tobacco: Never   Vaping Use   • Vaping status: Never Used   Substance and Sexual Activity   • Alcohol use: Never   • Drug use: Never   • Sexual activity: Not Currently   Other Topics Concern   • None   Social History Narrative   •  None     Social Determinants of Health     Financial Resource Strain: Low Risk  (10/9/2023)    Received from [facility]    Overall Financial Resource Strain (CARDIA)    • Difficulty of Paying Living Expenses: Not very hard   Food Insecurity: No Food Insecurity (2/9/2024)    Hunger Vital Sign    • Worried About Running Out of Food in the Last Year: Never true    • Ran Out of Food in the Last Year: Never true   Transportation Needs: No Transportation Needs (2/9/2024)    PRAPARE - Transportation    • Lack of Transportation (Medical): No    • Lack of Transportation (Non-Medical): No   Physical Activity: Not on file   Stress: Not on file   Social Connections: Not on file   Intimate Partner Violence: Not At Risk (10/9/2023)    Received from [facility]    Humiliation, Afraid, Rape, and Kick questionnaire    • Fear of Current or Ex-Partner: No    • Emotionally Abused: No    • Physically Abused: No    • Sexually Abused: No   Housing Stability: Low Risk  (2/9/2024)    Housing Stability Vital Sign    • Unable to Pay for Housing in the Last Year: No    • Number of Places Lived in the Last Year: 1    • Unstable Housing in the Last Year: No       Allergies:  No Known Allergies    Review of Systems     Review of Systems   Constitutional:  Positive for fatigue.   Cardiovascular:  Negative for chest pain and palpitations.   Neurological:  Negative for dizziness and headaches.   All other systems reviewed and are negative.    Medications and orders     All medications reviewed and updated in shelter EMR.    Objective     /65   Pulse 78   Temp 97.8 °F (36.6 °C)   Resp 18   Wt 66.2 kg (146 lb)   SpO2 96%   BMI 28.51 kg/m²     Physical Exam  Vitals and nursing note reviewed.   Constitutional:       General: She is not in acute distress.  HENT:      Head: Normocephalic.      Nose: Nose normal.      Mouth/Throat:      Mouth: Mucous membranes are moist.   Eyes:      General:          Right eye: No discharge.         Left eye: No discharge.      Conjunctiva/sclera: Conjunctivae normal.   Cardiovascular:      Rate and Rhythm: Normal rate and regular rhythm.      Heart sounds: No murmur heard.  Pulmonary:      Effort: Pulmonary effort is normal.      Breath sounds: Normal breath sounds.   Abdominal:      General: Bowel sounds are normal. There is no distension.      Palpations: Abdomen is soft.      Tenderness: There is no abdominal tenderness.   Musculoskeletal:      Cervical back: Neck supple.      Right lower leg: No edema.      Left lower leg: No edema.   Skin:     General: Skin is warm.   Neurological:      Mental Status: She is alert and oriented to person, place, and time.   Psychiatric:         Behavior: Behavior normal.       Pertinent Laboratory/Diagnostic Studies:   The following labs/studies were reviewed please see chart or hospital paperwork for details.    Labs & Imaging:  Lab Results   Component Value Date    WBC 4.34 02/07/2024    HGB 10.7 (L) 02/07/2024    HCT 33.8 (L) 02/07/2024    MCV 98 02/07/2024     02/07/2024     Lab Results   Component Value Date    SODIUM 135 02/10/2024    K 4.2 02/10/2024     02/10/2024    CO2 24 02/10/2024    AGAP 7 02/10/2024    BUN 13 02/10/2024    CREATININE 0.50 (L) 02/10/2024    GLUC 100 02/10/2024    CALCIUM 8.4 02/10/2024    AST 20 02/07/2024    ALT 12 02/07/2024    ALKPHOS 105 (H) 02/07/2024    TP 7.2 02/07/2024    TBILI 0.47 02/07/2024    EGFR 84 02/10/2024     Lab Results   Component Value Date    RHXYFHXZ62 727 02/08/2024     Lab Results   Component Value Date    VGG6HFPPNBUP 3.432 12/21/2023     Lab Results   Component Value Date    UBJG49FEIOIO 32.9 12/21/2023      CT head without contrast 2/7/24  encephalomalacia in the posteromedial right temporal and medial right occipital lobes consistent with remote right posterior cerebral artery territory infarct, unchanged from the prior study. Stable remote chronic ischemic and  microangiopathic changes without acute intracranial abnormality.    MRI brain wo contrast 2/11/24    1.  No acute infarct.  2.  Sequela of chronic right PCA territory infarct.  3.  Mild microangiopathic change.    I have spent 60 minutes with Patient  today including taking history from patient, review of records, charting, and counseling regarding diagnosis and management of conditions.    Marian Elmore MD

## 2024-02-13 VITALS
BODY MASS INDEX: 28.51 KG/M2 | WEIGHT: 146 LBS | SYSTOLIC BLOOD PRESSURE: 121 MMHG | DIASTOLIC BLOOD PRESSURE: 65 MMHG | HEART RATE: 78 BPM | RESPIRATION RATE: 18 BRPM | OXYGEN SATURATION: 96 % | TEMPERATURE: 97.8 F

## 2024-02-13 NOTE — ASSESSMENT & PLAN NOTE
Likely in the setting of BPPV worsening with Covid infection  MRI brain 2/11/2024 negative for acute CVA  Was instructed Epley maneuver while in the hospital  OT/PT

## 2024-02-13 NOTE — ASSESSMENT & PLAN NOTE
Patient is currently on home dose oxybutynin  Consider discontinuing medication, the risks outweigh the benefits in older adult population

## 2024-02-13 NOTE — ASSESSMENT & PLAN NOTE
Covid test positive 2/9/2024  No respiratory distress, no oxygen required  Continue Paxlovid and supportive care.   Encourage hydration  Monitor vitals, respiratory status closely

## 2024-02-13 NOTE — ASSESSMENT & PLAN NOTE
Ambulates with walker at baseline  High risk of recurrent fall due to age, Hx of fall, comorbidities  OT/PT  Fall precautions

## 2024-02-13 NOTE — ASSESSMENT & PLAN NOTE
Stable  Sequela of chronic right PCA territory infarct noted in MRI brain 2/11/24  Continue ASA and statin

## 2024-02-13 NOTE — ASSESSMENT & PLAN NOTE
S/p fall requiring hospitalization from December 21, 2023 through December 27, 2023  PT/OT, weightbearing as tolerated, and multimodal pain management  F/u with Orthopedics as scheduled

## 2024-02-14 ENCOUNTER — NURSING HOME VISIT (OUTPATIENT)
Dept: GERIATRICS | Facility: OTHER | Age: 89
End: 2024-02-14
Payer: MEDICARE

## 2024-02-14 DIAGNOSIS — R42 DIZZINESS: ICD-10-CM

## 2024-02-14 DIAGNOSIS — S32.110D CLOSED NONDISPLACED ZONE I FRACTURE OF SACRUM WITH ROUTINE HEALING, SUBSEQUENT ENCOUNTER: ICD-10-CM

## 2024-02-14 DIAGNOSIS — F41.9 ANXIETY: ICD-10-CM

## 2024-02-14 DIAGNOSIS — U07.1 COVID-19: Primary | ICD-10-CM

## 2024-02-14 DIAGNOSIS — R26.2 AMBULATORY DYSFUNCTION: ICD-10-CM

## 2024-02-14 DIAGNOSIS — Z86.73 HISTORY OF CVA (CEREBROVASCULAR ACCIDENT): ICD-10-CM

## 2024-02-14 PROCEDURE — 99309 SBSQ NF CARE MODERATE MDM 30: CPT | Performed by: NURSE PRACTITIONER

## 2024-02-14 NOTE — ASSESSMENT & PLAN NOTE
Mood appears stable at time of exam  Continue home dose Ativan 0.5 mg daily  Continue psychosocial support

## 2024-02-14 NOTE — ASSESSMENT & PLAN NOTE
Patient tested positive for COVID-19 on 2/9/2024 inpatient, Paxlovid ordered upon discharge from the hospital but was never started at SNF.  [facility] pharmacy did not send Paxlovid with notification via fax today (due to concern for interaction with lovastatin)-lovastatin was to start on 2/18/2024  Since patient does not have any respiratory symptoms and vital signs are stable, will discontinue Paxlovid which was discussed with patient  Continue isolation precautions as per facility protocol  Nursing to continue monitoring respiratory status closely

## 2024-02-14 NOTE — ASSESSMENT & PLAN NOTE
In setting of acute on chronic medical conditions  Continue supportive care at SNF  Continue PT/OT  Continue safety/fall precautions

## 2024-02-14 NOTE — ASSESSMENT & PLAN NOTE
S/p fall requiring hospitalization in December 2023  Patient currently takes Tylenol as needed for pain

## 2024-02-14 NOTE — ASSESSMENT & PLAN NOTE
Likely in setting of BPPV  MRI brain 2/11/2024 negative for acute CVA  Continue meclizine 25 mg daily as needed  Epley maneuver discussed with patient inpatient per record review  Continue PT/OT

## 2024-02-14 NOTE — PROGRESS NOTES
Facility: [facility]  POS: 31 (STR)  Progress Note    Chief Complaint/Reason for visit: STR follow up visit  Code status: Full code  History of Present Illness: 89-year-old female seen and examined for STR follow-up of acute and chronic medical conditions.  Received patient resting in bed eating lunch and appeared in no distress.  Patient tested positive for COVID-19 on 2/9/2024 inpatient and was discharged with Paxlovid order.  However, Paxlovid was never started at [facility] as pharmacy did not send for concern of drug interaction between Paxlovid and statin (patient already had a standing order for statin to restart on 2/18/2024).  Patient's respiratory status is stable.  See A/P for additional information.  Past Medical History: unchanged from history and physical  Past Medical History:   Diagnosis Date    CAD (coronary artery disease)      Family History: Unchanged from history and physical  Social History: Unchanged from history and physical  Review of systems: As per review of medical illness, all other systems reviewed and negative.  Medications: All medication and routine orders were reviewed and updated  Allergies: NKDA  Consults reviewed:PT, OT, and Other  Labs/Diagnostics (reviewed by this provider): Copy in Chart  Imaging Reviewed: None today  Physical Exam  Vital signs log reviewed at facility EMR; no acute findings    Physical Exam  Vitals and nursing note reviewed.   Constitutional:       General: She is not in acute distress.     Appearance: She is not ill-appearing, toxic-appearing or diaphoretic.   HENT:      Head: Normocephalic.      Nose: No congestion.      Mouth/Throat:      Mouth: Mucous membranes are moist.      Pharynx: No oropharyngeal exudate.   Eyes:      Extraocular Movements: Extraocular movements intact.      Conjunctiva/sclera: Conjunctivae normal.   Cardiovascular:      Rate and Rhythm: Normal rate.   Pulmonary:      Effort: Pulmonary effort is normal. No respiratory distress.    Abdominal:      General: Bowel sounds are normal.      Palpations: Abdomen is soft.   Musculoskeletal:      Cervical back: Neck supple. No rigidity.      Right lower leg: No edema.      Left lower leg: No edema.      Comments: Moves all 4 extremities.   Skin:     General: Skin is warm and dry.      Capillary Refill: Capillary refill takes less than 2 seconds.   Neurological:      Mental Status: She is alert. Mental status is at baseline.      Motor: Weakness present.      Gait: Gait abnormal.   Psychiatric:         Mood and Affect: Mood normal.         Behavior: Behavior normal.         Thought Content: Thought content normal.       Assessment/Plan:  89-year-old female with:    COVID-19  Patient tested positive for COVID-19 on 2/9/2024 inpatient, Paxlovid ordered upon discharge from the hospital but was never started at SNF.  [facility] pharmacy did not send Paxlovid with notification via fax today (due to concern for interaction with lovastatin)-lovastatin was to start on 2/18/2024  Since patient does not have any respiratory symptoms and vital signs are stable, will discontinue Paxlovid which was discussed with patient  Continue isolation precautions as per facility protocol  Nursing to continue monitoring respiratory status closely    Dizziness  Likely in setting of BPPV  MRI brain 2/11/2024 negative for acute CVA  Continue meclizine 25 mg daily as needed  Epley maneuver discussed with patient inpatient per record review  Continue PT/OT    Sacral fracture, closed (HCC)  S/p fall requiring hospitalization in December 2023  Patient currently takes Tylenol as needed for pain    History of CVA (cerebrovascular accident)  History of chronic right PCA territory infarct as per MRI 2/11/2024  Continue aspirin and statin    Anxiety  Mood appears stable at time of exam  Continue home dose Ativan 0.5 mg daily  Continue psychosocial support    Ambulatory dysfunction  In setting of acute on chronic medical  conditions  Continue supportive care at [facility]  Continue PT/OT  Continue safety/fall precautions    This note was completed in part utilizing [employer] direct voice recognition software.  Grammatical errors, random word insertion, spelling mistakes, and incomplete sentences may be an occasional consequence of the system secondary to software limitations, ambient noise and hardware issues.  At the time of dictation, efforts were made to edit, clarify and/or correct errors.  Please read the chart carefully and recognize, using context, where substitutions have occurred.  If you have any questions or concerns about the context, text or information contained within the body of this dictation, please contact myself, the provider, for further clarification.    DIGNA Patton  2/14/20241:40 PM

## 2024-02-19 ENCOUNTER — NURSING HOME VISIT (OUTPATIENT)
Dept: GERIATRICS | Facility: OTHER | Age: 89
End: 2024-02-19
Payer: MEDICARE

## 2024-02-19 ENCOUNTER — TELEPHONE (OUTPATIENT)
Dept: OTHER | Facility: OTHER | Age: 89
End: 2024-02-19

## 2024-02-19 DIAGNOSIS — S32.110D CLOSED NONDISPLACED ZONE I FRACTURE OF SACRUM WITH ROUTINE HEALING, SUBSEQUENT ENCOUNTER: ICD-10-CM

## 2024-02-19 DIAGNOSIS — Z86.73 HISTORY OF CVA (CEREBROVASCULAR ACCIDENT): ICD-10-CM

## 2024-02-19 DIAGNOSIS — R26.2 AMBULATORY DYSFUNCTION: ICD-10-CM

## 2024-02-19 DIAGNOSIS — E03.8 OTHER SPECIFIED HYPOTHYROIDISM: ICD-10-CM

## 2024-02-19 DIAGNOSIS — R42 DIZZINESS: ICD-10-CM

## 2024-02-19 DIAGNOSIS — U07.1 COVID-19: Primary | ICD-10-CM

## 2024-02-19 PROCEDURE — 99309 SBSQ NF CARE MODERATE MDM 30: CPT | Performed by: NURSE PRACTITIONER

## 2024-02-19 NOTE — PROGRESS NOTES
Facility: [facility]  POS: 31  Progress Note    Chief Complaint/Reason for visit: STR follow-up visit  Code status: Full code  History of Present Illness: 89-year-old female seen and examined for STR follow-up of acute and chronic medical conditions.  At time of examination, patient found resting in bed, and appeared in no distress.  Patient tested positive for COVID-19 on 2/9/2024 while hospitalized; respiratory status has been stable.  Patient does not appear toxic and vital signs are stable.  Denies shortness of breath, chest pain, headache, dizziness, abdominal pain, nausea, vomiting, diarrhea, constipation.  Recent CBC and BMP stable on 2/15/2024.  No complaints of vertigo at time of exam.  Patient participated in therapy, ambulating in her room 44 feet x 3 with roller walker and CGA; with slight dizziness during ambulation at first but then subsided as per PT notes.  See A/P for additional information.  Past Medical History: unchanged from history and physical  Past Medical History:   Diagnosis Date    CAD (coronary artery disease)      Family History: Unchanged from history and physical  Social History: Unchanged from history and physical  Review of systems: As per review of medical illness, all other systems reviewed and negative.  Medications: All medication and routine orders were reviewed and updated  Allergies: NKDA  Consults reviewed:PT, OT, and Other  Labs/Diagnostics (reviewed by this provider): Copy in Chart paper chart  Imaging Reviewed:  Physical Exam  Temp: 97.5        BP: 143/66        pulse: 72      resp: 16        Orientation:Person, Place, and Day     Physical Exam  Vitals and nursing note reviewed.   Constitutional:       General: She is not in acute distress.     Appearance: She is not ill-appearing, toxic-appearing or diaphoretic.   HENT:      Head: Normocephalic.      Nose: No congestion.      Mouth/Throat:      Mouth: Mucous membranes are moist.      Pharynx: No oropharyngeal  exudate.   Eyes:      Extraocular Movements: Extraocular movements intact.      Conjunctiva/sclera: Conjunctivae normal.   Cardiovascular:      Rate and Rhythm: Normal rate.   Pulmonary:      Effort: Pulmonary effort is normal. No respiratory distress.   Abdominal:      General: Bowel sounds are normal. There is no distension.      Palpations: Abdomen is soft.      Tenderness: There is no abdominal tenderness. There is no guarding.   Musculoskeletal:      Right lower leg: No edema.      Left lower leg: No edema.      Comments: Moves all 4 extremities.   Skin:     General: Skin is warm and dry.      Capillary Refill: Capillary refill takes less than 2 seconds.   Neurological:      Mental Status: She is alert and oriented to person, place, and time.      Motor: Weakness present.      Gait: Gait abnormal.   Psychiatric:         Mood and Affect: Mood normal.         Behavior: Behavior normal.         Thought Content: Thought content normal.       Assessment/Plan:.  89-year-old female with:    COVID-19  Patient tested positive for COVID-19 on 2/9/2024  Continue isolation precautions as per facility protocol  Respiratory status has been stable at SNF  No GI complaints/loss of taste or smell reported    Dizziness  Likely in setting of BPPV  No complaints of nausea or vomiting  Continue meclizine 25 mg daily as needed  Continue PT/OT    Sacral fracture, closed (HCC)  S/p fall requiring hospitalization in December 2023  No complaints of pain at time of exam   Continue Tylenol as needed for pain    Hypothyroid  Most recent TSH WNL 12/2023   Continue levothyroxine 75 mcg daily    History of CVA (cerebrovascular accident)  History of chronic right PCA territory infarct as per MRI 2/11/2024  Currently stable  Continue aspirin and statin    Ambulatory dysfunction  In setting of acute and chronic medical conditions  Continue supportive care at SNF   Continue PT/OT  Continue safety/fall precautions    This note was completed in  part utilizing [employer] direct voice recognition software.  Grammatical errors, random word insertion, spelling mistakes, and incomplete sentences may be an occasional consequence of the system secondary to software limitations, ambient noise and hardware issues.  At the time of dictation, efforts were made to edit, clarify and/or correct errors.  Please read the chart carefully and recognize, using context, where substitutions have occurred.  If you have any questions or concerns about the context, text or information contained within the body of this dictation, please contact myself, the provider, for further clarification.    DIGNA Patton  2/19/20245:28 PM [facility]

## 2024-02-19 NOTE — ASSESSMENT & PLAN NOTE
Likely in setting of BPPV  No complaints of nausea or vomiting  Continue meclizine 25 mg daily as needed  Continue PT/OT

## 2024-02-19 NOTE — ASSESSMENT & PLAN NOTE
History of chronic right PCA territory infarct as per MRI 2/11/2024  Currently stable  Continue aspirin and statin

## 2024-02-19 NOTE — ASSESSMENT & PLAN NOTE
In setting of acute and chronic medical conditions  Continue supportive care at SNF   Continue PT/OT  Continue safety/fall precautions

## 2024-02-19 NOTE — ASSESSMENT & PLAN NOTE
S/p fall requiring hospitalization in December 2023  No complaints of pain at time of exam   Continue Tylenol as needed for pain

## 2024-02-19 NOTE — ASSESSMENT & PLAN NOTE
Patient tested positive for COVID-19 on 2/9/2024  Continue isolation precautions as per facility protocol  Respiratory status has been stable at SNF  No GI complaints/loss of taste or smell reported

## 2024-02-20 NOTE — PROGRESS NOTES
[address] [address]  [phone]  DISCHARGE SUMMARY for 1/9/2024  Facility: [facility]  POS: 31: SNF/Short Term Rehab    NAME: Neha Molina  AGE: 89 y.o. SEX: female  DATE OF ADMISSION: 12/27/2023 DATE Pt left: 2/9/2024 DISCHARGE DISPOSITION: stable for [facility] as per pt request    Reason for admission: Patient was admitted from [facility] for rehabilitation after hospitalization for right sided hip pain and fracture of Rt pubis/sacrum after a fall at home ([facility]).    Pt admitted to [facility] on 12/27/2023 and participated in PT/OT. Then pt requested to be transferred to another local [facility]. Pt medically stable.    Admission Diagnoses:   Closed fracture of sacrum with routine healing, unspecified portion of sacrum, subsequent encounter    Closed fracture of ramus of right pubis with routine healing, subsequent encounter    Anemia, unspecified type    Acute pain due to trauma    Hypothyroidism, unspecified type    History of CVA (cerebrovascular accident    Discharge Diagnoses:   1) closed fracture of ramus of Rt pubis  2) closed fracture of sacrum  3) s/p fall  4) hypothyroidism  5) hyperlipidemia    For 1/9/2024 discharge summary  Leia Tolnetino MD  [facility] Senior Care Physician

## 2024-02-20 NOTE — TELEPHONE ENCOUNTER
[phone]/Paco from [facility]/Pt ran out of Ativan for night. Order is needed.    Dr ROSALEE Schultz was page via TC    Please allow the on-call provider 20-30 mins to respond. If you have not heard from them within that time, please feel free to call back.

## 2024-02-21 ENCOUNTER — NURSING HOME VISIT (OUTPATIENT)
Dept: GERIATRICS | Facility: OTHER | Age: 89
End: 2024-02-21
Payer: MEDICARE

## 2024-02-21 DIAGNOSIS — M17.0 OSTEOARTHRITIS OF BOTH KNEES, UNSPECIFIED OSTEOARTHRITIS TYPE: ICD-10-CM

## 2024-02-21 DIAGNOSIS — R26.2 AMBULATORY DYSFUNCTION: ICD-10-CM

## 2024-02-21 DIAGNOSIS — F41.9 ANXIETY: ICD-10-CM

## 2024-02-21 DIAGNOSIS — S32.110D CLOSED NONDISPLACED ZONE I FRACTURE OF SACRUM WITH ROUTINE HEALING, SUBSEQUENT ENCOUNTER: ICD-10-CM

## 2024-02-21 DIAGNOSIS — M54.2 NECK PAIN ON RIGHT SIDE: Primary | ICD-10-CM

## 2024-02-21 PROCEDURE — 99309 SBSQ NF CARE MODERATE MDM 30: CPT | Performed by: NURSE PRACTITIONER

## 2024-02-21 NOTE — ASSESSMENT & PLAN NOTE
Bilateral knees  Patient reports that her knees gives out at times which causes her to fall.  With history of four falls in 1 year  Continue Tylenol as needed, lidocaine patch  Continue PT/OT

## 2024-02-21 NOTE — ASSESSMENT & PLAN NOTE
S/p fall requiring hospitalization in December 2023  No complaints of sacral pain at time of exam  Continue Tylenol as needed for pain

## 2024-02-21 NOTE — ASSESSMENT & PLAN NOTE
Patient appears very anxious at time of exam, requesting to continue home dose Ativan 0.5 mg daily at bedtime.  Patient reports that she has tried melatonin and trazodone in the past and medications were not effective  We will continue Ativan 0.5 mg nightly

## 2024-02-21 NOTE — ASSESSMENT & PLAN NOTE
Patient reports waking up with right neck pain due to sleeping position  Order Aspercreme lidocaine cream twice daily  Has Tylenol order PRN

## 2024-02-21 NOTE — PROGRESS NOTES
Facility: [facility]  POS: 31  Progress Note    Chief Complaint/Reason for visit: STR follow-up visit  Code status: Full code  History of Present Illness: 89-year-old female seen and examined for STR follow-up of acute on chronic medical conditions.  Received patient resting in bed and appeared in no distress.  Patient stated that she feels everything has been going wrong since admission to SNF.  Emotional support provided and concerns reported to social service.  She is complaining of right neck pain and states it is due to sleeping position last night and now has a headache from not sleeping well last night.  She does have as needed Tylenol ordered.  Patient tested positive for COVID-19 on 2/9/2024 without respiratory symptoms.  Denies shortness of breath, chest pain, dizziness, abdominal pain, nausea, vomiting, diarrhea, constipation.  Recent CBC and BMP stable.  Past Medical History: unchanged from history and physical  Past Medical History:   Diagnosis Date    CAD (coronary artery disease)      Family History: Unchanged from history and physical  Social History: Unchanged from history and physical  Review of systems: As per review of medical illness, all other systems reviewed and negative.  Medications: All medication and routine orders were reviewed and updated  Allergies: Reviewed and unchanged  Consults reviewed:PT, OT, and Other  Labs/Diagnostics (reviewed by this provider): Copy in Chart  Imaging Reviewed:  Physical Exam  Temp: 97.6          BP: 138/76      pulse: 76 resp: 16       Orientation:Person, Place, and Day     Physical Exam  Vitals and nursing note reviewed.   Constitutional:       General: She is not in acute distress.     Appearance: She is not ill-appearing, toxic-appearing or diaphoretic.   HENT:      Head: Normocephalic.      Nose: No congestion.      Mouth/Throat:      Mouth: Mucous membranes are moist.      Pharynx: No oropharyngeal exudate.   Eyes:      Extraocular Movements:  Extraocular movements intact.      Conjunctiva/sclera: Conjunctivae normal.   Cardiovascular:      Rate and Rhythm: Normal rate.   Pulmonary:      Effort: Pulmonary effort is normal. No respiratory distress.   Abdominal:      General: Bowel sounds are normal. There is no distension.      Palpations: Abdomen is soft.      Tenderness: There is no abdominal tenderness. There is no guarding.   Musculoskeletal:         General: Deformity (Arthritic changes to both knees) present.      Right lower leg: No edema.      Left lower leg: No edema.      Comments: Moves all 4 extremities.   Skin:     General: Skin is warm and dry.      Capillary Refill: Capillary refill takes less than 2 seconds.   Neurological:      Mental Status: She is alert and oriented to person, place, and time.      Motor: Weakness present.      Gait: Gait abnormal.   Psychiatric:         Mood and Affect: Mood normal.         Behavior: Behavior normal.         Thought Content: Thought content normal.       Assessment/Plan:  89-year-old female with:    Neck pain on right side  Patient reports waking up with right neck pain due to sleeping position  Order Aspercreme lidocaine cream twice daily  Has Tylenol order PRN    DJD (degenerative joint disease) of knee  Bilateral knees  Patient reports that her knees gives out at times which causes her to fall.  With history of four falls in 1 year  Continue Tylenol as needed, lidocaine patch  Continue PT/OT    Sacral fracture, closed (HCC)  S/p fall requiring hospitalization in December 2023  No complaints of sacral pain at time of exam  Continue Tylenol as needed for pain    Anxiety  Patient appears very anxious at time of exam, requesting to continue home dose Ativan 0.5 mg daily at bedtime.  Patient reports that she has tried melatonin and trazodone in the past and medications were not effective  We will continue Ativan 0.5 mg nightly    Ambulatory dysfunction  In setting of acute and chronic medical  conditions  Continue supportive care at [facility]  Continue PT/OT  Continue safety/fall precautions     This note was completed in part utilizing [employer] direct voice recognition software.  Grammatical errors, random word insertion, spelling mistakes, and incomplete sentences may be an occasional consequence of the system secondary to software limitations, ambient noise and hardware issues.  At the time of dictation, efforts were made to edit, clarify and/or correct errors.  Please read the chart carefully and recognize, using context, where substitutions have occurred.  If you have any questions or concerns about the context, text or information contained within the body of this dictation, please contact myself, the provider, for further clarification.    DIGNA Patton  2/21/20245:16 PM

## 2024-02-22 ENCOUNTER — TELEPHONE (OUTPATIENT)
Dept: INTERNAL MEDICINE CLINIC | Facility: CLINIC | Age: 89
End: 2024-02-22

## 2024-02-22 NOTE — TELEPHONE ENCOUNTER
Pt is requesting for a referral to see an ophthalmologist, but is unsure if this can be done do to her still being under the care of the Dr at [facility].

## 2024-02-22 NOTE — TELEPHONE ENCOUNTER
While she is a patient at the [facility] her orders have to come through the physician that is taking care of her there.  Thank you

## 2024-02-26 ENCOUNTER — NURSING HOME VISIT (OUTPATIENT)
Dept: GERIATRICS | Facility: OTHER | Age: 89
End: 2024-02-26
Payer: MEDICARE

## 2024-02-26 ENCOUNTER — TELEPHONE (OUTPATIENT)
Dept: INTERNAL MEDICINE CLINIC | Facility: CLINIC | Age: 89
End: 2024-02-26

## 2024-02-26 DIAGNOSIS — Z86.73 HISTORY OF CVA (CEREBROVASCULAR ACCIDENT): ICD-10-CM

## 2024-02-26 DIAGNOSIS — F41.9 ANXIETY: ICD-10-CM

## 2024-02-26 DIAGNOSIS — S32.110D CLOSED NONDISPLACED ZONE I FRACTURE OF SACRUM WITH ROUTINE HEALING, SUBSEQUENT ENCOUNTER: ICD-10-CM

## 2024-02-26 DIAGNOSIS — R26.2 AMBULATORY DYSFUNCTION: ICD-10-CM

## 2024-02-26 DIAGNOSIS — R42 DIZZINESS: Primary | ICD-10-CM

## 2024-02-26 PROCEDURE — 99309 SBSQ NF CARE MODERATE MDM 30: CPT | Performed by: NURSE PRACTITIONER

## 2024-02-26 NOTE — ASSESSMENT & PLAN NOTE
History of chronic right PCA territory infarct as per MRI 2/11/2024  Stable  Continue aspirin and statin  Continue PT/OT

## 2024-02-26 NOTE — ASSESSMENT & PLAN NOTE
Likely in setting of BPPV  With intermittent episodes of dizziness  No complaints of dizziness at time of exam  Continue meclizine 25 mg daily as needed  Continue PT/OT

## 2024-02-26 NOTE — ASSESSMENT & PLAN NOTE
In setting of acute and chronic medical conditions  Continue supportive care  Continue PT/OT  Continue safety/fall precautions

## 2024-02-26 NOTE — TELEPHONE ENCOUNTER
Pt is not looking for a referral, Just a recommendation for good ophthalmologist and one who specializes in glaucoma.    Pt can be reached at [phone]

## 2024-02-26 NOTE — PROGRESS NOTES
Facility: [facility]  POS: 31  Progress Note    Chief Complaint/Reason for visit: STR follow-up visit  Code status: Full code  History of Present Illness: 89-year-old female seen and examined for STR follow-up of acute on chronic medical conditions.  Received patient resting in bed and appeared in no distress.  Patient reports that she has a history of migraine with no recurrence for 3 years until yesterday.  She reports having no dizziness or headache today.  Patient stated that she was seen by an ophthalmologist in [place] who informed her that she may have glaucoma and now would like to make an appointment with an ophthalmologist.  Phone number provided for [facility].  No acute issues reported by nursing staff.  Past Medical History: unchanged from history and physical  Past Medical History:   Diagnosis Date    CAD (coronary artery disease)      Family History: Unchanged from history and physical  Social History: Unchanged from history and physical  Review of systems: As per review of medical illness, all other systems reviewed and negative.  Medications: All medication and routine orders were reviewed and updated  Allergies: NKDA  Consults reviewed:PT, OT, and Other  Labs/Diagnostics (reviewed by this provider): Copy in Chart paper chart  Imaging Reviewed:  Physical Exam  Temp: 97.1          BP: 134/81  pulse: 70       resp: 16       Orientation:Person, Place, Day, and Date     Physical Exam  Vitals and nursing note reviewed.   Constitutional:       General: She is not in acute distress.     Appearance: She is not ill-appearing, toxic-appearing or diaphoretic.   HENT:      Head: Normocephalic.      Nose: No congestion.      Mouth/Throat:      Mouth: Mucous membranes are moist.      Pharynx: No oropharyngeal exudate.   Eyes:      Extraocular Movements: Extraocular movements intact.      Conjunctiva/sclera: Conjunctivae normal.   Cardiovascular:      Rate and Rhythm: Normal rate.   Pulmonary:       Effort: Pulmonary effort is normal. No respiratory distress.   Abdominal:      General: Bowel sounds are normal. There is no distension.      Palpations: Abdomen is soft.      Tenderness: There is no abdominal tenderness. There is no guarding.   Musculoskeletal:      Cervical back: Neck supple.      Right lower leg: No edema.      Left lower leg: No edema.      Comments: Moves all 4 extremities.   Skin:     General: Skin is warm and dry.      Capillary Refill: Capillary refill takes less than 2 seconds.   Neurological:      Mental Status: She is alert and oriented to person, place, and time.      Motor: Weakness present.      Gait: Gait abnormal.   Psychiatric:         Mood and Affect: Mood normal.         Behavior: Behavior normal.         Thought Content: Thought content normal.       Assessment/Plan:  89-year-old female with:    Dizziness  Likely in setting of BPPV  With intermittent episodes of dizziness  No complaints of dizziness at time of exam  Continue meclizine 25 mg daily as needed  Continue PT/OT    Sacral fracture, closed (HCC)  Stable without complaints of sacral pain  Continue Tylenol as needed  Continue PT/OT  Follow-up with orthopedics as scheduled outpatient    History of CVA (cerebrovascular accident)  History of chronic right PCA territory infarct as per MRI 2/11/2024  Stable  Continue aspirin and statin  Continue PT/OT    Anxiety  Continue home dose Ativan 0.5 mg daily at bedtime    Ambulatory dysfunction  In setting of acute and chronic medical conditions  Continue supportive care  Continue PT/OT  Continue safety/fall precautions     This note was completed in part utilizing [employer] direct voice recognition software.  Grammatical errors, random word insertion, spelling mistakes, and incomplete sentences may be an occasional consequence of the system secondary to software limitations, ambient noise and hardware issues.  At the time of dictation, efforts were made to edit, clarify  and/or correct errors.  Please read the chart carefully and recognize, using context, where substitutions have occurred.  If you have any questions or concerns about the context, text or information contained within the body of this dictation, please contact myself, the provider, for further clarification.    DIGNA Patton  2/26/20246:52 PM

## 2024-02-26 NOTE — ASSESSMENT & PLAN NOTE
Stable without complaints of sacral pain  Continue Tylenol as needed  Continue PT/OT  Follow-up with orthopedics as scheduled outpatient

## 2024-02-28 DIAGNOSIS — S32.110A CLOSED NONDISPLACED ZONE I FRACTURE OF SACRUM, INITIAL ENCOUNTER (HCC): Primary | ICD-10-CM

## 2024-02-28 NOTE — ED ADULT TRIAGE NOTE - BEFAST LAST WELL KNOWN
Patient's son called to state that the patient's knee is swollen. He is asking for a call back because he is concerned that the patient's knee might be infected.    Known

## 2024-02-29 ENCOUNTER — NURSING HOME VISIT (OUTPATIENT)
Dept: GERIATRICS | Facility: OTHER | Age: 89
End: 2024-02-29
Payer: MEDICARE

## 2024-02-29 DIAGNOSIS — S32.591D CLOSED FRACTURE OF RAMUS OF RIGHT PUBIS WITH ROUTINE HEALING, SUBSEQUENT ENCOUNTER: Primary | ICD-10-CM

## 2024-02-29 DIAGNOSIS — R42 DIZZINESS: ICD-10-CM

## 2024-02-29 DIAGNOSIS — R51.9 GENERALIZED HEADACHES: ICD-10-CM

## 2024-02-29 DIAGNOSIS — M54.2 NECK PAIN ON RIGHT SIDE: ICD-10-CM

## 2024-02-29 DIAGNOSIS — F41.9 ANXIETY: ICD-10-CM

## 2024-02-29 DIAGNOSIS — S32.110D CLOSED NONDISPLACED ZONE I FRACTURE OF SACRUM WITH ROUTINE HEALING, SUBSEQUENT ENCOUNTER: ICD-10-CM

## 2024-02-29 PROCEDURE — 99309 SBSQ NF CARE MODERATE MDM 30: CPT | Performed by: NURSE PRACTITIONER

## 2024-02-29 NOTE — ASSESSMENT & PLAN NOTE
S/p fall 12/21/2023  No complaints of pain  Continue Tylenol as needed   Continue PT/OT  Follow-up with orthopedics as scheduled outpatient on 3/5/2024

## 2024-02-29 NOTE — ASSESSMENT & PLAN NOTE
Patient attributed pain due to sleeping position  Improved but has not totally subsided  Continue lidocaine patch   Continue Tylenol as needed

## 2024-02-29 NOTE — PROGRESS NOTES
Facility: [facility]  POS: 31  Progress Note    Chief Complaint/Reason for visit: STR follow-up visit  Code status: Full code  History of Present Illness: 89-year-old female seen and examined for STR follow-up of acute and chronic medical conditions this a.m.  At time of examination, patient found seated in chair eating breakfast, and appeared in no distress.  Patient reports that she has intermittent dizziness and right frontal headaches.  She requested meclizine and nurse made aware.  Patient made an appointment with [facility] glaucoma specialist due to concerns of glaucoma.  No acute issues reported by nursing staff.  PT sessions were interrupted yesterday by patient having to make ophthalmology appointment, requesting to return to bed due to neck and headache.   Patient tested positive for COVID-19 on 2/9/2024 without any respiratory symptoms.  Past Medical History: unchanged from history and physical  Past Medical History:   Diagnosis Date    CAD (coronary artery disease)      Family History: Unchanged from history and physical  Social History: Unchanged from history and physical  Review of systems: As per review of medical illness, all other systems reviewed and negative.  Medications: All medication and routine orders were reviewed and updated  Allergies: NKDA  Consults reviewed:PT, OT, and Other  Labs/Diagnostics (reviewed by this provider): Copy in Chart paper chart  Imaging Reviewed:  Physical Exam  Weight: No recent weight recorded Temp: 98.2         BP: 129/75  pulse: 77   resp: 16       Orientation:Person, Place, Day, Date, Month, and Year     Physical Exam  Vitals and nursing note reviewed.   Constitutional:       General: She is not in acute distress.     Appearance: She is not ill-appearing, toxic-appearing or diaphoretic.   HENT:      Head: Normocephalic.      Nose: No congestion.      Mouth/Throat:      Mouth: Mucous membranes are moist.      Pharynx: No oropharyngeal exudate.    Eyes:      Extraocular Movements: Extraocular movements intact.      Conjunctiva/sclera: Conjunctivae normal.   Cardiovascular:      Rate and Rhythm: Normal rate.   Pulmonary:      Effort: Pulmonary effort is normal. No respiratory distress.   Abdominal:      General: Bowel sounds are normal. There is no distension.      Palpations: Abdomen is soft.      Tenderness: There is no abdominal tenderness. There is no guarding.   Musculoskeletal:      Right lower leg: No edema.      Left lower leg: No edema.      Comments: Moves all 4 extremities.   Skin:     General: Skin is warm and dry.      Capillary Refill: Capillary refill takes less than 2 seconds.   Neurological:      Mental Status: She is alert and oriented to person, place, and time.      Motor: Weakness present.      Gait: Gait abnormal.   Psychiatric:         Mood and Affect: Mood normal.         Behavior: Behavior normal.         Thought Content: Thought content normal.       Assessment/Plan:  89-year-old female with:    Fracture of ramus of right pubis ([facility])  S/p fall 12/21/2023  No complaints of pain  Continue Tylenol as needed   Continue PT/OT  Follow-up with orthopedics as scheduled outpatient on 3/5/2024    Dizziness  Likely in setting of BPPV  Continues with intermittent episodes of dizziness without nausea or vomiting  Continue meclizine 25 mg daily as needed  Continue PT/OT    Generalized headaches  Patient reports that she has history of chronic right frontal migraine headaches years ago and was evaluated by neurology in [place].  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, mild microangiopathic change  No nausea or vomiting  No focal deficits noted  She currently has Tylenol ordered as needed  If headaches continue and worsens, will recommend further interventions/neurology consult    Sacral fracture, closed ([facility])  No  complaints of sacral pain  Follow-up with orthopedics as scheduled outpatient on 3/5/2024    Neck pain on right side  Patient attributed pain due to sleeping position  Improved but has not totally subsided  Continue lidocaine patch   Continue Tylenol as needed    Anxiety  Continue home dose Ativan 0.5 mg nightly    This note was completed in part utilizing [employer] direct voice recognition software.  Grammatical errors, random word insertion, spelling mistakes, and incomplete sentences may be an occasional consequence of the system secondary to software limitations, ambient noise and hardware issues.  At the time of dictation, efforts were made to edit, clarify and/or correct errors.  Please read the chart carefully and recognize, using context, where substitutions have occurred.  If you have any questions or concerns about the context, text or information contained within the body of this dictation, please contact myself, the provider, for further clarification.    DIGNA Patton  2/29/20245:10 PM

## 2024-02-29 NOTE — ASSESSMENT & PLAN NOTE
Likely in setting of BPPV  Continues with intermittent episodes of dizziness without nausea or vomiting  Continue meclizine 25 mg daily as needed  Continue PT/OT

## 2024-02-29 NOTE — ASSESSMENT & PLAN NOTE
Patient reports that she has history of chronic right frontal migraine headaches years ago and was evaluated by neurology in [place].  She is complaining of intermittent pressure right frontal area without aura  No nausea or vomiting  No focal deficits noted  She currently has Tylenol ordered as needed  If headaches continue and worsens, will recommend further interventions/neurology consult

## 2024-03-05 ENCOUNTER — NURSING HOME VISIT (OUTPATIENT)
Dept: GERIATRICS | Facility: OTHER | Age: 89
End: 2024-03-05
Payer: MEDICARE

## 2024-03-05 ENCOUNTER — HOSPITAL ENCOUNTER (OUTPATIENT)
Dept: RADIOLOGY | Facility: HOSPITAL | Age: 89
Discharge: HOME | End: 2024-03-05
Attending: ORTHOPAEDIC SURGERY
Payer: MEDICARE

## 2024-03-05 ENCOUNTER — OFFICE VISIT (OUTPATIENT)
Dept: OBGYN CLINIC | Facility: HOSPITAL | Age: 89
End: 2024-03-05
Payer: MEDICARE

## 2024-03-05 VITALS
BODY MASS INDEX: 28.51 KG/M2 | SYSTOLIC BLOOD PRESSURE: 119 MMHG | HEART RATE: 67 BPM | HEIGHT: 60 IN | DIASTOLIC BLOOD PRESSURE: 61 MMHG

## 2024-03-05 DIAGNOSIS — S32.110A CLOSED NONDISPLACED ZONE I FRACTURE OF SACRUM, INITIAL ENCOUNTER (HCC): ICD-10-CM

## 2024-03-05 DIAGNOSIS — R26.2 AMBULATORY DYSFUNCTION: ICD-10-CM

## 2024-03-05 DIAGNOSIS — M17.11 PRIMARY OSTEOARTHRITIS OF RIGHT KNEE: Primary | ICD-10-CM

## 2024-03-05 DIAGNOSIS — S32.591D CLOSED FRACTURE OF RAMUS OF RIGHT PUBIS WITH ROUTINE HEALING, SUBSEQUENT ENCOUNTER: Primary | ICD-10-CM

## 2024-03-05 DIAGNOSIS — H40.89 OTHER GLAUCOMA OF LEFT EYE: ICD-10-CM

## 2024-03-05 DIAGNOSIS — S32.591A CLOSED FRACTURE OF RAMUS OF RIGHT PUBIS, INITIAL ENCOUNTER (HCC): ICD-10-CM

## 2024-03-05 DIAGNOSIS — M17.0 PRIMARY OSTEOARTHRITIS OF KNEES, BILATERAL: ICD-10-CM

## 2024-03-05 DIAGNOSIS — R42 DIZZINESS: ICD-10-CM

## 2024-03-05 PROCEDURE — 20610 DRAIN/INJ JOINT/BURSA W/O US: CPT | Performed by: ORTHOPAEDIC SURGERY

## 2024-03-05 PROCEDURE — 99214 OFFICE O/P EST MOD 30 MIN: CPT | Performed by: ORTHOPAEDIC SURGERY

## 2024-03-05 PROCEDURE — 72170 X-RAY EXAM OF PELVIS: CPT

## 2024-03-05 PROCEDURE — 99309 SBSQ NF CARE MODERATE MDM 30: CPT | Performed by: NURSE PRACTITIONER

## 2024-03-05 RX ORDER — BUPIVACAINE HYDROCHLORIDE 2.5 MG/ML
2 INJECTION, SOLUTION INFILTRATION; PERINEURAL
Status: COMPLETED | OUTPATIENT
Start: 2024-03-05 | End: 2024-03-05

## 2024-03-05 RX ORDER — BETAMETHASONE SODIUM PHOSPHATE AND BETAMETHASONE ACETATE 3; 3 MG/ML; MG/ML
12 INJECTION, SUSPENSION INTRA-ARTICULAR; INTRALESIONAL; INTRAMUSCULAR; SOFT TISSUE
Status: COMPLETED | OUTPATIENT
Start: 2024-03-05 | End: 2024-03-05

## 2024-03-05 RX ORDER — BUPIVACAINE HYDROCHLORIDE 2.5 MG/ML
1 INJECTION, SOLUTION INFILTRATION; PERINEURAL
Status: COMPLETED | OUTPATIENT
Start: 2024-03-05 | End: 2024-03-05

## 2024-03-05 RX ADMIN — BUPIVACAINE HYDROCHLORIDE 1 ML: 2.5 INJECTION, SOLUTION INFILTRATION; PERINEURAL at 11:15

## 2024-03-05 RX ADMIN — BETAMETHASONE SODIUM PHOSPHATE AND BETAMETHASONE ACETATE 12 MG: 3; 3 INJECTION, SUSPENSION INTRA-ARTICULAR; INTRALESIONAL; INTRAMUSCULAR; SOFT TISSUE at 11:15

## 2024-03-05 RX ADMIN — BUPIVACAINE HYDROCHLORIDE 2 ML: 2.5 INJECTION, SOLUTION INFILTRATION; PERINEURAL at 11:15

## 2024-03-05 NOTE — PROGRESS NOTES
Facility: [facility]  POS: 31   Progress Note    Chief Complaint/Reason for visit: STR follow up visit  Code status: Full code  History of Present Illness: 89-year-old female seen and examined today for STR follow-up of acute and chronic medical conditions.  Received patient seated in chair talking to her sister on the phone.  Patient was pleasant and had no complaints at that time.  Observed patient ambulating earlier in the hallway using her walker with PT assist.  No falls reported at SNF.  Patient had follow-up with orthopedics today for bilateral knee arthritis, sacral fracture, and fracture of ramus of right pubis; notes reviewed.  Respiratory status is stable.  Patient reports having intermittent headaches but nothing severe.  No acute issues reported by nursing staff  Past Medical History: unchanged from history and physical  Past Medical History:   Diagnosis Date    CAD (coronary artery disease)      Family History: Unchanged from history and physical  Social History: Unchanged from history and physical  Review of systems: As per review of medical illness, all other systems reviewed and negative.  Medications: All medication and routine orders were reviewed and updated  Allergies: NKDA  Consults reviewed:PT, OT, and Other orthopedics  Labs/Diagnostics (reviewed by this provider): Copy in Chart paper chart  Imaging Reviewed: None today  Physical Exam  Reviewed vital signs log; no acute findings  Orientation:Person, Place, and Day     Physical Exam  Vitals and nursing note reviewed.   Constitutional:       General: She is not in acute distress.     Appearance: She is not ill-appearing, toxic-appearing or diaphoretic.   HENT:      Head: Normocephalic.      Nose: No congestion.      Mouth/Throat:      Mouth: Mucous membranes are moist.      Pharynx: No oropharyngeal exudate.   Eyes:      Extraocular Movements: Extraocular movements intact.      Conjunctiva/sclera: Conjunctivae normal.   Cardiovascular:       Rate and Rhythm: Normal rate and regular rhythm.   Pulmonary:      Effort: Pulmonary effort is normal. No respiratory distress.   Abdominal:      General: Bowel sounds are normal. There is no distension.      Palpations: Abdomen is soft.      Tenderness: There is no abdominal tenderness. There is no guarding.   Musculoskeletal:      Cervical back: Neck supple. No rigidity.      Right lower leg: No edema.      Left lower leg: No edema.      Comments: Moves all 4 extremities. Ambulatory with walker.   Skin:     General: Skin is warm and dry.      Capillary Refill: Capillary refill takes less than 2 seconds.   Neurological:      Mental Status: She is alert and oriented to person, place, and time.      Motor: Weakness present.      Gait: Gait abnormal.   Psychiatric:         Mood and Affect: Mood normal.         Behavior: Behavior normal.         Thought Content: Thought content normal.       Assessment/Plan:  89-year-old female with:     Sacral fracture, closed ([facility])  S/p fall 12/21/2023  Had follow up with orthopedics today  Patient is stable    Fracture of ramus of right pubis ([facility])  S/p fall 12/21/2023  Had follow-up with orthopedics today  Patient participating in therapy at [facility]    Primary osteoarthritis of knees, bilateral  With history of multiple falls which she attributes to her knee arthritis, R>L  Was evaluated by orthopedics today and received CS injection in office right knee.  As per orthopedic notes, nonsurgical and surgical intervention TKA was discussed  Continue Tylenol for pain  Continue fall precautions    Dizziness  Likely in setting of BPPV  Continues with intermittent episodes of dizziness without nausea or vomiting  Continue meclizine 25 mg daily as needed  Continue PT/OT    Ambulatory dysfunction  In setting of acute and chronic medical conditions  Ambulatory with walker  Need PT/OT  /Fall precautions    Other specified glaucoma  Patient scheduled an appointment with ophthalmology office at  [facility]  Continue supportive care at [facility]     This note was completed in part utilizing [employer] direct voice recognition software.  Grammatical errors, random word insertion, spelling mistakes, and incomplete sentences may be an occasional consequence of the system secondary to software limitations, ambient noise and hardware issues.  At the time of dictation, efforts were made to edit, clarify and/or correct errors.  Please read the chart carefully and recognize, using context, where substitutions have occurred.  If you have any questions or concerns about the context, text or information contained within the body of this dictation, please contact myself, the provider, for further clarification.    DIGNA Patton  3/6/61637:31 AM

## 2024-03-05 NOTE — PROGRESS NOTES
Assessment:  1. Primary osteoarthritis of right knee  Large joint arthrocentesis      2. Closed nondisplaced zone I fracture of sacrum, initial encounter ([facility])        3. Closed fracture of ramus of right pubis, initial encounter ([facility])            Plan:  The patient has an examination consistent with right knee osteoarthritis.  I have discussed with the patient the pathophysiology of this diagnosis and reviewed how the examination correlates with this diagnosis.  Surgical vs conservative treatment options were discussed at length to included CS injection, PT, un- brace, visco supplemenation and total knee arthroplasty. After discussing these treatment options, the patient elected for a CS injection today.  Explained the definitive treatment would be total knee arthoplasty.  Return if symptoms worsen and will get new bilateral knee x-rays.     To do next visit:  Return if symptoms worsen or fail to improve.    The above stated was discussed in layman's terms and the patient expressed understanding.  All questions were answered to the patient's satisfaction.             Subjective:   Neha Molina is a 89 y.o. female who presents today for evaluation and treatment of right pubic ramus and sacral fractures sustained 12/21/2023. Patient states overall the pelvis is doing well. She reports bilateral knee pain right > right.  She states she has been treating in [facility] with intermittent CS injections which failed to provided relief.  Patient reports she has had multi falls which she attributes to her knee osteoarthritis.      Past Medical History:   Diagnosis Date    CAD (coronary artery disease)        Past Surgical History:   Procedure Laterality Date    BREAST BIOPSY         Family History   Problem Relation Age of Onset    Stroke Sister        Social History     Occupational History    Not on file   Tobacco Use    Smoking status: Former     Types: Cigarettes     Passive exposure: Past    Smokeless tobacco: Never    Vaping Use    Vaping status: Never Used   Substance and Sexual Activity    Alcohol use: Never    Drug use: Never    Sexual activity: Not Currently         Current Outpatient Medications:     acetaminophen (TYLENOL) 500 mg tablet, Take 500 mg by mouth every 6 (six) hours as needed, Disp: , Rfl:     aspirin 81 mg chewable tablet, Chew 81 mg daily, Disp: , Rfl:     cholecalciferol (VITAMIN D3) 1,000 units tablet, Take 1 tablet (1,000 Units total) by mouth daily, Disp: , Rfl:     lactulose 20 g/30 mL, Take 10 g by mouth daily as needed (Constipation), Disp: , Rfl:     levothyroxine 75 mcg tablet, Take 75 mcg by mouth daily, Disp: , Rfl:     lidocaine (LIDODERM) 5 %, Apply 1 patch topically over 12 hours daily Remove & Discard patch within 12 hours or as directed by MD, Disp: , Rfl:     lovastatin (ALTOPREV) 20 MG 24 hr tablet, Take 1 tablet (20 mg total) by mouth daily at bedtime Restart after completing Paxlovid treatment. Do not start before February 18, 2024., Disp: , Rfl:     meclizine (ANTIVERT) 25 mg tablet, Take 25 mg by mouth daily as needed, Disp: , Rfl:     oxybutynin (DITROPAN-XL) 10 MG 24 hr tablet, Take 10 mg by mouth daily, Disp: , Rfl:     triamterene-hydrochlorothiazide (DYAZIDE) 37.5-25 mg per capsule, Take 1 capsule by mouth daily as needed, Disp: , Rfl:     LORazepam (Ativan) 0.5 mg tablet, Take 1 tablet (0.5 mg total) by mouth daily at bedtime for 7 days, Disp: 7 tablet, Rfl: 0    No Known Allergies      Objective:  Vitals:    03/05/24 1212   BP: 119/61   Pulse: 67       Review of Systems   Constitutional:  Negative for chills and fever.   HENT:  Negative for drooling and sneezing.    Eyes:  Negative for redness.   Respiratory:  Negative for cough and wheezing.    Gastrointestinal:  Negative for nausea and vomiting.   Musculoskeletal:         Please see ortho exam   Psychiatric/Behavioral:  Negative for behavioral problems. The patient is not nervous/anxious.      Ortho exam:   Patient sits  comfortably in her chair with her hip flexed at 90 degrees  No tenderness to palpation  Sensation intact in DP/SP/Vasquez/Sa/T nerve distributions  2+ DP & PT pulses  Brisk capillary refill in all toes  Right knee TTP medial and lateral joint line.     Physical Exam  Vitals reviewed.   Constitutional:       Appearance: She is well-developed.   Eyes:      Pupils: Pupils are equal, round, and reactive to light.   Pulmonary:      Effort: Pulmonary effort is normal.      Breath sounds: Normal breath sounds.   Abdominal:      General: Abdomen is flat. There is no distension.   Skin:     General: Skin is warm and dry.   Neurological:      Mental Status: She is alert and oriented to person, place, and time.   Psychiatric:         Behavior: Behavior normal.         Thought Content: Thought content normal.         Judgment: Judgment normal.           Diagnostics, reviewed and taken today if performed as documented:    The attending physician has personally reviewed the pertinent films in PACS and interpretation is as follows:  X-ray of pelvis obtained today reviewed and demonstrates: pubic rami and sacral fractures in maintained alignment with interval healing noted     Procedures, if performed today:    Large joint arthrocentesis: R knee  Universal Protocol:  Consent: Verbal consent obtained.  Consent given by: patient  Patient understanding: patient states understanding of the procedure being performed  Site marked: the operative site was marked  Patient identity confirmed: verbally with patient  Supporting Documentation  Indications: pain   Procedure Details  Location: knee - R knee  Needle size: 22 G  Ultrasound guidance: no  Approach: lateral  Medications administered: 2 mL bupivacaine 0.25 %; 1 mL bupivacaine 0.25 %; 12 mg betamethasone acetate-betamethasone sodium phosphate 6 (3-3) mg/mL    Patient tolerance: patient tolerated the procedure well with no immediate complications  Dressing:  Sterile dressing  "applied          Scribe Attestation      I,:  Priscilla Ochoa am acting as a scribe while in the presence of the attending physician.:       I,:  Rajiv Gonzalez MD personally performed the services described in this documentation    as scribed in my presence.:                 Portions of the record may have been created with voice recognition software.  Occasional wrong word or \"sound a like\" substitutions may have occurred due to the inherent limitations of voice recognition software.  Read the chart carefully and recognize, using context, where substitutions have occurred.  "

## 2024-03-06 PROBLEM — U07.1 COVID-19: Status: RESOLVED | Noted: 2024-02-10 | Resolved: 2024-03-06

## 2024-03-06 PROBLEM — M17.0 PRIMARY OSTEOARTHRITIS OF KNEES, BILATERAL: Status: ACTIVE | Noted: 2024-03-06

## 2024-03-06 NOTE — ASSESSMENT & PLAN NOTE
S/p fall 12/21/2023  Had follow-up with orthopedics today  Patient participating in therapy at SNF

## 2024-03-06 NOTE — ASSESSMENT & PLAN NOTE
With history of multiple falls which she attributes to her knee arthritis, R>L  Was evaluated by orthopedics today and received CS injection in office right knee.  As per orthopedic notes, nonsurgical and surgical intervention TKA was discussed  Continue Tylenol for pain  Continue fall precautions

## 2024-03-06 NOTE — ASSESSMENT & PLAN NOTE
Patient scheduled an appointment with ophthalmology office at [facility]  Continue supportive care at [facility]

## 2024-03-06 NOTE — ASSESSMENT & PLAN NOTE
In setting of acute and chronic medical conditions  Ambulatory with walker  Need PT/OT  /Fall precautions

## 2024-03-08 ENCOUNTER — NURSING HOME VISIT (OUTPATIENT)
Dept: GERIATRICS | Facility: OTHER | Age: 89
End: 2024-03-08
Payer: MEDICARE

## 2024-03-08 DIAGNOSIS — E03.8 OTHER SPECIFIED HYPOTHYROIDISM: ICD-10-CM

## 2024-03-08 DIAGNOSIS — R35.0 URINARY FREQUENCY: ICD-10-CM

## 2024-03-08 DIAGNOSIS — S32.591D CLOSED FRACTURE OF RAMUS OF RIGHT PUBIS WITH ROUTINE HEALING, SUBSEQUENT ENCOUNTER: ICD-10-CM

## 2024-03-08 DIAGNOSIS — R42 DIZZINESS: Primary | ICD-10-CM

## 2024-03-08 DIAGNOSIS — H40.89 OTHER SPECIFIED GLAUCOMA, UNSPECIFIED LATERALITY: ICD-10-CM

## 2024-03-08 DIAGNOSIS — S32.110D CLOSED NONDISPLACED ZONE I FRACTURE OF SACRUM WITH ROUTINE HEALING, SUBSEQUENT ENCOUNTER: ICD-10-CM

## 2024-03-08 DIAGNOSIS — Z86.73 HISTORY OF CVA (CEREBROVASCULAR ACCIDENT): ICD-10-CM

## 2024-03-08 DIAGNOSIS — M17.0 PRIMARY OSTEOARTHRITIS OF KNEES, BILATERAL: ICD-10-CM

## 2024-03-08 DIAGNOSIS — R26.2 AMBULATORY DYSFUNCTION: ICD-10-CM

## 2024-03-08 DIAGNOSIS — F41.9 ANXIETY: ICD-10-CM

## 2024-03-08 PROBLEM — M54.2 NECK PAIN ON RIGHT SIDE: Status: RESOLVED | Noted: 2024-02-21 | Resolved: 2024-03-08

## 2024-03-08 PROCEDURE — 99316 NF DSCHRG MGMT 30 MIN+: CPT | Performed by: NURSE PRACTITIONER

## 2024-03-08 NOTE — PROGRESS NOTES
[facility]  [address] [address]  [phone]  DISCHARGE SUMMARY  POS: 31  Facility: [facility]    NAME: Neha Molina  AGE: 89 y.o. SEX: female  DATE OF ADMISSION: 2/12/2024 DATE OF DISCHARGE: 3/11/2024 DISCHARGE DISPOSITION: [facility]  Code status: Full code  Reason for admission: Patient was admitted from [facility] for rehabilitation after hospitalization for s/p fall; dizziness secondary to BPPV, mild hyponatremia, osteoarthritis, ambulatory dysfunction  Additional Problems:   Past Medical History:   Diagnosis Date    CAD (coronary artery disease)    Glaucoma  Hypothyroid  Vitamin B12 deficiency    Discharge Diagnoses: See problem list follow up recommendations below.    Course of stay: Patient was admitted to [facility] for rehabilitation following hospitalization for above-mentioned.  At time of examination this morning, patient found seated in chair, and appeared in no distress.  Continues with intermittent dizziness without nausea or vomiting.  Denies shortness of breath, chest pain, headache, abdominal pain, constipation, or diarrhea.  During the resident's stay at [facility], she received skilled nursing care, PT, OT, dietitian support, social service support, and medical management for an overall improvement in her functional status.  She is scheduled to be discharged to [facility] on 3/11/2024.  A referral was placed to [facility] by social service.  No additional equipment needed at this time.    Labs and testing performed during stay: CBC, BMP    Discharge Medications: See discharge medication list which was reviewed in [facility] and compared to facility orders for accuracy.  Status at time of discharge exam: Stable    Today's Visit: 3/8/96922:14 PM    Subjective: No complaints at time of exam    Review of systems: As per review of medical illness, all other systems reviewed and negative.    Vitals: Weight: 140.2 pounds BP: 131/70    temp: 97.6    heart rate: 70   resp: 18    Exam: Physical Exam  Vitals and nursing note reviewed.   Constitutional:       General: She is not in acute distress.     Appearance: She is not ill-appearing, toxic-appearing or diaphoretic.   HENT:      Head: Normocephalic.      Nose: No congestion.      Mouth/Throat:      Mouth: Mucous membranes are moist.      Pharynx: No oropharyngeal exudate.   Eyes:      Extraocular Movements: Extraocular movements intact.      Conjunctiva/sclera: Conjunctivae normal.   Cardiovascular:      Rate and Rhythm: Normal rate.   Pulmonary:      Effort: Pulmonary effort is normal. No respiratory distress.   Abdominal:      General: Bowel sounds are normal. There is no distension.      Palpations: Abdomen is soft.      Tenderness: There is no abdominal tenderness. There is no guarding.   Musculoskeletal:      Cervical back: Neck supple. No rigidity.      Right lower leg: No edema.      Left lower leg: No edema.      Comments: Moves all 4 extremities.   Skin:     General: Skin is warm and dry.      Capillary Refill: Capillary refill takes less than 2 seconds.   Neurological:      Mental Status: She is alert. Mental status is at baseline.   Psychiatric:         Mood and Affect: Mood normal.         Behavior: Behavior normal.         Thought Content: Thought content normal.       Discussion with patient and further instructions:  -Fall precautions  -Monitor for signs/symptoms of infection  -Medication list was reviewed and signed  -DME form was completed    Follow-up Recommendations: Please follow-up with your primary care physician within 7-10 days of discharge to review medication changes and current status.     Problem List Follow-up Recommendations:  89-year-old female with:    Dizziness  Likely in setting of BPPV  Continues with intermittent dizziness and Antivert effective  Continue Antivert 25 mg daily as needed  Continue PT/OT  Follow-up with PCP outpatient    Primary osteoarthritis of  knees, bilateral  With history of multiple falls which she attributes to her knee arthritis, right >left   Patient was evaluated by orthopedics on 3/6/2024 and received CSI injection right knee with improvement.  Nonsurgical and surgical intervention TKA discussed in office as per orthopedic notes  Continue Tylenol for pain  Continue fall precautions    Sacral fracture, closed (HCC)  S/p fall 12/21/2023  Stable without pain  Had follow-up with orthopedics on 3/6/2024    Fracture of ramus of right pubis (formerly [facility])  S/p  fall 12/21/2023  Had follow-up with orthopedics on 3/6/2024  No complaints of pain    Hypothyroid  TSH WNL 12/2023  Continue levothyroxine 75 mcg daily  Follow-up with PCP for management    History of CVA (cerebrovascular accident)  Recent MRI 2/11/2024 showed no acute infarct, sequela of chronic right PCA territory infarct, mild microangiopathic change  No new focal deficits noted  Continue aspirin and statin    Other specified glaucoma  Patient has an upcoming appointment with ophthalmology office at [facility]    Anxiety  Mood appears stable  Continue home dose Ativan 0.5 mg Q HS  Follow-up with PCP    Ambulatory dysfunction  In setting of acute and chronic medical conditions  Ambulates using a walker  Patient participated in therapy during SNF stay  Recommend to continue PT/OT with home health services     Urinary frequency  Stable  Currently on home dose oxybutynin ER 10 mg daily  Follow-up with PCP for management    I have spent >30 minutes with patient today in which greater than 50% of this time was spent in counseling/coordination of care regarding Diagnostic results, Instructions for management, Importance of tx compliance, Counseling / Coordination of care, Documenting in the medical record, Reviewing / ordering tests, medicine, procedures  , Obtaining or reviewing history  , and Communicating with other healthcare professionals .    PCP made aware of discharge summary via epic  communications.    This note was completed in part utilizing Dragon Medical one voice recognition software.  Grammatical errors, random word insertion, spelling mistakes, and incomplete sentences may be an occasional consequence of the system secondary to software limitations, ambient noise and hardware issues.  At the time of dictation, efforts were made to edit, clarify and/or correct errors.  Please read the chart carefully and recognize, using context, where substitutions have occurred.  If you have any questions or concerns about the context, text or information contained within the body of this dictation, please contact myself, the provider, for further clarification.    DIGNA Patton  3/8/75751:14 PM

## 2024-03-08 NOTE — LETTER
March 8, 2024     Dinesh Johnston MD  [address] [place] [address]  [address] Floor, [address]  [facility] [address]    Patient: Neha Molina   YOB: 1932   Date of Visit: 3/8/2024       Dear Dr. Johnston:    Thank you for referring Neha Molina to me for evaluation. Below are my notes for this consultation.    If you have questions, please do not hesitate to call me. I look forward to following your patient along with you.         Sincerely,        DIGNA Patton        CC: No Recipients    DIGNA Patton  3/8/2024  7:16 PM  Sign when Signing Visit  [address] [address] [phone]  DISCHARGE SUMMARY  POS: 31  Facility: [facility]    NAME: Neha Molina  AGE: 89 y.o. SEX: female  DATE OF ADMISSION: 2/12/2024 DATE OF DISCHARGE: 3/11/2024 DISCHARGE DISPOSITION: [facility]  Code status: Full code  Reason for admission: Patient was admitted from [facility] for rehabilitation after hospitalization for s/p fall; dizziness secondary to BPPV, mild hyponatremia, osteoarthritis, ambulatory dysfunction  Additional Problems:   Past Medical History:   Diagnosis Date   • CAD (coronary artery disease)    Glaucoma  Hypothyroid  Vitamin B12 deficiency    Discharge Diagnoses: See problem list follow up recommendations below.    Course of stay: Patient was admitted to [facility] for rehabilitation following hospitalization for above-mentioned.  At time of examination this morning, patient found seated in chair, and appeared in no distress.  Continues with intermittent dizziness without nausea or vomiting.  Denies shortness of breath, chest pain, headache, abdominal pain, constipation, or diarrhea.  During the resident's stay at [facility], she received skilled nursing care, PT, OT, dietitian support, social service support, and medical management for an overall improvement in her functional status.  She is scheduled  to be discharged to [facility] on 3/11/2024.  A referral was placed to [facility] by social service.  No additional equipment needed at this time.    Labs and testing performed during stay: CBC, BMP    Discharge Medications: See discharge medication list which [facility] and compared to facility orders for accuracy.  Status at time of discharge exam: Stable    Today's Visit: 3/8/01105:14 PM    Subjective: No complaints at time of exam    Review of systems: As per review of medical illness, all other systems reviewed and negative.    Vitals: Weight: 140.2 pounds BP: 131/70   temp: 97.6    heart rate: 70   resp: 18    Exam: Physical Exam  Vitals and nursing note reviewed.   Constitutional:       General: She is not in acute distress.     Appearance: She is not ill-appearing, toxic-appearing or diaphoretic.   HENT:      Head: Normocephalic.      Nose: No congestion.      Mouth/Throat:      Mouth: Mucous membranes are moist.      Pharynx: No oropharyngeal exudate.   Eyes:      Extraocular Movements: Extraocular movements intact.      Conjunctiva/sclera: Conjunctivae normal.   Cardiovascular:      Rate and Rhythm: Normal rate.   Pulmonary:      Effort: Pulmonary effort is normal. No respiratory distress.   Abdominal:      General: Bowel sounds are normal. There is no distension.      Palpations: Abdomen is soft.      Tenderness: There is no abdominal tenderness. There is no guarding.   Musculoskeletal:      Cervical back: Neck supple. No rigidity.      Right lower leg: No edema.      Left lower leg: No edema.      Comments: Moves all 4 extremities.   Skin:     General: Skin is warm and dry.      Capillary Refill: Capillary refill takes less than 2 seconds.   Neurological:      Mental Status: She is alert. Mental status is at baseline.   Psychiatric:         Mood and Affect: Mood normal.         Behavior: Behavior normal.         Thought Content: Thought content normal.       Discussion with patient and  further instructions:  -Fall precautions  -Monitor for signs/symptoms of infection  -Medication list was reviewed and signed  -DME form was completed    Follow-up Recommendations: Please follow-up with your primary care physician within 7-10 days of discharge to review medication changes and current status.     Problem List Follow-up Recommendations:  89-year-old female with:    Dizziness  Likely in setting of BPPV  Continues with intermittent dizziness and Antivert effective  Continue Antivert 25 mg daily as needed  Continue PT/OT  Follow-up with PCP outpatient    Primary osteoarthritis of knees, bilateral  With history of multiple falls which she attributes to her knee arthritis, right >left   Patient was evaluated by orthopedics on 3/6/2024 and received CSI injection right knee with improvement.  Nonsurgical and surgical intervention TKA discussed in office as per orthopedic notes  Continue Tylenol for pain  Continue fall precautions    Sacral fracture, closed (HCC)  S/p fall 12/21/2023  Stable without pain  Had follow-up with orthopedics on 3/6/2024    Fracture of ramus of right pubis (HCC)  S/p  fall 12/21/2023  Had follow-up with orthopedics on 3/6/2024  No complaints of pain    Hypothyroid  TSH WNL 12/2023  Continue levothyroxine 75 mcg daily  Follow-up with PCP for management    History of CVA (cerebrovascular accident)  History of chronic right PCA territory infarct as per MRI 2/11/2024  No new focal deficits noted  Continue aspirin and statin    Other specified glaucoma  Patient has an upcoming appointment with ophthalmology office at [facility]    Anxiety  Mood appears stable  Continue home dose Ativan 0.5 mg Q HS  Follow-up with PCP    Ambulatory dysfunction  In setting of acute and chronic medical conditions  Ambulates using a walker  Patient participated in therapy during SNF stay  Recommend to continue PT/OT with home health services     Urinary frequency  Stable  Currently on home dose  oxybutynin ER 10 mg daily  Follow-up with PCP for management    I have spent >30 minutes with patient today in which greater than 50% of this time was spent in counseling/coordination of care regarding Diagnostic results, Instructions for management, Importance of tx compliance, Counseling / Coordination of care, Documenting in the medical record, Reviewing / ordering tests, medicine, procedures  , Obtaining or reviewing history  , and Communicating with other healthcare professionals .    PCP made aware of discharge summary via epic communications.    This note was completed in part utilizing Dragon Medical one voice recognition software.  Grammatical errors, random word insertion, spelling mistakes, and incomplete sentences may be an occasional consequence of the system secondary to software limitations, ambient noise and hardware issues.  At the time of dictation, efforts were made to edit, clarify and/or correct errors.  Please read the chart carefully and recognize, using context, where substitutions have occurred.  If you have any questions or concerns about the context, text or information contained within the body of this dictation, please contact myself, the provider, for further clarification.    DIGNA Patton  3/8/76285:14 PM

## 2024-03-09 NOTE — ASSESSMENT & PLAN NOTE
History of chronic right PCA territory infarct as per MRI 2/11/2024  No new focal deficits noted  Continue aspirin and statin

## 2024-03-09 NOTE — ASSESSMENT & PLAN NOTE
With history of multiple falls which she attributes to her knee arthritis, right >left   Patient was evaluated by orthopedics on 3/6/2024 and received CSI injection right knee with improvement.  Nonsurgical and surgical intervention TKA discussed in office as per orthopedic notes  Continue Tylenol for pain  Continue fall precautions

## 2024-03-09 NOTE — ASSESSMENT & PLAN NOTE
Likely in setting of BPPV  Continues with intermittent dizziness and Antivert effective  Continue Antivert 25 mg daily as needed  Continue PT/OT  Follow-up with PCP outpatient

## 2024-03-09 NOTE — ASSESSMENT & PLAN NOTE
In setting of acute and chronic medical conditions  Ambulates using a walker  Patient participated in therapy during SNF stay  Recommend to continue PT/OT with home health services

## 2024-03-14 ENCOUNTER — TELEPHONE (OUTPATIENT)
Dept: INTERNAL MEDICINE CLINIC | Facility: CLINIC | Age: 89
End: 2024-03-14

## 2024-03-14 DIAGNOSIS — H53.9 VISION CHANGES: Primary | ICD-10-CM

## 2024-03-19 ENCOUNTER — OFFICE VISIT (OUTPATIENT)
Dept: OBGYN CLINIC | Facility: HOSPITAL | Age: 89
End: 2024-03-19
Payer: MEDICARE

## 2024-03-19 VITALS
HEIGHT: 60 IN | HEART RATE: 64 BPM | DIASTOLIC BLOOD PRESSURE: 75 MMHG | SYSTOLIC BLOOD PRESSURE: 128 MMHG | BODY MASS INDEX: 28.51 KG/M2

## 2024-03-19 DIAGNOSIS — M17.12 PRIMARY OSTEOARTHRITIS OF LEFT KNEE: Primary | ICD-10-CM

## 2024-03-19 DIAGNOSIS — M17.0 PRIMARY OSTEOARTHRITIS OF KNEES, BILATERAL: ICD-10-CM

## 2024-03-19 PROCEDURE — 20610 DRAIN/INJ JOINT/BURSA W/O US: CPT | Performed by: ORTHOPAEDIC SURGERY

## 2024-03-19 RX ORDER — BUPIVACAINE HYDROCHLORIDE 2.5 MG/ML
4 INJECTION, SOLUTION INFILTRATION; PERINEURAL
Status: COMPLETED | OUTPATIENT
Start: 2024-03-19 | End: 2024-03-19

## 2024-03-19 RX ORDER — METHYLPREDNISOLONE ACETATE 40 MG/ML
2 INJECTION, SUSPENSION INTRA-ARTICULAR; INTRALESIONAL; INTRAMUSCULAR; SOFT TISSUE
Status: COMPLETED | OUTPATIENT
Start: 2024-03-19 | End: 2024-03-19

## 2024-03-19 RX ADMIN — BUPIVACAINE HYDROCHLORIDE 4 ML: 2.5 INJECTION, SOLUTION INFILTRATION; PERINEURAL at 10:45

## 2024-03-19 RX ADMIN — METHYLPREDNISOLONE ACETATE 2 ML: 40 INJECTION, SUSPENSION INTRA-ARTICULAR; INTRALESIONAL; INTRAMUSCULAR; SOFT TISSUE at 10:45

## 2024-03-19 NOTE — PROGRESS NOTES
Large joint arthrocentesis: L knee  Universal Protocol:  Consent: Verbal consent obtained. Written consent not obtained.  Consent given by: patient  Timeout called at: 3/19/2024 11:05 AM.  Patient understanding: patient states understanding of the procedure being performed  Patient identity confirmed: verbally with patient  Supporting Documentation  Indications: pain and diagnostic evaluation   Procedure Details  Location: knee - L knee  Needle size: 22 G  Ultrasound guidance: no  Approach: anterolateral  Medications administered: 4 mL bupivacaine 0.25 %; 2 mL methylPREDNISolone acetate 40 mg/mL

## 2024-03-20 PROBLEM — S22.39XA RIB FRACTURE: Status: RESOLVED | Noted: 2023-10-24 | Resolved: 2024-03-20

## 2024-03-20 PROBLEM — M15.0 PRIMARY OSTEOARTHRITIS INVOLVING MULTIPLE JOINTS: Status: ACTIVE | Noted: 2024-03-20

## 2024-03-20 PROBLEM — H81.13 BENIGN PAROXYSMAL POSITIONAL VERTIGO DUE TO BILATERAL VESTIBULAR DISORDER: Status: ACTIVE | Noted: 2024-03-20

## 2024-03-20 PROBLEM — M15.9 PRIMARY OSTEOARTHRITIS INVOLVING MULTIPLE JOINTS: Status: ACTIVE | Noted: 2024-03-20

## 2024-03-20 PROBLEM — S32.10XA SACRAL FRACTURE, CLOSED (HCC): Status: RESOLVED | Noted: 2023-12-21 | Resolved: 2024-03-20

## 2024-03-20 PROBLEM — S32.591A FRACTURE OF RAMUS OF RIGHT PUBIS (HCC): Status: RESOLVED | Noted: 2023-12-21 | Resolved: 2024-03-20

## 2024-03-25 ENCOUNTER — OFFICE VISIT (OUTPATIENT)
Dept: INTERNAL MEDICINE CLINIC | Facility: CLINIC | Age: 89
End: 2024-03-25
Payer: MEDICARE

## 2024-03-25 ENCOUNTER — TRANSITIONAL CARE MANAGEMENT (OUTPATIENT)
Dept: INTERNAL MEDICINE CLINIC | Facility: CLINIC | Age: 89
End: 2024-03-25

## 2024-03-25 VITALS — HEART RATE: 75 BPM | OXYGEN SATURATION: 97 %

## 2024-03-25 DIAGNOSIS — Z86.73 HISTORY OF CVA (CEREBROVASCULAR ACCIDENT): ICD-10-CM

## 2024-03-25 DIAGNOSIS — F41.9 ANXIETY: ICD-10-CM

## 2024-03-25 DIAGNOSIS — E03.8 OTHER SPECIFIED HYPOTHYROIDISM: ICD-10-CM

## 2024-03-25 DIAGNOSIS — R35.0 URINARY FREQUENCY: ICD-10-CM

## 2024-03-25 DIAGNOSIS — S32.82XD MULTIPLE CLOSED FRACTURES OF PELVIS WITHOUT DISRUPTION OF PELVIC RING WITH ROUTINE HEALING: Primary | ICD-10-CM

## 2024-03-25 DIAGNOSIS — R26.2 AMBULATORY DYSFUNCTION: ICD-10-CM

## 2024-03-25 DIAGNOSIS — H61.23 BILATERAL IMPACTED CERUMEN: ICD-10-CM

## 2024-03-25 DIAGNOSIS — N32.81 OVERACTIVE BLADDER: ICD-10-CM

## 2024-03-25 DIAGNOSIS — H81.13 BENIGN PAROXYSMAL POSITIONAL VERTIGO DUE TO BILATERAL VESTIBULAR DISORDER: ICD-10-CM

## 2024-03-25 PROCEDURE — 99495 TRANSJ CARE MGMT MOD F2F 14D: CPT | Performed by: INTERNAL MEDICINE

## 2024-03-25 RX ORDER — OXYBUTYNIN CHLORIDE 10 MG/1
10 TABLET, EXTENDED RELEASE ORAL
Qty: 90 TABLET | Refills: 1 | Status: SHIPPED | OUTPATIENT
Start: 2024-03-25 | End: 2024-03-25 | Stop reason: SDUPTHER

## 2024-03-25 RX ORDER — OXYBUTYNIN CHLORIDE 10 MG/1
10 TABLET, EXTENDED RELEASE ORAL
Qty: 90 TABLET | Refills: 1 | Status: SHIPPED | OUTPATIENT
Start: 2024-03-25

## 2024-03-25 RX ORDER — LEVOTHYROXINE SODIUM 0.07 MG/1
TABLET ORAL
Qty: 90 TABLET | Refills: 2 | Status: SHIPPED | OUTPATIENT
Start: 2024-03-25 | End: 2024-03-25 | Stop reason: SDUPTHER

## 2024-03-25 RX ORDER — LEVOTHYROXINE SODIUM 0.07 MG/1
TABLET ORAL
Qty: 90 TABLET | Refills: 2 | Status: SHIPPED | OUTPATIENT
Start: 2024-03-25

## 2024-03-25 RX ORDER — LORAZEPAM 0.5 MG/1
0.5 TABLET ORAL
Qty: 90 TABLET | Refills: 0 | Status: SHIPPED | OUTPATIENT
Start: 2024-03-25 | End: 2024-03-27

## 2024-03-25 NOTE — ASSESSMENT & PLAN NOTE
Ambulatory dysfunction compounded by recent pelvic fracture recommend continuation of PT and OT therapies

## 2024-03-25 NOTE — PROGRESS NOTES
Assessment & Plan     1. Benign paroxysmal positional vertigo due to bilateral vestibular disorder  Assessment & Plan:  Diagnosis of BPPV reviewed with the patient currently without any vertiginous symptoms.  Indicated that the symptoms can come and go.  She does have a prescription for meclizine which does seem to give her some degree of benefit so she can continue to use 25 mg as needed.      2. Ambulatory dysfunction  Assessment & Plan:  Ambulatory dysfunction compounded by recent pelvic fracture recommend continuation of PT and OT therapies      3. Bilateral impacted cerumen  Assessment & Plan:  Examination of the ears today indicates bilateral impacted cerumen referral to ENT for removal of the cerumen.  It may be a factor in some of her vertigo symptoms    Orders:  -     Ambulatory Referral to Otolaryngology; Future    4. Other specified hypothyroidism  Assessment & Plan:  Thyroid assessment performed during hospitalization has been reviewed and confirms adequate replacement therapy on the patient's current dosing of thyroid medication which is 75 mcg of levothyroxine daily.    Orders:  -     levothyroxine 75 mcg tablet; Please administer at 8am daily    5. Urinary frequency  -     oxybutynin (DITROPAN-XL) 10 MG 24 hr tablet; Take 1 tablet (10 mg total) by mouth daily at bedtime    6. Overactive bladder  Assessment & Plan:  Symptoms of overactive bladder seem to bother the patient most at night because they interrupt her sleep.  She is currently on oxybutynin 10 mg extended release daily she would like to try switching it to nighttime to see if she sleeps better so we will instruct her facility to dose it in the evening instead of morning      7. Anxiety  Assessment & Plan:  Anxiety symptoms are currently relatively stable she has an evening dose of lorazepam 0.5 mg at bedtime recommend continuation of this medication      8. History of CVA (cerebrovascular accident)  Assessment & Plan:  History of right  posterior cerebral artery infarction no new findings on recent MRI scan of the brain.  Neurologically she is stable      9. Multiple closed fractures of pelvis without disruption of pelvic ring with routine healing  Assessment & Plan:  Events of hospitalization as well as rehab reviewed with the patient during today's visit.  She recalls a fall as being mechanical where there was a threshold with an elevation that she misjudged and she tripped and fell.  Currently seems to be doing well follow-up x-ray should show good healing process patient continues with PT and O2 does not require any specific pain medication at this time.           Subjective     Transitional Care Management Review:   Neha Molina is a 89 y.o. female here for TCM follow up.     During the TCM phone call patient stated:  TCM Call       Date and time call was made  3/25/2024 11:22 AM    Hospital care reviewed  Records reviewed    Patient was hospitialized at  Other (comment)    Comment  [facility]    Date of Admission  02/12/24    Date of discharge  03/11/24    Diagnosis  dizziness    Disposition  Home    Current Symptoms  None          TCM Call       Post hospital issues  None    Should patient be enrolled in anticoag monitoring?  No    Scheduled for follow up?  Yes    Did you obtain your prescribed medications  Yes    Do you need help managing your prescriptions or medications  No    Is transportation to your appointment needed  No    I have advised the patient to call PCP with any new or worsening symptoms  barrett [email or web address]    Living Arrangements  Alone    Support System  Family; Friends    The type of support provided  Emotional; Physical    Do you have social support  Yes, as much as I need    Comments  pt sent to SNF          This 89-year-old female patient presents today for a transition of care visit.  She sustained a mechanical fall resulting in pelvic fracture.  She was hospitalized at [facility] for evaluation and then subsequently  transferred for nursing home rehab.  She is now returned to her assisted living facility at [facility].  She indicates that this time she continues to receive physical therapy and Occupational Therapy.  She seems to be healing nicely based upon her most recent x-rays and denies any significant pain at this time.  She presents today in a wheelchair.    We have also reviewed the symptoms of intermittent vertigo which she has been diagnosed as BPPV seems to be triggered by change in position.  MRI scanning of the brain shows chronic damage secondary to posterior cerebral artery distribution infarction on the right side      Review of Systems   Musculoskeletal:  Positive for gait problem.   Neurological:         Intermittent symptoms of vertigo   All other systems reviewed and are negative.      Objective     Pulse 75   LMP  (LMP Unknown)   SpO2 97%      Physical Exam  Vitals and nursing note reviewed.   Constitutional:       General: She is not in acute distress.     Appearance: She is well-developed.   HENT:      Head: Normocephalic and atraumatic.      Right Ear: There is impacted cerumen.      Left Ear: There is impacted cerumen.   Eyes:      Conjunctiva/sclera: Conjunctivae normal.   Cardiovascular:      Rate and Rhythm: Normal rate and regular rhythm.      Heart sounds: No murmur heard.  Pulmonary:      Effort: Pulmonary effort is normal. No respiratory distress.      Breath sounds: Normal breath sounds.   Abdominal:      Palpations: Abdomen is soft.      Tenderness: There is no abdominal tenderness.   Musculoskeletal:         General: No swelling or tenderness.      Cervical back: Neck supple.      Right lower leg: No edema.      Left lower leg: No edema.   Skin:     General: Skin is warm and dry.      Capillary Refill: Capillary refill takes less than 2 seconds.   Neurological:      Mental Status: She is alert. Mental status is at baseline.   Psychiatric:         Mood and Affect: Mood normal.       Medications have  been reviewed by provider in current encounter    Dinesh Johnston MD

## 2024-03-25 NOTE — ASSESSMENT & PLAN NOTE
Thyroid assessment performed during hospitalization has been reviewed and confirms adequate replacement therapy on the patient's current dosing of thyroid medication which is 75 mcg of levothyroxine daily.

## 2024-03-25 NOTE — ASSESSMENT & PLAN NOTE
Symptoms of overactive bladder seem to bother the patient most at night because they interrupt her sleep.  She is currently on oxybutynin 10 mg extended release daily she would like to try switching it to nighttime to see if she sleeps better so we will instruct her facility to dose it in the evening instead of morning

## 2024-03-25 NOTE — ASSESSMENT & PLAN NOTE
History of right posterior cerebral artery infarction no new findings on recent MRI scan of the brain.  Neurologically she is stable

## 2024-03-25 NOTE — ASSESSMENT & PLAN NOTE
Anxiety symptoms are currently relatively stable she has an evening dose of lorazepam 0.5 mg at bedtime recommend continuation of this medication

## 2024-03-25 NOTE — ASSESSMENT & PLAN NOTE
Events of hospitalization as well as rehab reviewed with the patient during today's visit.  She recalls a fall as being mechanical where there was a threshold with an elevation that she misjudged and she tripped and fell.  Currently seems to be doing well follow-up x-ray should show good healing process patient continues with PT and O2 does not require any specific pain medication at this time.

## 2024-03-25 NOTE — ASSESSMENT & PLAN NOTE
Examination of the ears today indicates bilateral impacted cerumen referral to ENT for removal of the cerumen.  It may be a factor in some of her vertigo symptoms

## 2024-03-25 NOTE — ASSESSMENT & PLAN NOTE
Diagnosis of BPPV reviewed with the patient currently without any vertiginous symptoms.  Indicated that the symptoms can come and go.  She does have a prescription for meclizine which does seem to give her some degree of benefit so she can continue to use 25 mg as needed.

## 2024-03-25 NOTE — TELEPHONE ENCOUNTER
[facility]  # [phone]  [phone]    THEY ARE REQUESTING SCRIPT TO BE SENT TO [facility] FAX # [phone]

## 2024-03-26 ENCOUNTER — NEW PATIENT COMPREHENSIVE (OUTPATIENT)
Dept: URBAN - METROPOLITAN AREA CLINIC 6 | Facility: CLINIC | Age: 89
End: 2024-03-26

## 2024-03-26 DIAGNOSIS — Z96.1: ICD-10-CM

## 2024-03-26 PROCEDURE — 92004 COMPRE OPH EXAM NEW PT 1/>: CPT

## 2024-03-26 ASSESSMENT — VISUAL ACUITY
OS_SC: 20/30-2
OD_SC: 20/70
OD_PH: 20/50

## 2024-03-26 ASSESSMENT — TONOMETRY
OS_IOP_MMHG: 14
OD_IOP_MMHG: 15

## 2024-03-27 DIAGNOSIS — F41.9 ANXIETY: ICD-10-CM

## 2024-03-27 RX ORDER — LORAZEPAM 0.5 MG/1
TABLET ORAL
Qty: 30 TABLET | Refills: 5 | Status: SHIPPED | OUTPATIENT
Start: 2024-03-27

## 2024-04-11 DIAGNOSIS — F41.9 ANXIETY: ICD-10-CM

## 2024-04-11 RX ORDER — LORAZEPAM 0.5 MG/1
0.5 TABLET ORAL
Qty: 30 TABLET | Refills: 5 | Status: SHIPPED | OUTPATIENT
Start: 2024-04-11

## 2024-05-01 PROBLEM — H61.23 BILATERAL IMPACTED CERUMEN: Status: RESOLVED | Noted: 2024-03-25 | Resolved: 2024-05-01

## 2024-05-03 ENCOUNTER — TELEPHONE (OUTPATIENT)
Age: 89
End: 2024-05-03

## 2024-05-03 NOTE — TELEPHONE ENCOUNTER
Patient is calling to request a referral for urology, she wants to know if Dr Johnston can recommend someone to go to that he trusts.

## 2024-05-06 DIAGNOSIS — R32 URINARY INCONTINENCE, UNSPECIFIED TYPE: Primary | ICD-10-CM

## 2024-05-23 ENCOUNTER — OFFICE VISIT (OUTPATIENT)
Dept: INTERNAL MEDICINE CLINIC | Facility: CLINIC | Age: 89
End: 2024-05-23
Payer: MEDICARE

## 2024-05-23 ENCOUNTER — TELEPHONE (OUTPATIENT)
Age: 89
End: 2024-05-23

## 2024-05-23 VITALS
WEIGHT: 142 LBS | SYSTOLIC BLOOD PRESSURE: 124 MMHG | DIASTOLIC BLOOD PRESSURE: 70 MMHG | HEART RATE: 76 BPM | HEIGHT: 60 IN | TEMPERATURE: 98.3 F | OXYGEN SATURATION: 98 % | BODY MASS INDEX: 27.88 KG/M2

## 2024-05-23 DIAGNOSIS — I65.23 BILATERAL CAROTID ARTERY STENOSIS: Primary | ICD-10-CM

## 2024-05-23 DIAGNOSIS — F41.9 ANXIETY: ICD-10-CM

## 2024-05-23 DIAGNOSIS — R51.9 GENERALIZED HEADACHES: ICD-10-CM

## 2024-05-23 DIAGNOSIS — E03.8 OTHER SPECIFIED HYPOTHYROIDISM: ICD-10-CM

## 2024-05-23 DIAGNOSIS — R60.0 LOCALIZED EDEMA: ICD-10-CM

## 2024-05-23 DIAGNOSIS — R35.0 URINARY FREQUENCY: ICD-10-CM

## 2024-05-23 DIAGNOSIS — Z86.73 HISTORY OF CVA (CEREBROVASCULAR ACCIDENT): ICD-10-CM

## 2024-05-23 DIAGNOSIS — N32.81 OVERACTIVE BLADDER: ICD-10-CM

## 2024-05-23 DIAGNOSIS — H81.13 BENIGN PAROXYSMAL POSITIONAL VERTIGO DUE TO BILATERAL VESTIBULAR DISORDER: ICD-10-CM

## 2024-05-23 PROCEDURE — 99215 OFFICE O/P EST HI 40 MIN: CPT | Performed by: INTERNAL MEDICINE

## 2024-05-23 PROCEDURE — G2211 COMPLEX E/M VISIT ADD ON: HCPCS | Performed by: INTERNAL MEDICINE

## 2024-05-23 RX ORDER — OXYBUTYNIN CHLORIDE 10 MG/1
20 TABLET, EXTENDED RELEASE ORAL
Qty: 180 TABLET | Refills: 1 | Status: SHIPPED | OUTPATIENT
Start: 2024-05-23

## 2024-05-23 NOTE — TELEPHONE ENCOUNTER
Patient called back regarding an appt that she had this morning. She forgot to ask a question. States she is having bedwetting at night. States that she is on oxybutynin at night and that has not been helping. States that she is wetting the bed and having to get up and change everything 3 to 5 times a night. She is requesting a call back with recommendations from the PCP.

## 2024-05-23 NOTE — ASSESSMENT & PLAN NOTE
Lateral lower extremity edema appreciated today I have recommended an echocardiogram to evaluate for the possibility that she has some degree of right side congestive heart failure will review the results of study with patient upon return visit avoid excessive salt consumption and elevate feet during the day when possible is advised

## 2024-05-23 NOTE — TELEPHONE ENCOUNTER
Patient reports frequent urination 5 times at night sometimes on Ditropan 10 mg will increase to 20 mg.

## 2024-05-23 NOTE — ASSESSMENT & PLAN NOTE
Symptoms of vertigo and vertebrobasilar insufficiency reviewed with the patient recommend ultrasound of carotids as well as vertebrobasilar

## 2024-05-23 NOTE — ASSESSMENT & PLAN NOTE
Chronic anxiety likely a factor in her chronic headache symptoms which seem to be muscular and tension type headaches.  Recommend Ativan 0.25 at bedtime to assist with falling asleep.

## 2024-05-23 NOTE — ASSESSMENT & PLAN NOTE
Overactive bladder with frequent urination up to 5 times at night recommend increasing oxybutynin from 10 mg at bedtime to 20 mg at bedtime.

## 2024-05-23 NOTE — PROGRESS NOTES
Ambulatory Visit  Name: Neha Molina      : 1932      MRN: [id number]  Encounter Provider: Dinesh Johnston MD  Encounter Date: 2024   Encounter department: [facility] INTERNAL MEDICINE    Assessment & Plan   1. Bilateral carotid artery stenosis  Assessment & Plan:  Symptoms of vertigo and vertebrobasilar insufficiency reviewed with the patient recommend ultrasound of carotids as well as vertebrobasilar  Orders:  -     VAS carotid complete study; Future; Expected date: 2024  2. Localized edema  Assessment & Plan:  Lateral lower extremity edema appreciated today I have recommended an echocardiogram to evaluate for the possibility that she has some degree of right side congestive heart failure will review the results of study with patient upon return visit avoid excessive salt consumption and elevate feet during the day when possible is advised  Orders:  -     Echo complete w/ contrast if indicated; Future; Expected date: 2024  3. Other specified hypothyroidism  Assessment & Plan:  Most recent thyroid testing in 2023 indicated adequate control clinically the patient remained stable recommend continuation of levothyroxine at 75 mcg daily  4. 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  I have requested an ultrasound of her carotid arteries and vertebrobasilar arteries will review the results with patient upon completion.  Recommend that she practice care and caution and changing the posture from sitting to standing and lying to standing  5. Overactive bladder  Assessment & Plan:  Overactive bladder with frequent urination up to 5 times at night recommend increasing oxybutynin from 10 mg at bedtime to 20 mg at bedtime.  6. Anxiety  Assessment & Plan:  Chronic  anxiety likely a factor in her chronic headache symptoms which seem to be muscular and tension type headaches.  Recommend Ativan 0.25 at bedtime to assist with falling asleep.  7. Generalized headaches  Assessment & Plan:  From the patient's description of her headaches which are chronic and dates back several years I believe they are most likely anxiety and tension related headaches.  As needed use of Tylenol advised and at bedtime use of Ativan to assist with falling asleep  8. History of CVA (cerebrovascular accident)  Assessment & Plan:  Review of MRI scanning of the patient's brain confirms that she has previously suffered a small stroke in the septal region likely a factor in her vertigo and balance issues         History of Present Illness     This 89-year-old female patient returns to our office today for a routine visit.  During today's visit she has expressed concern about multiple symptoms.    For skin plaint is regarding long-term headaches and vertigo symptoms.  Symptoms date back quite a few years to when she was living in [facility] she saw multiple physicians for these complaints who are not able to actually pinpoint a cause.  The of vertigo and lightheadedness symptoms seem to be precipitated by change in posture when she stands up from sitting or getting out of bed to go to the bathroom she frequently falls backwards or loses her balance.  The headaches are generalized in nature may be related to her chronic anxiety symptoms.  Does seem to have some component of muscular tension.    She also has been concerned about frequent urination.  She indicates that at nighttime she can void up to 5 times.  This is disruptive to her sleep pattern.  She is currently on oxybutynin 10 mg at bedtime I have recommended an increase to 20 mg at bedtime.    She continues to struggle with anxiety symptoms and its effect on falling asleep.  I have indicated that she can certainly continue to use Ativan 0.25 mg at  bedtime if she requires it to help her fall asleep.    She also has bilateral lower extremity edema involving the ankle and pretibial area bilaterally.  Review of recent testing indicates that she does have reasonably good renal function denies any excessive salt consumption.  This may be an indication of right side congestive heart failure I have recommended an echocardiogram to evaluate this further.      Patient was seen for 45 minutes face-to-face time today this was preceded by 10 minutes of previsit time reviewing patient's records and 12 minutes of postvisit time for completion of chart and therapeutic plan.      Review of Systems   Genitourinary:         Nocturnal urinary frequency   Musculoskeletal:  Positive for back pain.   Neurological:  Positive for dizziness and headaches.   All other systems reviewed and are negative.    Past Medical History:   Diagnosis Date    CAD (coronary artery disease)      Past Surgical History:   Procedure Laterality Date    BREAST BIOPSY       Family History   Problem Relation Age of Onset    Stroke Sister      Social History     Tobacco Use    Smoking status: Former     Types: Cigarettes     Passive exposure: Past    Smokeless tobacco: Never   Vaping Use    Vaping status: Never Used   Substance and Sexual Activity    Alcohol use: Never    Drug use: Never    Sexual activity: Not Currently     Current Outpatient Medications on File Prior to Visit   Medication Sig    acetaminophen (TYLENOL) 500 mg tablet Take 500 mg by mouth every 6 (six) hours as needed    aspirin 81 mg chewable tablet Chew 81 mg daily    cholecalciferol (VITAMIN D3) 1,000 units tablet Take 1 tablet (1,000 Units total) by mouth daily    lactulose 20 g/30 mL Take 10 g by mouth daily as needed (Constipation)    levothyroxine 75 mcg tablet Please administer at 8am daily    lidocaine (LIDODERM) 5 % Apply 1 patch topically over 12 hours daily Remove & Discard patch within 12 hours or as directed by MD    LORazepam  (ATIVAN) 0.5 mg tablet Take 1 tablet (0.5 mg total) by mouth daily at bedtime    lovastatin (ALTOPREV) 20 MG 24 hr tablet Take 1 tablet (20 mg total) by mouth daily at bedtime Restart after completing Paxlovid treatment. Do not start before February 18, 2024.    meclizine (ANTIVERT) 25 mg tablet Take 25 mg by mouth daily as needed    triamterene-hydrochlorothiazide (DYAZIDE) 37.5-25 mg per capsule Take 1 capsule by mouth daily as needed    [DISCONTINUED] oxybutynin (DITROPAN-XL) 10 MG 24 hr tablet Take 1 tablet (10 mg total) by mouth daily at bedtime    ipratropium (ATROVENT) 0.03 % nasal spray 2 sprays into each nostril every 12 (twelve) hours     No Known Allergies  Immunization History   Administered Date(s) Administered    COVID-19 Moderna mRNA Vaccine 12 Yr+ 50 mcg/0.5 mL (Spikevax) 10/23/2023    COVID-19 PFIZER VACCINE 0.3 ML IM 01/23/2021, 02/12/2021    INFLUENZA 10/23/2023     Objective     /70   Pulse 76   Temp 98.3 °F (36.8 °C)   Ht 5' (1.524 m)   Wt 64.4 kg (142 lb)   LMP  (LMP Unknown)   SpO2 98%   BMI 27.73 kg/m²     Physical Exam  Vitals and nursing note reviewed.   Constitutional:       General: She is not in acute distress.     Appearance: She is well-developed. She is not ill-appearing.   HENT:      Head: Normocephalic and atraumatic.      Right Ear: Tympanic membrane, ear canal and external ear normal.      Left Ear: Tympanic membrane, ear canal and external ear normal.   Eyes:      Conjunctiva/sclera: Conjunctivae normal.   Cardiovascular:      Rate and Rhythm: Normal rate and regular rhythm.      Heart sounds: No murmur heard.  Pulmonary:      Effort: Pulmonary effort is normal. No respiratory distress.      Breath sounds: Normal breath sounds. No wheezing, rhonchi or rales.   Abdominal:      Palpations: Abdomen is soft.      Tenderness: There is no abdominal tenderness.   Musculoskeletal:         General: No swelling.      Cervical back: Normal range of motion and neck supple. No  rigidity or tenderness.   Skin:     General: Skin is warm and dry.      Capillary Refill: Capillary refill takes less than 2 seconds.   Neurological:      Mental Status: She is alert. Mental status is at baseline.   Psychiatric:         Mood and Affect: Mood normal.       Administrative Statements

## 2024-05-23 NOTE — ASSESSMENT & PLAN NOTE
From the patient's description of her headaches which are chronic and dates back several years I believe they are most likely anxiety and tension related headaches.  As needed use of Tylenol advised and at bedtime use of Ativan to assist with falling asleep

## 2024-05-23 NOTE — ASSESSMENT & PLAN NOTE
Most recent thyroid testing in December 2023 indicated adequate control clinically the patient remained stable recommend continuation of levothyroxine at 75 mcg daily

## 2024-05-23 NOTE — ASSESSMENT & PLAN NOTE
Believe the state patient's symptoms of vertigo and balance are most likely a sign of vestibular dysfunction possibly also vertebrobasilar insufficiency.  I have requested an ultrasound of her carotid arteries and vertebrobasilar arteries will review the results with patient upon completion.  Recommend that she practice care and caution and changing the posture from sitting to standing and lying to standing

## 2024-05-23 NOTE — ASSESSMENT & PLAN NOTE
Review of MRI scanning of the patient's brain confirms that she has previously suffered a small stroke in the septal region likely a factor in her vertigo and balance issues

## 2024-06-11 DIAGNOSIS — E63.9 NUTRITIONAL DEFICIENCY: Primary | ICD-10-CM

## 2024-06-11 RX ORDER — CHOLECALCIFEROL (VITAMIN D3) 25 MCG
1 TABLET,CHEWABLE ORAL DAILY
Qty: 30 TABLET | Refills: 5 | Status: SHIPPED | OUTPATIENT
Start: 2024-06-11

## 2024-06-11 RX ORDER — CALCIUM CITRATE/VITAMIN D3 200MG-6.25
1 TABLET ORAL DAILY
Qty: 30 TABLET | Refills: 5 | Status: SHIPPED | OUTPATIENT
Start: 2024-06-11

## 2024-06-13 ENCOUNTER — HOSPITAL ENCOUNTER (OUTPATIENT)
Dept: NON INVASIVE DIAGNOSTICS | Facility: HOSPITAL | Age: 89
Discharge: HOME | End: 2024-06-13
Attending: INTERNAL MEDICINE
Payer: MEDICARE

## 2024-06-13 DIAGNOSIS — I65.23 BILATERAL CAROTID ARTERY STENOSIS: ICD-10-CM

## 2024-06-13 PROCEDURE — 93880 EXTRACRANIAL BILAT STUDY: CPT

## 2024-06-14 PROCEDURE — 93880 EXTRACRANIAL BILAT STUDY: CPT | Performed by: INTERNAL MEDICINE

## 2024-06-20 ENCOUNTER — HOSPITAL ENCOUNTER (OUTPATIENT)
Dept: NON INVASIVE DIAGNOSTICS | Facility: CLINIC | Age: 89
Discharge: HOME | End: 2024-06-20
Attending: INTERNAL MEDICINE
Payer: MEDICARE

## 2024-06-20 VITALS
BODY MASS INDEX: 27.88 KG/M2 | WEIGHT: 142 LBS | HEIGHT: 60 IN | HEART RATE: 76 BPM | SYSTOLIC BLOOD PRESSURE: 124 MMHG | DIASTOLIC BLOOD PRESSURE: 70 MMHG

## 2024-06-20 DIAGNOSIS — R60.0 LOCALIZED EDEMA: ICD-10-CM

## 2024-06-20 LAB
AORTIC ROOT: 3.2 CM
AORTIC VALVE MEAN VELOCITY: 11.9 M/S
APICAL FOUR CHAMBER EJECTION FRACTION: 59 %
ASCENDING AORTA: 3.5 CM
AV AREA BY CONTINUOUS VTI: 2 CM2
AV AREA PEAK VELOCITY: 1.9 CM2
AV LVOT MEAN GRADIENT: 3 MMHG
AV LVOT PEAK GRADIENT: 6 MMHG
AV MEAN GRADIENT: 6 MMHG
AV PEAK GRADIENT: 13 MMHG
AV VALVE AREA: 1.98 CM2
AV VELOCITY RATIO: 0.68
BSA FOR ECHO PROCEDURE: 1.61 M2
DOP CALC AO PEAK VEL: 1.83 M/S
DOP CALC AO VTI: 37.9 CM
DOP CALC LVOT AREA: 2.83 CM2
DOP CALC LVOT CARDIAC INDEX: 3.02 L/MIN/M2
DOP CALC LVOT CARDIAC OUTPUT: 4.86 L/MIN
DOP CALC LVOT DIAMETER: 1.9 CM
DOP CALC LVOT PEAK VEL VTI: 26.42 CM
DOP CALC LVOT PEAK VEL: 1.25 M/S
DOP CALC LVOT STROKE INDEX: 46 ML/M2
DOP CALC LVOT STROKE VOLUME: 74.87
DOP CALC MV VTI: 50.06 CM
E WAVE DECELERATION TIME: 436 MS
E/A RATIO: 0.6
FRACTIONAL SHORTENING: 36 (ref 28–44)
INTERVENTRICULAR SEPTUM IN DIASTOLE (PARASTERNAL SHORT AXIS VIEW): 1.9 CM
INTERVENTRICULAR SEPTUM: 1.9 CM (ref 0.6–1.1)
LAAS-AP2: 17.5 CM2
LAAS-AP4: 18.1 CM2
LEFT ATRIUM SIZE: 4.4 CM
LEFT ATRIUM VOLUME (MOD BIPLANE): 48 ML
LEFT ATRIUM VOLUME INDEX (MOD BIPLANE): 29.8 ML/M2
LEFT INTERNAL DIMENSION IN SYSTOLE: 2.3 CM (ref 2.1–4)
LEFT VENTRICULAR INTERNAL DIMENSION IN DIASTOLE: 3.6 CM (ref 3.5–6)
LEFT VENTRICULAR POSTERIOR WALL IN END DIASTOLE: 1.5 CM
LEFT VENTRICULAR STROKE VOLUME: 35 ML
LVSV (TEICH): 35 ML
MV E'TISSUE VEL-LAT: 6 CM/S
MV E'TISSUE VEL-SEP: 6 CM/S
MV MEAN GRADIENT: 5 MMHG
MV PEAK A VEL: 1.79 M/S
MV PEAK E VEL: 107 CM/S
MV PEAK GRADIENT: 19 MMHG
MV STENOSIS PRESSURE HALF TIME: 126 MS
MV VALVE AREA BY CONTINUITY EQUATION: 1.5 CM2
MV VALVE AREA P 1/2 METHOD: 1.75
RIGHT ATRIUM AREA SYSTOLE A4C: 10.2 CM2
RIGHT VENTRICLE ID DIMENSION: 2.9 CM
SL CV LEFT ATRIUM LENGTH A2C: 6.3 CM
SL CV LV EF: 60
SL CV PED ECHO LEFT VENTRICLE DIASTOLIC VOLUME (MOD BIPLANE) 2D: 53 ML
SL CV PED ECHO LEFT VENTRICLE SYSTOLIC VOLUME (MOD BIPLANE) 2D: 19 ML
TRICUSPID ANNULAR PLANE SYSTOLIC EXCURSION: 1.8 CM

## 2024-06-20 PROCEDURE — 93306 TTE W/DOPPLER COMPLETE: CPT

## 2024-06-20 PROCEDURE — 93306 TTE W/DOPPLER COMPLETE: CPT | Performed by: INTERNAL MEDICINE

## 2024-06-26 ENCOUNTER — TELEPHONE (OUTPATIENT)
Age: 89
End: 2024-06-26

## 2024-06-26 ENCOUNTER — TELEPHONE (OUTPATIENT)
Dept: INTERNAL MEDICINE CLINIC | Facility: CLINIC | Age: 89
End: 2024-06-26

## 2024-06-26 NOTE — TELEPHONE ENCOUNTER
Unfortunately the dr is out of the office today.  A message was sent to him asking if he can review her results and speak to her over the phone since there is no availability soon.  I am waiting to see if dr. Johnston will call the patient.

## 2024-06-26 NOTE — TELEPHONE ENCOUNTER
Dr ruiz, can you go over results with  a phone call? Neha needs to be rescheduled and there is nothing soon.  Please advise.

## 2024-06-26 NOTE — TELEPHONE ENCOUNTER
Due to patient not being seen today, if Dr Johnston felt comfortable going over her results via the phone the patient is open to that, when he is next available. If a phone call is not appropriate can we please look to see if there is any way to have her seen sooner then the rescheduled visit, she would only like to see Dr. Johnston. Please advise and call patient with outcome.

## 2024-06-27 NOTE — TELEPHONE ENCOUNTER
Please schedule patient for an office visit let her know thAt there is no critical disease on her carotid scan  thank you

## 2024-06-28 NOTE — TELEPHONE ENCOUNTER
I spoke with the nurse at her residence to give your message and a reminder of her new appt on 7/23.  Thank you.

## 2024-06-28 NOTE — TELEPHONE ENCOUNTER
Dr reviewed results (noted on other message).  There is nothing critical on test results.   advised appt which is set for 7/23.  LM with her residential nurse.  She will relay message to patient and remind of appt date for f/u.

## 2024-06-30 ENCOUNTER — NURSE TRIAGE (OUTPATIENT)
Dept: OTHER | Facility: OTHER | Age: 89
End: 2024-06-30

## 2024-06-30 NOTE — TELEPHONE ENCOUNTER
Patient reports b/l LE edema for several months. She notes it has been progressively getting worse. Denies any chest pain or SOB but voices concern. Notes she has an appointment scheduled for 7/23, but is questioning if she can see PCP this week?    Will forward to office to help facilitate appt. Thank you.    Pt escalated in home monitoring queue. Pt states that she was tested on Wednesday and just had loss of taste and smell. She now has an infrequent cough and fatigue. No fever, CP, or SOB. Gave home care advice and gave number to [facility] for any further concerns.    Reason for Disposition   [1] COVID-19 infection diagnosed or suspected AND [2] mild symptoms (fever, cough) AND [3] no trouble breathing or other complications    Additional Information   Negative: Severe difficulty breathing (e.g., struggling for each breath, speaks in single words)   Negative: Difficult to awaken or acting confused (e.g., disoriented, slurred speech)   Negative: Bluish (or gray) lips or face now   Negative: Shock suspected (e.g., cold/pale/clammy skin, too weak to stand, low BP, rapid pulse)   Negative: Sounds like a life-threatening emergency to the triager   Negative: [1] COVID-19 suspected (e.g., cough, fever, shortness of breath) AND [2] mild symptoms AND [3] public health department recommends testing   Negative: [1] COVID-19 exposure AND [2] no symptoms   Negative: COVID-19 and Breastfeeding, questions about   Negative: SEVERE or constant chest pain (Exception: mild central chest pain, present only when coughing)   Negative: MODERATE difficulty breathing (e.g., speaks in phrases, SOB even at rest, pulse 100-120)   Negative: Patient sounds very sick or weak to the triager   Negative: MILD difficulty breathing (e.g., minimal/no SOB at rest, SOB with walking, pulse <100)   Negative: Chest pain   Negative: Fever > 103 F (39.4 C)   Negative: [1] Fever > 101 F (38.3 C) AND [2] age > 60   Negative: [1] Fever > 100.0 F (37.8 C) AND [2] bedridden (e.g., nursing home patient, CVA, chronic illness, recovering from surgery)   Negative: HIGH RISK patient (e.g., age > 64 years, diabetes, heart or lung disease, weak immune system)   Negative: Fever present > 3 days (72 hours)   Negative: [1] Fever returns after gone for over 24  hours AND [2] symptoms worse or not improved   Negative: [1] Continuous (nonstop) coughing interferes with work or school AND [2] no improvement using cough treatment per protocol   Negative: Cough present > 3 weeks    Protocols used: CORONAVIRUS (COVID-19) - DIAGNOSED OR [id number]

## 2024-06-30 NOTE — TELEPHONE ENCOUNTER
"Reason for Disposition   [1] MILD swelling of both ankles (i.e., pedal edema) AND [2] new-onset or worsening    Answer Assessment - Initial Assessment Questions  1. ONSET: \"When did the swelling start?\" (e.g., minutes, hours, days)      Several months ago  2. LOCATION: \"What part of the leg is swollen?\"  \"Are both legs swollen or just one leg?\"      B/l lower legs- feet and ankles  3. SEVERITY: \"How bad is the swelling?\" (e.g., localized; mild, moderate, severe)   - Localized - small area of swelling localized to one leg   - MILD pedal edema - swelling limited to foot and ankle, pitting edema < 1/4 inch (6 mm) deep, rest and elevation eliminate most or all swelling   - MODERATE edema - swelling of lower leg to knee, pitting edema > 1/4 inch (6 mm) deep, rest and elevation only partially reduce swelling   - SEVERE edema - swelling extends above knee, facial or hand swelling present       Mild-moderate  4. REDNESS: \"Does the swelling look red or infected?\"      denies  5. PAIN: \"Is the swelling painful to touch?\" If Yes, ask: \"How painful is it?\"   (Scale 1-10; mild, moderate or severe)      Reports discomfort  6. FEVER: \"Do you have a fever?\" If Yes, ask: \"What is it, how was it measured, and when did it start?\"       denies  7. CAUSE: \"What do you think is causing the leg swelling?\"      unsure  10. OTHER SYMPTOMS: \"Do you have any other symptoms?\" (e.g., chest pain, difficulty breathing)        denies    Protocols used: Leg Swelling and Edema-ADULT-AH    "

## 2024-07-02 ENCOUNTER — OFFICE VISIT (OUTPATIENT)
Dept: INTERNAL MEDICINE CLINIC | Facility: CLINIC | Age: 89
End: 2024-07-02
Payer: MEDICARE

## 2024-07-02 VITALS
WEIGHT: 151 LBS | HEIGHT: 60 IN | BODY MASS INDEX: 29.64 KG/M2 | OXYGEN SATURATION: 96 % | TEMPERATURE: 97.3 F | HEART RATE: 77 BPM | RESPIRATION RATE: 16 BRPM

## 2024-07-02 DIAGNOSIS — E03.8 OTHER SPECIFIED HYPOTHYROIDISM: ICD-10-CM

## 2024-07-02 DIAGNOSIS — M17.0 PRIMARY OSTEOARTHRITIS OF KNEES, BILATERAL: ICD-10-CM

## 2024-07-02 DIAGNOSIS — Z13.0 SCREENING FOR DEFICIENCY ANEMIA: ICD-10-CM

## 2024-07-02 DIAGNOSIS — H81.13 BENIGN PAROXYSMAL POSITIONAL VERTIGO DUE TO BILATERAL VESTIBULAR DISORDER: ICD-10-CM

## 2024-07-02 DIAGNOSIS — N32.81 OVERACTIVE BLADDER: ICD-10-CM

## 2024-07-02 DIAGNOSIS — R51.9 GENERALIZED HEADACHES: ICD-10-CM

## 2024-07-02 DIAGNOSIS — I50.33 ACUTE ON CHRONIC DIASTOLIC CONGESTIVE HEART FAILURE (HCC): Primary | ICD-10-CM

## 2024-07-02 PROBLEM — I34.0 MITRAL VALVE REGURGITATION: Status: ACTIVE | Noted: 2024-07-02

## 2024-07-02 PROBLEM — I51.89 DIASTOLIC DYSFUNCTION: Status: ACTIVE | Noted: 2024-07-02

## 2024-07-02 PROBLEM — I37.1 NONRHEUMATIC PULMONARY VALVE INSUFFICIENCY: Status: ACTIVE | Noted: 2024-07-02

## 2024-07-02 PROBLEM — I36.1 NONRHEUMATIC TRICUSPID VALVE REGURGITATION: Status: ACTIVE | Noted: 2024-07-02

## 2024-07-02 PROCEDURE — G2211 COMPLEX E/M VISIT ADD ON: HCPCS | Performed by: INTERNAL MEDICINE

## 2024-07-02 PROCEDURE — 99215 OFFICE O/P EST HI 40 MIN: CPT | Performed by: INTERNAL MEDICINE

## 2024-07-02 RX ORDER — FUROSEMIDE 40 MG/1
40 TABLET ORAL DAILY
Qty: 30 TABLET | Refills: 5 | Status: SHIPPED | OUTPATIENT
Start: 2024-07-02

## 2024-07-02 NOTE — ASSESSMENT & PLAN NOTE
Overactive bladder with incontinence symptoms reviewed with the patient recommend continuation of current dosing of oxybutynin.  Consideration for urology consultation once congestive heart failure issue is resolved

## 2024-07-02 NOTE — PROGRESS NOTES
Ambulatory Visit  Name: Neha Molina      : 1932      MRN: [id number]  Encounter Provider: Dinesh Johnston MD  Encounter Date: 2024   Encounter department: [facility] INTERNAL MEDICINE    Assessment & Plan   1. Acute on chronic diastolic congestive heart failure (HCC)  -     furosemide (LASIX) 40 mg tablet; Take 1 tablet (40 mg total) by mouth daily  -     Comprehensive metabolic panel; Future  2. Screening for deficiency anemia  -     CBC and differential; Future  3. Other specified hypothyroidism  Assessment & Plan:  Patient has symptoms of fatigue will update 3 T4 and TSH values to ensure that current dosing of levothyroxine is adequate.  Orders:  -     T4, free; Future  -     TSH, 3rd generation; Future  4. 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  As needed meclizine for vertigo symptoms is recommended and continuation of low-cholesterol diet along with low-dose aspirin to reduce further plaque development in the vertebrobasilar distribution  5. Primary osteoarthritis of knees, bilateral  Assessment & Plan:  Bilateral knee arthritis reviewed with the patient recommend continued consultation with [facility] orthopedics for intermittent steroid or viscous injections  6. Overactive bladder  Assessment & Plan:  Overactive bladder with incontinence symptoms reviewed with the patient recommend continuation of current dosing of oxybutynin.  Consideration for urology consultation once congestive heart failure issue is resolved  7. Generalized headaches  Assessment & Plan:  The nature of the patient's chronic headaches which extend back multiple years and have previously been a evaluated extensively when she lived in [facility].  Suspect that a good component of her headache is most likely tension and  anxiety.  Recommend Tylenol as needed utilizing 1000 mg every 8 hours as needed.         History of Present Illness     This 89-year-old female patient returns today for an acute visit.  She is here today because she has been experiencing an increase in lower extremity edema.  She indicates that her legs have been swollen over the past several days.  She denies any recent chest pain palpitations or shortness of breath.    Patient is also concerned about nocturnal bedwetting he is currently on oxybutynin but still has episodes of nocturnal incontinence.  We discussed the possibility of a urology consultation to evaluate this condition further but have decided that the congestive heart failure issue is more important to address first.    The patient has chronic headaches which go back years to when she lived in [place] was seen and evaluated extensively by multiple neurologist in [place] according to the patient.  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  Has ongoing lightheadedness dizziness or vertigo type symptoms.  The goal to determine exactly which of these she has based upon her description.  I suspect she has vertebrobasilar insufficiency based upon the vascular insufficiency evident on her MRI scan.    We discussed the patient's bilateral knee arthritis.  At her age of 89 years I do not recommend surgical replacement of her knees.  With her history of cardiac issues as well as having had a stroke with cerebral vascular disease the risk of surgery would be prohibitive.  I have recommended that she continue to consult with orthopedics for continued treatment with either viscous substances or steroids.    A period of 55 minutes was spent with the patient today face-to-face reviewing her current  symptoms studies and adjustment of treatment.  A period of 11 minutes was spent after the patient's visit completing our office note and formulating treatment plans      Review of Systems   Cardiovascular:  Positive for leg swelling.   Genitourinary:         Incontinence of urine   Musculoskeletal:  Positive for arthralgias.        Bilateral knee pain   Neurological:  Positive for dizziness and light-headedness.   All other systems reviewed and are negative.    Past Medical History:   Diagnosis Date    CAD (coronary artery disease)      Past Surgical History:   Procedure Laterality Date    BREAST BIOPSY       Family History   Problem Relation Age of Onset    Stroke Sister      Social History     Tobacco Use    Smoking status: Former     Types: Cigarettes     Passive exposure: Past    Smokeless tobacco: Never   Vaping Use    Vaping status: Never Used   Substance and Sexual Activity    Alcohol use: Never    Drug use: Never    Sexual activity: Not Currently     Current Outpatient Medications on File Prior to Visit   Medication Sig    acetaminophen (TYLENOL) 500 mg tablet Take 500 mg by mouth every 6 (six) hours as needed    aspirin 81 mg chewable tablet Chew 81 mg daily    Cholecalciferol (Vitamin D3 Gummies) 25 MCG (1000 UT) CHEW Chew 1 tablet (1,000 Units total) in the morning    lactulose 20 g/30 mL Take 10 g by mouth daily as needed (Constipation)    levothyroxine 75 mcg tablet Please administer at 8am daily    lidocaine (LIDODERM) 5 % Apply 1 patch topically over 12 hours daily Remove & Discard patch within 12 hours or as directed by MD    LORazepam (ATIVAN) 0.5 mg tablet Take 1 tablet (0.5 mg total) by mouth daily at bedtime    lovastatin (ALTOPREV) 20 MG 24 hr tablet Take 1 tablet (20 mg total) by mouth daily at bedtime Restart after completing Paxlovid treatment. Do not start before February 18, 2024.    meclizine (ANTIVERT) 25 mg tablet Take 25 mg by mouth daily as needed    Multiple Vitamins-Minerals  (Multivitamin Gummies Womens) CHEW Chew 1 tablet in the morning    oxybutynin (DITROPAN-XL) 10 MG 24 hr tablet Take 2 tablets (20 mg total) by mouth daily at bedtime    ipratropium (ATROVENT) 0.03 % nasal spray 2 sprays into each nostril every 12 (twelve) hours     No Known Allergies  Immunization History   Administered Date(s) Administered    COVID-19 Moderna mRNA Vaccine 12 Yr+ 50 mcg/0.5 mL (Spikevax) 10/23/2023    COVID-19 PFIZER VACCINE 0.3 ML IM 01/23/2021, 02/12/2021    COVID-19 Pfizer Vac BIVALENT Dev-sucrose 12 Yr+ IM 09/18/2022    COVID-19 Pfizer vac (Dev-sucrose, gray cap) 12 yr+ IM 10/01/2021, 04/04/2022    INFLUENZA 10/23/2023     Objective     Pulse 77   Temp (!) 97.3 °F (36.3 °C) (Tympanic Core)   Resp 16   Ht 5' (1.524 m)   Wt 68.5 kg (151 lb)   LMP  (LMP Unknown)   SpO2 96%   BMI 29.49 kg/m²     Physical Exam  Vitals and nursing note reviewed.   Constitutional:       General: She is not in acute distress.     Appearance: She is well-developed.   HENT:      Head: Normocephalic and atraumatic.   Eyes:      Conjunctiva/sclera: Conjunctivae normal.   Cardiovascular:      Rate and Rhythm: Normal rate and regular rhythm.      Heart sounds: Murmur heard.   Pulmonary:      Effort: Pulmonary effort is normal. No respiratory distress.      Breath sounds: Normal breath sounds. No wheezing, rhonchi or rales.   Abdominal:      Palpations: Abdomen is soft.      Tenderness: There is no abdominal tenderness.   Musculoskeletal:         General: No swelling.      Cervical back: Neck supple.      Right lower leg: Edema present.      Left lower leg: Edema present.      Comments: +2 edema of both lower extremities and ankle areas   Skin:     General: Skin is warm and dry.      Capillary Refill: Capillary refill takes less than 2 seconds.   Neurological:      Mental Status: She is alert. Mental status is at baseline.   Psychiatric:         Mood and Affect: Mood normal.       Administrative Statements

## 2024-07-02 NOTE — ASSESSMENT & PLAN NOTE
Bilateral knee arthritis reviewed with the patient recommend continued consultation with [facility] orthopedics for intermittent steroid or viscous injections

## 2024-07-02 NOTE — ASSESSMENT & PLAN NOTE
Symptoms of benign paroxysmal positional vertigo and possibly vertebrobasilar insufficiency reviewed with the patient today.  As needed meclizine for vertigo symptoms is recommended and continuation of low-cholesterol diet along with low-dose aspirin to reduce further plaque development in the vertebrobasilar distribution

## 2024-07-02 NOTE — ASSESSMENT & PLAN NOTE
Patient has symptoms of fatigue will update 3 T4 and TSH values to ensure that current dosing of levothyroxine is adequate.

## 2024-07-04 ENCOUNTER — TELEPHONE (OUTPATIENT)
Dept: OTHER | Facility: OTHER | Age: 89
End: 2024-07-04

## 2024-07-04 PROBLEM — I50.33 ACUTE ON CHRONIC DIASTOLIC CONGESTIVE HEART FAILURE (HCC): Status: ACTIVE | Noted: 2024-07-04

## 2024-07-04 NOTE — TELEPHONE ENCOUNTER
Pt is requesting appt for injection shots in her knee again. Please give pt a call back to schedule.

## 2024-07-04 NOTE — ASSESSMENT & PLAN NOTE
The nature of the patient's chronic headaches which extend back multiple years and have previously been a evaluated extensively when she lived in [facility].  Suspect that a good component of her headache is most likely tension and anxiety.  Recommend Tylenol as needed utilizing 1000 mg every 8 hours as needed.

## 2024-07-04 NOTE — ASSESSMENT & PLAN NOTE
Patient Wt Readings from Last 3 Encounters:   07/02/24 68.5 kg (151 lb)   06/20/24 64.4 kg (142 lb)   05/23/24 64.4 kg (142 lb)       This patient displays an acute on chronic exacerbation of diastolic congestive heart failure with a 9 pound weight gain.  She has been on Dyazide which we will discontinue and in its place start Lasix 40 mg daily.  Patient should avoid high salt foods and a follow-up visit has been requested along with a comprehensive metabolic profile to evaluate electrolyte balance and kidney performance on furosemide

## 2024-07-05 ENCOUNTER — TELEPHONE (OUTPATIENT)
Age: 89
End: 2024-07-05

## 2024-07-05 NOTE — TELEPHONE ENCOUNTER
Patient called she started Lasix 40 mg tablet this morning around 8:30 AM and has been urinating about every 20 minutes.  She asked if the medication can be changed to she is not going to the bathroom as often.    Thank you

## 2024-07-05 NOTE — TELEPHONE ENCOUNTER
Left a message on Pt vm letting her know what  recommends.    Letter on MD desk for signature and will mail when done

## 2024-07-05 NOTE — PROGRESS NOTES
Patient called the office today started her 40 mg of Lasix daily today and is urinating too frequently is upset with having to urinate every 20 minutes.  Will decrease the pill to 20 mg daily.

## 2024-07-05 NOTE — TELEPHONE ENCOUNTER
Patient called back regarding lasix. Advised of Dr. Fatima orders. She is going to make sure that her facility received updated orders and if not will call back.

## 2024-07-23 ENCOUNTER — OFFICE VISIT (OUTPATIENT)
Dept: INTERNAL MEDICINE CLINIC | Facility: CLINIC | Age: 89
End: 2024-07-23
Payer: MEDICARE

## 2024-07-23 VITALS — OXYGEN SATURATION: 95 % | HEART RATE: 80 BPM | HEIGHT: 60 IN | WEIGHT: 149.6 LBS | BODY MASS INDEX: 29.37 KG/M2

## 2024-07-23 DIAGNOSIS — I51.7 CONCENTRIC LEFT VENTRICULAR HYPERTROPHY: ICD-10-CM

## 2024-07-23 DIAGNOSIS — R60.0 LOCALIZED EDEMA: ICD-10-CM

## 2024-07-23 DIAGNOSIS — H81.13 BENIGN PAROXYSMAL POSITIONAL VERTIGO DUE TO BILATERAL VESTIBULAR DISORDER: ICD-10-CM

## 2024-07-23 DIAGNOSIS — I51.89 DIASTOLIC DYSFUNCTION: ICD-10-CM

## 2024-07-23 DIAGNOSIS — I50.33 ACUTE ON CHRONIC DIASTOLIC CONGESTIVE HEART FAILURE (HCC): Primary | ICD-10-CM

## 2024-07-23 DIAGNOSIS — R26.2 AMBULATORY DYSFUNCTION: ICD-10-CM

## 2024-07-23 DIAGNOSIS — N32.81 OVERACTIVE BLADDER: ICD-10-CM

## 2024-07-23 PROBLEM — S32.82XD MULTIPLE CLOSED FRACTURES OF PELVIS WITHOUT DISRUPTION OF PELVIC RING WITH ROUTINE HEALING: Status: RESOLVED | Noted: 2024-03-25 | Resolved: 2024-07-23

## 2024-07-23 PROCEDURE — G2211 COMPLEX E/M VISIT ADD ON: HCPCS | Performed by: INTERNAL MEDICINE

## 2024-07-23 PROCEDURE — 99215 OFFICE O/P EST HI 40 MIN: CPT | Performed by: INTERNAL MEDICINE

## 2024-07-23 NOTE — ASSESSMENT & PLAN NOTE
2 pound weight reduction on furosemide will continue at 40 mg daily patient cautioned to try to minimize exposure to salt in her diet where she lives does not offer any cardiac diets follow-up in 1 month continue elevation of legs when possible

## 2024-07-23 NOTE — ASSESSMENT & PLAN NOTE
Centric left ventricular hypertrophy which is severe noted on the patient's echocardiogram this is likely a factor in the patient's current congestive heart failure.  Most likely cause for her concentric hypertrophy would be hypertension.

## 2024-07-23 NOTE — ASSESSMENT & PLAN NOTE
Wt Readings from Last 3 Encounters:   07/23/24 67.9 kg (149 lb 9.6 oz)   07/02/24 68.5 kg (151 lb)   06/20/24 64.4 kg (142 lb)       Weight reduction approximately 2 pounds on furosemide recommend continuation of 40 mg furosemide daily.  Echocardiogram shows severe left ventricular hypertrophy with grade 1 diastolic dysfunction ejection fraction is 60%.  She has a mildly dilated left atrium mitral valve stenosis which is mild and sclerosis of her aortic valve.  Diet where she lives is not controlled most likely has more salt in it than she should be taking.  Favor continuation of furosemide at this time may need to stay on some dose of diuretic permanently to avoid excessive fluid retention.

## 2024-07-23 NOTE — PROGRESS NOTES
Ambulatory Visit  Name: Neha Molina      : 1932      MRN: [id number]  Encounter Provider: Dinesh Johnston MD  Encounter Date: 2024   Encounter department: [facility] INTERNAL MEDICINE    Assessment & Plan   1. Acute on chronic diastolic congestive heart failure (HCC)  Assessment & Plan:  Wt Readings from Last 3 Encounters:   24 67.9 kg (149 lb 9.6 oz)   24 68.5 kg (151 lb)   24 64.4 kg (142 lb)       Weight reduction approximately 2 pounds on furosemide recommend continuation of 40 mg furosemide daily.  Echocardiogram shows severe left ventricular hypertrophy with grade 1 diastolic dysfunction ejection fraction is 60%.  She has a mildly dilated left atrium mitral valve stenosis which is mild and sclerosis of her aortic valve.  Diet where she lives is not controlled most likely has more salt in it than she should be taking.  Favor continuation of furosemide at this time may need to stay on some dose of diuretic permanently to avoid excessive fluid retention.      2. Concentric left ventricular hypertrophy  Assessment & Plan:  Centric left ventricular hypertrophy which is severe noted on the patient's echocardiogram this is likely a factor in the patient's current congestive heart failure.  Most likely cause for her concentric hypertrophy would be hypertension.  3. 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  4. Overactive bladder  Assessment & Plan:  Consultation with urology has been scheduled  5. Ambulatory dysfunction  Assessment & Plan:  No recent falls reported patient continues to ambulate safely with a rollator  6. Diastolic dysfunction  Assessment & Plan:  Grade 1 diastolic dysfunction reviewed today  likely a factor along with left ventricular concentric hypertrophy and the patient's congestive heart failure  7. Localized edema  Assessment & Plan:  2 pound weight reduction on furosemide will continue at 40 mg daily patient cautioned to try to minimize exposure to salt in her diet where she lives does not offer any cardiac diets follow-up in 1 month continue elevation of legs when possible         History of Present Illness     Pleasant 89-year-old female patient returns today for a follow-up assessment of her congestive heart failure and peripheral edema.  Her weight is down 2 pounds from her previous visit and there is some mild reduction in her peripheral edema but there is still the presence of bilateral lower extremity edema.  I reviewed with the patient today her echocardiogram which can firmed the presence of severe left ventricular hypertrophy which is concentric she has an ejection fraction of 60% she is also noted to have grade 1 diastolic dysfunction.  Her left atrium is mildly enlarged she does have some mild stenosis of her mitral valve and sclerosis of her aortic valve.    Workup of the patient's ongoing dizziness symptoms included carotid*artery study which was reviewed with the patient today she has less than 50% stenosis of both carotid arteries and has antegrade motion of blood flow through her vertebral arteries.    A period of 10 minutes was spent prior to the patient's visit reviewing current medical records and results of studies followed by 40 minutes of face-to-face time with the patient and 12 minutes of postvisit time for completion of the patient's chart notes and therapeutic planning      Review of Systems   Cardiovascular:  Positive for leg swelling.   Neurological:  Positive for dizziness.   All other systems reviewed and are negative.    Past Medical History:   Diagnosis Date    CAD (coronary artery disease)      Past Surgical History:   Procedure Laterality Date    BREAST BIOPSY        Family History   Problem Relation Age of Onset    Stroke Sister      Social History     Tobacco Use    Smoking status: Former     Types: Cigarettes     Passive exposure: Past    Smokeless tobacco: Never   Vaping Use    Vaping status: Never Used   Substance and Sexual Activity    Alcohol use: Never    Drug use: Never    Sexual activity: Not Currently     Current Outpatient Medications on File Prior to Visit   Medication Sig    acetaminophen (TYLENOL) 500 mg tablet Take 500 mg by mouth every 6 (six) hours as needed    aspirin 81 mg chewable tablet Chew 81 mg daily    Cholecalciferol (Vitamin D3 Gummies) 25 MCG (1000 UT) CHEW Chew 1 tablet (1,000 Units total) in the morning    furosemide (LASIX) 40 mg tablet Take 1 tablet (40 mg total) by mouth daily    lactulose 20 g/30 mL Take 10 g by mouth daily as needed (Constipation)    levothyroxine 75 mcg tablet Please administer at 8am daily    lidocaine (LIDODERM) 5 % Apply 1 patch topically over 12 hours daily Remove & Discard patch within 12 hours or as directed by MD    LORazepam (ATIVAN) 0.5 mg tablet Take 1 tablet (0.5 mg total) by mouth daily at bedtime    lovastatin (ALTOPREV) 20 MG 24 hr tablet Take 1 tablet (20 mg total) by mouth daily at bedtime Restart after completing Paxlovid treatment. Do not start before February 18, 2024.    meclizine (ANTIVERT) 25 mg tablet Take 25 mg by mouth daily as needed    Multiple Vitamins-Minerals (Multivitamin Gummies Womens) CHEW Chew 1 tablet in the morning    oxybutynin (DITROPAN-XL) 10 MG 24 hr tablet Take 2 tablets (20 mg total) by mouth daily at bedtime    ipratropium (ATROVENT) 0.03 % nasal spray 2 sprays into each nostril every 12 (twelve) hours     No Known Allergies  Immunization History   Administered Date(s) Administered    COVID-19 Moderna mRNA Vaccine 12 Yr+ 50 mcg/0.5 mL (Spikevax) 10/23/2023    COVID-19 PFIZER VACCINE 0.3 ML IM 01/23/2021, 02/12/2021    COVID-19 Pfizer Vac BIVALENT Dev-sucrose 12 Yr+ IM  09/18/2022    COVID-19 Pfizer vac (Dev-sucrose, gray cap) 12 yr+ IM 10/01/2021, 04/04/2022    INFLUENZA 10/23/2023     Objective     Pulse 80   Ht 5' (1.524 m)   Wt 67.9 kg (149 lb 9.6 oz)   LMP  (LMP Unknown)   SpO2 95%   BMI 29.22 kg/m²     Physical Exam  Vitals and nursing note reviewed.   Constitutional:       General: She is not in acute distress.     Appearance: She is well-developed.   HENT:      Head: Normocephalic and atraumatic.   Eyes:      Conjunctiva/sclera: Conjunctivae normal.   Cardiovascular:      Rate and Rhythm: Normal rate and regular rhythm.      Heart sounds: Murmur heard.   Pulmonary:      Effort: Pulmonary effort is normal. No respiratory distress.      Breath sounds: Normal breath sounds. No wheezing, rhonchi or rales.   Abdominal:      Palpations: Abdomen is soft.   Musculoskeletal:         General: No swelling.      Cervical back: Neck supple.      Right lower leg: Edema present.      Left lower leg: Edema present.   Skin:     General: Skin is warm and dry.      Capillary Refill: Capillary refill takes less than 2 seconds.   Neurological:      Mental Status: She is alert.   Psychiatric:         Mood and Affect: Mood normal.

## 2024-07-23 NOTE — ASSESSMENT & PLAN NOTE
Grade 1 diastolic dysfunction reviewed today likely a factor along with left ventricular concentric hypertrophy and the patient's congestive heart failure

## 2024-07-23 NOTE — ASSESSMENT & PLAN NOTE
Recommend continued use of meclizine for treatment of symptoms of benign positional vertigo patient describes dizziness and spinning sensation when neck is hyperextended or she puts her head back on a pillow.

## 2024-07-25 ENCOUNTER — OFFICE VISIT (OUTPATIENT)
Dept: OBGYN CLINIC | Facility: HOSPITAL | Age: 89
End: 2024-07-25
Payer: MEDICARE

## 2024-07-25 VITALS
HEART RATE: 67 BPM | DIASTOLIC BLOOD PRESSURE: 67 MMHG | WEIGHT: 149 LBS | HEIGHT: 60 IN | BODY MASS INDEX: 29.25 KG/M2 | SYSTOLIC BLOOD PRESSURE: 105 MMHG

## 2024-07-25 DIAGNOSIS — M17.0 PRIMARY OSTEOARTHRITIS OF KNEES, BILATERAL: Primary | ICD-10-CM

## 2024-07-25 DIAGNOSIS — M25.561 CHRONIC PAIN OF BOTH KNEES: ICD-10-CM

## 2024-07-25 DIAGNOSIS — M25.562 CHRONIC PAIN OF BOTH KNEES: ICD-10-CM

## 2024-07-25 DIAGNOSIS — G89.29 CHRONIC PAIN OF BOTH KNEES: ICD-10-CM

## 2024-07-25 PROCEDURE — 99212 OFFICE O/P EST SF 10 MIN: CPT

## 2024-07-25 PROCEDURE — 20610 DRAIN/INJ JOINT/BURSA W/O US: CPT

## 2024-07-25 RX ORDER — TRIAMCINOLONE ACETONIDE 40 MG/ML
40 INJECTION, SUSPENSION INTRA-ARTICULAR; INTRAMUSCULAR
Status: COMPLETED | OUTPATIENT
Start: 2024-07-25 | End: 2024-07-25

## 2024-07-25 RX ORDER — LIDOCAINE HYDROCHLORIDE 10 MG/ML
3 INJECTION, SOLUTION INFILTRATION; PERINEURAL
Status: COMPLETED | OUTPATIENT
Start: 2024-07-25 | End: 2024-07-25

## 2024-07-25 RX ADMIN — LIDOCAINE HYDROCHLORIDE 3 ML: 10 INJECTION, SOLUTION INFILTRATION; PERINEURAL at 14:00

## 2024-07-25 RX ADMIN — TRIAMCINOLONE ACETONIDE 40 MG: 40 INJECTION, SUSPENSION INTRA-ARTICULAR; INTRAMUSCULAR at 14:00

## 2024-07-25 NOTE — PROGRESS NOTES
Assessment:   Diagnosis ICD-10-CM Associated Orders   1. Primary osteoarthritis of knees, bilateral  M17.0 Large joint arthrocentesis: bilateral knee      2. Chronic pain of both knees  M25.561 Large joint arthrocentesis: bilateral knee    M25.562     G89.29           Plan:  The patient was offered and accepted bilateral knee CSI at today's visit.  The procedures were tolerated well without any immediate complications.  She will let us know in a few weeks whether or not she had any relief.  We discussed that if these injections do not provide her with adequate relief, we could discuss a round of Visco supplementation.    To do next visit:  Follow-up in 3 months for re-evaluation of the knees.    The above stated was discussed in layman's terms and the patient expressed understanding.  All questions were answered to the patient's satisfaction.         Subjective:   Neha Molina is a 89 y.o. female who presents to the office today for a follow-up appointment for her bilateral knee pain.  She underwent CSI most recently on 3/19/24, but she does not think this provided her with significant relief.  She is still interested in repeat CSI today.  She states she has had Visco supplementation previously which she thinks provided her with some relief.        Review of systems negative unless otherwise specified in HPI    Past Medical History:   Diagnosis Date    CAD (coronary artery disease)        Past Surgical History:   Procedure Laterality Date    BREAST BIOPSY         Family History   Problem Relation Age of Onset    Stroke Sister        Social History     Occupational History    Not on file   Tobacco Use    Smoking status: Former     Types: Cigarettes     Passive exposure: Past    Smokeless tobacco: Never   Vaping Use    Vaping status: Never Used   Substance and Sexual Activity    Alcohol use: Never    Drug use: Never    Sexual activity: Not Currently         Current Outpatient Medications:     acetaminophen  (TYLENOL) 500 mg tablet, Take 500 mg by mouth every 6 (six) hours as needed, Disp: , Rfl:     aspirin 81 mg chewable tablet, Chew 81 mg daily, Disp: , Rfl:     Cholecalciferol (Vitamin D3 Gummies) 25 MCG (1000 UT) CHEW, Chew 1 tablet (1,000 Units total) in the morning, Disp: 30 tablet, Rfl: 5    furosemide (LASIX) 40 mg tablet, Take 1 tablet (40 mg total) by mouth daily, Disp: 30 tablet, Rfl: 5    lactulose 20 g/30 mL, Take 10 g by mouth daily as needed (Constipation), Disp: , Rfl:     levothyroxine 75 mcg tablet, Please administer at 8am daily, Disp: 90 tablet, Rfl: 2    lidocaine (LIDODERM) 5 %, Apply 1 patch topically over 12 hours daily Remove & Discard patch within 12 hours or as directed by MD, Disp: , Rfl:     LORazepam (ATIVAN) 0.5 mg tablet, Take 1 tablet (0.5 mg total) by mouth daily at bedtime, Disp: 30 tablet, Rfl: 5    lovastatin (ALTOPREV) 20 MG 24 hr tablet, Take 1 tablet (20 mg total) by mouth daily at bedtime Restart after completing Paxlovid treatment. Do not start before February 18, 2024., Disp: , Rfl:     meclizine (ANTIVERT) 25 mg tablet, Take 25 mg by mouth daily as needed, Disp: , Rfl:     Multiple Vitamins-Minerals (Multivitamin Gummies Womens) CHEW, Chew 1 tablet in the morning, Disp: 30 tablet, Rfl: 5    oxybutynin (DITROPAN-XL) 10 MG 24 hr tablet, Take 2 tablets (20 mg total) by mouth daily at bedtime, Disp: 180 tablet, Rfl: 1    ipratropium (ATROVENT) 0.03 % nasal spray, 2 sprays into each nostril every 12 (twelve) hours, Disp: 30 mL, Rfl: 3    No Known Allergies         Vitals:    07/25/24 1407   BP: 105/67   Pulse: 67       Objective:    General:  Patient is WDWN, alert and oriented, appears stated age, and is in no acute distress.    Musculoskeletal:    Bilateral Knee:    Inspection:  The knees are without erythema or purulence indicative of infection which would preclude the patient from receiving a joint injection at today's visit.         Diagnostics, reviewed and taken today if  "performed as documented:    None performed        Procedures, if performed today:    Large joint arthrocentesis: bilateral knee  Universal Protocol:  Consent: Verbal consent obtained.  Consent given by: patient  Time out: Immediately prior to procedure a \"time out\" was called to verify the correct patient, procedure, equipment, support staff and site/side marked as required.  Patient understanding: patient states understanding of the procedure being performed  Site marked: the operative site was marked  Patient identity confirmed: verbally with patient  Supporting Documentation  Indications: pain   Procedure Details  Location: knee - bilateral knee  Preparation: Patient was prepped and draped in the usual sterile fashion  Needle size: 22 G  Ultrasound guidance: no  Approach: anterolateral    Medications (Right): 40 mg triamcinolone acetonide 40 mg/mL; 3 mL lidocaine 1 %Medications (Left): 40 mg triamcinolone acetonide 40 mg/mL; 3 mL lidocaine 1 %   Patient tolerance: patient tolerated the procedure well with no immediate complications  Dressing:  Sterile dressing applied        Portions of the record may have been created with voice recognition software.  Occasional wrong word or \"sound a like\" substitutions may have occurred due to the inherent limitations of voice recognition software.  Read the chart carefully and recognize, using context, where substitutions have occurred.  "

## 2024-07-30 ENCOUNTER — TELEPHONE (OUTPATIENT)
Age: 89
End: 2024-07-30

## 2024-07-30 NOTE — TELEPHONE ENCOUNTER
Pt calling  states had Echo done aware of results but wanted to know if Dr. Johnston was able to inform her of the pumping rate of her heart.

## 2024-07-30 NOTE — TELEPHONE ENCOUNTER
Pt returning phone call; wasn't sure if someone in office was reaching out in regards to her earlier question/message for Dr. Johnston. If someone attempted to contact pt please give her a call back when lela.

## 2024-07-31 NOTE — TELEPHONE ENCOUNTER
Call returned to the patient she was inquiring as to her left ventricular ejection fraction report from her last echocardiogram indicated to her that that reading is 60%.

## 2024-08-03 ENCOUNTER — NURSE TRIAGE (OUTPATIENT)
Dept: OTHER | Facility: OTHER | Age: 89
End: 2024-08-03

## 2024-08-03 NOTE — TELEPHONE ENCOUNTER
Pt had an xray done at [facility] yesterday. Xray showed a nodule in lung. Pt has an appt with Dr Johnston on 8/29. Pt would like to know if he thinks she needs to be seen earlier than that due to nodule.     Please call patient back: [phone]

## 2024-08-03 NOTE — TELEPHONE ENCOUNTER
"Reason for Disposition  • Requesting regular office appointment    Answer Assessment - Initial Assessment Questions  1. REASON FOR CALL or QUESTION: \"What is your reason for calling today?\" or \"How can I best help you?\" or \"What question do you have that I can help answer?\"      Pt was seen at  ED yesterday for a fall. Provider there did xrays. Xray of chest showed a nodule in her lung. Pt has an appt on 8/29 with Dr ruiz but would like an earlier appt if he feels its necessary.    Protocols used: Information Only Call-ADULT-    "

## 2024-08-05 NOTE — TELEPHONE ENCOUNTER
I scheduled her for the 12th but she is checking if her ride might be able to bring her Wednesday.

## 2024-08-05 NOTE — TELEPHONE ENCOUNTER
Looks like we have an appointment opening at 1:40 this afternoon see if the patient can make it today.  Thank you

## 2024-08-12 ENCOUNTER — OFFICE VISIT (OUTPATIENT)
Dept: INTERNAL MEDICINE CLINIC | Facility: CLINIC | Age: 89
End: 2024-08-12
Payer: MEDICARE

## 2024-08-12 VITALS
TEMPERATURE: 97.7 F | WEIGHT: 142.2 LBS | HEIGHT: 60 IN | OXYGEN SATURATION: 99 % | BODY MASS INDEX: 27.92 KG/M2 | HEART RATE: 81 BPM

## 2024-08-12 DIAGNOSIS — R91.1 LUNG NODULE: Primary | ICD-10-CM

## 2024-08-12 DIAGNOSIS — R26.2 AMBULATORY DYSFUNCTION: ICD-10-CM

## 2024-08-12 DIAGNOSIS — N32.81 OVERACTIVE BLADDER: ICD-10-CM

## 2024-08-12 DIAGNOSIS — I50.33 ACUTE ON CHRONIC DIASTOLIC CONGESTIVE HEART FAILURE (HCC): ICD-10-CM

## 2024-08-12 PROCEDURE — G2211 COMPLEX E/M VISIT ADD ON: HCPCS | Performed by: INTERNAL MEDICINE

## 2024-08-12 PROCEDURE — 99214 OFFICE O/P EST MOD 30 MIN: CPT | Performed by: INTERNAL MEDICINE

## 2024-08-12 NOTE — ASSESSMENT & PLAN NOTE
Lung nodule detected on CT scan during recent visit to the emergency room in the right upper lung measured at 19 mm.  Previous CT scan performed in November 2023 indicated the same lesion measured at 19 mm.  Given the fact that it has not changed significantly in this period of time most likely it is benign.  Patient will have 1 additional study at her request in 6 months to reevaluate the nodule to ensure its benign nature.   she has no pulmonary symptoms at this time.

## 2024-08-12 NOTE — ASSESSMENT & PLAN NOTE
Recent fall and visit to [facility] emergency room reviewed today fortunately patient did not sustain any serious injuries it is highly recommended that she continue to use her rolling walker on a daily basis.

## 2024-08-12 NOTE — PROGRESS NOTES
Ambulatory Visit  Name: Neha Molina      : 1932      MRN: [id number]  Encounter Provider: Dinesh Johnston MD  Encounter Date: 2024   Encounter department: [facility] INTERNAL MEDICINE    Assessment & Plan   1. Lung nodule  Assessment & Plan:  Lung nodule detected on CT scan during recent visit to the emergency room in the right upper lung measured at 19 mm.  Previous CT scan performed in 2023 indicated the same lesion measured at 19 mm.  Given the fact that it has not changed significantly in this period of time most likely it is benign.  Patient will have 1 additional study at her request in 6 months to reevaluate the nodule to ensure its benign nature.   she has no pulmonary symptoms at this time.  Orders:  -     CT chest wo contrast; Future; Expected date: 2025  2. Acute on chronic diastolic congestive heart failure (HCC)  Assessment & Plan:  Wt Readings from Last 3 Encounters:   24 64.5 kg (142 lb 3.2 oz)   24 67.6 kg (149 lb)   24 67.9 kg (149 lb 9.6 oz)             3. Overactive bladder  Assessment & Plan:  Recommend continuation of oxybutynin 20 mg at bedtime for overactive bladder symptoms.  4. Ambulatory dysfunction  Assessment & Plan:  Recent fall and visit to [facility] emergency room reviewed today fortunately patient did not sustain any serious injuries it is highly recommended that she continue to use her rolling walker on a daily basis.         History of Present Illness     This pleasant 89-year-old female patient returns today for acute visit.  She recently sustained a fall at her assisted living facility and was evaluated at the [facility] emergency room.  Part of her evaluation included a CT scan of the chest which revealed a 19 mm nodule in the right upper lung area.  She is here today out of concern about this finding.  She denies any hemoptysis shortness of breath cough or wheezing.  Has had no recent  fevers or chills.    Fortunately she did not sustain any serious injuries with her fall and we have reviewed the entire note note and testing done in the emergency room after her fall.  She is to ambulate at all times with a walker she does indicate there are times that she forgets to use it.  Her shoes today are somewhat worn out and probably not the most supportive.  She does have an appointment with podiatry to evaluate conditions with her feet.      Review of Systems   All other systems reviewed and are negative.    Past Medical History:   Diagnosis Date    CAD (coronary artery disease)      Past Surgical History:   Procedure Laterality Date    BREAST BIOPSY       Family History   Problem Relation Age of Onset    Stroke Sister      Social History     Tobacco Use    Smoking status: Former     Types: Cigarettes     Passive exposure: Past    Smokeless tobacco: Never   Vaping Use    Vaping status: Never Used   Substance and Sexual Activity    Alcohol use: Never    Drug use: Never    Sexual activity: Not Currently     Current Outpatient Medications on File Prior to Visit   Medication Sig    acetaminophen (TYLENOL) 500 mg tablet Take 500 mg by mouth every 6 (six) hours as needed    aspirin 81 mg chewable tablet Chew 81 mg daily    Cholecalciferol (Vitamin D3 Gummies) 25 MCG (1000 UT) CHEW Chew 1 tablet (1,000 Units total) in the morning    furosemide (LASIX) 40 mg tablet Take 1 tablet (40 mg total) by mouth daily    lactulose 20 g/30 mL Take 10 g by mouth daily as needed (Constipation)    levothyroxine 75 mcg tablet Please administer at 8am daily    lidocaine (LIDODERM) 5 % Apply 1 patch topically over 12 hours daily Remove & Discard patch within 12 hours or as directed by MD    LORazepam (ATIVAN) 0.5 mg tablet Take 1 tablet (0.5 mg total) by mouth daily at bedtime    lovastatin (ALTOPREV) 20 MG 24 hr tablet Take 1 tablet (20 mg total) by mouth daily at bedtime Restart after completing Paxlovid treatment. Do not start  before February 18, 2024.    meclizine (ANTIVERT) 25 mg tablet Take 25 mg by mouth daily as needed    Multiple Vitamins-Minerals (Multivitamin Gummies Womens) CHEW Chew 1 tablet in the morning    oxybutynin (DITROPAN-XL) 10 MG 24 hr tablet Take 2 tablets (20 mg total) by mouth daily at bedtime     No Known Allergies  Immunization History   Administered Date(s) Administered    COVID-19 Moderna mRNA Vaccine 12 Yr+ 50 mcg/0.5 mL (Spikevax) 10/23/2023    COVID-19 PFIZER VACCINE 0.3 ML IM 01/23/2021, 02/12/2021    COVID-19 Pfizer Vac BIVALENT Dev-sucrose 12 Yr+ IM 09/18/2022    COVID-19 Pfizer vac (Dev-sucrose, gray cap) 12 yr+ IM 10/01/2021, 04/04/2022    INFLUENZA 10/23/2023     Objective     Pulse 81   Temp 97.7 °F (36.5 °C) (Tympanic)   Ht 5' (1.524 m)   Wt 64.5 kg (142 lb 3.2 oz)   LMP  (LMP Unknown)   SpO2 99%   BMI 27.77 kg/m²     Physical Exam  Vitals and nursing note reviewed.   Constitutional:       General: She is not in acute distress.     Appearance: She is well-developed.   HENT:      Head: Normocephalic and atraumatic.   Eyes:      Conjunctiva/sclera: Conjunctivae normal.   Cardiovascular:      Rate and Rhythm: Normal rate and regular rhythm.      Heart sounds: No murmur heard.  Pulmonary:      Effort: Pulmonary effort is normal. No respiratory distress.      Breath sounds: Normal breath sounds. No wheezing, rhonchi or rales.   Abdominal:      Palpations: Abdomen is soft.      Tenderness: There is no abdominal tenderness.   Musculoskeletal:         General: No swelling.      Cervical back: Neck supple.      Right lower leg: No edema.      Left lower leg: No edema.   Skin:     General: Skin is warm and dry.      Capillary Refill: Capillary refill takes less than 2 seconds.   Neurological:      Mental Status: She is alert.   Psychiatric:         Mood and Affect: Mood normal.

## 2024-08-13 ENCOUNTER — TELEPHONE (OUTPATIENT)
Age: 89
End: 2024-08-13

## 2024-08-14 ENCOUNTER — TELEPHONE (OUTPATIENT)
Age: 89
End: 2024-08-14

## 2024-08-14 NOTE — TELEPHONE ENCOUNTER
Caller: Patient    Doctor/Office: Preston    Call regarding :  Appt 9/18     Call was transferred to: Internal Med

## 2024-08-20 ENCOUNTER — OFFICE VISIT (OUTPATIENT)
Dept: OBGYN CLINIC | Facility: CLINIC | Age: 89
End: 2024-08-20
Payer: MEDICARE

## 2024-08-20 ENCOUNTER — TELEPHONE (OUTPATIENT)
Age: 89
End: 2024-08-20

## 2024-08-20 ENCOUNTER — APPOINTMENT (OUTPATIENT)
Dept: RADIOLOGY | Facility: AMBULARY SURGERY CENTER | Age: 89
End: 2024-08-20
Attending: ORTHOPAEDIC SURGERY
Payer: MEDICARE

## 2024-08-20 VITALS
HEIGHT: 60 IN | HEART RATE: 58 BPM | DIASTOLIC BLOOD PRESSURE: 76 MMHG | SYSTOLIC BLOOD PRESSURE: 125 MMHG | BODY MASS INDEX: 27.88 KG/M2 | WEIGHT: 142 LBS

## 2024-08-20 DIAGNOSIS — M79.671 PAIN IN RIGHT FOOT: ICD-10-CM

## 2024-08-20 DIAGNOSIS — M20.21 HALLUX RIGIDUS OF RIGHT FOOT: ICD-10-CM

## 2024-08-20 DIAGNOSIS — M25.871 SESAMOIDITIS OF RIGHT FOOT: Primary | ICD-10-CM

## 2024-08-20 DIAGNOSIS — Z01.89 ENCOUNTER FOR LOWER EXTREMITY COMPARISON IMAGING STUDY: ICD-10-CM

## 2024-08-20 PROCEDURE — 73620 X-RAY EXAM OF FOOT: CPT

## 2024-08-20 PROCEDURE — 73630 X-RAY EXAM OF FOOT: CPT

## 2024-08-20 PROCEDURE — 99214 OFFICE O/P EST MOD 30 MIN: CPT | Performed by: ORTHOPAEDIC SURGERY

## 2024-08-20 NOTE — PATIENT INSTRUCTIONS
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 These surgeries are fraught with complications and most orthopaedic surgeons do not perform these surgeries due to the complication.    These implants tend to erode through the bone and cause impingement and arthritis pain from the metal rubbing on bone.    It appears like the pain you're experiencing is coming from your tibial sesamoid which is a prominent bone on your plantar foot (the ball of your foot.)    To treat this, you need to spend most of your time in supportive sneakers (see instructions below on sneaker choice.) In addition, obtaining a sesamoid J pad and wearing like the image shows below (or even sticking this to your shoe), can offload this area, decrease your pain, and maybe improve your balance.    Dancers/Sesamoid Pads   Type into Amazon...  \"PerformanceFoot Dancer/Sesamoid Pad - 1/8 inch Felt Left Foot (10)\"      [employer], New Balance, [employer] are good brands but I recommend going to a dedicate shoe store (not [employer] or [employer].) At these types of stores, they have experts that can fit you for shoes appropriate for your foot problem. Shoe choice is essential to solving/improving most types of foot pain.  Even after a surgery, good shoes are necessary to keep the foot as comfortable as possible.    [employer]  [address] [facility] [address] [phone]    [address], [place], [place] [address] [phone]    [employer]  [address] [place] [address] [phone]    [employer]   [address] [facility]  [phone]    [employer]  [address], [place] [address] [phone]    [facility]  [address] [facility] [phone]    [employer]   [address] [place] [address]  [phone]    [employer]  [address] [place], [place]  [phone]    [employer] " [address], [address]  [phone]    [employer] [address], [address], [place] [address]   [phone]                James R Lachman, M.D.  Attending, Orthopaedic Surgery  Foot and Ankle  [facility]        ORTHOPAEDIC FOOT AND ANKLE CLINIC VISIT     Assessment:     Encounter Diagnoses   Name Primary?    Pain in right foot Yes    Encounter for lower extremity comparison imaging study     Sesamoiditis of right foot     Hallux rigidus of right foot               Plan:   The patient verbalized understanding of exam findings and treatment plan. We engaged in the shared decision-making process and treatment options were discussed at length with the patient. Surgical and conservative management discussed today along with risks and benefits.  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  We recommended supportive shoes and sesamoid J pad.   Sesamoid J pad is obtained on [employer].com  Supportive shoes from stores listed in her AVSS  If her pain does not improve with these options, we could remove her hardware and perform a resection arthroplasty of her 1st MTP, this would not be a reliable option though as this surgery also has a lot of complications      History of Present Illness:   Chief Complaint: Right 1st Plantar sesamoid pain  eNha Molina is a 89 y.o. female who is being seen for right foot pain. 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  Pain is localized at plantar 1st MTP with minimal radiating and described as sharp and severe. Patient denies numbness, tingling or radicular pain.  Denies history of neuropathy.  Patient does not smoke, does not have diabetes and does take blood thinners.  Patient denies family history of anesthesia complications and has not had any complications with anesthesia.     Pain/symptom timing:  Worse during the day when active  Pain/symptom  context:  Worse with activites and work  Pain/symptom modifying factors:  Rest makes better, activities make worse  Pain/symptom associated signs/symptoms: none    Prior treatment   NSAIDsYes    Injections No   Bracing/Orthotics Yes   Physical Therapy No     Orthopedic Surgical History:   See below    Past Medical, Surgical and Social History:  Past Medical History:  has a past medical history of CAD (coronary artery disease).  Problem List: does not have any pertinent problems on file.  Past Surgical History:  has a past surgical history that includes Breast biopsy.  Family History: family history includes Stroke in her sister.  Social History:  reports that she has quit smoking. Her smoking use included cigarettes. She has been exposed to tobacco smoke. She has never used smokeless tobacco. She reports that she does not drink alcohol and does not use drugs.  Current Medications: has a current medication list which includes the following prescription(s): acetaminophen, aspirin, vitamin d3 gummies, furosemide, lactulose, levothyroxine, lidocaine, lorazepam, lovastatin, meclizine, multivitamin gummies womens, and oxybutynin.  Allergies: has No Known Allergies.     Review of Systems:  General- denies fever/chills  HEENT- denies hearing loss or sore throat  Eyes- denies eye pain or visual disturbances, denies red eyes  Respiratory- denies cough or SOB  Cardio- denies chest pain or palpitations  GI- denies abdominal pain  Endocrine- denies urinary frequency  Urinary- denies pain with urination  Musculoskeletal- Negative except noted above  Skin- denies rashes or wounds  Neurological- denies dizziness or headache  Psychiatric- denies anxiety or difficulty concentrating    Physical Exam:   LMP  (LMP Unknown)   General/Constitutional: No apparent distress: well-nourished and well developed.  Eyes: normal ocular motion  Cardio: RRR, Normal S1S2, No m/r/g  Lymphatic: No appreciable lymphadenopathy  Respiratory: Non-labored  breathing, CTA b/l no w/c/r  Vascular: No edema, swelling or tenderness, except as noted in detailed exam.  Integumentary: No impressive skin lesions present, except as noted in detailed exam.  Neuro: No ataxia or tremors noted  Psych: Normal mood and affect, oriented to person, place and time. Appropriate affect.  Musculoskeletal: Normal, except as noted in detailed exam and in HPI.    Examination    Right    Gait Antalgic   Musculoskeletal Tender to palpation at tibial sesamoid    Skin Normal.      Nails Normal    Range of Motion  20 degrees dorsiflexion, 30 degrees plantarflexion  Subtalar motion: normal    Stability Stable    Muscle Strength 5/5 tibialis anterior  5/5 gastrocnemius-soleus  5/5 posterior tibialis  5/5 peroneal/eversion strength  5/5 EHL  5/5 FHL    Neurologic Normal    Sensation  Intact to light touch throughout sural, saphenous, superficial peroneal, deep peroneal and medial/lateral plantar nerve distributions.  Washington-Raimundo 5.07 filament (10g) testing deferred.    Cardiovascular Brisk capillary refill < 2 seconds,intact DP and PT pulses    Special Tests None      Imaging Studies:       3 views of the right foot were taken, reviewed and interpreted independently that demonstrate 1st MTP hemicap which has eroded through her proximal phalanx. Reviewed by me personally.        James R. Lachman, MD  Foot & Ankle Surgery   Department of Orthopaedic Surgery  [facility]      I personally performed the service.    James R. Lachman, MD

## 2024-08-20 NOTE — TELEPHONE ENCOUNTER
Caller: Sherrie - Nurse w/ Luiz    Doctor: Lachman    Reason for call: Nurse was reviewing patient's after-visit summary from today and had questions; saw xray order mentioned but no script for xray.  Advised xrays were taken in office w/ provider.    No further action at this time.    Call back#: N/A

## 2024-08-20 NOTE — PROGRESS NOTES
James R Lachman, M.D.  Attending, Orthopaedic Surgery  Foot and Ankle  [facility]        ORTHOPAEDIC FOOT AND ANKLE CLINIC VISIT     Assessment:     Encounter Diagnoses   Name Primary?    Pain in right foot Yes    Encounter for lower extremity comparison imaging study     Sesamoiditis of right foot     Hallux rigidus of right foot               Plan:   The patient verbalized understanding of exam findings and treatment plan. We engaged in the shared decision-making process and treatment options were discussed at length with the patient. Surgical and conservative management discussed today along with risks and benefits.  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  We recommended supportive shoes and sesamoid J pad.   Sesamoid J pad is obtained on [employer]  Supportive shoes from stores listed in her AVSS  If her pain does not improve with these options, we could remove her hardware and perform a resection arthroplasty of her 1st MTP, this would not be a reliable option though as this surgery also has a lot of complications      History of Present Illness:   Chief Complaint: Right 1st Plantar sesamoid pain  Neha Molina is a 89 y.o. female who is being seen for right foot pain. 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  Pain is localized at plantar 1st MTP with minimal radiating and described as sharp and severe. Patient denies numbness, tingling or radicular pain.  Denies history of neuropathy.  Patient does not smoke, does not have diabetes and does take blood thinners.  Patient denies family history of anesthesia complications and has not had any complications with anesthesia.     Pain/symptom timing:  Worse during the day when active  Pain/symptom context:  Worse with activites and work  Pain/symptom modifying factors:  Rest makes better, activities make worse  Pain/symptom associated signs/symptoms:  none    Prior treatment   NSAIDsYes    Injections No   Bracing/Orthotics Yes   Physical Therapy No     Orthopedic Surgical History:   See below    Past Medical, Surgical and Social History:  Past Medical History:  has a past medical history of CAD (coronary artery disease).  Problem List: does not have any pertinent problems on file.  Past Surgical History:  has a past surgical history that includes Breast biopsy.  Family History: family history includes Stroke in her sister.  Social History:  reports that she has quit smoking. Her smoking use included cigarettes. She has been exposed to tobacco smoke. She has never used smokeless tobacco. She reports that she does not drink alcohol and does not use drugs.  Current Medications: has a current medication list which includes the following prescription(s): acetaminophen, aspirin, vitamin d3 gummies, furosemide, lactulose, levothyroxine, lidocaine, lorazepam, lovastatin, meclizine, multivitamin gummies womens, and oxybutynin.  Allergies: has No Known Allergies.     Review of Systems:  General- denies fever/chills  HEENT- denies hearing loss or sore throat  Eyes- denies eye pain or visual disturbances, denies red eyes  Respiratory- denies cough or SOB  Cardio- denies chest pain or palpitations  GI- denies abdominal pain  Endocrine- denies urinary frequency  Urinary- denies pain with urination  Musculoskeletal- Negative except noted above  Skin- denies rashes or wounds  Neurological- denies dizziness or headache  Psychiatric- denies anxiety or difficulty concentrating    Physical Exam:   LMP  (LMP Unknown)   General/Constitutional: No apparent distress: well-nourished and well developed.  Eyes: normal ocular motion  Cardio: RRR, Normal S1S2, No m/r/g  Lymphatic: No appreciable lymphadenopathy  Respiratory: Non-labored breathing, CTA b/l no w/c/r  Vascular: No edema, swelling or tenderness, except as noted in detailed exam.  Integumentary: No impressive skin lesions  present, except as noted in detailed exam.  Neuro: No ataxia or tremors noted  Psych: Normal mood and affect, oriented to person, place and time. Appropriate affect.  Musculoskeletal: Normal, except as noted in detailed exam and in HPI.    Examination    Right    Gait Antalgic   Musculoskeletal Tender to palpation at tibial sesamoid    Skin Normal.      Nails Normal    Range of Motion  20 degrees dorsiflexion, 30 degrees plantarflexion  Subtalar motion: normal    Stability Stable    Muscle Strength 5/5 tibialis anterior  5/5 gastrocnemius-soleus  5/5 posterior tibialis  5/5 peroneal/eversion strength  5/5 EHL  5/5 FHL    Neurologic Normal    Sensation  Intact to light touch throughout sural, saphenous, superficial peroneal, deep peroneal and medial/lateral plantar nerve distributions.  Round O-Raimundo 5.07 filament (10g) testing deferred.    Cardiovascular Brisk capillary refill < 2 seconds,intact DP and PT pulses    Special Tests None      Imaging Studies:       3 views of the right foot were taken, reviewed and interpreted independently that demonstrate 1st MTP hemicap which has eroded through her proximal phalanx. Reviewed by me personally.        James R. Lachman, MD  Foot & Ankle Surgery   Department of Orthopaedic Surgery  [facility]      I personally performed the service.    James R. Lachman, MD

## 2024-08-26 ENCOUNTER — OFFICE VISIT (OUTPATIENT)
Dept: UROLOGY | Facility: AMBULATORY SURGERY CENTER | Age: 89
End: 2024-08-26

## 2024-08-26 VITALS
OXYGEN SATURATION: 99 % | WEIGHT: 144 LBS | DIASTOLIC BLOOD PRESSURE: 60 MMHG | HEART RATE: 69 BPM | HEIGHT: 60 IN | SYSTOLIC BLOOD PRESSURE: 120 MMHG | BODY MASS INDEX: 28.27 KG/M2

## 2024-08-26 DIAGNOSIS — N32.81 OVERACTIVE BLADDER: ICD-10-CM

## 2024-08-26 DIAGNOSIS — N39.41 URGE INCONTINENCE OF URINE: Primary | ICD-10-CM

## 2024-08-26 LAB
BACTERIA UR QL AUTO: ABNORMAL /HPF
BILIRUB UR QL STRIP: NEGATIVE
CLARITY UR: CLEAR
COLOR UR: COLORLESS
GLUCOSE UR STRIP-MCNC: NEGATIVE MG/DL
HGB UR QL STRIP.AUTO: NEGATIVE
KETONES UR STRIP-MCNC: NEGATIVE MG/DL
LEUKOCYTE ESTERASE UR QL STRIP: ABNORMAL
NITRITE UR QL STRIP: NEGATIVE
NON-SQ EPI CELLS URNS QL MICRO: ABNORMAL /HPF
PH UR STRIP.AUTO: 7 [PH]
POST-VOID RESIDUAL VOLUME, ML POC: 14 ML
PROT UR STRIP-MCNC: NEGATIVE MG/DL
RBC #/AREA URNS AUTO: ABNORMAL /HPF
SL AMB  POCT GLUCOSE, UA: NORMAL
SL AMB LEUKOCYTE ESTERASE,UA: NORMAL
SL AMB POCT BILIRUBIN,UA: NORMAL
SL AMB POCT BLOOD,UA: NORMAL
SL AMB POCT CLARITY,UA: NORMAL
SL AMB POCT COLOR,UA: YELLOW
SL AMB POCT KETONES,UA: NORMAL
SL AMB POCT NITRITE,UA: NORMAL
SL AMB POCT PH,UA: 7.5
SL AMB POCT SPECIFIC GRAVITY,UA: 1
SL AMB POCT URINE PROTEIN: NORMAL
SL AMB POCT UROBILINOGEN: 0.2
SP GR UR STRIP.AUTO: 1.01 (ref 1–1.03)
UROBILINOGEN UR STRIP-ACNC: <2 MG/DL
WBC #/AREA URNS AUTO: ABNORMAL /HPF

## 2024-08-26 PROCEDURE — 87086 URINE CULTURE/COLONY COUNT: CPT

## 2024-08-26 PROCEDURE — 87077 CULTURE AEROBIC IDENTIFY: CPT

## 2024-08-26 PROCEDURE — 81001 URINALYSIS AUTO W/SCOPE: CPT

## 2024-08-26 PROCEDURE — 87186 SC STD MICRODIL/AGAR DIL: CPT

## 2024-08-26 RX ORDER — MIRABEGRON 25 MG/1
25 TABLET, FILM COATED, EXTENDED RELEASE ORAL DAILY
Qty: 30 TABLET | Refills: 4 | Status: SHIPPED | OUTPATIENT
Start: 2024-08-26

## 2024-08-26 NOTE — PROGRESS NOTES
Assessment and plan:     Overactive bladder  Currently utilizing 20 mg of oxybutynin nightly for bothersome urinary symptoms, continues with nocturia several times per night and urinary urgency and frequency throughout the day  Was recently placed on Lasix for congestive heart failure, recommend making sure that she is taking the medication in the morning and not at nighttime as this can certainly precipitate/worsen her nocturia  Discontinue 20 mg of oxybutynin and initiate 25 mg of Myrbetriq, side effect profile discussed  PVR in office today 14 mL  UA in office today positive for leukocytes and small blood, will send for formal urine culture and microscopy  Discussed diet and lifestyle modifications, such as limiting bladder irritants and stopping fluid consumption 2 hours prior to bedtime  Follow-up in 8 to 10 weeks to discuss symptoms    History of Present Illness     Neha Molina is a 89 y.o. female who presents today to the office as a new patient for further evaluation of overactive bladder.  She currently resides at the [facility] and has a medical assistant who helps her throughout the day with her medications.  She has been taking oxybutynin for quite some time now 20 mg which has not been beneficial for her.  She also was recently placed on Lasix for her congestive heart failure.  I did explain to her that this medication can certainly worsen her bothersome urinary symptoms.  She states that the most bothersome thing to her is the nocturia which occurs several times per night.  She denies any dysuria, hematuria, suprapubic or flank pain.  She does state that she does have some mild leakage when she coughs, laughs, and sneezes.  She states that she does drink adequate fluid throughout the day.  She does not think that she consumes any bladder irritants.  She is interested in trying another medication for her overactive bladder.    Laboratory     Lab Results   Component Value Date    BUN 44 (H) 08/02/2024  "   CREATININE 0.95 08/02/2024       No components found for: \"GFR\"    Lab Results   Component Value Date    CALCIUM 9.2 08/02/2024    K 5.1 08/02/2024    CO2 25 08/02/2024     08/02/2024       Lab Results   Component Value Date    WBC 4.34 02/07/2024    HGB 10.7 (L) 02/07/2024    HCT 33.8 (L) 02/07/2024    MCV 98 02/07/2024     02/07/2024       No results found for: \"PSA\"    Recent Results (from the past 1 hour(s))   POCT urine dip    Collection Time: 08/26/24 10:12 AM   Result Value Ref Range    LEUKOCYTE ESTERASE,UA large     NITRITE,UA -     SL AMB POCT UROBILINOGEN 0.2     POCT URINE PROTEIN -      PH,UA 7.5     BLOOD,UA small     SPECIFIC GRAVITY,UA 1.000     KETONES,UA -     BILIRUBIN,UA -     GLUCOSE, UA -      COLOR,UA yellow     CLARITY,UA cloudy    POCT Measure PVR    Collection Time: 08/26/24 10:12 AM   Result Value Ref Range    POST-VOID RESIDUAL VOLUME, ML POC 14 mL       Review of Systems     Review of Systems              Allergies     No Known Allergies    Physical Exam     Physical Exam    Vital Signs     Vitals:    08/26/24 1009   BP: 120/60   BP Location: Left arm   Patient Position: Sitting   Cuff Size: Standard   Pulse: 69   SpO2: 99%   Weight: 65.3 kg (144 lb)   Height: 5' (1.524 m)       Current Medications       Current Outpatient Medications:     acetaminophen (TYLENOL) 500 mg tablet, Take 500 mg by mouth every 6 (six) hours as needed, Disp: , Rfl:     aspirin 81 mg chewable tablet, Chew 81 mg daily, Disp: , Rfl:     Cholecalciferol (Vitamin D3 Gummies) 25 MCG (1000 UT) CHEW, Chew 1 tablet (1,000 Units total) in the morning, Disp: 30 tablet, Rfl: 5    furosemide (LASIX) 40 mg tablet, Take 1 tablet (40 mg total) by mouth daily, Disp: 30 tablet, Rfl: 5    lactulose 20 g/30 mL, Take 10 g by mouth daily as needed (Constipation), Disp: , Rfl:     levothyroxine 75 mcg tablet, Please administer at 8am daily, Disp: 90 tablet, Rfl: 2    lidocaine (LIDODERM) 5 %, Apply 1 patch topically " over 12 hours daily Remove & Discard patch within 12 hours or as directed by MD, Disp: , Rfl:     LORazepam (ATIVAN) 0.5 mg tablet, Take 1 tablet (0.5 mg total) by mouth daily at bedtime, Disp: 30 tablet, Rfl: 5    lovastatin (ALTOPREV) 20 MG 24 hr tablet, Take 1 tablet (20 mg total) by mouth daily at bedtime Restart after completing Paxlovid treatment. Do not start before February 18, 2024., Disp: , Rfl:     meclizine (ANTIVERT) 25 mg tablet, Take 25 mg by mouth daily as needed, Disp: , Rfl:     Mirabegron ER 25 MG TB24, Take 25 mg by mouth in the morning, Disp: 30 tablet, Rfl: 4    Multiple Vitamins-Minerals (Multivitamin Gummies Womens) CHEW, Chew 1 tablet in the morning, Disp: 30 tablet, Rfl: 5    Active Problems     Patient Active Problem List   Diagnosis    Lactose intolerance    Generalized headaches    Hypothyroid    Other hyperlipidemia    History of CVA (cerebrovascular accident)    Anxiety    Other specified glaucoma    DJD (degenerative joint disease) of knee    Anemia    Ambulatory dysfunction    Overactive bladder    Primary osteoarthritis of knees, bilateral    Primary osteoarthritis involving multiple joints    Benign paroxysmal positional vertigo due to bilateral vestibular disorder    Localized edema    Bilateral carotid artery stenosis    Diastolic dysfunction    Mitral valve regurgitation    Nonrheumatic tricuspid valve regurgitation    Nonrheumatic pulmonary valve insufficiency    Acute on chronic diastolic congestive heart failure (HCC)    Concentric left ventricular hypertrophy    Lung nodule    Sesamoiditis of right foot    Hallux rigidus of right foot       Past Medical History     Past Medical History:   Diagnosis Date    CAD (coronary artery disease)        Surgical History     Past Surgical History:   Procedure Laterality Date    BREAST BIOPSY         Family History     Family History   Problem Relation Age of Onset    Stroke Sister        Social History     Social History     Social  History     Tobacco Use   Smoking Status Former    Types: Cigarettes    Passive exposure: Past   Smokeless Tobacco Never       Past Surgical History:   Procedure Laterality Date    BREAST BIOPSY           The following portions of the patient's history were reviewed and updated as appropriate: allergies, current medications, past family history, past medical history, past social history, past surgical history and problem list    Please note :  Voice dictation software has been used to create this document.  There may be inadvertent transcription errors.    DIGNA Brownlee

## 2024-08-26 NOTE — ASSESSMENT & PLAN NOTE
Currently utilizing 20 mg of oxybutynin nightly for bothersome urinary symptoms, continues with nocturia several times per night and urinary urgency and frequency throughout the day  Was recently placed on Lasix for congestive heart failure, recommend making sure that she is taking the medication in the morning and not at nighttime as this can certainly precipitate/worsen her nocturia  Discontinue 20 mg of oxybutynin and initiate 25 mg of Myrbetriq, side effect profile discussed  PVR in office today 14 mL  UA in office today positive for leukocytes and small blood, will send for formal urine culture and microscopy  Discussed diet and lifestyle modifications, such as limiting bladder irritants and stopping fluid consumption 2 hours prior to bedtime  Follow-up in 8 to 10 weeks to discuss symptoms

## 2024-08-28 ENCOUNTER — TELEPHONE (OUTPATIENT)
Dept: UROLOGY | Facility: AMBULATORY SURGERY CENTER | Age: 89
End: 2024-08-28

## 2024-08-28 ENCOUNTER — HOSPITAL ENCOUNTER (OUTPATIENT)
Dept: RADIOLOGY | Age: 89
Discharge: HOME | End: 2024-08-28
Payer: MEDICARE

## 2024-08-28 ENCOUNTER — TELEPHONE (OUTPATIENT)
Dept: OTHER | Facility: OTHER | Age: 89
End: 2024-08-28

## 2024-08-28 DIAGNOSIS — N39.0 URINARY TRACT INFECTION WITHOUT HEMATURIA, SITE UNSPECIFIED: Primary | ICD-10-CM

## 2024-08-28 DIAGNOSIS — R91.1 LUNG NODULE: ICD-10-CM

## 2024-08-28 PROCEDURE — 71250 CT THORAX DX C-: CPT

## 2024-08-28 RX ORDER — CEPHALEXIN 500 MG/1
500 CAPSULE ORAL EVERY 8 HOURS SCHEDULED
Qty: 21 CAPSULE | Refills: 0 | Status: SHIPPED | OUTPATIENT
Start: 2024-08-28 | End: 2024-09-04

## 2024-08-28 NOTE — TELEPHONE ENCOUNTER
Patient is calling to confirm her appointment date and time with Dr. Johnston. Appointment scheduled for Wednesday September 18, 2024 at 10:20 am.

## 2024-08-28 NOTE — TELEPHONE ENCOUNTER
Pt made aware .         ----- Message from DIGNA Brownlee sent at 8/28/2024  8:06 AM EDT -----  Please call Patricia and let her know that her urine culture was positive for bacteria.  I did send in a course of antibiotics that she can start taking now.  Please let her know that this UTI may be contributing to her bothersome symptoms of urinary urgency and frequency as symptoms do present differently in the elderly population.  She has any other questions or concerns she can feel free to reach out to our office.  
Clothing//Cell Phone/PDA (specify)

## 2024-08-29 LAB — BACTERIA UR CULT: ABNORMAL

## 2024-08-30 NOTE — H&P ADULT - REASON FOR ADMISSION
[facility]  Eliud Hilton MD    [facility] [address] [address]  [phone]    Fax: [phone]    Patient Name: Cary Carbone  Medical Record Number: [id number]  PCP: Delores Bauer FNP  Age: 56 y.o. : 1967  Contact: [phone] (home)     History of Present Illness:     Cary Carbone is a 56 y.o.  female here for follow up of keloid of right breast treated with 0.5 mL of ILK and has improved. Today, she is c/o thickened scar of right knee following TKR in 2024.    The patient has no other concerns today.    Review of Systems:     Unremarkable other than mentioned above.     Physical Exam:     General: Relaxed, oriented, alert    Skin examination of the scalp, face, neck, chest, back, abdomen, upper extremities and lower extremities were normal except for as listed below      Assessment and Plan:     1. 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    Plan: Intralesional Kenalog    Lesions Injected: 6  Medication Injected: 10 mg/cc of Kenalog  Total Volume Injected: 0.6 cc  NDC #: 2939-1322-88  Lot: [id number]  Expiration: 2025  Administered by: Eliud Hilton MD     The risks of atrophy were reviewed with the patient.            Eliud Hilton MD   Mohs Surgery/Dermatologic Oncology  Dermatology        Stroke on CTH

## 2024-09-06 DIAGNOSIS — E63.9 NUTRITIONAL DEFICIENCY: ICD-10-CM

## 2024-09-06 RX ORDER — MULTIVIT-MINERALS/FOLIC ACID 80 MCG
TABLET,CHEWABLE ORAL
Qty: 90 TABLET | Status: SHIPPED | OUTPATIENT
Start: 2024-09-06

## 2024-09-06 RX ORDER — ASCORBIC ACID 125 MG
TABLET,CHEWABLE ORAL
Qty: 90 TABLET | Status: SHIPPED | OUTPATIENT
Start: 2024-09-06

## 2024-09-09 DIAGNOSIS — F41.9 ANXIETY: ICD-10-CM

## 2024-09-09 RX ORDER — LORAZEPAM 0.5 MG/1
TABLET ORAL
Qty: 30 TABLET | Refills: 0 | Status: SHIPPED | OUTPATIENT
Start: 2024-09-09

## 2024-09-18 ENCOUNTER — OFFICE VISIT (OUTPATIENT)
Age: 89
End: 2024-09-18
Payer: MEDICARE

## 2024-09-18 VITALS
HEIGHT: 60 IN | HEART RATE: 72 BPM | TEMPERATURE: 96.8 F | WEIGHT: 146.2 LBS | SYSTOLIC BLOOD PRESSURE: 118 MMHG | DIASTOLIC BLOOD PRESSURE: 70 MMHG | OXYGEN SATURATION: 96 % | BODY MASS INDEX: 28.7 KG/M2

## 2024-09-18 DIAGNOSIS — R91.1 LUNG NODULE: ICD-10-CM

## 2024-09-18 DIAGNOSIS — I50.33 ACUTE ON CHRONIC DIASTOLIC CONGESTIVE HEART FAILURE (HCC): Primary | ICD-10-CM

## 2024-09-18 DIAGNOSIS — T78.40XA ALLERGY, INITIAL ENCOUNTER: ICD-10-CM

## 2024-09-18 DIAGNOSIS — R51.9 GENERALIZED HEADACHES: ICD-10-CM

## 2024-09-18 DIAGNOSIS — H81.13 BENIGN PAROXYSMAL POSITIONAL VERTIGO DUE TO BILATERAL VESTIBULAR DISORDER: ICD-10-CM

## 2024-09-18 PROCEDURE — 99214 OFFICE O/P EST MOD 30 MIN: CPT | Performed by: INTERNAL MEDICINE

## 2024-09-18 PROCEDURE — G2211 COMPLEX E/M VISIT ADD ON: HCPCS | Performed by: INTERNAL MEDICINE

## 2024-09-18 RX ORDER — LORATADINE 10 MG/1
10 TABLET ORAL DAILY
Qty: 30 TABLET | Refills: 2 | Status: SHIPPED | OUTPATIENT
Start: 2024-09-18

## 2024-09-19 NOTE — ASSESSMENT & PLAN NOTE
Patient does have ongoing lightheadedness and dizziness symptoms which are related to her bilateral vertebral insufficiency.  Reviewed this and recommend continuation of Greg all management and continuation of low-dose aspirin 81 mg daily

## 2024-09-19 NOTE — PROGRESS NOTES
Ambulatory Visit  Name: Neha Molina      : 1932      MRN: [id number]  Encounter Provider: Dinesh Johnston MD  Encounter Date: 2024   Encounter department: [facility] INTERNAL MEDICINE    Assessment & Plan  Allergy, initial encounter    Orders:    loratadine (CLARITIN) 10 mg tablet; Take 1 tablet (10 mg total) by mouth daily    Lung nodule    Orders:    CT chest wo contrast; Future    Acute on chronic diastolic congestive heart failure (HCC)  Wt Readings from Last 3 Encounters:   24 66.3 kg (146 lb 3.2 oz)   24 65.3 kg (144 lb)   24 64.4 kg (142 lb)       Current assessment of the patient's congestive heart failure confirms stability lung fields are clear she has had no significant shortness of breath and her O2 saturation is 96% on room air.  No significant peripheral edema is appreciated weight salts has been advised to the patient and also continuation of current diuretic therapy at 40 mg daily.             Benign paroxysmal positional vertigo due to bilateral vestibular disorder  Patient does have ongoing lightheadedness and dizziness symptoms which are related to her bilateral vertebral insufficiency.  Reviewed this and recommend continuation of Greg all management and continuation of low-dose aspirin 81 mg daily         Generalized headaches  Assessment of the patient's headaches today indicates some nasal congestion and tenderness in the frontal and maxillary sinuses suggestive of allergic irritation trial of Claritin 10 mg daily has been recommended              History of Present Illness     This 89-year-old female patient returns today for a visit with us.  During today's visit she has expressed concerns about multiple issues.    First concern is regarding her right foot.  She has ongoing discomfort in the foot.  She has been seen by orthopedics and podiatry and apparently has a failed prior surgery on metatarsal bone in her foot.  X-rays were reviewed  with the patient today.  I have been recommended that she obtain better foot wear.  The shoes that she is wearing today are completely worn out and offer very little if any support to her foot.  I have urged her to try to find a way to get to the shoe store to purchase some new shoes.  Recommendation from orthopedics/podiatry is not to try to intervene surgically given the patient's advanced age.    Patient has left knee osteoarthritis issues and is continuing to get relief from her symptoms with cortisone type injections.  She does have advanced arthritis.  She will follow-up with orthopedics in October or November for consideration of a updated cortisone injection.  We also discussed the possibility of viscous supplementation injections into the joint if cortisone fails to provide her with improvement in symptoms.    Also the patient has been expressing concern about overactive bladder symptoms.  She was seen by urology recently and was switched from 20 mg of oxybutynin to 25 mg of Myrbetriq.  Recommend the patient try the medication for period of several weeks before deciding on how beneficial it is.    Patient also has ongoing symptoms of headache and pressure feeling in her head.  She evidently was evaluated multiple times when she was a resident in [facility] by neurology services they were unable to find any specific reason for her symptoms.  Most recently we have evaluated the symptoms and find possibility that they may be related to some allergy type of issues.  We have recommended a trial of Claritin 10 mg on a daily basis.    During today's visit we have reviewed the patient's finding of a lung nodule in the right upper lung CT scanning has been requested for December of this year to confirm stability of this area.  Differential diagnosis includes scarring versus inflammatory process versus malignancy.  Will review CAT scan in person with the patient upon completion in December currently she expresses  no pulmonary symptoms obstructive shortness of breath cough or hemoptysis.  He displays no significant weight reduction.    Dizziness  Associated symptoms include arthralgias.     Review of Systems   Genitourinary:  Positive for frequency.   Musculoskeletal:  Positive for arthralgias.        Right foot pain secondary to failed previous surgery on metatarsal bone recommendation is to obtain better foot support with new shoes.  Current shoes are completely worn out and provide her with no arch or foot support.   Neurological:  Positive for dizziness.   All other systems reviewed and are negative.    Past Medical History:   Diagnosis Date    CAD (coronary artery disease)      Past Surgical History:   Procedure Laterality Date    BREAST BIOPSY       Family History   Problem Relation Age of Onset    Stroke Sister      Social History     Tobacco Use    Smoking status: Former     Types: Cigarettes     Passive exposure: Past    Smokeless tobacco: Never   Vaping Use    Vaping status: Never Used   Substance and Sexual Activity    Alcohol use: Never    Drug use: Never    Sexual activity: Not Currently     Current Outpatient Medications on File Prior to Visit   Medication Sig    acetaminophen (TYLENOL) 500 mg tablet Take 500 mg by mouth every 6 (six) hours as needed    aspirin 81 mg chewable tablet Chew 81 mg daily    furosemide (LASIX) 40 mg tablet Take 1 tablet (40 mg total) by mouth daily    levothyroxine 75 mcg tablet Please administer at 8am daily    LORazepam (ATIVAN) 0.5 mg tablet TAKE 1 TABLET BY MOUTH AT BEDTIME FOR ANXIETY    lovastatin (ALTOPREV) 20 MG 24 hr tablet Take 1 tablet (20 mg total) by mouth daily at bedtime Restart after completing Paxlovid treatment. Do not start before February 18, 2024.    meclizine (ANTIVERT) 25 mg tablet Take 25 mg by mouth daily as needed    Mirabegron ER 25 MG TB24 Take 25 mg by mouth in the morning    Multiple Vitamins-Minerals (Centrum Adult 50+ MultiGummies) CHEW CHEW 1 TABLET BY  MOUTH EVERY MORNING    Vitamin D3 Adult Gummies 25 MCG (1000 UT) CHEW CHEW 1 TABLET BY MOUTH EVERY MORNING    [DISCONTINUED] lactulose 20 g/30 mL Take 10 g by mouth daily as needed (Constipation) (Patient not taking: Reported on 9/18/2024)    [DISCONTINUED] lidocaine (LIDODERM) 5 % Apply 1 patch topically over 12 hours daily Remove & Discard patch within 12 hours or as directed by MD (Patient not taking: Reported on 9/18/2024)     No Known Allergies  Immunization History   Administered Date(s) Administered    COVID-19 Moderna mRNA Vaccine 12 Yr+ 50 mcg/0.5 mL (Spikevax) 10/23/2023    COVID-19 PFIZER VACCINE 0.3 ML IM 01/23/2021, 02/12/2021    COVID-19 Pfizer Vac BIVALENT Dev-sucrose 12 Yr+ IM 09/18/2022    COVID-19 Pfizer vac (Dev-sucrose, gray cap) 12 yr+ IM 10/01/2021, 04/04/2022    INFLUENZA 10/23/2023     Objective     /70   Pulse 72   Temp (!) 96.8 °F (36 °C) (Tympanic)   Ht 5' (1.524 m)   Wt 66.3 kg (146 lb 3.2 oz)   LMP  (LMP Unknown)   SpO2 96%   BMI 28.55 kg/m²     Physical Exam  Vitals and nursing note reviewed.   Constitutional:       General: She is not in acute distress.     Appearance: She is well-developed.   HENT:      Head: Normocephalic and atraumatic.   Eyes:      Conjunctiva/sclera: Conjunctivae normal.   Cardiovascular:      Rate and Rhythm: Normal rate and regular rhythm.      Heart sounds: No murmur heard.  Pulmonary:      Effort: Pulmonary effort is normal. No respiratory distress.      Breath sounds: Normal breath sounds. No wheezing, rhonchi or rales.   Abdominal:      General: Bowel sounds are normal.      Palpations: Abdomen is soft.      Tenderness: There is no abdominal tenderness.   Musculoskeletal:         General: No swelling.      Cervical back: Neck supple.   Skin:     General: Skin is warm and dry.      Capillary Refill: Capillary refill takes less than 2 seconds.   Neurological:      Mental Status: She is alert.   Psychiatric:         Mood and Affect: Mood normal.  yes

## 2024-09-19 NOTE — ASSESSMENT & PLAN NOTE
Assessment of the patient's headaches today indicates some nasal congestion and tenderness in the frontal and maxillary sinuses suggestive of allergic irritation trial of Claritin 10 mg daily has been recommended

## 2024-09-19 NOTE — ASSESSMENT & PLAN NOTE
Wt Readings from Last 3 Encounters:   09/18/24 66.3 kg (146 lb 3.2 oz)   08/26/24 65.3 kg (144 lb)   08/20/24 64.4 kg (142 lb)       Current assessment of the patient's congestive heart failure confirms stability lung fields are clear she has had no significant shortness of breath and her O2 saturation is 96% on room air.  No significant peripheral edema is appreciated weight salts has been advised to the patient and also continuation of current diuretic therapy at 40 mg daily.

## 2024-10-02 ENCOUNTER — TELEPHONE (OUTPATIENT)
Age: 89
End: 2024-10-02

## 2024-10-03 ENCOUNTER — TELEPHONE (OUTPATIENT)
Age: 89
End: 2024-10-03

## 2024-10-03 NOTE — TELEPHONE ENCOUNTER
Neha called concerned about her appt being far off in December with you.  It is scheduled after her CT scan appt on 12/2.  Is this ct scan not due until December.      She also has a skin tag on her rectum.  Does she need an evaluation.  It is not bothering her.  Its on the outside.    She is still getting dizzy. The meclazine is not really working and she only takes it when needed.      She would like to review all her medications and feels she would like to review if needs to be on everything.

## 2024-10-03 NOTE — TELEPHONE ENCOUNTER
The patient's visit with us is several days after her CT scan.  If she has emergent issues that she would like to see us for we will be happy to try to give her an appointment sooner.

## 2024-10-03 NOTE — TELEPHONE ENCOUNTER
Patient called asking to speak to anamaria. Attempted to warm transfer, line was unavailable. Please contact pt

## 2024-10-07 NOTE — PROGRESS NOTE ADULT - PROBLEM SELECTOR PROBLEM 1
CVA (cerebrovascular accident)
(4) rarely moist

## 2024-10-14 DIAGNOSIS — F41.9 ANXIETY: ICD-10-CM

## 2024-10-14 RX ORDER — LORAZEPAM 0.5 MG/1
TABLET ORAL
Qty: 30 TABLET | Refills: 0 | Status: SHIPPED | OUTPATIENT
Start: 2024-10-14 | End: 2024-10-21 | Stop reason: SDUPTHER

## 2024-10-16 ENCOUNTER — TELEPHONE (OUTPATIENT)
Age: 89
End: 2024-10-16

## 2024-10-16 DIAGNOSIS — F41.9 ANXIETY: ICD-10-CM

## 2024-10-16 NOTE — TELEPHONE ENCOUNTER
Patient had previous sx on her foot in [place] and would like a second opinion. She will cb to schedule once she receives her surgical records

## 2024-10-21 ENCOUNTER — OFFICE VISIT (OUTPATIENT)
Age: 89
End: 2024-10-21
Payer: MEDICARE

## 2024-10-21 VITALS
HEIGHT: 60 IN | BODY MASS INDEX: 29.17 KG/M2 | TEMPERATURE: 97.6 F | OXYGEN SATURATION: 99 % | HEART RATE: 66 BPM | WEIGHT: 148.6 LBS

## 2024-10-21 DIAGNOSIS — R91.1 LUNG NODULE: ICD-10-CM

## 2024-10-21 DIAGNOSIS — I50.33 ACUTE ON CHRONIC DIASTOLIC CONGESTIVE HEART FAILURE (HCC): ICD-10-CM

## 2024-10-21 DIAGNOSIS — F41.9 ANXIETY: Primary | ICD-10-CM

## 2024-10-21 DIAGNOSIS — E03.8 OTHER SPECIFIED HYPOTHYROIDISM: ICD-10-CM

## 2024-10-21 PROCEDURE — 99214 OFFICE O/P EST MOD 30 MIN: CPT | Performed by: INTERNAL MEDICINE

## 2024-10-21 PROCEDURE — G2211 COMPLEX E/M VISIT ADD ON: HCPCS | Performed by: INTERNAL MEDICINE

## 2024-10-21 RX ORDER — LORAZEPAM 0.5 MG/1
0.25 TABLET ORAL
Qty: 30 TABLET | Refills: 0 | Status: SHIPPED | OUTPATIENT
Start: 2024-10-21

## 2024-10-21 NOTE — PROGRESS NOTES
Ambulatory Visit  Name: Neha Molina      : 1932      MRN: [id number]  Encounter Provider: Dinesh Johnston MD  Encounter Date: 10/21/2024   Encounter department: [facility] INTERNAL MEDICINE    Assessment & Plan  Anxiety  Anxiety symptoms and difficulty falling asleep at night currently on lorazepam 0.5 mg patient describes brain fog the next morning will decrease her dose from 0.5 down to 0.25 mg at bedtime.    Orders:    LORazepam (ATIVAN) 0.5 mg tablet; Take 0.5 tablets (0.25 mg total) by mouth daily at bedtime    Acute on chronic diastolic congestive heart failure (HCC)  Wt Readings from Last 3 Encounters:   10/21/24 67.4 kg (148 lb 9.6 oz)   24 66.3 kg (146 lb 3.2 oz)   24 65.3 kg (144 lb)         Currently the patient shows stable weight decreased ankle edema in both lower extremities she would like to stop her furosemide which has been 40 mg daily.  I have agreed to this but she understands she has to let us know immediately if she starts to develop swelling again.  Either ankle swelling or shortness of breath would be reasons to notify us about restarting diuretic.           Lung nodule  Patient is due for follow-up CT scan in December for follow-up of right upper lung nodule.  Will see her         Other specified hypothyroidism  Recommend continuation of current thyroid supplementation at 75 mcg daily clinically stable              History of Present Illness     This pleasant 89-year-old female patient returns to our office today for a routine follow-up visit.  She indicates that she has been experiencing a morning brain fog.  This seems to last for a while after she gets up in the morning and then clears up and by midmorning.  Quite likely that this is a side effect of the lorazepam that she has been taking to induce sleep at night.  We reviewed the dose which is currently 0.5 mg and have asked her to decrease the dosing down to 0.25 mg in the hope that will  decrease the morning brain fog symptoms.    The patient shows no evidence of any peripheral edema on today's visit her ankles are in fact quite skinny she is inquiring if we can try stopping her diuretic medication.  I am agree with this approach indicating to her that she needs to let me know if there is any signs of recurrent edema in her lower extremities.    Patient has chronic pain in her heels which seems to be most severe at the end of the day into the evening.  The shoes that she is currently wearing are very poorly fitting and certainly worn out.  Indicated to the patient she needs better supportive shoes and possibly an insert such as a gel insert into the shoe to cushion her heel area.      Review of Systems   Musculoskeletal:         Bilateral heel pain specially at the end of the day   Neurological:         Morning brain fog symptoms   All other systems reviewed and are negative.    Past Medical History:   Diagnosis Date    CAD (coronary artery disease)      Past Surgical History:   Procedure Laterality Date    BREAST BIOPSY       Family History   Problem Relation Age of Onset    Stroke Sister      Social History     Tobacco Use    Smoking status: Former     Types: Cigarettes     Passive exposure: Past    Smokeless tobacco: Never   Vaping Use    Vaping status: Never Used   Substance and Sexual Activity    Alcohol use: Never    Drug use: Never    Sexual activity: Not Currently     Current Outpatient Medications on File Prior to Visit   Medication Sig    acetaminophen (TYLENOL) 500 mg tablet Take 500 mg by mouth every 6 (six) hours as needed    aspirin 81 mg chewable tablet Chew 81 mg daily    levothyroxine 75 mcg tablet Please administer at 8am daily    lovastatin (ALTOPREV) 20 MG 24 hr tablet Take 1 tablet (20 mg total) by mouth daily at bedtime Restart after completing Paxlovid treatment. Do not start before February 18, 2024.    meclizine (ANTIVERT) 25 mg tablet Take 25 mg by mouth daily as needed     Mirabegron ER 25 MG TB24 Take 25 mg by mouth in the morning    Multiple Vitamins-Minerals (Centrum Adult 50+ MultiGummies) CHEW CHEW 1 TABLET BY MOUTH EVERY MORNING    Vitamin D3 Adult Gummies 25 MCG (1000 UT) CHEW CHEW 1 TABLET BY MOUTH EVERY MORNING    [DISCONTINUED] furosemide (LASIX) 40 mg tablet Take 1 tablet (40 mg total) by mouth daily    [DISCONTINUED] loratadine (CLARITIN) 10 mg tablet Take 1 tablet (10 mg total) by mouth daily    [DISCONTINUED] LORazepam (ATIVAN) 0.5 mg tablet TAKE 1 TABLET BY MOUTH AT BEDTIME FOR ANXIETY     No Known Allergies  Immunization History   Administered Date(s) Administered    COVID-19 Moderna mRNA Vaccine 12 Yr+ 50 mcg/0.5 mL (Spikevax) 10/23/2023    COVID-19 PFIZER VACCINE 0.3 ML IM 01/23/2021, 02/12/2021    COVID-19 Pfizer Vac BIVALENT Dev-sucrose 12 Yr+ IM 09/18/2022    COVID-19 Pfizer vac (Dev-sucrose, gray cap) 12 yr+ IM 10/01/2021, 04/04/2022    INFLUENZA 10/23/2023     Objective     Pulse 66   Temp 97.6 °F (36.4 °C) (Tympanic)   Ht 5' (1.524 m)   Wt 67.4 kg (148 lb 9.6 oz)   LMP  (LMP Unknown)   SpO2 99%   BMI 29.02 kg/m²     Physical Exam  Vitals and nursing note reviewed.   Constitutional:       General: She is not in acute distress.     Appearance: She is well-developed.   HENT:      Head: Normocephalic and atraumatic.      Right Ear: Tympanic membrane, ear canal and external ear normal.      Left Ear: Tympanic membrane, ear canal and external ear normal.      Nose: Congestion and rhinorrhea present.   Eyes:      Conjunctiva/sclera: Conjunctivae normal.   Neck:      Vascular: No carotid bruit.   Cardiovascular:      Rate and Rhythm: Normal rate and regular rhythm.      Heart sounds: No murmur heard.  Pulmonary:      Effort: Pulmonary effort is normal. No respiratory distress.      Breath sounds: Normal breath sounds. No wheezing, rhonchi or rales.   Abdominal:      General: Abdomen is flat. Bowel sounds are normal. There is no distension.      Palpations:  Abdomen is soft. There is no mass.      Tenderness: There is no abdominal tenderness. There is no guarding.   Musculoskeletal:         General: No swelling.      Cervical back: Normal range of motion and neck supple. No rigidity or tenderness.   Lymphadenopathy:      Cervical: No cervical adenopathy.   Skin:     General: Skin is warm and dry.      Capillary Refill: Capillary refill takes less than 2 seconds.      Coloration: Skin is not jaundiced or pale.   Neurological:      Mental Status: She is alert. Mental status is at baseline.   Psychiatric:         Mood and Affect: Mood normal.         Behavior: Behavior normal.         Thought Content: Thought content normal.         Judgment: Judgment normal.

## 2024-10-21 NOTE — ASSESSMENT & PLAN NOTE
Anxiety symptoms and difficulty falling asleep at night currently on lorazepam 0.5 mg patient describes brain fog the next morning will decrease her dose from 0.5 down to 0.25 mg at bedtime.    Orders:    LORazepam (ATIVAN) 0.5 mg tablet; Take 0.5 tablets (0.25 mg total) by mouth daily at bedtime

## 2024-10-21 NOTE — ASSESSMENT & PLAN NOTE
Patient is due for follow-up CT scan in December for follow-up of right upper lung nodule.  Will see her

## 2024-10-21 NOTE — ASSESSMENT & PLAN NOTE
Wt Readings from Last 3 Encounters:   10/21/24 67.4 kg (148 lb 9.6 oz)   09/18/24 66.3 kg (146 lb 3.2 oz)   08/26/24 65.3 kg (144 lb)         Currently the patient shows stable weight decreased ankle edema in both lower extremities she would like to stop her furosemide which has been 40 mg daily.  I have agreed to this but she understands she has to let us know immediately if she starts to develop swelling again.  Either ankle swelling or shortness of breath would be reasons to notify us about restarting diuretic.

## 2024-10-21 NOTE — ASSESSMENT & PLAN NOTE
Recommend continuation of current thyroid supplementation at 75 mcg daily clinically stable

## 2024-10-21 NOTE — PROGRESS NOTES
Assessment:   Diagnosis ICD-10-CM Associated Orders   1. Primary osteoarthritis of knees, bilateral  M17.0 Large joint arthrocentesis: bilateral knee      2. Chronic pain of both knees  M25.561 Large joint arthrocentesis: bilateral knee    M25.562     G89.29       3. Sesamoiditis of right foot  M25.871 Ambulatory Referral to Podiatry      4. Hallux rigidus of right foot  M20.21 Ambulatory Referral to Podiatry        Plan:  The patient was offered and accepted bilateral knee CSI at today's visit.  The procedures were tolerated well without any immediate complications.  She will let us know in a few weeks if she has not had any relief.  We discussed that if these injections do not provide her with adequate relief, we could discuss a round of Visco supplementation.  A podiatry referral was placed today for second opinion regarding her previous foot surgery.    To do next visit:  Follow-up in 3 months for re-evaluation of the knees.    The above stated was discussed in layman's terms and the patient expressed understanding.  All questions were answered to the patient's satisfaction.         Subjective:   Neha Molina is a 89 y.o. female who presents to the office today for a follow-up appointment for her bilateral knee pain.  She underwent CSI most recently on 7/25/24 with some relief.  She is interested in repeat CSI today.  She states she has had Visco supplementation previously which she thinks provided her with some relief.    She wants to know who she should see regarding a second opinion for a previous foot surgery she had.  She did see Dr. Lachman for this before.      Review of systems negative unless otherwise specified in HPI    Past Medical History:   Diagnosis Date    CAD (coronary artery disease)        Past Surgical History:   Procedure Laterality Date    BREAST BIOPSY         Family History   Problem Relation Age of Onset    Stroke Sister        Social History     Occupational History    Not on file    Tobacco Use    Smoking status: Former     Types: Cigarettes     Passive exposure: Past    Smokeless tobacco: Never   Vaping Use    Vaping status: Never Used   Substance and Sexual Activity    Alcohol use: Never    Drug use: Never    Sexual activity: Not Currently         Current Outpatient Medications:     acetaminophen (TYLENOL) 500 mg tablet, Take 500 mg by mouth every 6 (six) hours as needed, Disp: , Rfl:     aspirin 81 mg chewable tablet, Chew 81 mg daily, Disp: , Rfl:     levothyroxine 75 mcg tablet, Please administer at 8am daily, Disp: 90 tablet, Rfl: 2    LORazepam (ATIVAN) 0.5 mg tablet, Take 0.5 tablets (0.25 mg total) by mouth daily at bedtime, Disp: 30 tablet, Rfl: 0    lovastatin (ALTOPREV) 20 MG 24 hr tablet, Take 1 tablet (20 mg total) by mouth daily at bedtime Restart after completing Paxlovid treatment. Do not start before February 18, 2024., Disp: , Rfl:     meclizine (ANTIVERT) 25 mg tablet, Take 25 mg by mouth daily as needed, Disp: , Rfl:     Mirabegron ER 25 MG TB24, Take 25 mg by mouth in the morning, Disp: 30 tablet, Rfl: 4    Multiple Vitamins-Minerals (Centrum Adult 50+ MultiGummies) CHEW, CHEW 1 TABLET BY MOUTH EVERY MORNING, Disp: 90 tablet, Rfl: PRN    Vitamin D3 Adult Gummies 25 MCG (1000 UT) CHEW, CHEW 1 TABLET BY MOUTH EVERY MORNING, Disp: 90 tablet, Rfl: PRN    No Known Allergies         Vitals:    10/24/24 1402   BP: 147/79   Pulse: 79         Objective:    General:  Patient is WDWN, alert and oriented, appears stated age, and is in no acute distress.    Musculoskeletal:    Bilateral Knee:    Inspection:  The knees are without erythema or purulence indicative of infection which would preclude the patient from receiving a joint injection at today's visit.         Diagnostics, reviewed and taken today if performed as documented:    None performed        Procedures, if performed today:    Large joint arthrocentesis: bilateral knee  Universal Protocol:  Consent: Verbal consent  "obtained.  Consent given by: patient  Time out: Immediately prior to procedure a \"time out\" was called to verify the correct patient, procedure, equipment, support staff and site/side marked as required.  Patient understanding: patient states understanding of the procedure being performed  Site marked: the operative site was marked  Patient identity confirmed: verbally with patient  Supporting Documentation  Indications: pain   Procedure Details  Location: knee - bilateral knee  Preparation: Patient was prepped and draped in the usual sterile fashion  Needle size: 22 G  Ultrasound guidance: no  Approach: anterolateral    Medications (Right): 3 mL lidocaine 1 %; 12 mg betamethasone acetate-betamethasone sodium phosphate 6 (3-3) mg/mLMedications (Left): 3 mL lidocaine 1 %; 12 mg betamethasone acetate-betamethasone sodium phosphate 6 (3-3) mg/mL   Patient tolerance: patient tolerated the procedure well with no immediate complications  Dressing:  Sterile dressing applied        Portions of the record may have been created with voice recognition software.  Occasional wrong word or \"sound a like\" substitutions may have occurred due to the inherent limitations of voice recognition software.  Read the chart carefully and recognize, using context, where substitutions have occurred.  "

## 2024-10-24 ENCOUNTER — OFFICE VISIT (OUTPATIENT)
Dept: OBGYN CLINIC | Facility: HOSPITAL | Age: 89
End: 2024-10-24
Payer: MEDICARE

## 2024-10-24 VITALS
WEIGHT: 148 LBS | BODY MASS INDEX: 29.06 KG/M2 | SYSTOLIC BLOOD PRESSURE: 147 MMHG | HEIGHT: 60 IN | DIASTOLIC BLOOD PRESSURE: 79 MMHG | HEART RATE: 79 BPM

## 2024-10-24 DIAGNOSIS — M20.21 HALLUX RIGIDUS OF RIGHT FOOT: ICD-10-CM

## 2024-10-24 DIAGNOSIS — M25.562 CHRONIC PAIN OF BOTH KNEES: ICD-10-CM

## 2024-10-24 DIAGNOSIS — M17.0 PRIMARY OSTEOARTHRITIS OF KNEES, BILATERAL: Primary | ICD-10-CM

## 2024-10-24 DIAGNOSIS — M25.561 CHRONIC PAIN OF BOTH KNEES: ICD-10-CM

## 2024-10-24 DIAGNOSIS — G89.29 CHRONIC PAIN OF BOTH KNEES: ICD-10-CM

## 2024-10-24 DIAGNOSIS — M25.871 SESAMOIDITIS OF RIGHT FOOT: ICD-10-CM

## 2024-10-24 PROCEDURE — 99212 OFFICE O/P EST SF 10 MIN: CPT

## 2024-10-24 PROCEDURE — 20610 DRAIN/INJ JOINT/BURSA W/O US: CPT

## 2024-10-24 RX ORDER — BETAMETHASONE SODIUM PHOSPHATE AND BETAMETHASONE ACETATE 3; 3 MG/ML; MG/ML
12 INJECTION, SUSPENSION INTRA-ARTICULAR; INTRALESIONAL; INTRAMUSCULAR; SOFT TISSUE
Status: COMPLETED | OUTPATIENT
Start: 2024-10-24 | End: 2024-10-24

## 2024-10-24 RX ORDER — LIDOCAINE HYDROCHLORIDE 10 MG/ML
3 INJECTION, SOLUTION INFILTRATION; PERINEURAL
Status: COMPLETED | OUTPATIENT
Start: 2024-10-24 | End: 2024-10-24

## 2024-10-24 RX ADMIN — LIDOCAINE HYDROCHLORIDE 3 ML: 10 INJECTION, SOLUTION INFILTRATION; PERINEURAL at 14:00

## 2024-10-24 RX ADMIN — BETAMETHASONE SODIUM PHOSPHATE AND BETAMETHASONE ACETATE 12 MG: 3; 3 INJECTION, SUSPENSION INTRA-ARTICULAR; INTRALESIONAL; INTRAMUSCULAR; SOFT TISSUE at 14:00

## 2024-10-27 ENCOUNTER — NURSE TRIAGE (OUTPATIENT)
Dept: OTHER | Facility: OTHER | Age: 89
End: 2024-10-27

## 2024-10-27 NOTE — TELEPHONE ENCOUNTER
"Reason for Disposition   General information question, no triage required and triager able to answer question    Answer Assessment - Initial Assessment Questions  1. REASON FOR CALL: \"What is the main reason for your call?\" or \"How can I best help you?\"      Pt wants to switch appt date with Dr Resendez. Pts appt date switched to 11/18 at 1130.    Protocols used: Information Only Call - No Triage-Adult-    "

## 2024-11-04 ENCOUNTER — OFFICE VISIT (OUTPATIENT)
Dept: UROLOGY | Facility: AMBULATORY SURGERY CENTER | Age: 89
End: 2024-11-04

## 2024-11-04 VITALS
SYSTOLIC BLOOD PRESSURE: 118 MMHG | BODY MASS INDEX: 29.06 KG/M2 | DIASTOLIC BLOOD PRESSURE: 58 MMHG | OXYGEN SATURATION: 99 % | HEART RATE: 68 BPM | WEIGHT: 148 LBS | HEIGHT: 60 IN

## 2024-11-04 DIAGNOSIS — N39.0 URINARY TRACT INFECTION WITHOUT HEMATURIA, SITE UNSPECIFIED: Primary | ICD-10-CM

## 2024-11-04 DIAGNOSIS — N32.81 OVERACTIVE BLADDER: ICD-10-CM

## 2024-11-04 LAB — POST-VOID RESIDUAL VOLUME, ML POC: 0 ML

## 2024-11-04 RX ORDER — OXYBUTYNIN CHLORIDE 10 MG/1
10 TABLET, EXTENDED RELEASE ORAL
Qty: 30 TABLET | Refills: 4 | Status: SHIPPED | OUTPATIENT
Start: 2024-11-04

## 2024-11-04 NOTE — ASSESSMENT & PLAN NOTE
Currently utilizing 25 mg of Myrbetriq with minimal benefit  Was previously on oxybutynin 20 mg with minimal benefit  She was requesting to restart oxybutynin, I have represcribed oxybutynin 10 mg extended release, side effect profile rediscussed  Was recently placed on Lasix for congestive heart failure, this was discontinued by her PCP on 10/21/2024, she states she did not notice any difference in her urinary symptoms since discontinuing this  PVR in office today 0 mL  Discussed diet and lifestyle modifications, such as limiting bladder irritants and stopping fluid consumption 2 hours prior to bedtime  Discussed alternative therapies such as bladder botox, pelvic floor physical therapy and InterStim, she wishes to defer this  Follow-up in 8 to 10 weeks to discuss symptoms

## 2024-11-04 NOTE — PROGRESS NOTES
Assessment and plan:     Overactive bladder  Currently utilizing 25 mg of Myrbetriq with minimal benefit  Was previously on oxybutynin 20 mg with minimal benefit  She was requesting to restart oxybutynin, I have represcribed oxybutynin 10 mg extended release, side effect profile rediscussed  Was recently placed on Lasix for congestive heart failure, this was discontinued by her PCP on 10/21/2024, she states she did not notice any difference in her urinary symptoms since discontinuing this  PVR in office today 0 mL  Discussed diet and lifestyle modifications, such as limiting bladder irritants and stopping fluid consumption 2 hours prior to bedtime  Discussed alternative therapies such as bladder botox, pelvic floor physical therapy and InterStim, she wishes to defer this  Follow-up in 8 to 10 weeks to discuss symptoms    History of Present Illness     Neha Molina is a 89 y.o. female who presents today to the office for follow-up of overactive bladder.  She was last seen in August 2024.  She was previously on 20 mg of oxybutynin for her bothersome urinary symptoms of urgency, frequency and nocturia.  She was discontinued off of her oxybutynin and initiated on 25 mg of Myrbetriq.    Today in the office she states she is overall doing okay.  She states she has not noticed much of a difference since starting the Myrbetriq.  She is requesting to go back on the oxybutynin as she feels as though her symptoms were somewhat tolerable during that time.  She also was recently discontinued off of her Lasix by her PCP.  She states that she has not noticed any difference in her urinary symptoms since discontinuing this medication.  She is very hesitant to move forward with any interventions.  She also states that she is potentially going to be leaving the [facility] and moving to [place] within the next couple of months.  She continues with urinary urgency and frequency with urge incontinence.  She denies any dysuria,  "hematuria, suprapubic or flank pain.    Laboratory     Lab Results   Component Value Date    BUN 44 (H) 08/02/2024    CREATININE 0.95 08/02/2024       No components found for: \"GFR\"    Lab Results   Component Value Date    CALCIUM 9.2 08/02/2024    K 5.1 08/02/2024    CO2 25 08/02/2024     08/02/2024       Lab Results   Component Value Date    WBC 4.34 02/07/2024    HGB 10.7 (L) 02/07/2024    HCT 33.8 (L) 02/07/2024    MCV 98 02/07/2024     02/07/2024       No results found for: \"PSA\"    Recent Results (from the past 1 hour(s))   POCT Measure PVR    Collection Time: 11/04/24  1:35 PM   Result Value Ref Range    POST-VOID RESIDUAL VOLUME, ML POC 0 mL       Review of Systems     Review of Systems   Constitutional:  Negative for chills and fever.   Respiratory: Negative.  Negative for cough and shortness of breath.    Cardiovascular:  Negative for chest pain and leg swelling.   Genitourinary:  Positive for frequency and urgency. Negative for dyspareunia, dysuria, flank pain, hematuria, menstrual problem, pelvic pain, vaginal bleeding, vaginal discharge and vaginal pain.   Skin:  Negative for rash.   Neurological: Negative.    Hematological:  Negative for adenopathy. Does not bruise/bleed easily.         Allergies     No Known Allergies    Physical Exam     Physical Exam  Vitals reviewed.   Constitutional:       Appearance: Normal appearance.   HENT:      Head: Normocephalic and atraumatic.   Eyes:      Pupils: Pupils are equal, round, and reactive to light.   Cardiovascular:      Rate and Rhythm: Normal rate.   Pulmonary:      Effort: Pulmonary effort is normal.   Musculoskeletal:      Cervical back: Normal range of motion.   Skin:     General: Skin is warm and dry.   Neurological:      General: No focal deficit present.      Mental Status: She is alert and oriented to person, place, and time. Mental status is at baseline.   Psychiatric:         Mood and Affect: Mood normal.         Behavior: Behavior " normal.         Thought Content: Thought content normal.         Judgment: Judgment normal.         Vital Signs     Vitals:    11/04/24 1321   BP: 118/58   BP Location: Left arm   Patient Position: Sitting   Cuff Size: Standard   Pulse: 68   SpO2: 99%   Weight: 67.1 kg (148 lb)   Height: 5' (1.524 m)       Current Medications       Current Outpatient Medications:     acetaminophen (TYLENOL) 500 mg tablet, Take 500 mg by mouth every 6 (six) hours as needed, Disp: , Rfl:     aspirin 81 mg chewable tablet, Chew 81 mg daily, Disp: , Rfl:     levothyroxine 75 mcg tablet, Please administer at 8am daily, Disp: 90 tablet, Rfl: 2    LORazepam (ATIVAN) 0.5 mg tablet, Take 0.5 tablets (0.25 mg total) by mouth daily at bedtime, Disp: 30 tablet, Rfl: 0    lovastatin (ALTOPREV) 20 MG 24 hr tablet, Take 1 tablet (20 mg total) by mouth daily at bedtime Restart after completing Paxlovid treatment. Do not start before February 18, 2024., Disp: , Rfl:     meclizine (ANTIVERT) 25 mg tablet, Take 25 mg by mouth daily as needed, Disp: , Rfl:     Multiple Vitamins-Minerals (Centrum Adult 50+ MultiGummies) CHEW, CHEW 1 TABLET BY MOUTH EVERY MORNING, Disp: 90 tablet, Rfl: PRN    oxybutynin (DITROPAN-XL) 10 MG 24 hr tablet, Take 1 tablet (10 mg total) by mouth daily at bedtime, Disp: 30 tablet, Rfl: 4    Vitamin D3 Adult Gummies 25 MCG (1000 UT) CHEW, CHEW 1 TABLET BY MOUTH EVERY MORNING, Disp: 90 tablet, Rfl: PRN    Active Problems     Patient Active Problem List   Diagnosis    Lactose intolerance    Generalized headaches    Hypothyroid    Other hyperlipidemia    History of CVA (cerebrovascular accident)    Anxiety    Other specified glaucoma    DJD (degenerative joint disease) of knee    Anemia    Ambulatory dysfunction    Overactive bladder    Primary osteoarthritis of knees, bilateral    Primary osteoarthritis involving multiple joints    Benign paroxysmal positional vertigo due to bilateral vestibular disorder    Localized edema     Bilateral carotid artery stenosis    Diastolic dysfunction    Mitral valve regurgitation    Nonrheumatic tricuspid valve regurgitation    Nonrheumatic pulmonary valve insufficiency    Acute on chronic diastolic congestive heart failure (HCC)    Concentric left ventricular hypertrophy    Lung nodule    Sesamoiditis of right foot    Hallux rigidus of right foot       Past Medical History     Past Medical History:   Diagnosis Date    CAD (coronary artery disease)        Surgical History     Past Surgical History:   Procedure Laterality Date    BREAST BIOPSY         Family History     Family History   Problem Relation Age of Onset    Stroke Sister        Social History     Social History     Social History     Tobacco Use   Smoking Status Former    Types: Cigarettes    Passive exposure: Past   Smokeless Tobacco Never       Past Surgical History:   Procedure Laterality Date    BREAST BIOPSY           The following portions of the patient's history were reviewed and updated as appropriate: allergies, current medications, past family history, past medical history, past social history, past surgical history and problem list    Please note :  Voice dictation software has been used to create this document.  There may be inadvertent transcription errors.    DIGNA Brownlee

## 2024-11-11 DIAGNOSIS — F41.9 ANXIETY: ICD-10-CM

## 2024-11-11 RX ORDER — LORAZEPAM 0.5 MG/1
TABLET ORAL
Qty: 15 TABLET | Refills: 0 | Status: SHIPPED | OUTPATIENT
Start: 2024-11-11 | End: 2024-11-18 | Stop reason: SDUPTHER

## 2024-11-12 ENCOUNTER — HOSPITAL ENCOUNTER (OUTPATIENT)
Dept: RADIOLOGY | Age: 89
Discharge: HOME | End: 2024-11-12
Payer: MEDICARE

## 2024-11-12 DIAGNOSIS — R91.1 LUNG NODULE: ICD-10-CM

## 2024-11-12 PROCEDURE — 71250 CT THORAX DX C-: CPT

## 2024-11-14 ENCOUNTER — OFFICE VISIT (OUTPATIENT)
Age: 89
End: 2024-11-14
Payer: MEDICARE

## 2024-11-14 VITALS
WEIGHT: 149.4 LBS | HEIGHT: 60 IN | TEMPERATURE: 97.5 F | HEART RATE: 78 BPM | BODY MASS INDEX: 29.33 KG/M2 | OXYGEN SATURATION: 96 %

## 2024-11-14 DIAGNOSIS — F41.9 ANXIETY: ICD-10-CM

## 2024-11-14 DIAGNOSIS — R26.2 AMBULATORY DYSFUNCTION: ICD-10-CM

## 2024-11-14 DIAGNOSIS — R60.0 LOCALIZED EDEMA: ICD-10-CM

## 2024-11-14 DIAGNOSIS — R91.1 LUNG NODULE: Primary | ICD-10-CM

## 2024-11-14 DIAGNOSIS — E03.8 OTHER SPECIFIED HYPOTHYROIDISM: ICD-10-CM

## 2024-11-14 DIAGNOSIS — N32.81 OVERACTIVE BLADDER: ICD-10-CM

## 2024-11-14 DIAGNOSIS — I50.33 ACUTE ON CHRONIC DIASTOLIC CONGESTIVE HEART FAILURE (HCC): ICD-10-CM

## 2024-11-14 PROCEDURE — 99214 OFFICE O/P EST MOD 30 MIN: CPT | Performed by: INTERNAL MEDICINE

## 2024-11-14 PROCEDURE — G2211 COMPLEX E/M VISIT ADD ON: HCPCS | Performed by: INTERNAL MEDICINE

## 2024-11-14 RX ORDER — FUROSEMIDE 40 MG/1
20 TABLET ORAL DAILY
Qty: 15 TABLET | Refills: 5 | Status: SHIPPED | OUTPATIENT
Start: 2024-11-14

## 2024-11-14 NOTE — ASSESSMENT & PLAN NOTE
Anxiety symptoms reviewed today patient may utilize lorazepam 0.25 mg at bedtime to assist with sleep induction.  No indication of oversedation from this medication.

## 2024-11-14 NOTE — ASSESSMENT & PLAN NOTE
History of ambulatory dysfunction without any recent falls recommend continued use of walker at all times.  Patient would benefit from periodic physical therapy for strengthening of lower extremities.

## 2024-11-14 NOTE — ASSESSMENT & PLAN NOTE
Lung nodule in the right upper lung region has increased in size over the past 3 months.  Recommend PET scan for further identification of possible tumor activity.  Patient unfortunately is scheduled to move out of this area in the next week we are unable to get her an appointment in the PET scanner before she leaves [place].  I have instructed the patient to pursue medical care as soon as she arrives in [place] so that she can get the follow-up PET scan as soon as possible at her new location.  I have informed the patient that there is a possibility that this may be a malignancy.

## 2024-11-14 NOTE — ASSESSMENT & PLAN NOTE
Current clinical assessment of thyroid performance indicates adequate replacement therapy on 75 mcg of levothyroxine daily recommend continuation of current treatment.

## 2024-11-14 NOTE — PROGRESS NOTES
Name: Neha Molina      : 1932      MRN: [id number]  Encounter Provider: Dinesh Johnston MD  Encounter Date: 2024   Encounter department: [facility]    Assessment & Plan  Localized edema    Orders:    furosemide (LASIX) 40 mg tablet; Take 0.5 tablets (20 mg total) by mouth daily    Acute on chronic diastolic congestive heart failure (HCC)  Wt Readings from Last 3 Encounters:   24 67.8 kg (149 lb 6.4 oz)   24 67.1 kg (148 lb)   10/24/24 67.1 kg (148 lb)     Weight up since last visit with patient noting increase in peripheral edema toward the end of the day.  Will resume her diuretic therapy starting with furosemide 20 mg daily.  Indicated to the patient is wise to avoid high salt foods and elevate her feet is much as possible during the day.               Lung nodule  Lung nodule in the right upper lung region has increased in size over the past 3 months.  Recommend PET scan for further identification of possible tumor activity.  Patient unfortunately is scheduled to move out of this area in the next week we are unable to get her an appointment in the PET scanner before she leaves [place].  I have instructed the patient to pursue medical care as soon as she arrives in [place] so that she can get the follow-up PET scan as soon as possible at her new location.  I have informed the patient that there is a possibility that this may be a malignancy.         Other specified hypothyroidism  Current clinical assessment of thyroid performance indicates adequate replacement therapy on 75 mcg of levothyroxine daily recommend continuation of current treatment.         Overactive bladder  Currently on oxybutynin 10 mg daily for overactive bladder.  Recommend continuation of current therapy         Anxiety  Anxiety symptoms reviewed today patient may utilize lorazepam 0.25 mg at bedtime to assist with sleep induction.  No indication of oversedation from this  medication.         Ambulatory dysfunction  History of ambulatory dysfunction without any recent falls recommend continued use of walker at all times.  Patient would benefit from periodic physical therapy for strengthening of lower extremities.              History of Present Illness     This pleasant 89-year-old female patient returns to our office today for follow-up visit.  She is here today to review the results of her CT scan of the chest which was requested 3 months after her last imaging to evaluate for possible change to lung nodule noted on the previous scan.  The location of this nodule is in the right upper lung.  A review of the updated CT scan indicates a increase in the size of this nodule concerning for possible underlying malignancy.  I have discussed this finding with the patient and we recommend that she have a PET scan performed to evaluate for possible malignant activity in the area of this abnormality.  Unfortunately the patient is about to move out of the area in the next week moving to a new home in [facility].  We have attempted to schedule her for the PET scan prior to moving unfortunately we are unable to get a slot in our PET scanner before she will be leaving the area.  Have advised the patient to contact a new provider as soon as she arrives in [place] to pursue further imaging of this right upper lung nodule.    Patient also today was evaluated for an increase in peripheral edema.  She has had this problem on and off over the past several months.  At the time of her previous visit she requested that we discontinue her diuretic therapy which we did now is showing signs of recurrent edema.  I have placed her back on her furosemide diuretic therapy asking her to take 20 mg daily in an effort to control her peripheral edema.    She continues to experience frequency  of urination.  She is currently on oxybutynin which I recommend she continue for management of the symptoms.   Current dosing is 10 mg daily.      Review of Systems   Cardiovascular:  Positive for leg swelling.   Genitourinary:  Positive for frequency.   All other systems reviewed and are negative.    Past Medical History:   Diagnosis Date    CAD (coronary artery disease)      Past Surgical History:   Procedure Laterality Date    BREAST BIOPSY       Family History   Problem Relation Age of Onset    Stroke Sister      Social History     Tobacco Use    Smoking status: Former     Types: Cigarettes     Passive exposure: Past    Smokeless tobacco: Never   Vaping Use    Vaping status: Never Used   Substance and Sexual Activity    Alcohol use: Never    Drug use: Never    Sexual activity: Not Currently     Current Outpatient Medications on File Prior to Visit   Medication Sig    acetaminophen (TYLENOL) 500 mg tablet Take 500 mg by mouth every 6 (six) hours as needed    aspirin 81 mg chewable tablet Chew 81 mg daily    levothyroxine 75 mcg tablet Please administer at 8am daily    LORazepam (ATIVAN) 0.5 mg tablet TAKE ONE-HALF TABLET (0.25MG)  BY MOUTH AT BEDTIME    lovastatin (ALTOPREV) 20 MG 24 hr tablet Take 1 tablet (20 mg total) by mouth daily at bedtime Restart after completing Paxlovid treatment. Do not start before February 18, 2024.    meclizine (ANTIVERT) 25 mg tablet Take 25 mg by mouth daily as needed    Multiple Vitamins-Minerals (Centrum Adult 50+ MultiGummies) CHEW CHEW 1 TABLET BY MOUTH EVERY MORNING    oxybutynin (DITROPAN-XL) 10 MG 24 hr tablet Take 1 tablet (10 mg total) by mouth daily at bedtime    Vitamin D3 Adult Gummies 25 MCG (1000 UT) CHEW CHEW 1 TABLET BY MOUTH EVERY MORNING     No Known Allergies  Immunization History   Administered Date(s) Administered    COVID-19 Moderna mRNA Vaccine 12 Yr+ 50 mcg/0.5 mL (Spikevax) 10/23/2023    COVID-19 PFIZER VACCINE 0.3 ML IM 01/23/2021, 02/12/2021    COVID-19 Pfizer Vac BIVALENT Dev-sucrose 12 Yr+ IM 09/18/2022    COVID-19 Pfizer vac (Dev-sucrose, gray cap) 12 yr+ IM  10/01/2021, 04/04/2022    INFLUENZA 10/23/2023     Objective   Pulse 78   Temp 97.5 °F (36.4 °C) (Tympanic)   Ht 5' (1.524 m)   Wt 67.8 kg (149 lb 6.4 oz)   LMP  (LMP Unknown)   SpO2 96%   BMI 29.18 kg/m²     Physical Exam  Vitals and nursing note reviewed.   Constitutional:       General: She is not in acute distress.     Appearance: She is well-developed.   HENT:      Head: Normocephalic and atraumatic.   Eyes:      Conjunctiva/sclera: Conjunctivae normal.   Cardiovascular:      Rate and Rhythm: Normal rate and regular rhythm.      Heart sounds: No murmur heard.  Pulmonary:      Effort: Pulmonary effort is normal. No respiratory distress.      Breath sounds: Normal breath sounds. No wheezing, rhonchi or rales.   Abdominal:      Palpations: Abdomen is soft.   Musculoskeletal:      Cervical back: Neck supple.      Right lower leg: Edema present.      Left lower leg: Edema present.      Comments: Trace edema both lower extremities patient indicates edema increases as the day progresses he comes rather swollen at the end of the day in both ankles   Skin:     General: Skin is warm and dry.      Capillary Refill: Capillary refill takes less than 2 seconds.   Neurological:      Mental Status: She is alert.   Psychiatric:         Mood and Affect: Mood normal.

## 2024-11-14 NOTE — ASSESSMENT & PLAN NOTE
Wt Readings from Last 3 Encounters:   11/14/24 67.8 kg (149 lb 6.4 oz)   11/04/24 67.1 kg (148 lb)   10/24/24 67.1 kg (148 lb)     Weight up since last visit with patient noting increase in peripheral edema toward the end of the day.  Will resume her diuretic therapy starting with furosemide 20 mg daily.  Indicated to the patient is wise to avoid high salt foods and elevate her feet is much as possible during the day.

## 2024-11-14 NOTE — ASSESSMENT & PLAN NOTE
Currently on oxybutynin 10 mg daily for overactive bladder.  Recommend continuation of current therapy

## 2024-11-17 ENCOUNTER — TELEPHONE (OUTPATIENT)
Dept: OTHER | Facility: OTHER | Age: 89
End: 2024-11-17

## 2024-11-17 NOTE — TELEPHONE ENCOUNTER
"Pt stated, \" I am currently taking 0.5  mg of   LORazepam (ATIVAN)  and it is not effective. I would like to go back to taking the full 1 mg . \"      Please follow up, thank you.   "

## 2024-11-18 ENCOUNTER — OFFICE VISIT (OUTPATIENT)
Dept: PODIATRY | Facility: CLINIC | Age: 89
End: 2024-11-18
Payer: MEDICARE

## 2024-11-18 VITALS
WEIGHT: 145 LBS | SYSTOLIC BLOOD PRESSURE: 124 MMHG | HEART RATE: 62 BPM | DIASTOLIC BLOOD PRESSURE: 76 MMHG | BODY MASS INDEX: 28.47 KG/M2 | HEIGHT: 60 IN

## 2024-11-18 DIAGNOSIS — M20.42 HAMMER TOE OF LEFT FOOT: Primary | ICD-10-CM

## 2024-11-18 DIAGNOSIS — M20.21 HALLUX RIGIDUS OF RIGHT FOOT: ICD-10-CM

## 2024-11-18 DIAGNOSIS — M25.871 SESAMOIDITIS OF RIGHT FOOT: ICD-10-CM

## 2024-11-18 DIAGNOSIS — L84 CALLUS OF FOOT: ICD-10-CM

## 2024-11-18 PROCEDURE — 99203 OFFICE O/P NEW LOW 30 MIN: CPT | Performed by: PODIATRIST

## 2024-11-18 NOTE — TELEPHONE ENCOUNTER
Please contact the patient I am worried about her taking a milligram of Ativan and the possibility of over sedation and fall risk.  I would like her to stay at 0.5 mg she can take 1 pill 3 times a day but not more than that.  Thank you

## 2024-11-18 NOTE — PROGRESS NOTES
Podiatry Clinic Visit  Neha Molina 89 y.o. female MRN: [id number]  Encounter: [id number]    Assessment & Plan        Diagnoses and all orders for this visit:    Sesamoiditis of right foot  -     Ambulatory Referral to Podiatry    Hallux rigidus of right foot  -     Ambulatory Referral to Podiatry           Plan:  Patient was examined, evaluated, and treated with all questions and concerns addressed.  Reviewed X-ray 8/20/2024 of right foot. The 1st MPJ shows semi-joint implant. Right hallux does not purchase to the weight bearing surface. The implant is plantar located to the proximal phalanx and the 1st MT head is obliterated.   Discussed possible surgical and conservative treatment for submet 1 pain. Because of her age, comorbidity and her plan to move to [place], conservative care with padding insole was suggested. Patient was made 1 felt pad insole with 1st MT cut fit to her foot and 2 felt pad insole with 1st MT head cut fit to her current shoes, which is longer than her foot.  Patient's left 3rd toe hammer toe distal callus was trimmed with 15 blade with no complications. Patient reports immediate relief. Hammer toe crest was given to her.   Patient was re-appointed for as needed    - Dr. Resendez was available/present for entirety of patient encounter and present for all procedures.    History of Present Illness     HPI: Neha Molina is a 89 y.o. female who presents with complaint of Right sub met 1 pain and left 3rd toe distal callus pain. Patient received a bunion surgery 8 years ago and got an implant. Her 1st MPJ of right foot became rigidly dorsiflexed since then and there is pain to the sub met 1 when weight bearing. The fat pad has been thinned. She lives in a nursing home which does not provide her sneakers or shoe shopping. She walks with a walker and to the lateral side of there right foot, which let to her 2 falls. Patient tried some other offloading from another office but did not help.  Patient will move to [place] soon. The patient has no further podiatric complaints at this time.    Review of Systems   Constitutional: Negative.    HENT: Negative.    Eyes: Negative.    Respiratory: Negative.    Cardiovascular: Negative.    Gastrointestinal: Negative.    Musculoskeletal: rigid 1st MPJ of right foot with pain of submet 1 in walking. Hammer toe of left foot.  Skin: callus of left 3rd distal toe  Neurological: Negative.        Historical Information   Past Medical History:   Diagnosis Date    CAD (coronary artery disease)      Past Surgical History:   Procedure Laterality Date    BREAST BIOPSY       Social History   Social History     Substance and Sexual Activity   Alcohol Use Never     Social History     Substance and Sexual Activity   Drug Use Never     Social History     Tobacco Use   Smoking Status Former    Types: Cigarettes    Passive exposure: Past   Smokeless Tobacco Never     Family History:   Family History   Problem Relation Age of Onset    Stroke Sister        Meds/Allergies   Not in a hospital admission.  No Known Allergies    Objective     Current Vitals:   Blood Pressure: 124/76 (11/18/24 1156)  Pulse: 62 (11/18/24 1156)  Height: 5' (152.4 cm) (11/18/24 1156)  Weight - Scale: 65.8 kg (145 lb) (11/18/24 1156)        /76 (Patient Position: Sitting, Cuff Size: Standard)   Pulse 62   Ht 5' (1.524 m)   Wt 65.8 kg (145 lb)   LMP  (LMP Unknown)   BMI 28.32 kg/m²       Lower Extremity Exam:    Foot Exam    Musculoskeletal:  MMT is 5/5 to all compartments of the LE B/L, +1/4 edema B/L,  Pain on  palpation of right submet 1 and left distal 3rd toe callus, Equinus is present. No pain with passive ROM of pedal joints.     Vascular:   DP pulses are absent bilaterally and PT pulses are absent bilaterally., CFT< 3sec to all digits. Pedal hair is Absent. Skin temperature warm to warm B/L.     Dermatological:  No open Lesions. Skin of the LE is normal texture, temperature, turgor B/L.  Interdigital maceration is not present.  Nails elongated and thickened with subungual debris x10. Xerotic skin is not noted of the dorsum and plantar surface of the feet bilaterally.        Neurologic:  Gross sensation is Intact. Monofilament sensation is Intact. Sharp sensation is present.

## 2024-11-20 ENCOUNTER — HOSPITAL ENCOUNTER (OUTPATIENT)
Dept: NUCLEAR MEDICINE | Facility: HOSPITAL | Age: 89
Discharge: HOME | End: 2024-11-20
Payer: MEDICARE

## 2024-11-20 DIAGNOSIS — R91.1 LUNG NODULE: ICD-10-CM

## 2024-11-20 LAB — GLUCOSE SERPL-MCNC: 96 MG/DL (ref 65–140)

## 2024-11-20 PROCEDURE — 82948 REAGENT STRIP/BLOOD GLUCOSE: CPT

## 2024-11-20 PROCEDURE — 78815 PET IMAGE W/CT SKULL-THIGH: CPT

## 2024-11-20 PROCEDURE — A9552 F18 FDG: HCPCS

## 2024-11-21 NOTE — PROGRESS NOTES
Patient contacted by phone she is already relocated to the [facility].  I went over her PET scan with her in detail by phone indicating that the right lung mass does have some faint activity on PET scanning and I recommend a repeat CT scan in 4 months to reevaluate this area.  In addition we find a small cyst in the pancreas recommend reimaging in 2 years.  I have urged the patient to get established with an internist in the [place] area to  her care and move forward.

## 2024-11-25 ENCOUNTER — TELEPHONE (OUTPATIENT)
Age: 89
End: 2024-11-25

## 2024-11-25 DIAGNOSIS — E03.8 OTHER SPECIFIED HYPOTHYROIDISM: ICD-10-CM

## 2024-11-25 DIAGNOSIS — N32.81 OVERACTIVE BLADDER: ICD-10-CM

## 2024-11-25 DIAGNOSIS — Z86.73 HISTORY OF CVA (CEREBROVASCULAR ACCIDENT): Primary | ICD-10-CM

## 2024-11-25 DIAGNOSIS — R60.0 LOCALIZED EDEMA: ICD-10-CM

## 2024-11-25 DIAGNOSIS — E78.49 OTHER HYPERLIPIDEMIA: ICD-10-CM

## 2024-11-25 RX ORDER — FUROSEMIDE 40 MG/1
20 TABLET ORAL DAILY
Qty: 15 TABLET | Refills: 5 | Status: SHIPPED | OUTPATIENT
Start: 2024-11-25 | End: 2024-11-26 | Stop reason: SDUPTHER

## 2024-11-25 RX ORDER — LEVOTHYROXINE SODIUM 75 UG/1
TABLET ORAL
Qty: 90 TABLET | Refills: 2 | Status: SHIPPED | OUTPATIENT
Start: 2024-11-25 | End: 2024-11-26 | Stop reason: SDUPTHER

## 2024-11-25 RX ORDER — ASPIRIN 81 MG/1
81 TABLET, CHEWABLE ORAL DAILY
Qty: 30 TABLET | Refills: 5 | Status: SHIPPED | OUTPATIENT
Start: 2024-11-25 | End: 2024-11-26 | Stop reason: SDUPTHER

## 2024-11-25 RX ORDER — OXYBUTYNIN CHLORIDE 10 MG/1
10 TABLET, EXTENDED RELEASE ORAL
Qty: 30 TABLET | Refills: 4 | Status: CANCELLED | OUTPATIENT
Start: 2024-11-25

## 2024-11-25 NOTE — TELEPHONE ENCOUNTER
Patient requesting a refill of the attached medication oxybutynin. She also is requesting to send it to a new pharmacy because she is in a different facility. She is requesting to send to [facility] on [address] in [place], [place] (Updated pharmacy)    Patient only has 3 days left and requesting a call back once filled at [phone]

## 2024-11-25 NOTE — TELEPHONE ENCOUNTER
Patient called back stating her last call was disconnected.    Pt asked for medical records to be sent to facility she's in. Pt was warm transferred to medical records for further assistance. They'll be able to obtain a verbal consent from the pt and send records.

## 2024-11-25 NOTE — TELEPHONE ENCOUNTER
[facility] called to notify  that pt moved and no longer receives her prescriptions at the facility. They received a refill request for Lasix. Please advise

## 2024-11-25 NOTE — TELEPHONE ENCOUNTER
Medication:     levothyroxine 75 mcg tablet       Dose/Frequency: : Please administer at 8am daily, P     Quantity: 90    Pharmacy: [place] [address]    Office:   [] PCP/Provider -   [] Speciality/Provider -     Does the patient have enough for 3 days?   [] Yes   [x] No - Send as HP to POD        Medication:     furosemide (LASIX) 40 mg tablet       Dose/Frequency: : Take 0.5 tablets (20 mg total) by mouth daily     Quantity: 15    Pharmacy: [address] [place] [address]    Office:   [x] PCP/Provider -   [] Speciality/Provider -     Does the patient have enough for 3 days?   [] Yes   [x] No - Send as HP to POD          Medication:     aspirin 81 mg chewable tablet       Dose/Frequency: Chew 81 mg daily,     Quantity: n/a    Pharmacy: [place] [address]    Office:   [x] PCP/Provider -   [] Speciality/Provider -     Does the patient have enough for 3 days?   [] Yes   [x] No - Send as HP to POD    Medication:     lovastatin (ALTOPREV) 20 MG 24 hr tablet       Dose/Frequency: Take 1 tablet (20 mg total) by mouth daily at bedtime Restart after completing Paxlovid treatmen     Quantity: n/a    Pharmacy: [address] [place] [address]    Office:   [] PCP/Provider -   [x] Speciality/Provider -     Does the patient have enough for 3 days?   [x] Yes   [] No - Send as HP to POD

## 2024-11-26 DIAGNOSIS — E03.8 OTHER SPECIFIED HYPOTHYROIDISM: ICD-10-CM

## 2024-11-26 DIAGNOSIS — R60.0 LOCALIZED EDEMA: ICD-10-CM

## 2024-11-26 DIAGNOSIS — Z86.73 HISTORY OF CVA (CEREBROVASCULAR ACCIDENT): ICD-10-CM

## 2024-11-26 DIAGNOSIS — E78.49 OTHER HYPERLIPIDEMIA: ICD-10-CM

## 2024-11-26 RX ORDER — FUROSEMIDE 40 MG/1
20 TABLET ORAL DAILY
Qty: 15 TABLET | Refills: 5 | Status: SHIPPED | OUTPATIENT
Start: 2024-11-26

## 2024-11-26 RX ORDER — LEVOTHYROXINE SODIUM 75 UG/1
TABLET ORAL
Qty: 90 TABLET | Refills: 1 | Status: SHIPPED | OUTPATIENT
Start: 2024-11-26

## 2024-11-26 RX ORDER — OXYBUTYNIN CHLORIDE 10 MG/1
10 TABLET, EXTENDED RELEASE ORAL
Qty: 30 TABLET | Refills: 4 | Status: SHIPPED | OUTPATIENT
Start: 2024-11-26

## 2024-11-26 RX ORDER — ASPIRIN 81 MG/1
81 TABLET, CHEWABLE ORAL DAILY
Qty: 30 TABLET | Refills: 5 | Status: SHIPPED | OUTPATIENT
Start: 2024-11-26

## 2024-11-26 NOTE — TELEPHONE ENCOUNTER
NOT A DUPLICATE:  Patient confirmed medications went to the wrong pharmacy. Patient requesting scripts sent to [facility] pharmacy.     Reason for call:   [x] Refill   [] Prior Auth  [x] Other: Alternate pharmacy    Office:   [] PCP/Provider -   [] Specialty/Provider -     Medication:   levothyroxine 75 mcg/ Please administer at 8am daily     furosemide (LASIX) 40 mg/Take 0.5 tablets (20 mg total) by mouth daily     aspirin 81 mg/Chew 1 tablet (81 mg total) daily     lovastatin (ALTOPREV) 20 MG/ Take 1 tablet (20 mg total) by mouth daily at bedtime     Quantity:     Pharmacy:   [facility]/pharmacy #[id number] - [place] - [address]       Does the patient have enough for 3 days?   [] Yes   [x] No - Send as HP to POD

## 2024-11-27 NOTE — TELEPHONE ENCOUNTER
Spoke to patient and informed her that the prescription was sent in to the [facility] on [address] in [place]

## 2024-11-29 DIAGNOSIS — E78.49 OTHER HYPERLIPIDEMIA: ICD-10-CM

## 2024-11-29 NOTE — TELEPHONE ENCOUNTER
Patient calling regarding refill for lovastatin she realized she did not have anymore when she went to take her pills this morning. Made patient aware that script was sent to pharmacy on 11/26. Patient to follow up with pharmacy

## 2024-12-19 NOTE — OCCUPATIONAL THERAPY INITIAL EVALUATION ADULT - LIGHT TOUCH SENSATION, LUE, REHAB EVAL
December 19, 2024     RENNY Contreras  [address]  [address]  [place] [address]    Patient: Steven Mahajan   YOB: 1955   Date of Visit: 12/19/2024       Dear RENNY Contreras:    Thank you for referring Steven Mahajan to me for evaluation. Below are the relevant portions of my assessment and plan of care.    Encounter Diagnosis and Orders:  Diagnoses and all orders for this visit:    1. Foot pain, bilateral (Primary)    2. Arthritis of both feet  -     Diclofenac Sodium (VOLTAREN) 1 % gel gel; Apply 4 g topically to the appropriate area as directed 4 (Four) Times a Day.  Dispense: 100 g; Refill: 5    3. Pes planus of both feet        If you have questions, please do not hesitate to call me. I look forward to following Steven along with you.         Sincerely,        Rafal Steve DPM        CC: No Recipients      
within normal limits

## 2025-01-19 NOTE — INPATIENT CERTIFICATION FOR MEDICARE PATIENTS - IN ORDER TO MEET MEDICARE REQUIREMENTS.
36.6 In order to meet Medicare requirements, the clinical documentation must support the information cited in the admission order.  Please be sure to provide detailed and clear documentation about the following in the admitting note/history and physical:

## 2025-06-18 NOTE — PATIENT PROFILE ADULT - ARRIVAL FROM
Grisel Kaiser MA JE    6/13/25  3:41 PM  Note     Outcome     Prior Authorization is not required for this medication dosage form and strength at the quantity and days supply requested.      If this requires a response please respond to the pool ( P [facility]X P[facility] MEDICATION PRE-AUTH).       Thank you please advise patient.         Home